# Patient Record
Sex: MALE | Race: WHITE | Employment: OTHER | URBAN - METROPOLITAN AREA
[De-identification: names, ages, dates, MRNs, and addresses within clinical notes are randomized per-mention and may not be internally consistent; named-entity substitution may affect disease eponyms.]

---

## 2018-12-11 ENCOUNTER — HOSPITAL ENCOUNTER (OUTPATIENT)
Dept: CARDIAC REHAB | Age: 63
Setting detail: THERAPIES SERIES
Discharge: HOME | End: 2018-12-11
Payer: MEDICARE

## 2018-12-11 PROCEDURE — G0424 PULMONARY REHAB W EXER: HCPCS

## 2018-12-13 ENCOUNTER — HOSPITAL ENCOUNTER (OUTPATIENT)
Dept: CARDIAC REHAB | Age: 63
Setting detail: THERAPIES SERIES
Discharge: HOME | End: 2018-12-13
Payer: MEDICARE

## 2018-12-13 PROCEDURE — G0424 PULMONARY REHAB W EXER: HCPCS

## 2018-12-18 ENCOUNTER — HOSPITAL ENCOUNTER (OUTPATIENT)
Dept: CARDIAC REHAB | Age: 63
Setting detail: THERAPIES SERIES
Discharge: HOME | End: 2018-12-18
Payer: MEDICARE

## 2018-12-18 PROCEDURE — G0424 PULMONARY REHAB W EXER: HCPCS

## 2018-12-20 ENCOUNTER — HOSPITAL ENCOUNTER (OUTPATIENT)
Dept: CARDIAC REHAB | Age: 63
Setting detail: THERAPIES SERIES
Discharge: HOME | End: 2018-12-20
Payer: MEDICARE

## 2018-12-20 VITALS — BODY MASS INDEX: 28.72 KG/M2 | WEIGHT: 200.6 LBS | HEIGHT: 70 IN

## 2018-12-20 PROCEDURE — G0424 PULMONARY REHAB W EXER: HCPCS

## 2018-12-27 ENCOUNTER — HOSPITAL ENCOUNTER (OUTPATIENT)
Dept: CARDIAC REHAB | Age: 63
Setting detail: THERAPIES SERIES
Discharge: HOME | End: 2018-12-27
Payer: MEDICARE

## 2018-12-27 VITALS — WEIGHT: 202.2 LBS | HEIGHT: 70 IN | BODY MASS INDEX: 28.95 KG/M2

## 2018-12-27 NOTE — PROGRESS NOTES
Outpatient Dietitian Assessment  12/27/2018    Lucita Padilla 58 y.o. male    PCP:   Ghazala Koehler MD    Assessment:  Patient and significant seen for nutrition counseling. Patient and significant report that patient usually eats 3 meals per day; has gained 20 pounds since quit smoking and wants to lose weight. Usual body weight: 170 to 175 pounds. Patient likes sweets. Pertinent past medical/surgical history:   hyperlipidemia, depression, emphysema ( per pt), GERD, HTN, SOB, Hard of Hearing, COPD, CAD,  LLL  Lung nodule. Diet at home: regular; no alcohol use now ( hx of 1 beer a month , data on file) , no food allergies; appetite excellent; no cultural, Islam or ethnic food preferences; no difficulty chewing or swallowing foods; has partial upper dentures and own bottom teeth; has adequate funds for groceries; patient and significan other shop at grocery stores; patient and significant other cook; patient is a retired baker; 24 hour dietary recall done and  Is as followed:  Breakfast: 9 am, at home 2 cups regular coffee with cream ( usually does not eat breakfast)  AM snack: time?, at home,  Ate few [employer] sugar cookies  During  afternoon, ate \"few ham sandwiches\" on white bread which were leftover  After dinner, time?, at home, ate more Amanda cookies  Eats at sit down restaurants per haps one time per week  Eats at fast food restaurants one time per month ( plans to stop eating fast food after the first of year)   Drinks approximately 2 1/2 cups regular coffee with cream daily  Does not routinely drink pop  Drinks 1 - 16 ounce bottle of water daily    Ht Readings from Last 1 Encounters:   12/27/18 5' 10\" (1.778 m)     Wt Readings from Last 3 Encounters:   12/27/18 202 lb 3.2 oz (91.7 kg)   12/20/18 200 lb 9.6 oz (91 kg)   01/06/13 175 lb (79.4 kg)     Body mass index is 29.01 kg/m². Diagnosis:  Overweight related to history of excessive caloric intake as evident by BMI 29.01.     Intervention:  Meet estimated needs. Lose weight. Adequate hydration. Increased knowledge. Improved compliance. Follow individualized diet. Education provided: Medical Nutrition Therapy Heart Healthy Low Sodium, My Plate, Meal Planning Made Easy, Reaching Nutrition goals, 7 common serving size handouts. ( addendum) Nutrition: Interdisciplinary Nutrition Therapy ( ITP) note: Patient is a phase II patient. ITP done. See data on file. Will continue to monitor. Electronically signed by Josie Thayer RD, LD on 12/27/18 at 1:23 PM      Monitoring/Evaluation:  RD will monitor PO intake and weight data as available. The goals set by this patient are to limit portions to lose weight, drink more water and limit sweet intake. The patient will be followed on January 24, 2019. Sonali Melton MA, RD, LD

## 2019-11-07 RX ORDER — GABAPENTIN 600 MG/1
600 TABLET ORAL NIGHTLY
COMMUNITY

## 2019-11-07 RX ORDER — HYDROCODONE BITARTRATE AND ACETAMINOPHEN 7.5; 325 MG/1; MG/1
1 TABLET ORAL EVERY 8 HOURS PRN
COMMUNITY
End: 2022-08-21 | Stop reason: ALTCHOICE

## 2019-11-07 RX ORDER — QUETIAPINE FUMARATE 200 MG/1
200 TABLET, FILM COATED ORAL DAILY
COMMUNITY
End: 2022-08-21 | Stop reason: ALTCHOICE

## 2019-11-08 ENCOUNTER — HOSPITAL ENCOUNTER (OUTPATIENT)
Age: 64
Discharge: HOME | End: 2019-11-08
Payer: MEDICARE

## 2019-11-08 LAB
EKG ATRIAL RATE: 110 BPM
EKG P AXIS: 79 DEGREES
EKG P-R INTERVAL: 174 MS
EKG Q-T INTERVAL: 324 MS
EKG QRS DURATION: 92 MS
EKG QTC CALCULATION (BAZETT): 438 MS
EKG R AXIS: 80 DEGREES
EKG T AXIS: 61 DEGREES
EKG VENTRICULAR RATE: 110 BPM

## 2019-11-08 PROCEDURE — 93005 ELECTROCARDIOGRAM TRACING: CPT | Performed by: ANESTHESIOLOGY

## 2019-11-11 ENCOUNTER — ANESTHESIA EVENT (OUTPATIENT)
Dept: OPERATING ROOM | Age: 64
End: 2019-11-11
Payer: MEDICARE

## 2019-11-12 ASSESSMENT — LIFESTYLE VARIABLES: SMOKING_STATUS: 0

## 2019-11-13 ENCOUNTER — HOSPITAL ENCOUNTER (OUTPATIENT)
Age: 64
Setting detail: OUTPATIENT SURGERY
Discharge: HOME | End: 2019-11-13
Attending: SURGERY | Admitting: SURGERY
Payer: MEDICARE

## 2019-11-13 ENCOUNTER — ANESTHESIA (OUTPATIENT)
Dept: OPERATING ROOM | Age: 64
End: 2019-11-13
Payer: MEDICARE

## 2019-11-13 VITALS
WEIGHT: 209 LBS | SYSTOLIC BLOOD PRESSURE: 130 MMHG | HEIGHT: 70 IN | HEART RATE: 95 BPM | BODY MASS INDEX: 29.92 KG/M2 | DIASTOLIC BLOOD PRESSURE: 82 MMHG | OXYGEN SATURATION: 92 % | RESPIRATION RATE: 16 BRPM | TEMPERATURE: 98 F

## 2019-11-13 VITALS
OXYGEN SATURATION: 99 % | SYSTOLIC BLOOD PRESSURE: 102 MMHG | DIASTOLIC BLOOD PRESSURE: 63 MMHG | RESPIRATION RATE: 8 BRPM

## 2019-11-13 DIAGNOSIS — M72.0 CONTRACTURE OF PALMAR FASCIA: ICD-10-CM

## 2019-11-13 DIAGNOSIS — M72.0 DUPUYTREN CONTRACTURE: Primary | ICD-10-CM

## 2019-11-13 PROCEDURE — 3700000001 HC ADD 15 MINUTES (ANESTHESIA): Performed by: SURGERY

## 2019-11-13 PROCEDURE — 7100000010 HC PHASE II RECOVERY - FIRST 15 MIN: Performed by: SURGERY

## 2019-11-13 PROCEDURE — 7100000001 HC PACU RECOVERY - ADDTL 15 MIN: Performed by: SURGERY

## 2019-11-13 PROCEDURE — 2580000003 HC RX 258: Performed by: ANESTHESIOLOGY

## 2019-11-13 PROCEDURE — 6370000000 HC RX 637 (ALT 250 FOR IP): Performed by: ANESTHESIOLOGY

## 2019-11-13 PROCEDURE — 3700000000 HC ANESTHESIA ATTENDED CARE: Performed by: SURGERY

## 2019-11-13 PROCEDURE — 3600000003 HC SURGERY LEVEL 3 BASE: Performed by: SURGERY

## 2019-11-13 PROCEDURE — 7100000000 HC PACU RECOVERY - FIRST 15 MIN: Performed by: SURGERY

## 2019-11-13 PROCEDURE — 2709999900 HC NON-CHARGEABLE SUPPLY: Performed by: SURGERY

## 2019-11-13 PROCEDURE — 6360000002 HC RX W HCPCS: Performed by: NURSE ANESTHETIST, CERTIFIED REGISTERED

## 2019-11-13 PROCEDURE — 2580000003 HC RX 258: Performed by: SURGERY

## 2019-11-13 PROCEDURE — 7100000011 HC PHASE II RECOVERY - ADDTL 15 MIN: Performed by: SURGERY

## 2019-11-13 PROCEDURE — 6360000002 HC RX W HCPCS: Performed by: SURGERY

## 2019-11-13 PROCEDURE — 3600000013 HC SURGERY LEVEL 3 ADDTL 15MIN: Performed by: SURGERY

## 2019-11-13 PROCEDURE — 2500000003 HC RX 250 WO HCPCS: Performed by: SURGERY

## 2019-11-13 PROCEDURE — 2500000003 HC RX 250 WO HCPCS: Performed by: NURSE ANESTHETIST, CERTIFIED REGISTERED

## 2019-11-13 PROCEDURE — 88304 TISSUE EXAM BY PATHOLOGIST: CPT

## 2019-11-13 RX ORDER — HYDRALAZINE HYDROCHLORIDE 20 MG/ML
5 INJECTION INTRAMUSCULAR; INTRAVENOUS EVERY 10 MIN PRN
Status: DISCONTINUED | OUTPATIENT
Start: 2019-11-13 | End: 2019-11-13 | Stop reason: HOSPADM

## 2019-11-13 RX ORDER — SODIUM CHLORIDE, SODIUM LACTATE, POTASSIUM CHLORIDE, CALCIUM CHLORIDE 600; 310; 30; 20 MG/100ML; MG/100ML; MG/100ML; MG/100ML
INJECTION, SOLUTION INTRAVENOUS CONTINUOUS
Status: DISCONTINUED | OUTPATIENT
Start: 2019-11-13 | End: 2019-11-13 | Stop reason: HOSPADM

## 2019-11-13 RX ORDER — HYDROMORPHONE HYDROCHLORIDE 1 MG/ML
0.25 INJECTION, SOLUTION INTRAMUSCULAR; INTRAVENOUS; SUBCUTANEOUS EVERY 5 MIN PRN
Status: DISCONTINUED | OUTPATIENT
Start: 2019-11-13 | End: 2019-11-13 | Stop reason: HOSPADM

## 2019-11-13 RX ORDER — FAMOTIDINE 20 MG/1
20 TABLET, FILM COATED ORAL ONCE
Status: COMPLETED | OUTPATIENT
Start: 2019-11-13 | End: 2019-11-13

## 2019-11-13 RX ORDER — HYDROCODONE BITARTRATE AND ACETAMINOPHEN 5; 325 MG/1; MG/1
1 TABLET ORAL EVERY 4 HOURS PRN
Qty: 12 TABLET | Refills: 0 | Status: SHIPPED | OUTPATIENT
Start: 2019-11-13 | End: 2019-11-20

## 2019-11-13 RX ORDER — PROMETHAZINE HYDROCHLORIDE 25 MG/ML
25 INJECTION, SOLUTION INTRAMUSCULAR; INTRAVENOUS
Status: DISCONTINUED | OUTPATIENT
Start: 2019-11-13 | End: 2019-11-13 | Stop reason: HOSPADM

## 2019-11-13 RX ORDER — HYDROCODONE BITARTRATE AND ACETAMINOPHEN 7.5; 325 MG/1; MG/1
1 TABLET ORAL EVERY 6 HOURS PRN
Qty: 12 TABLET | Refills: 0 | Status: SHIPPED | OUTPATIENT
Start: 2019-11-13 | End: 2019-11-16

## 2019-11-13 RX ORDER — DIPHENHYDRAMINE HYDROCHLORIDE 50 MG/ML
INJECTION INTRAMUSCULAR; INTRAVENOUS PRN
Status: DISCONTINUED | OUTPATIENT
Start: 2019-11-13 | End: 2019-11-13 | Stop reason: SDUPTHER

## 2019-11-13 RX ORDER — MEPERIDINE HYDROCHLORIDE 50 MG/ML
INJECTION INTRAMUSCULAR; INTRAVENOUS; SUBCUTANEOUS PRN
Status: DISCONTINUED | OUTPATIENT
Start: 2019-11-13 | End: 2019-11-13 | Stop reason: SDUPTHER

## 2019-11-13 RX ORDER — GLYCOPYRROLATE 1 MG/5 ML
SYRINGE (ML) INTRAVENOUS PRN
Status: DISCONTINUED | OUTPATIENT
Start: 2019-11-13 | End: 2019-11-13 | Stop reason: SDUPTHER

## 2019-11-13 RX ORDER — MIDAZOLAM HYDROCHLORIDE 1 MG/ML
INJECTION INTRAMUSCULAR; INTRAVENOUS PRN
Status: DISCONTINUED | OUTPATIENT
Start: 2019-11-13 | End: 2019-11-13 | Stop reason: SDUPTHER

## 2019-11-13 RX ORDER — METOCLOPRAMIDE 10 MG/1
10 TABLET ORAL ONCE
Status: COMPLETED | OUTPATIENT
Start: 2019-11-13 | End: 2019-11-13

## 2019-11-13 RX ORDER — DEXAMETHASONE SODIUM PHOSPHATE 10 MG/ML
INJECTION, SOLUTION INTRAMUSCULAR; INTRAVENOUS PRN
Status: DISCONTINUED | OUTPATIENT
Start: 2019-11-13 | End: 2019-11-13 | Stop reason: SDUPTHER

## 2019-11-13 RX ORDER — MEPERIDINE HYDROCHLORIDE 50 MG/ML
12.5 INJECTION INTRAMUSCULAR; INTRAVENOUS; SUBCUTANEOUS EVERY 5 MIN PRN
Status: DISCONTINUED | OUTPATIENT
Start: 2019-11-13 | End: 2019-11-13 | Stop reason: HOSPADM

## 2019-11-13 RX ORDER — MORPHINE SULFATE 2 MG/ML
1 INJECTION, SOLUTION INTRAMUSCULAR; INTRAVENOUS EVERY 5 MIN PRN
Status: DISCONTINUED | OUTPATIENT
Start: 2019-11-13 | End: 2019-11-13 | Stop reason: HOSPADM

## 2019-11-13 RX ORDER — FENTANYL CITRATE 50 UG/ML
50 INJECTION, SOLUTION INTRAMUSCULAR; INTRAVENOUS EVERY 5 MIN PRN
Status: DISCONTINUED | OUTPATIENT
Start: 2019-11-13 | End: 2019-11-13 | Stop reason: HOSPADM

## 2019-11-13 RX ORDER — SIMVASTATIN 80 MG
10 TABLET ORAL NIGHTLY
COMMUNITY

## 2019-11-13 RX ORDER — PROPOFOL 10 MG/ML
INJECTION, EMULSION INTRAVENOUS PRN
Status: DISCONTINUED | OUTPATIENT
Start: 2019-11-13 | End: 2019-11-13 | Stop reason: SDUPTHER

## 2019-11-13 RX ORDER — CEPHALEXIN 500 MG/1
500 CAPSULE ORAL 3 TIMES DAILY
Qty: 16 CAPSULE | Refills: 0 | Status: ON HOLD | OUTPATIENT
Start: 2019-11-13 | End: 2021-09-14 | Stop reason: HOSPADM

## 2019-11-13 RX ORDER — DIPHENHYDRAMINE HYDROCHLORIDE 50 MG/ML
12.5 INJECTION INTRAMUSCULAR; INTRAVENOUS
Status: DISCONTINUED | OUTPATIENT
Start: 2019-11-13 | End: 2019-11-13 | Stop reason: HOSPADM

## 2019-11-13 RX ORDER — HYDROCODONE BITARTRATE AND ACETAMINOPHEN 5; 325 MG/1; MG/1
1 TABLET ORAL PRN
Status: COMPLETED | OUTPATIENT
Start: 2019-11-13 | End: 2019-11-13

## 2019-11-13 RX ORDER — LIDOCAINE HYDROCHLORIDE 20 MG/ML
INJECTION, SOLUTION INTRAVENOUS PRN
Status: DISCONTINUED | OUTPATIENT
Start: 2019-11-13 | End: 2019-11-13 | Stop reason: SDUPTHER

## 2019-11-13 RX ORDER — FENTANYL CITRATE 50 UG/ML
INJECTION, SOLUTION INTRAMUSCULAR; INTRAVENOUS PRN
Status: DISCONTINUED | OUTPATIENT
Start: 2019-11-13 | End: 2019-11-13 | Stop reason: SDUPTHER

## 2019-11-13 RX ORDER — HYDROCODONE BITARTRATE AND ACETAMINOPHEN 5; 325 MG/1; MG/1
2 TABLET ORAL PRN
Status: COMPLETED | OUTPATIENT
Start: 2019-11-13 | End: 2019-11-13

## 2019-11-13 RX ORDER — OXYCODONE HYDROCHLORIDE AND ACETAMINOPHEN 5; 325 MG/1; MG/1
1 TABLET ORAL EVERY 4 HOURS PRN
Status: DISCONTINUED | OUTPATIENT
Start: 2019-11-13 | End: 2019-11-13 | Stop reason: HOSPADM

## 2019-11-13 RX ORDER — PHENYLEPHRINE HYDROCHLORIDE 10 MG/ML
INJECTION INTRAVENOUS PRN
Status: DISCONTINUED | OUTPATIENT
Start: 2019-11-13 | End: 2019-11-13 | Stop reason: SDUPTHER

## 2019-11-13 RX ORDER — LABETALOL 20 MG/4 ML (5 MG/ML) INTRAVENOUS SYRINGE
5 EVERY 10 MIN PRN
Status: DISCONTINUED | OUTPATIENT
Start: 2019-11-13 | End: 2019-11-13 | Stop reason: HOSPADM

## 2019-11-13 RX ORDER — PROMETHAZINE HYDROCHLORIDE 25 MG/ML
INJECTION, SOLUTION INTRAMUSCULAR; INTRAVENOUS PRN
Status: DISCONTINUED | OUTPATIENT
Start: 2019-11-13 | End: 2019-11-13 | Stop reason: SDUPTHER

## 2019-11-13 RX ORDER — ONDANSETRON 2 MG/ML
INJECTION INTRAMUSCULAR; INTRAVENOUS PRN
Status: DISCONTINUED | OUTPATIENT
Start: 2019-11-13 | End: 2019-11-13 | Stop reason: SDUPTHER

## 2019-11-13 RX ADMIN — Medication 0.2 MG: at 12:26

## 2019-11-13 RX ADMIN — FAMOTIDINE 20 MG: 20 TABLET ORAL at 11:15

## 2019-11-13 RX ADMIN — SODIUM CHLORIDE, POTASSIUM CHLORIDE, SODIUM LACTATE AND CALCIUM CHLORIDE: 600; 310; 30; 20 INJECTION, SOLUTION INTRAVENOUS at 13:28

## 2019-11-13 RX ADMIN — PHENYLEPHRINE HYDROCHLORIDE 100 MCG: 10 INJECTION INTRAVENOUS at 13:22

## 2019-11-13 RX ADMIN — HYDROCODONE BITARTRATE AND ACETAMINOPHEN 2 TABLET: 5; 325 TABLET ORAL at 15:31

## 2019-11-13 RX ADMIN — FENTANYL CITRATE 100 MCG: 50 INJECTION, SOLUTION INTRAMUSCULAR; INTRAVENOUS at 12:42

## 2019-11-13 RX ADMIN — CEFAZOLIN 1 G: 1 INJECTION, POWDER, FOR SOLUTION INTRAMUSCULAR; INTRAVENOUS at 12:24

## 2019-11-13 RX ADMIN — FENTANYL CITRATE 50 MCG: 50 INJECTION, SOLUTION INTRAMUSCULAR; INTRAVENOUS at 13:19

## 2019-11-13 RX ADMIN — PHENYLEPHRINE HYDROCHLORIDE 50 MCG: 10 INJECTION INTRAVENOUS at 12:49

## 2019-11-13 RX ADMIN — METOCLOPRAMIDE 10 MG: 10 TABLET ORAL at 11:15

## 2019-11-13 RX ADMIN — ONDANSETRON HYDROCHLORIDE 4 MG: 2 INJECTION, SOLUTION INTRAMUSCULAR; INTRAVENOUS at 14:18

## 2019-11-13 RX ADMIN — DEXAMETHASONE SODIUM PHOSPHATE 10 MG: 10 INJECTION, SOLUTION INTRAMUSCULAR; INTRAVENOUS at 12:45

## 2019-11-13 RX ADMIN — FENTANYL CITRATE 50 MCG: 50 INJECTION, SOLUTION INTRAMUSCULAR; INTRAVENOUS at 12:26

## 2019-11-13 RX ADMIN — DIPHENHYDRAMINE HYDROCHLORIDE 12.5 MG: 50 INJECTION, SOLUTION INTRAMUSCULAR; INTRAVENOUS at 14:18

## 2019-11-13 RX ADMIN — SODIUM CHLORIDE, POTASSIUM CHLORIDE, SODIUM LACTATE AND CALCIUM CHLORIDE: 600; 310; 30; 20 INJECTION, SOLUTION INTRAVENOUS at 11:33

## 2019-11-13 RX ADMIN — MEPERIDINE HYDROCHLORIDE 25 MG: 50 INJECTION, SOLUTION INTRAMUSCULAR; INTRAVENOUS; SUBCUTANEOUS at 14:22

## 2019-11-13 RX ADMIN — PROPOFOL 200 MG: 10 INJECTION, EMULSION INTRAVENOUS at 12:30

## 2019-11-13 RX ADMIN — MEPERIDINE HYDROCHLORIDE 25 MG: 50 INJECTION, SOLUTION INTRAMUSCULAR; INTRAVENOUS; SUBCUTANEOUS at 14:18

## 2019-11-13 RX ADMIN — MIDAZOLAM 2 MG: 1 INJECTION INTRAMUSCULAR; INTRAVENOUS at 12:24

## 2019-11-13 RX ADMIN — LIDOCAINE HYDROCHLORIDE 50 MG: 20 INJECTION, SOLUTION INTRAVENOUS at 12:26

## 2019-11-13 RX ADMIN — PROMETHAZINE HYDROCHLORIDE 12.5 MG: 25 INJECTION INTRAMUSCULAR; INTRAVENOUS at 14:22

## 2019-11-13 RX ADMIN — FENTANYL CITRATE 50 MCG: 50 INJECTION, SOLUTION INTRAMUSCULAR; INTRAVENOUS at 13:06

## 2019-11-13 ASSESSMENT — PULMONARY FUNCTION TESTS
PIF_VALUE: 19
PIF_VALUE: 17
PIF_VALUE: 18
PIF_VALUE: 16
PIF_VALUE: 16
PIF_VALUE: 18
PIF_VALUE: 16
PIF_VALUE: 15
PIF_VALUE: 17
PIF_VALUE: 15
PIF_VALUE: 16
PIF_VALUE: 15
PIF_VALUE: 17
PIF_VALUE: 16
PIF_VALUE: 17
PIF_VALUE: 16
PIF_VALUE: 17
PIF_VALUE: 15
PIF_VALUE: 2
PIF_VALUE: 16
PIF_VALUE: 17
PIF_VALUE: 27
PIF_VALUE: 16
PIF_VALUE: 3
PIF_VALUE: 16
PIF_VALUE: 17
PIF_VALUE: 15
PIF_VALUE: 15
PIF_VALUE: 6
PIF_VALUE: 17
PIF_VALUE: 16
PIF_VALUE: 14
PIF_VALUE: 16
PIF_VALUE: 17
PIF_VALUE: 15
PIF_VALUE: 15
PIF_VALUE: 17
PIF_VALUE: 15
PIF_VALUE: 15
PIF_VALUE: 17
PIF_VALUE: 15
PIF_VALUE: 19
PIF_VALUE: 15
PIF_VALUE: 19
PIF_VALUE: 9
PIF_VALUE: 17
PIF_VALUE: 16
PIF_VALUE: 17
PIF_VALUE: 17
PIF_VALUE: 19
PIF_VALUE: 4
PIF_VALUE: 19
PIF_VALUE: 20
PIF_VALUE: 15
PIF_VALUE: 5
PIF_VALUE: 16
PIF_VALUE: 16
PIF_VALUE: 15
PIF_VALUE: 18
PIF_VALUE: 16
PIF_VALUE: 18
PIF_VALUE: 17
PIF_VALUE: 19
PIF_VALUE: 15
PIF_VALUE: 17
PIF_VALUE: 15
PIF_VALUE: 16
PIF_VALUE: 17
PIF_VALUE: 17
PIF_VALUE: 16
PIF_VALUE: 15
PIF_VALUE: 16
PIF_VALUE: 16
PIF_VALUE: 17
PIF_VALUE: 17
PIF_VALUE: 15
PIF_VALUE: 17
PIF_VALUE: 17
PIF_VALUE: 16
PIF_VALUE: 17
PIF_VALUE: 16
PIF_VALUE: 4
PIF_VALUE: 4
PIF_VALUE: 15
PIF_VALUE: 15
PIF_VALUE: 17
PIF_VALUE: 16
PIF_VALUE: 17
PIF_VALUE: 16
PIF_VALUE: 19
PIF_VALUE: 5
PIF_VALUE: 15
PIF_VALUE: 17
PIF_VALUE: 14
PIF_VALUE: 17
PIF_VALUE: 16
PIF_VALUE: 17
PIF_VALUE: 17
PIF_VALUE: 16
PIF_VALUE: 17
PIF_VALUE: 18
PIF_VALUE: 16
PIF_VALUE: 19
PIF_VALUE: 16
PIF_VALUE: 17
PIF_VALUE: 15
PIF_VALUE: 17
PIF_VALUE: 1
PIF_VALUE: 17
PIF_VALUE: 15
PIF_VALUE: 16
PIF_VALUE: 0
PIF_VALUE: 18
PIF_VALUE: 18
PIF_VALUE: 16
PIF_VALUE: 12
PIF_VALUE: 0
PIF_VALUE: 18
PIF_VALUE: 15
PIF_VALUE: 15

## 2019-11-13 ASSESSMENT — PAIN DESCRIPTION - ORIENTATION
ORIENTATION: LEFT

## 2019-11-13 ASSESSMENT — PAIN DESCRIPTION - FREQUENCY
FREQUENCY: CONTINUOUS

## 2019-11-13 ASSESSMENT — PAIN SCALES - GENERAL
PAINLEVEL_OUTOF10: 5
PAINLEVEL_OUTOF10: 0
PAINLEVEL_OUTOF10: 10
PAINLEVEL_OUTOF10: 0

## 2019-11-13 ASSESSMENT — PAIN DESCRIPTION - LOCATION
LOCATION: HAND

## 2019-11-13 ASSESSMENT — PAIN DESCRIPTION - DESCRIPTORS
DESCRIPTORS: ACHING;BURNING;DISCOMFORT
DESCRIPTORS: BURNING

## 2019-11-13 ASSESSMENT — PAIN DESCRIPTION - PAIN TYPE
TYPE: SURGICAL PAIN
TYPE: SURGICAL PAIN
TYPE: CHRONIC PAIN
TYPE: SURGICAL PAIN

## 2019-11-13 ASSESSMENT — PAIN DESCRIPTION - PROGRESSION: CLINICAL_PROGRESSION: GRADUALLY WORSENING

## 2019-11-13 ASSESSMENT — PAIN DESCRIPTION - ONSET: ONSET: ON-GOING

## 2019-11-13 ASSESSMENT — PAIN - FUNCTIONAL ASSESSMENT: PAIN_FUNCTIONAL_ASSESSMENT: 0-10

## 2020-04-27 ENCOUNTER — HOSPITAL ENCOUNTER (OUTPATIENT)
Age: 65
Discharge: HOME | End: 2020-04-29
Payer: MEDICARE

## 2020-04-27 LAB
ALBUMIN SERPL-MCNC: 4.2 G/DL (ref 3.5–5.2)
ALP BLD-CCNC: 98 U/L (ref 40–129)
ALT SERPL-CCNC: 35 U/L (ref 0–40)
ANION GAP SERPL CALCULATED.3IONS-SCNC: 12 MMOL/L (ref 7–16)
AST SERPL-CCNC: 21 U/L (ref 0–39)
BASOPHILS ABSOLUTE: 0 E9/L (ref 0–0.2)
BASOPHILS RELATIVE PERCENT: 0.3 % (ref 0–2)
BILIRUB SERPL-MCNC: 0.3 MG/DL (ref 0–1.2)
BUN BLDV-MCNC: 17 MG/DL (ref 8–23)
CALCIUM SERPL-MCNC: 9.4 MG/DL (ref 8.6–10.2)
CHLORIDE BLD-SCNC: 103 MMOL/L (ref 98–107)
CHOLESTEROL, TOTAL: 262 MG/DL (ref 0–199)
CO2: 25 MMOL/L (ref 22–29)
CREAT SERPL-MCNC: 1.2 MG/DL (ref 0.7–1.2)
EOSINOPHILS ABSOLUTE: 0.1 E9/L (ref 0.05–0.5)
EOSINOPHILS RELATIVE PERCENT: 0.9 % (ref 0–6)
GFR AFRICAN AMERICAN: >60
GFR NON-AFRICAN AMERICAN: >60 ML/MIN/1.73
GLUCOSE BLD-MCNC: 135 MG/DL (ref 74–99)
HCT VFR BLD CALC: 47.9 % (ref 37–54)
HDLC SERPL-MCNC: 51 MG/DL
HEMOGLOBIN: 15.7 G/DL (ref 12.5–16.5)
LDL CHOLESTEROL CALCULATED: 163 MG/DL (ref 0–99)
LYMPHOCYTES ABSOLUTE: 1.27 E9/L (ref 1.5–4)
LYMPHOCYTES RELATIVE PERCENT: 11.3 % (ref 20–42)
MCH RBC QN AUTO: 32 PG (ref 26–35)
MCHC RBC AUTO-ENTMCNC: 32.8 % (ref 32–34.5)
MCV RBC AUTO: 97.8 FL (ref 80–99.9)
MONOCYTES ABSOLUTE: 0.58 E9/L (ref 0.1–0.95)
MONOCYTES RELATIVE PERCENT: 5.2 % (ref 2–12)
NEUTROPHILS ABSOLUTE: 9.55 E9/L (ref 1.8–7.3)
NEUTROPHILS RELATIVE PERCENT: 82.6 % (ref 43–80)
PDW BLD-RTO: 13.2 FL (ref 11.5–15)
PLATELET # BLD: 197 E9/L (ref 130–450)
PMV BLD AUTO: 10.4 FL (ref 7–12)
POTASSIUM SERPL-SCNC: 4.6 MMOL/L (ref 3.5–5)
PROSTATE SPECIFIC ANTIGEN: 0.4 NG/ML (ref 0–4)
RBC # BLD: 4.9 E12/L (ref 3.8–5.8)
SODIUM BLD-SCNC: 140 MMOL/L (ref 132–146)
TOTAL PROTEIN: 6.7 G/DL (ref 6.4–8.3)
TRIGL SERPL-MCNC: 238 MG/DL (ref 0–149)
TSH SERPL DL<=0.05 MIU/L-ACNC: 0.2 UIU/ML (ref 0.27–4.2)
URIC ACID, SERUM: 5.3 MG/DL (ref 3.4–7)
VITAMIN D 25-HYDROXY: 41 NG/ML (ref 30–100)
VLDLC SERPL CALC-MCNC: 48 MG/DL
WBC # BLD: 11.5 E9/L (ref 4.5–11.5)

## 2020-04-27 PROCEDURE — 80061 LIPID PANEL: CPT

## 2020-04-27 PROCEDURE — 85025 COMPLETE CBC W/AUTO DIFF WBC: CPT

## 2020-04-27 PROCEDURE — 82306 VITAMIN D 25 HYDROXY: CPT

## 2020-04-27 PROCEDURE — 84550 ASSAY OF BLOOD/URIC ACID: CPT

## 2020-04-27 PROCEDURE — 84443 ASSAY THYROID STIM HORMONE: CPT

## 2020-04-27 PROCEDURE — G0103 PSA SCREENING: HCPCS

## 2020-04-27 PROCEDURE — 80053 COMPREHEN METABOLIC PANEL: CPT

## 2021-08-24 ENCOUNTER — TELEPHONE (OUTPATIENT)
Dept: FAMILY MEDICINE CLINIC | Age: 66
End: 2021-08-24

## 2021-08-24 NOTE — TELEPHONE ENCOUNTER
----- Message from [email or web address] sent at 8/23/2021 12:29 PM EDT -----  Subject: Appointment Request    Reason for Call: Routine Hospital Follow Up    QUESTIONS  Type of Appointment? New Patient/New to Provider  Reason for appointment request? Available appointments did not meet   patient need  Additional Information for Provider? patient was released from [facility] 08/19/21, had 2 separate stays between 07/15-08/19/21 (9   days and 6 days on the second, for difficulty breathing-- lung infections,   pulmonary obstructions, and blood bacteria. Patient needs IP follow up   care to be scheduled. No appts available before 11/11/21. Patient wants to   see Dr. Jackie Mattson. Please leave message  ---------------------------------------------------------------------------  --------------  CALL BACK INFO  What is the best way for the office to contact you? OK to leave message on   voicemail  Preferred Call Back Phone Number? [phone]  ---------------------------------------------------------------------------  --------------  SCRIPT ANSWERS  Relationship to Patient? Other  Representative Name? Janette Worthington  Additional information verified (besides Name and Date of Birth)? Address  Specialty Confirmation? Primary Care  (Patient requests to see provider urgently. )? No  (Has the patient been discharged from the hospital within 2 business days   AND does not have a Telephone Encounter - Follow Up From Hospital   documented in [address]?)? No  Do you have any questions for your primary care provider that need to be   answered prior to your appointment? (Use RN Triage if question pertains to   anything on the red flag list)? No  (Patient needs follow up visit after hospital discharge) Book first   available appointment within 7 days OF DISCHARGE, if no appt, proceed to   book the next available time slot within 14 days OF DISCHARGE AND Send   Message to Provider.  [address] Follow Up appointment cannot be booked beyond 14 Days and should result in a Message to Provider. ? Yes   Have you been diagnosed with, awaiting test results for, or told that you   are suspected of having COVID-19 (Coronavirus)? (If patient has tested   negative or was tested as a requirement for work, school, or travel and   not based on symptoms, answer no)? No  Do you currently have flu-like symptoms including fever or chills, cough,   shortness of breath, difficulty breathing, or new loss of taste or smell? No  Have you had close contact with someone with COVID-19 in the last 14 days? No  (Service Expert - click yes below to proceed with [employer] As Usual   Scheduling)?  Yes

## 2021-09-07 ENCOUNTER — APPOINTMENT (OUTPATIENT)
Dept: GENERAL RADIOLOGY | Age: 66
DRG: 189 | End: 2021-09-07
Payer: MEDICARE

## 2021-09-07 ENCOUNTER — HOSPITAL ENCOUNTER (INPATIENT)
Age: 66
LOS: 7 days | Discharge: HOME | DRG: 189 | End: 2021-09-14
Attending: EMERGENCY MEDICINE | Admitting: INTERNAL MEDICINE
Payer: MEDICARE

## 2021-09-07 DIAGNOSIS — J96.21 ACUTE ON CHRONIC RESPIRATORY FAILURE WITH HYPOXIA (HCC): ICD-10-CM

## 2021-09-07 DIAGNOSIS — J44.1 COPD EXACERBATION (HCC): Primary | ICD-10-CM

## 2021-09-07 LAB
ALBUMIN SERPL-MCNC: 3.1 G/DL (ref 3.5–5.2)
ALP BLD-CCNC: 74 U/L (ref 40–129)
ALT SERPL-CCNC: 13 U/L (ref 0–40)
ANION GAP SERPL CALCULATED.3IONS-SCNC: 8 MMOL/L (ref 7–16)
AST SERPL-CCNC: 10 U/L (ref 0–39)
BASOPHILS ABSOLUTE: 0 E9/L (ref 0–0.2)
BASOPHILS RELATIVE PERCENT: 0.3 % (ref 0–2)
BILIRUB SERPL-MCNC: 0.6 MG/DL (ref 0–1.2)
BUN BLDV-MCNC: 13 MG/DL (ref 6–23)
CALCIUM SERPL-MCNC: 8.1 MG/DL (ref 8.6–10.2)
CHLORIDE BLD-SCNC: 103 MMOL/L (ref 98–107)
CO2: 23 MMOL/L (ref 22–29)
CREAT SERPL-MCNC: 1.1 MG/DL (ref 0.7–1.2)
EOSINOPHILS ABSOLUTE: 0.09 E9/L (ref 0.05–0.5)
EOSINOPHILS RELATIVE PERCENT: 0.9 % (ref 0–6)
GFR AFRICAN AMERICAN: >60
GFR NON-AFRICAN AMERICAN: >60 ML/MIN/1.73
GLUCOSE BLD-MCNC: 190 MG/DL (ref 74–99)
HCT VFR BLD CALC: 38.1 % (ref 37–54)
HEMOGLOBIN: 12.3 G/DL (ref 12.5–16.5)
LYMPHOCYTES ABSOLUTE: 1.14 E9/L (ref 1.5–4)
LYMPHOCYTES RELATIVE PERCENT: 12.2 % (ref 20–42)
MCH RBC QN AUTO: 32.5 PG (ref 26–35)
MCHC RBC AUTO-ENTMCNC: 32.3 % (ref 32–34.5)
MCV RBC AUTO: 100.8 FL (ref 80–99.9)
MONOCYTES ABSOLUTE: 0.66 E9/L (ref 0.1–0.95)
MONOCYTES RELATIVE PERCENT: 7 % (ref 2–12)
NEUTROPHILS ABSOLUTE: 7.6 E9/L (ref 1.8–7.3)
NEUTROPHILS RELATIVE PERCENT: 80 % (ref 43–80)
OVALOCYTES: ABNORMAL
PDW BLD-RTO: 13.3 FL (ref 11.5–15)
PLATELET # BLD: 162 E9/L (ref 130–450)
PMV BLD AUTO: 9.4 FL (ref 7–12)
POIKILOCYTES: ABNORMAL
POLYCHROMASIA: ABNORMAL
POTASSIUM REFLEX MAGNESIUM: 3.7 MMOL/L (ref 3.5–5)
PRO-BNP: 692 PG/ML (ref 0–125)
RBC # BLD: 3.78 E12/L (ref 3.8–5.8)
SARS-COV-2, NAAT: NOT DETECTED
SODIUM BLD-SCNC: 134 MMOL/L (ref 132–146)
TOTAL PROTEIN: 5.6 G/DL (ref 6.4–8.3)
TROPONIN, HIGH SENSITIVITY: 16 NG/L (ref 0–11)
WBC # BLD: 9.5 E9/L (ref 4.5–11.5)

## 2021-09-07 PROCEDURE — 93005 ELECTROCARDIOGRAM TRACING: CPT | Performed by: EMERGENCY MEDICINE

## 2021-09-07 PROCEDURE — 85025 COMPLETE CBC W/AUTO DIFF WBC: CPT

## 2021-09-07 PROCEDURE — 84484 ASSAY OF TROPONIN QUANT: CPT

## 2021-09-07 PROCEDURE — 80053 COMPREHEN METABOLIC PANEL: CPT

## 2021-09-07 PROCEDURE — 99223 1ST HOSP IP/OBS HIGH 75: CPT | Performed by: INTERNAL MEDICINE

## 2021-09-07 PROCEDURE — 2060000000 HC ICU INTERMEDIATE R&B

## 2021-09-07 PROCEDURE — 6370000000 HC RX 637 (ALT 250 FOR IP): Performed by: EMERGENCY MEDICINE

## 2021-09-07 PROCEDURE — 83036 HEMOGLOBIN GLYCOSYLATED A1C: CPT

## 2021-09-07 PROCEDURE — 87635 SARS-COV-2 COVID-19 AMP PRB: CPT

## 2021-09-07 PROCEDURE — 36415 COLL VENOUS BLD VENIPUNCTURE: CPT

## 2021-09-07 PROCEDURE — 71045 X-RAY EXAM CHEST 1 VIEW: CPT

## 2021-09-07 PROCEDURE — 99284 EMERGENCY DEPT VISIT MOD MDM: CPT

## 2021-09-07 PROCEDURE — 83880 ASSAY OF NATRIURETIC PEPTIDE: CPT

## 2021-09-07 PROCEDURE — 94640 AIRWAY INHALATION TREATMENT: CPT

## 2021-09-07 RX ORDER — METHYLPREDNISOLONE SODIUM SUCCINATE 125 MG/2ML
125 INJECTION, POWDER, LYOPHILIZED, FOR SOLUTION INTRAMUSCULAR; INTRAVENOUS ONCE
Status: DISCONTINUED | OUTPATIENT
Start: 2021-09-07 | End: 2021-09-07

## 2021-09-07 RX ORDER — IPRATROPIUM BROMIDE AND ALBUTEROL SULFATE 2.5; .5 MG/3ML; MG/3ML
3 SOLUTION RESPIRATORY (INHALATION) ONCE
Status: COMPLETED | OUTPATIENT
Start: 2021-09-07 | End: 2021-09-07

## 2021-09-07 RX ADMIN — IPRATROPIUM BROMIDE AND ALBUTEROL SULFATE 3 AMPULE: .5; 3 SOLUTION RESPIRATORY (INHALATION) at 20:42

## 2021-09-07 ASSESSMENT — ENCOUNTER SYMPTOMS
EYE PAIN: 0
BACK PAIN: 0
WHEEZING: 1
VOMITING: 0
DIARRHEA: 0
ABDOMINAL PAIN: 0
SORE THROAT: 0
ALLERGIC/IMMUNOLOGIC NEGATIVE: 1
COUGH: 1
SHORTNESS OF BREATH: 1

## 2021-09-08 ENCOUNTER — APPOINTMENT (OUTPATIENT)
Dept: CT IMAGING | Age: 66
DRG: 189 | End: 2021-09-08
Payer: MEDICARE

## 2021-09-08 PROBLEM — Z87.891 HISTORY OF TOBACCO ABUSE: Status: ACTIVE | Noted: 2021-09-08

## 2021-09-08 PROBLEM — J96.22 ACUTE ON CHRONIC RESPIRATORY FAILURE WITH HYPOXIA AND HYPERCAPNIA (HCC): Status: ACTIVE | Noted: 2021-09-08

## 2021-09-08 PROBLEM — J96.21 ACUTE ON CHRONIC RESPIRATORY FAILURE WITH HYPOXIA AND HYPERCAPNIA (HCC): Status: ACTIVE | Noted: 2021-09-08

## 2021-09-08 LAB
ADENOVIRUS BY PCR: NOT DETECTED
ANION GAP SERPL CALCULATED.3IONS-SCNC: 11 MMOL/L (ref 7–16)
B.E.: -4.5 MMOL/L (ref -3–3)
BORDETELLA PARAPERTUSSIS BY PCR: NOT DETECTED
BORDETELLA PERTUSSIS BY PCR: NOT DETECTED
BUN BLDV-MCNC: 12 MG/DL (ref 6–23)
CALCIUM SERPL-MCNC: 9.6 MG/DL (ref 8.6–10.2)
CHLAMYDOPHILIA PNEUMONIAE BY PCR: NOT DETECTED
CHLORIDE BLD-SCNC: 104 MMOL/L (ref 98–107)
CO2: 26 MMOL/L (ref 22–29)
COHB: 0.2 % (ref 0–1.5)
CORONAVIRUS 229E BY PCR: NOT DETECTED
CORONAVIRUS HKU1 BY PCR: NOT DETECTED
CORONAVIRUS NL63 BY PCR: NOT DETECTED
CORONAVIRUS OC43 BY PCR: NOT DETECTED
CREAT SERPL-MCNC: 0.8 MG/DL (ref 0.7–1.2)
CRITICAL: ABNORMAL
D DIMER: 397 NG/ML DDU
DATE ANALYZED: ABNORMAL
DATE OF COLLECTION: ABNORMAL
EKG ATRIAL RATE: 117 BPM
EKG P AXIS: 76 DEGREES
EKG P-R INTERVAL: 166 MS
EKG Q-T INTERVAL: 316 MS
EKG QRS DURATION: 92 MS
EKG QTC CALCULATION (BAZETT): 440 MS
EKG R AXIS: 72 DEGREES
EKG T AXIS: 59 DEGREES
EKG VENTRICULAR RATE: 117 BPM
FIO2: 36 %
FOLATE: 15.5 NG/ML (ref 4.8–24.2)
GFR AFRICAN AMERICAN: >60
GFR NON-AFRICAN AMERICAN: >60 ML/MIN/1.73
GLUCOSE BLD-MCNC: 277 MG/DL (ref 74–99)
HBA1C MFR BLD: 7.2 % (ref 4–5.6)
HCO3: 20.2 MMOL/L (ref 22–26)
HCT VFR BLD CALC: 39 % (ref 37–54)
HEMOGLOBIN: 12.9 G/DL (ref 12.5–16.5)
HHB: 1.8 % (ref 0–5)
HUMAN METAPNEUMOVIRUS BY PCR: NOT DETECTED
HUMAN RHINOVIRUS/ENTEROVIRUS BY PCR: NOT DETECTED
INFLUENZA A BY PCR: NOT DETECTED
INFLUENZA B BY PCR: NOT DETECTED
LAB: ABNORMAL
Lab: ABNORMAL
MAGNESIUM: 2.2 MG/DL (ref 1.6–2.6)
MCH RBC QN AUTO: 32.4 PG (ref 26–35)
MCHC RBC AUTO-ENTMCNC: 33.1 % (ref 32–34.5)
MCV RBC AUTO: 98 FL (ref 80–99.9)
METHB: 0.4 % (ref 0–1.5)
MODE: ABNORMAL
MYCOPLASMA PNEUMONIAE BY PCR: NOT DETECTED
O2 CONTENT: 19.7 ML/DL
O2 SATURATION: 98.2 % (ref 92–98.5)
O2HB: 97.6 % (ref 94–97)
OPERATOR ID: 3086
PARAINFLUENZA VIRUS 1 BY PCR: NOT DETECTED
PARAINFLUENZA VIRUS 2 BY PCR: NOT DETECTED
PARAINFLUENZA VIRUS 3 BY PCR: NOT DETECTED
PARAINFLUENZA VIRUS 4 BY PCR: NOT DETECTED
PATIENT TEMP: 37 C
PCO2: 36.1 MMHG (ref 35–45)
PDW BLD-RTO: 13 FL (ref 11.5–15)
PFO2: 3.49 MMHG/%
PH BLOOD GAS: 7.37 (ref 7.35–7.45)
PHOSPHORUS: 2.1 MG/DL (ref 2.5–4.5)
PLATELET # BLD: 183 E9/L (ref 130–450)
PMV BLD AUTO: 9.3 FL (ref 7–12)
PO2: 125.7 MMHG (ref 75–100)
POTASSIUM SERPL-SCNC: 4.1 MMOL/L (ref 3.5–5)
RBC # BLD: 3.98 E12/L (ref 3.8–5.8)
RESPIRATORY SYNCYTIAL VIRUS BY PCR: NOT DETECTED
RI(T): 0.64
SARS-COV-2, PCR: NOT DETECTED
SODIUM BLD-SCNC: 141 MMOL/L (ref 132–146)
SOURCE, BLOOD GAS: ABNORMAL
T4 FREE: 1.03 NG/DL (ref 0.93–1.7)
THB: 14.2 G/DL (ref 11.5–16.5)
TIME ANALYZED: 526
TROPONIN, HIGH SENSITIVITY: 11 NG/L (ref 0–11)
TROPONIN, HIGH SENSITIVITY: 13 NG/L (ref 0–11)
TSH SERPL DL<=0.05 MIU/L-ACNC: 0.1 UIU/ML (ref 0.27–4.2)
VITAMIN B-12: 617 PG/ML (ref 211–946)
WBC # BLD: 9.1 E9/L (ref 4.5–11.5)

## 2021-09-08 PROCEDURE — 83735 ASSAY OF MAGNESIUM: CPT

## 2021-09-08 PROCEDURE — 82607 VITAMIN B-12: CPT

## 2021-09-08 PROCEDURE — 6360000004 HC RX CONTRAST MEDICATION: Performed by: RADIOLOGY

## 2021-09-08 PROCEDURE — 80048 BASIC METABOLIC PNL TOTAL CA: CPT

## 2021-09-08 PROCEDURE — 6370000000 HC RX 637 (ALT 250 FOR IP): Performed by: EMERGENCY MEDICINE

## 2021-09-08 PROCEDURE — 2060000000 HC ICU INTERMEDIATE R&B

## 2021-09-08 PROCEDURE — 85378 FIBRIN DEGRADE SEMIQUANT: CPT

## 2021-09-08 PROCEDURE — 94664 DEMO&/EVAL PT USE INHALER: CPT

## 2021-09-08 PROCEDURE — 36415 COLL VENOUS BLD VENIPUNCTURE: CPT

## 2021-09-08 PROCEDURE — 84443 ASSAY THYROID STIM HORMONE: CPT

## 2021-09-08 PROCEDURE — 92610 EVALUATE SWALLOWING FUNCTION: CPT | Performed by: SPEECH-LANGUAGE PATHOLOGIST

## 2021-09-08 PROCEDURE — 6360000002 HC RX W HCPCS: Performed by: INTERNAL MEDICINE

## 2021-09-08 PROCEDURE — 85027 COMPLETE CBC AUTOMATED: CPT

## 2021-09-08 PROCEDURE — 2500000003 HC RX 250 WO HCPCS: Performed by: EMERGENCY MEDICINE

## 2021-09-08 PROCEDURE — 6360000002 HC RX W HCPCS: Performed by: EMERGENCY MEDICINE

## 2021-09-08 PROCEDURE — 82805 BLOOD GASES W/O2 SATURATION: CPT

## 2021-09-08 PROCEDURE — 94640 AIRWAY INHALATION TREATMENT: CPT

## 2021-09-08 PROCEDURE — 6370000000 HC RX 637 (ALT 250 FOR IP): Performed by: INTERNAL MEDICINE

## 2021-09-08 PROCEDURE — APPSS30 APP SPLIT SHARED TIME 16-30 MINUTES: Performed by: NURSE PRACTITIONER

## 2021-09-08 PROCEDURE — 2580000003 HC RX 258: Performed by: EMERGENCY MEDICINE

## 2021-09-08 PROCEDURE — 71275 CT ANGIOGRAPHY CHEST: CPT

## 2021-09-08 PROCEDURE — 84484 ASSAY OF TROPONIN QUANT: CPT

## 2021-09-08 PROCEDURE — 2580000003 HC RX 258: Performed by: INTERNAL MEDICINE

## 2021-09-08 PROCEDURE — 99233 SBSQ HOSP IP/OBS HIGH 50: CPT | Performed by: INTERNAL MEDICINE

## 2021-09-08 PROCEDURE — 93010 ELECTROCARDIOGRAM REPORT: CPT | Performed by: INTERNAL MEDICINE

## 2021-09-08 PROCEDURE — 84100 ASSAY OF PHOSPHORUS: CPT

## 2021-09-08 PROCEDURE — 84439 ASSAY OF FREE THYROXINE: CPT

## 2021-09-08 PROCEDURE — 82746 ASSAY OF FOLIC ACID SERUM: CPT

## 2021-09-08 PROCEDURE — 0202U NFCT DS 22 TRGT SARS-COV-2: CPT

## 2021-09-08 RX ORDER — DOXYCYCLINE HYCLATE 100 MG
100 TABLET ORAL EVERY 12 HOURS
Status: DISCONTINUED | OUTPATIENT
Start: 2021-09-08 | End: 2021-09-08 | Stop reason: CLARIF

## 2021-09-08 RX ORDER — METHYLPREDNISOLONE SODIUM SUCCINATE 40 MG/ML
40 INJECTION, POWDER, LYOPHILIZED, FOR SOLUTION INTRAMUSCULAR; INTRAVENOUS EVERY 6 HOURS
Status: COMPLETED | OUTPATIENT
Start: 2021-09-08 | End: 2021-09-09

## 2021-09-08 RX ORDER — HYDROCODONE BITARTRATE AND ACETAMINOPHEN 7.5; 325 MG/1; MG/1
1 TABLET ORAL EVERY 8 HOURS PRN
Status: DISCONTINUED | OUTPATIENT
Start: 2021-09-08 | End: 2021-09-14 | Stop reason: HOSPADM

## 2021-09-08 RX ORDER — ARFORMOTEROL TARTRATE 15 UG/2ML
15 SOLUTION RESPIRATORY (INHALATION) 2 TIMES DAILY
Status: DISCONTINUED | OUTPATIENT
Start: 2021-09-08 | End: 2021-09-14 | Stop reason: HOSPADM

## 2021-09-08 RX ORDER — POLYETHYLENE GLYCOL 3350 17 G/17G
17 POWDER, FOR SOLUTION ORAL DAILY PRN
Status: DISCONTINUED | OUTPATIENT
Start: 2021-09-08 | End: 2021-09-14 | Stop reason: HOSPADM

## 2021-09-08 RX ORDER — ACETAMINOPHEN 325 MG/1
650 TABLET ORAL EVERY 6 HOURS PRN
Status: DISCONTINUED | OUTPATIENT
Start: 2021-09-08 | End: 2021-09-14 | Stop reason: HOSPADM

## 2021-09-08 RX ORDER — DOXYCYCLINE HYCLATE 100 MG/1
100 CAPSULE ORAL EVERY 12 HOURS
Status: DISCONTINUED | OUTPATIENT
Start: 2021-09-08 | End: 2021-09-10

## 2021-09-08 RX ORDER — ONDANSETRON 4 MG/1
4 TABLET, ORALLY DISINTEGRATING ORAL EVERY 8 HOURS PRN
Status: DISCONTINUED | OUTPATIENT
Start: 2021-09-08 | End: 2021-09-14 | Stop reason: HOSPADM

## 2021-09-08 RX ORDER — QUETIAPINE FUMARATE 100 MG/1
200 TABLET, FILM COATED ORAL DAILY
Status: DISCONTINUED | OUTPATIENT
Start: 2021-09-08 | End: 2021-09-14 | Stop reason: HOSPADM

## 2021-09-08 RX ORDER — SODIUM CHLORIDE 0.9 % (FLUSH) 0.9 %
5-40 SYRINGE (ML) INJECTION PRN
Status: DISCONTINUED | OUTPATIENT
Start: 2021-09-08 | End: 2021-09-14 | Stop reason: HOSPADM

## 2021-09-08 RX ORDER — IPRATROPIUM BROMIDE AND ALBUTEROL SULFATE 2.5; .5 MG/3ML; MG/3ML
1 SOLUTION RESPIRATORY (INHALATION)
Status: DISCONTINUED | OUTPATIENT
Start: 2021-09-08 | End: 2021-09-10

## 2021-09-08 RX ORDER — ATORVASTATIN CALCIUM 20 MG/1
20 TABLET, FILM COATED ORAL DAILY
Status: DISCONTINUED | OUTPATIENT
Start: 2021-09-08 | End: 2021-09-14 | Stop reason: HOSPADM

## 2021-09-08 RX ORDER — GABAPENTIN 300 MG/1
600 CAPSULE ORAL 3 TIMES DAILY
Status: DISCONTINUED | OUTPATIENT
Start: 2021-09-08 | End: 2021-09-14 | Stop reason: HOSPADM

## 2021-09-08 RX ORDER — SODIUM CHLORIDE 9 MG/ML
25 INJECTION, SOLUTION INTRAVENOUS PRN
Status: DISCONTINUED | OUTPATIENT
Start: 2021-09-08 | End: 2021-09-14 | Stop reason: HOSPADM

## 2021-09-08 RX ORDER — SODIUM CHLORIDE 0.9 % (FLUSH) 0.9 %
5-40 SYRINGE (ML) INJECTION EVERY 12 HOURS SCHEDULED
Status: DISCONTINUED | OUTPATIENT
Start: 2021-09-08 | End: 2021-09-14 | Stop reason: HOSPADM

## 2021-09-08 RX ORDER — ONDANSETRON 2 MG/ML
4 INJECTION INTRAMUSCULAR; INTRAVENOUS EVERY 6 HOURS PRN
Status: DISCONTINUED | OUTPATIENT
Start: 2021-09-08 | End: 2021-09-14 | Stop reason: HOSPADM

## 2021-09-08 RX ORDER — ALBUTEROL SULFATE 2.5 MG/3ML
2.5 SOLUTION RESPIRATORY (INHALATION)
Status: DISCONTINUED | OUTPATIENT
Start: 2021-09-08 | End: 2021-09-14 | Stop reason: HOSPADM

## 2021-09-08 RX ORDER — PREDNISONE 20 MG/1
40 TABLET ORAL DAILY
Status: DISCONTINUED | OUTPATIENT
Start: 2021-09-10 | End: 2021-09-10

## 2021-09-08 RX ORDER — BUDESONIDE 0.5 MG/2ML
1 INHALANT ORAL 2 TIMES DAILY
Status: DISCONTINUED | OUTPATIENT
Start: 2021-09-08 | End: 2021-09-14 | Stop reason: HOSPADM

## 2021-09-08 RX ORDER — IPRATROPIUM BROMIDE AND ALBUTEROL SULFATE 2.5; .5 MG/3ML; MG/3ML
3 SOLUTION RESPIRATORY (INHALATION) ONCE
Status: COMPLETED | OUTPATIENT
Start: 2021-09-08 | End: 2021-09-08

## 2021-09-08 RX ORDER — ACETAMINOPHEN 650 MG/1
650 SUPPOSITORY RECTAL EVERY 6 HOURS PRN
Status: DISCONTINUED | OUTPATIENT
Start: 2021-09-08 | End: 2021-09-14 | Stop reason: HOSPADM

## 2021-09-08 RX ADMIN — IPRATROPIUM BROMIDE AND ALBUTEROL SULFATE 1 AMPULE: .5; 3 SOLUTION RESPIRATORY (INHALATION) at 09:50

## 2021-09-08 RX ADMIN — BUDESONIDE 1000 MCG: 0.5 INHALANT RESPIRATORY (INHALATION) at 05:15

## 2021-09-08 RX ADMIN — METHYLPREDNISOLONE SODIUM SUCCINATE 40 MG: 40 INJECTION, POWDER, FOR SOLUTION INTRAMUSCULAR; INTRAVENOUS at 21:13

## 2021-09-08 RX ADMIN — ATORVASTATIN CALCIUM 20 MG: 20 TABLET, FILM COATED ORAL at 15:16

## 2021-09-08 RX ADMIN — ALBUTEROL SULFATE 2.5 MG: 2.5 SOLUTION RESPIRATORY (INHALATION) at 09:50

## 2021-09-08 RX ADMIN — IPRATROPIUM BROMIDE AND ALBUTEROL SULFATE 1 AMPULE: .5; 3 SOLUTION RESPIRATORY (INHALATION) at 18:29

## 2021-09-08 RX ADMIN — QUETIAPINE FUMARATE 200 MG: 100 TABLET ORAL at 13:16

## 2021-09-08 RX ADMIN — DOXYCYCLINE HYCLATE 100 MG: 100 CAPSULE ORAL at 15:17

## 2021-09-08 RX ADMIN — IPRATROPIUM BROMIDE AND ALBUTEROL SULFATE 3 AMPULE: .5; 3 SOLUTION RESPIRATORY (INHALATION) at 05:11

## 2021-09-08 RX ADMIN — ARFORMOTEROL TARTRATE 15 MCG: 15 SOLUTION RESPIRATORY (INHALATION) at 05:10

## 2021-09-08 RX ADMIN — METHYLPREDNISOLONE SODIUM SUCCINATE 40 MG: 40 INJECTION, POWDER, FOR SOLUTION INTRAMUSCULAR; INTRAVENOUS at 02:03

## 2021-09-08 RX ADMIN — GABAPENTIN 600 MG: 300 CAPSULE ORAL at 15:17

## 2021-09-08 RX ADMIN — METHYLPREDNISOLONE SODIUM SUCCINATE 40 MG: 40 INJECTION, POWDER, FOR SOLUTION INTRAMUSCULAR; INTRAVENOUS at 10:03

## 2021-09-08 RX ADMIN — BUDESONIDE 1000 MCG: 0.5 INHALANT RESPIRATORY (INHALATION) at 18:29

## 2021-09-08 RX ADMIN — IOPAMIDOL 75 ML: 755 INJECTION, SOLUTION INTRAVENOUS at 11:18

## 2021-09-08 RX ADMIN — ONDANSETRON 4 MG: 4 TABLET, ORALLY DISINTEGRATING ORAL at 02:06

## 2021-09-08 RX ADMIN — IPRATROPIUM BROMIDE AND ALBUTEROL SULFATE 1 AMPULE: .5; 3 SOLUTION RESPIRATORY (INHALATION) at 14:52

## 2021-09-08 RX ADMIN — ENOXAPARIN SODIUM 40 MG: 40 INJECTION SUBCUTANEOUS at 13:16

## 2021-09-08 RX ADMIN — HYDROCODONE BITARTRATE AND ACETAMINOPHEN 1 TABLET: 7.5; 325 TABLET ORAL at 02:06

## 2021-09-08 RX ADMIN — METHYLPREDNISOLONE SODIUM SUCCINATE 40 MG: 40 INJECTION, POWDER, FOR SOLUTION INTRAMUSCULAR; INTRAVENOUS at 15:23

## 2021-09-08 RX ADMIN — Medication 10 ML: at 15:18

## 2021-09-08 RX ADMIN — GABAPENTIN 600 MG: 300 CAPSULE ORAL at 21:10

## 2021-09-08 RX ADMIN — ARFORMOTEROL TARTRATE 15 MCG: 15 SOLUTION RESPIRATORY (INHALATION) at 18:29

## 2021-09-08 RX ADMIN — DOXYCYCLINE 100 MG: 100 INJECTION, POWDER, LYOPHILIZED, FOR SOLUTION INTRAVENOUS at 02:08

## 2021-09-08 RX ADMIN — Medication 10 ML: at 21:12

## 2021-09-08 RX ADMIN — WATER 1000 MG: 1 INJECTION INTRAMUSCULAR; INTRAVENOUS; SUBCUTANEOUS at 02:07

## 2021-09-08 ASSESSMENT — PAIN SCALES - GENERAL: PAINLEVEL_OUTOF10: 3

## 2021-09-08 NOTE — ED NOTES
Paged respiratory, gases need re-drawn and patient needs a breathing treatment     Pravin Latif RN  09/08/21 [id number]

## 2021-09-08 NOTE — PROGRESS NOTES
[address] [place], [place] [address]ist   Progress Note    Admitting Date and Time: 9/7/2021  8:12 PM  Admit Dx: COPD exacerbation ([address] [J44.1]    Subjective:    Patient complains of shortness of breath. He is requiring 4 liters of oxygen and wears 2 liters at home. He said that he was admitted twice to [facility] since July 15 th. The first admission he said was 9 days and the second 6 days. He said that he had a bronch at that time and a pea was found in his lungs. He reports that he has episodes of choking on his food. ROS: denies fever, chills, cp, sob, n/v, HA unless stated above.      gabapentin  600 mg Oral TID    QUEtiapine  200 mg Oral Daily    atorvastatin  20 mg Oral Daily    sodium chloride flush  5-40 mL IntraVENous 2 times per day    enoxaparin  40 mg SubCUTAneous Daily    ipratropium-albuterol  1 ampule Inhalation Q4H WA    methylPREDNISolone  40 mg IntraVENous Q6H    Followed by   Elizabeth Mendez ON 9/10/2021] predniSONE  40 mg Oral Daily    Arformoterol Tartrate  15 mcg Nebulization BID    And    budesonide  1 mg Nebulization BID    doxycycline hyclate  100 mg Oral Q12H     HYDROcodone-acetaminophen, 1 tablet, Q8H PRN  sodium chloride flush, 5-40 mL, PRN  sodium chloride, 25 mL, PRN  ondansetron, 4 mg, Q8H PRN   Or  ondansetron, 4 mg, Q6H PRN  polyethylene glycol, 17 g, Daily PRN  acetaminophen, 650 mg, Q6H PRN   Or  acetaminophen, 650 mg, Q6H PRN  albuterol, 2.5 mg, Q2H PRN         Objective:    BP (!) 137/93   Pulse 96   Temp 97.6 °F (36.4 °C)   Resp 22   Wt 205 lb (93 kg)   SpO2 96%   BMI 29.41 kg/m²   General Appearance: alert and oriented to person, place and time, chronically ill appearing  Skin: warm and dry  Head: normocephalic and atraumatic  Eyes: pupils equal, round, and reactive to light, conjunctivae normal  Neck: neck supple and non tender without mass   Pulmonary/Chest: lungs diminished bilaterally,  wheezes, dyspnea with activity  Cardiovascular: normal rate, normal S1 and S2   Abdomen: soft, non-tender, distended, normal bowel sounds, no masses or organomegaly  Extremities: no cyanosis, no clubbing and no edema  Neurologic: no cranial nerve deficit and speech normal      Recent Labs     09/07/21 2046      K 3.7      CO2 23   BUN 13   CREATININE 1.1   GLUCOSE 190*   CALCIUM 8.1*       Recent Labs     09/07/21 2046   ALKPHOS 74   PROT 5.6*   LABALBU 3.1*   BILITOT 0.6   AST 10   ALT 13       Recent Labs     09/07/21 2046   WBC 9.5   RBC 3.78*   HGB 12.3*   HCT 38.1   .8*   MCH 32.5   MCHC 32.3   RDW 13.3      MPV 9.4           Radiology:   XR CHEST PORTABLE   Final Result   Vague ill-defined confluent area of increased density/plate atelectasis in   the left base. There is no recent studies available for comparison to determine baseline. Can consider further evaluation with CT chest.         CTA CHEST W CONTRAST    (Results Pending)       Assessment:  Active Problems:    COPD exacerbation (HCC)    Acute on chronic respiratory failure with hypoxia and hypercapnia (HCC)    History of tobacco abuse  Resolved Problems:    * No resolved hospital problems. *      Plan:  1. Acute on chronic respiratory failure with hypoxia and hypercapnia:  Patient wears 2 liters of oxygen chronically at home but is requiring 4 liters to maintain an oxygen sat above 90%. CTA of the chest ordered. Respiratory film array pending. 2. COPD exacerbation:  Continue doxycycline, steroids, and aerosols. Patient follows with Dr. Vivek Phan as an outpatient. 3. Dysphagia:  Consult Speech Therapy. 4. Depression:  Continue Seroquel. 5. Lung nodules:  3 mm right middle lobe nodule and 5 mm medial left lower lobe nodule seen on CT of the chest 6/24/20 at [facility]. CTA of the chest ordered. Record request recent bronch report from [facility]. NOTE: This report was transcribed using voice recognition software.  Every effort was made to ensure accuracy; however, inadvertent computerized transcription errors may be present.      Electronically signed by OSIRIS Rogers CNP on 9/8/2021 at 10:29 AM

## 2021-09-08 NOTE — ED PROVIDER NOTES
HPI:  9/7/21, Time: 8:26 PM EDT         Charity Gil is a 78 y.o. male presenting to the ED for shortness of breath, beginning 3 days ago. The complaint has been persistent, severe in severity, and worsened by walking. Patient has history of COPD quit smoking 6 years ago has a 30-pack-year history of smoking states he is on 2 L oxygen via nasal cannula at all times, at this time he is on 4 L oxygen nasal cannula slightly tachypneic in the emergency department still only saturating at 95%. .  States he did duo nebs at home however they did not improve his respiratory effort so he called the ambulance. He was given 125 Solu-Medrol and 2 DuoNeb's in route. Patient denies any fever, chills does state he got the Covid vaccine. States is made better by oxygen made worse by ambulation and activity. ROS:     Review of Systems   Constitutional: Positive for fatigue. Negative for chills and fever. HENT: Negative for ear pain and sore throat. Eyes: Negative for pain. Respiratory: Positive for cough, shortness of breath and wheezing. Cardiovascular: Negative for chest pain. Gastrointestinal: Negative for abdominal pain, diarrhea and vomiting. Genitourinary: Negative for flank pain and penile pain. Musculoskeletal: Negative for back pain and neck pain. Skin: Negative. Negative for rash. Allergic/Immunologic: Negative. Neurological: Negative. Negative for syncope and headaches.                --------------------------------------------- PAST HISTORY ---------------------------------------------  Past Medical History:  has a past medical history of Angina pectoris (HCC), Cervical spinal stenosis, COPD (chronic obstructive pulmonary disease) ([facility] Depression, Hyperlipidemia, and Neck pain. Past Surgical History:  has a past surgical history that includes Neck surgery; Colonoscopy; laryngoscopy; Hand surgery (Left, 11/13/2019); and Wrist surgery (Left, 11/13/2019).     Social History: reports that he has quit smoking. His smoking use included cigarettes. He quit after 40.00 years of use. He has never used smokeless tobacco. He reports current alcohol use. He reports that he does not use drugs. Family History: family history is not on file. The patients home medications have been reviewed. Allergies: Patient has no known allergies. ----------------------------------------PHYSICAL EXAM--------------------------------------  Constitutional:  Well developed, ill-appearing, well nourished, no acute distress, non-toxic appearance   Eyes:  PERRL, conjunctiva normal, EOMI  HENT:  Atraumatic, external ears normal, nose normal, oropharynx moist, no pharyngeal exudates. Neck- normal range of motion, no nuchal rigidity   Respiratory: Moderate respiratory distress, normal breath sounds, no rales, diffuse wheezing with moderate restriction of lower lung fields and decreased lung sounds lower lung fields. Cardiovascular:  Tachycardic rate, normal rhythm, no murmurs, no gallops, no rubs. Radial and DP pulses 2+ bilaterally. GI:  Soft, nondistended, normal bowel sounds, nontender, no organomegaly, no mass, no rebound, no guarding   :  No costovertebral angle tenderness   Musculoskeletal:  No edema, no tenderness, no deformities. Back- no tenderness  Integument:  Well hydrated, no rash. Adequate perfusion. Lymphatic:  No cervical lymphadenopathy noted   Neurologic:  Alert & oriented x 3, CN 2-12 normal, normal motor function, normal sensory function, no focal deficits noted. Normal gait. Psychiatric:  Speech and behavior appropriate       -------------------------------------------------- RESULTS -------------------------------------------------  I have personally reviewed all laboratory and imaging results for this patient. Results are listed below.      LABS:  Results for orders placed or performed during the hospital encounter of 09/07/21   COVID-19, Rapid    Specimen: Nasopharyngeal Swab   Result Value Ref Range    SARS-CoV-2, NAAT Not Detected Not Detected   CBC Auto Differential   Result Value Ref Range    WBC 9.5 4.5 - 11.5 E9/L    RBC 3.78 (L) 3.80 - 5.80 E12/L    Hemoglobin 12.3 (L) 12.5 - 16.5 g/dL    Hematocrit 38.1 37.0 - 54.0 %    .8 (H) 80.0 - 99.9 fL    MCH 32.5 26.0 - 35.0 pg    MCHC 32.3 32.0 - 34.5 %    RDW 13.3 11.5 - 15.0 fL    Platelets 593 183 - 620 E9/L    MPV 9.4 7.0 - 12.0 fL    Neutrophils % 80.0 43.0 - 80.0 %    Lymphocytes % 12.2 (L) 20.0 - 42.0 %    Monocytes % 7.0 2.0 - 12.0 %    Eosinophils % 0.9 0.0 - 6.0 %    Basophils % 0.3 0.0 - 2.0 %    Neutrophils Absolute 7.60 (H) 1.80 - 7.30 E9/L    Lymphocytes Absolute 1.14 (L) 1.50 - 4.00 E9/L    Monocytes Absolute 0.66 0.10 - 0.95 E9/L    Eosinophils Absolute 0.09 0.05 - 0.50 E9/L    Basophils Absolute 0.00 0.00 - 0.20 E9/L    Polychromasia 1+     Poikilocytes 1+     Ovalocytes 1+    Comprehensive Metabolic Panel w/ Reflex to MG   Result Value Ref Range    Sodium 134 132 - 146 mmol/L    Potassium reflex Magnesium 3.7 3.5 - 5.0 mmol/L    Chloride 103 98 - 107 mmol/L    CO2 23 22 - 29 mmol/L    Anion Gap 8 7 - 16 mmol/L    Glucose 190 (H) 74 - 99 mg/dL    BUN 13 6 - 23 mg/dL    CREATININE 1.1 0.7 - 1.2 mg/dL    GFR Non-African American >60 >=60 mL/min/1.73    GFR African American >60     Calcium 8.1 (L) 8.6 - 10.2 mg/dL    Total Protein 5.6 (L) 6.4 - 8.3 g/dL    Albumin 3.1 (L) 3.5 - 5.2 g/dL    Total Bilirubin 0.6 0.0 - 1.2 mg/dL    Alkaline Phosphatase 74 40 - 129 U/L    ALT 13 0 - 40 U/L    AST 10 0 - 39 U/L   Troponin   Result Value Ref Range    Troponin, High Sensitivity 16 (H) 0 - 11 ng/L   Brain Natriuretic Peptide   Result Value Ref Range    Pro- (H) 0 - 125 pg/mL   Troponin   Result Value Ref Range    Troponin, High Sensitivity 13 (H) 0 - 11 ng/L   D-dimer, quantitative   Result Value Ref Range    D-Dimer, Quant 397 ng/mL DDU   TSH without Reflex   Result Value Ref Range    TSH 0.098 (L) 0.270 - 4.200 uIU/mL   EKG 12 Lead   Result Value Ref Range    Ventricular Rate 117 BPM    Atrial Rate 117 BPM    P-R Interval 166 ms    QRS Duration 92 ms    Q-T Interval 316 ms    QTc Calculation (Bazett) 440 ms    P Axis 76 degrees    R Axis 72 degrees    T Axis 59 degrees       RADIOLOGY:  Interpreted by Radiologist.  XR CHEST PORTABLE   Final Result   Vague ill-defined confluent area of increased density/plate atelectasis in   the left base. There is no recent studies available for comparison to determine baseline. Can consider further evaluation with CT chest.                 EKG Interpretation by Me  Time: 2051  Rhythm: sinus tachycardia  Rate: tachycardia  Axis: normal  Conduction: normal  ST Segments: no acute change  T Waves: no acute change  Clinical Impression: no acute changes  Comparison to prior EKG: stable as compared to patient's most recent EKG and None      ------------------------- NURSING NOTES AND VITALS REVIEWED ---------------------------  The nursing notes within the ED encounter and vital signs as below have been reviewed by myself. /86   Pulse 127   Temp 97 °F (36.1 °C) (Temporal)   Resp (!) 33   Wt 205 lb (93 kg)   SpO2 97%   BMI 29.41 kg/m²   Oxygen Saturation Interpretation: Abnormal      The patients available past medical records and past encounters were reviewed.         ------------------------------ ED COURSE/MEDICAL DECISION MAKING----------------------  Medications   gabapentin (NEURONTIN) capsule 600 mg (has no administration in time range)   HYDROcodone-acetaminophen (NORCO) 7.5-325 MG per tablet 1 tablet (1 tablet Oral Given 9/8/21 0206)   QUEtiapine (SEROQUEL) tablet 200 mg (has no administration in time range)   atorvastatin (LIPITOR) tablet 20 mg (has no administration in time range)   sodium chloride flush 0.9 % injection 5-40 mL (has no administration in time range)   sodium chloride flush 0.9 % injection 5-40 mL (has no administration in time range) 0.9 % sodium chloride infusion (has no administration in time range)   ondansetron (ZOFRAN-ODT) disintegrating tablet 4 mg (4 mg Oral Given 9/8/21 0206)     Or   ondansetron (ZOFRAN) injection 4 mg ( IntraVENous See Alternative 9/8/21 0206)   polyethylene glycol (GLYCOLAX) packet 17 g (has no administration in time range)   enoxaparin (LOVENOX) injection 40 mg (has no administration in time range)   acetaminophen (TYLENOL) tablet 650 mg (has no administration in time range)     Or   acetaminophen (TYLENOL) suppository 650 mg (has no administration in time range)   ipratropium-albuterol (DUONEB) nebulizer solution 1 ampule (1 ampule Inhalation Not Given 9/8/21 0503)   albuterol (PROVENTIL) nebulizer solution 2.5 mg (has no administration in time range)   methylPREDNISolone sodium (SOLU-MEDROL) injection 40 mg (40 mg IntraVENous Given 9/8/21 0203)     Followed by   predniSONE (DELTASONE) tablet 40 mg (has no administration in time range)   Arformoterol Tartrate (BROVANA) nebulizer solution 15 mcg (15 mcg Nebulization Given 9/8/21 0510)     And   budesonide (PULMICORT) nebulizer suspension 1,000 mcg (1,000 mcg Nebulization Not Given 9/8/21 0122)   doxycycline hyclate (VIBRAMYCIN) capsule 100 mg (has no administration in time range)   ipratropium-albuterol (DUONEB) nebulizer solution 3 ampule (has no administration in time range)   ipratropium-albuterol (DUONEB) nebulizer solution 3 ampule (3 ampules Inhalation Given 9/7/21 2042)   cefTRIAXone (ROCEPHIN) 1,000 mg in sterile water 10 mL IV syringe (0 mg IntraVENous Stopped 9/8/21 0242)   doxycycline (VIBRAMYCIN) 100 mg in dextrose 5 % 100 mL IVPB (100 mg IntraVENous New Bag 9/8/21 0208)     ED Course as of Sep 08 0511   Tue Sep 07, 2021   2047 ATTENDING PROVIDER ATTESTATION:     I have personally performed and/or participated in the history, exam, medical decision making, and procedures and agree with all pertinent clinical information unless otherwise noted.       I have also reviewed and agree with the past medical, family and social history unless otherwise noted. I have discussed this patient in detail with the resident, and provided the instruction and education regarding patient here complaining of gradually worsening shortness of breath, states he spent 15 days in the hospital last month for worsening along issues and breathing issues and has only 30% lung function. States he is chronically getting worse again, no acute change. No particular chest pain or palpitations. No fevers or URI symptoms. No leg pain or swelling. Medicated with breathing treatments prior to arrival as well as steroids. .  My findings/plan: Patient with mild respiratory distress with tachypnea, very diminished breath sounds with an extremely prolonged expiratory phase with tight wheezes in all lung fields. Heart rate mildly tachycardic. Abdomen soft and nontender. No pretibial edema or calf pain. [NC]      ED Course User Index  [NC] Kalia Del Rio,        MEDICATIONS  New Prescriptions    No medications on file           Medical Decision Making:    Patient is a 70-year-old male with chronic COPD on 2 L chronically presenting with moderate respiratory distress requiring 4 to 5 L oxygen to maintain a saturation above 90%. Patient's continues to be tachypneic was given duo nebs and Solu-Medrol however he continued to have diffuse wheezing with decreased lung sounds bilateral lower lung fields.   Patient was given Rocephin and doxycycline for treatment of his COPD exacerbation and will be admitted to the hospital for continued treatment        This patient's ED course included: a personal history and physicial examination, re-evaluation prior to disposition, multiple bedside re-evaluations, IV medications, cardiac monitoring, continuous pulse oximetry, complex medical decision making and emergency management and a personal history and physicial eaxmination    This patient has remained hemodynamically stable and been closely monitored during their ED course. Re-Evaluations:  Time: 2156   Re-evaluation. Patients symptoms show no change  Repeat physical examination is not changed      Consultations:   Spoke with Dr. Darnell Vazquez, he will admit the patient      Counseling: The emergency provider has spoken with the patient and discussed todays results, in addition to providing specific details for the plan of care and counseling regarding the diagnosis and prognosis. Questions are answered at this time and they are agreeable with the plan.    --------------------------- IMPRESSION AND DISPOSITION ---------------------------------    IMPRESSION  1. COPD exacerbation ([facility] [address]    2.  Acute on chronic respiratory failure with hypoxia ([facility]        DISPOSITION  Disposition: Admit to telemetry  Patient condition is fair              Robi Rosales DO  Resident  09/08/21 3274

## 2021-09-08 NOTE — PROGRESS NOTES
SPEECH/LANGUAGE PATHOLOGY  CLINICAL ASSESSMENT OF SWALLOWING FUNCTION   and PLAN OF CARE    PATIENT NAME:  Mike Healy  (male)     MRN:  [id number]    :  1955  (78 y.o.)  STATUS:  Emergency visit:  Room     TODAY'S DATE:  2021  REFERRING PROVIDER:     SLP eval and treat Start: 21 0900, End: 21 09, ONE TIME, Standing Count: 1 Occurrences, R    Gonzalo Benson, APRN - CNP  REASON FOR REFERRAL: concern for aspiration, patient reports that he chokes on his food  EVALUATING THERAPIST: CLARISSA Alex                 RESULTS:    DYSPHAGIA DIAGNOSIS:   Clinical indicators of mild  pharyngeal phase dysphagia       DIET RECOMMENDATIONS:  Regular consistency solids with  thin liquids     FEEDING RECOMMENDATIONS:     Assistance level:  No assistance needed      Compensatory strategies recommended: Small bites/sips and Alternate solids and liquids      Discussed recommendations with nursing and/or faxed report to referring provider: No secondary to no diet/liquid change recommended     SPEECH THERAPY  PLAN OF CARE   The dysphagia POC is established based on physician order, dysphagia diagnosis and results of clinical assessment     Skilled SLP intervention for dysphagia management on acute care 3-5 x per week until goals met, pt plateaus in function and/or discharged from hospital    Conditions Requiring Skilled Therapeutic Intervention for dysphagia:    Sensation of food sticking in throat     Specific dysphagia interventions to include:     Training in positioning for improved integrity of swallow  Compensatory strategy training     Specific instructions for next treatment:  development and training of compensatory swallow strategies to improve airway protection and swallow function     Patient Treatment Goals:    Short Term Goals:  Pt will participate in MBSS to fully assess oropharyngeal swallow function and to assist in determining the least restrictive PO diet to maintain adequate nutrition/hydration   Pt will implement identified compensatory swallowing strategies on 90% of opportunities or greater to improve airway protection and swallow function. Long Term Goals:   Pt will improve oropharyngeal swallow function to ensure airway protection during PO intake to maintain adequate nutrition/hydration and decrease signs/symptoms of aspiration to less than 1 x/day. OTHER RECOMMENDATIONS:  A Video Swallow Study (MBSS) is recommended and requires a physician order      Patient/family Goal:    To eat and drink without choking and feeling food stick     Plan of care discussed with Patient and Family   The Patient and Family understand(s) the diagnosis, prognosis and plan of care     Rehabilitation Potential/Prognosis: good                    ADMITTING DIAGNOSIS: COPD exacerbation ([facility] [J44.1]    VISIT DIAGNOSIS:   Visit Diagnoses       Codes    Acute on chronic respiratory failure with [facility]     J96.21           PATIENT REPORT/COMPLAINT: patient reports he feels food get stuck in throat and at times chokes. Family also reports that in July he had a bronchoscopy done and at the time they found a pea in his lungs. RN cleared patient for participation in assessment     yes     PRIOR LEVEL OF SWALLOW FUNCTION:    PAST HISTORY OF DYSPHAGIA?: yes    Home diet: Regular consistency solids with  thin liquids    PROCEDURE:  Consistencies Administered During the Evaluation   Liquids: thin liquid   Solids:  pureed foods and solid foods      Method of Intake:   cup, spoon  Self fed      Position:   Seated, upright    CLINICAL ASSESSMENT:  Oral Stage:       Pt is able to achieve adequate cohesive bolus prep as evidenced by satisfactory mastication patterns, timely a-p bolus propulsion, and with min oral stasis      Pharyngeal Stage:    No signs of aspiration were noted during this evaluation however, silent aspiration cannot be ruled out at bedside.   If silent aspiration is suspected, a

## 2021-09-08 NOTE — ED NOTES
Bed: 17  Expected date:   Expected time:   Means of arrival:   Comments:  India Burciaga RN  09/07/21 2012

## 2021-09-09 LAB
ALBUMIN SERPL-MCNC: 3.2 G/DL (ref 3.5–5.2)
ALP BLD-CCNC: 82 U/L (ref 40–129)
ALT SERPL-CCNC: 14 U/L (ref 0–40)
ANION GAP SERPL CALCULATED.3IONS-SCNC: 13 MMOL/L (ref 7–16)
AST SERPL-CCNC: 11 U/L (ref 0–39)
BILIRUB SERPL-MCNC: 0.5 MG/DL (ref 0–1.2)
BUN BLDV-MCNC: 16 MG/DL (ref 6–23)
CALCIUM SERPL-MCNC: 8.9 MG/DL (ref 8.6–10.2)
CHLORIDE BLD-SCNC: 103 MMOL/L (ref 98–107)
CO2: 23 MMOL/L (ref 22–29)
CREAT SERPL-MCNC: 0.8 MG/DL (ref 0.7–1.2)
GFR AFRICAN AMERICAN: >60
GFR NON-AFRICAN AMERICAN: >60 ML/MIN/1.73
GLUCOSE BLD-MCNC: 258 MG/DL (ref 74–99)
HCT VFR BLD CALC: 36.6 % (ref 37–54)
HEMOGLOBIN: 11.9 G/DL (ref 12.5–16.5)
MAGNESIUM: 2.3 MG/DL (ref 1.6–2.6)
MCH RBC QN AUTO: 32.7 PG (ref 26–35)
MCHC RBC AUTO-ENTMCNC: 32.5 % (ref 32–34.5)
MCV RBC AUTO: 100.5 FL (ref 80–99.9)
METER GLUCOSE: 210 MG/DL (ref 74–99)
METER GLUCOSE: 215 MG/DL (ref 74–99)
METER GLUCOSE: 243 MG/DL (ref 74–99)
METER GLUCOSE: 271 MG/DL (ref 74–99)
PDW BLD-RTO: 13.2 FL (ref 11.5–15)
PHOSPHORUS: 2.2 MG/DL (ref 2.5–4.5)
PLATELET # BLD: 213 E9/L (ref 130–450)
PMV BLD AUTO: 9.6 FL (ref 7–12)
POTASSIUM SERPL-SCNC: 4.4 MMOL/L (ref 3.5–5)
RBC # BLD: 3.64 E12/L (ref 3.8–5.8)
SODIUM BLD-SCNC: 139 MMOL/L (ref 132–146)
TOTAL PROTEIN: 6.1 G/DL (ref 6.4–8.3)
WBC # BLD: 15.1 E9/L (ref 4.5–11.5)

## 2021-09-09 PROCEDURE — 2580000003 HC RX 258: Performed by: NURSE PRACTITIONER

## 2021-09-09 PROCEDURE — 85027 COMPLETE CBC AUTOMATED: CPT

## 2021-09-09 PROCEDURE — 36415 COLL VENOUS BLD VENIPUNCTURE: CPT

## 2021-09-09 PROCEDURE — 6370000000 HC RX 637 (ALT 250 FOR IP): Performed by: INTERNAL MEDICINE

## 2021-09-09 PROCEDURE — APPSS30 APP SPLIT SHARED TIME 16-30 MINUTES: Performed by: NURSE PRACTITIONER

## 2021-09-09 PROCEDURE — 82962 GLUCOSE BLOOD TEST: CPT

## 2021-09-09 PROCEDURE — 2700000000 HC OXYGEN THERAPY PER DAY

## 2021-09-09 PROCEDURE — 6360000002 HC RX W HCPCS: Performed by: INTERNAL MEDICINE

## 2021-09-09 PROCEDURE — 84100 ASSAY OF PHOSPHORUS: CPT

## 2021-09-09 PROCEDURE — 6370000000 HC RX 637 (ALT 250 FOR IP): Performed by: NURSE PRACTITIONER

## 2021-09-09 PROCEDURE — 99233 SBSQ HOSP IP/OBS HIGH 50: CPT | Performed by: INTERNAL MEDICINE

## 2021-09-09 PROCEDURE — 80053 COMPREHEN METABOLIC PANEL: CPT

## 2021-09-09 PROCEDURE — 2580000003 HC RX 258: Performed by: INTERNAL MEDICINE

## 2021-09-09 PROCEDURE — 2500000003 HC RX 250 WO HCPCS: Performed by: NURSE PRACTITIONER

## 2021-09-09 PROCEDURE — 94640 AIRWAY INHALATION TREATMENT: CPT

## 2021-09-09 PROCEDURE — 2060000000 HC ICU INTERMEDIATE R&B

## 2021-09-09 PROCEDURE — 83735 ASSAY OF MAGNESIUM: CPT

## 2021-09-09 RX ORDER — CARVEDILOL 3.12 MG/1
3.12 TABLET ORAL 2 TIMES DAILY WITH MEALS
Status: DISCONTINUED | OUTPATIENT
Start: 2021-09-09 | End: 2021-09-14 | Stop reason: HOSPADM

## 2021-09-09 RX ORDER — NICOTINE POLACRILEX 4 MG
15 LOZENGE BUCCAL PRN
Status: DISCONTINUED | OUTPATIENT
Start: 2021-09-09 | End: 2021-09-14 | Stop reason: HOSPADM

## 2021-09-09 RX ORDER — DEXTROSE MONOHYDRATE 25 G/50ML
12.5 INJECTION, SOLUTION INTRAVENOUS PRN
Status: DISCONTINUED | OUTPATIENT
Start: 2021-09-09 | End: 2021-09-14 | Stop reason: HOSPADM

## 2021-09-09 RX ORDER — DEXTROSE MONOHYDRATE 50 MG/ML
100 INJECTION, SOLUTION INTRAVENOUS PRN
Status: DISCONTINUED | OUTPATIENT
Start: 2021-09-09 | End: 2021-09-14 | Stop reason: HOSPADM

## 2021-09-09 RX ADMIN — BUDESONIDE 1000 MCG: 0.5 INHALANT RESPIRATORY (INHALATION) at 17:30

## 2021-09-09 RX ADMIN — IPRATROPIUM BROMIDE AND ALBUTEROL SULFATE 1 AMPULE: .5; 3 SOLUTION RESPIRATORY (INHALATION) at 06:39

## 2021-09-09 RX ADMIN — INSULIN LISPRO 3 UNITS: 100 INJECTION, SOLUTION INTRAVENOUS; SUBCUTANEOUS at 12:21

## 2021-09-09 RX ADMIN — METHYLPREDNISOLONE SODIUM SUCCINATE 40 MG: 40 INJECTION, POWDER, FOR SOLUTION INTRAMUSCULAR; INTRAVENOUS at 18:17

## 2021-09-09 RX ADMIN — ARFORMOTEROL TARTRATE 15 MCG: 15 SOLUTION RESPIRATORY (INHALATION) at 17:30

## 2021-09-09 RX ADMIN — CARVEDILOL 3.12 MG: 3.12 TABLET, FILM COATED ORAL at 16:44

## 2021-09-09 RX ADMIN — GABAPENTIN 600 MG: 300 CAPSULE ORAL at 14:07

## 2021-09-09 RX ADMIN — METHYLPREDNISOLONE SODIUM SUCCINATE 40 MG: 40 INJECTION, POWDER, FOR SOLUTION INTRAMUSCULAR; INTRAVENOUS at 01:13

## 2021-09-09 RX ADMIN — QUETIAPINE FUMARATE 200 MG: 100 TABLET ORAL at 08:24

## 2021-09-09 RX ADMIN — DOXYCYCLINE HYCLATE 100 MG: 100 CAPSULE ORAL at 01:14

## 2021-09-09 RX ADMIN — Medication 10 ML: at 21:03

## 2021-09-09 RX ADMIN — DOXYCYCLINE HYCLATE 100 MG: 100 CAPSULE ORAL at 12:21

## 2021-09-09 RX ADMIN — Medication 10 ML: at 08:24

## 2021-09-09 RX ADMIN — ARFORMOTEROL TARTRATE 15 MCG: 15 SOLUTION RESPIRATORY (INHALATION) at 06:39

## 2021-09-09 RX ADMIN — ATORVASTATIN CALCIUM 20 MG: 20 TABLET, FILM COATED ORAL at 08:24

## 2021-09-09 RX ADMIN — CARVEDILOL 3.12 MG: 3.12 TABLET, FILM COATED ORAL at 08:23

## 2021-09-09 RX ADMIN — INSULIN LISPRO 2 UNITS: 100 INJECTION, SOLUTION INTRAVENOUS; SUBCUTANEOUS at 16:44

## 2021-09-09 RX ADMIN — IPRATROPIUM BROMIDE AND ALBUTEROL SULFATE 1 AMPULE: .5; 3 SOLUTION RESPIRATORY (INHALATION) at 13:23

## 2021-09-09 RX ADMIN — GABAPENTIN 600 MG: 300 CAPSULE ORAL at 21:00

## 2021-09-09 RX ADMIN — GABAPENTIN 600 MG: 300 CAPSULE ORAL at 08:23

## 2021-09-09 RX ADMIN — IPRATROPIUM BROMIDE AND ALBUTEROL SULFATE 1 AMPULE: .5; 3 SOLUTION RESPIRATORY (INHALATION) at 09:59

## 2021-09-09 RX ADMIN — METHYLPREDNISOLONE SODIUM SUCCINATE 40 MG: 40 INJECTION, POWDER, FOR SOLUTION INTRAMUSCULAR; INTRAVENOUS at 07:14

## 2021-09-09 RX ADMIN — METFORMIN HYDROCHLORIDE 500 MG: 500 TABLET ORAL at 16:44

## 2021-09-09 RX ADMIN — INSULIN LISPRO 1 UNITS: 100 INJECTION, SOLUTION INTRAVENOUS; SUBCUTANEOUS at 21:01

## 2021-09-09 RX ADMIN — SODIUM PHOSPHATE, MONOBASIC, MONOHYDRATE 10 MMOL: 276; 142 INJECTION, SOLUTION INTRAVENOUS at 12:29

## 2021-09-09 RX ADMIN — METHYLPREDNISOLONE SODIUM SUCCINATE 40 MG: 40 INJECTION, POWDER, FOR SOLUTION INTRAMUSCULAR; INTRAVENOUS at 12:29

## 2021-09-09 RX ADMIN — BUDESONIDE 1000 MCG: 0.5 INHALANT RESPIRATORY (INHALATION) at 06:39

## 2021-09-09 RX ADMIN — ENOXAPARIN SODIUM 40 MG: 40 INJECTION SUBCUTANEOUS at 08:28

## 2021-09-09 RX ADMIN — Medication 10 ML: at 07:14

## 2021-09-09 RX ADMIN — INSULIN LISPRO 2 UNITS: 100 INJECTION, SOLUTION INTRAVENOUS; SUBCUTANEOUS at 08:32

## 2021-09-09 RX ADMIN — IPRATROPIUM BROMIDE AND ALBUTEROL SULFATE 1 AMPULE: .5; 3 SOLUTION RESPIRATORY (INHALATION) at 17:30

## 2021-09-09 SDOH — ECONOMIC STABILITY: HOUSING INSECURITY
IN THE LAST 12 MONTHS, WAS THERE A TIME WHEN YOU DID NOT HAVE A STEADY PLACE TO SLEEP OR SLEPT IN A SHELTER (INCLUDING NOW)?: NO

## 2021-09-09 SDOH — ECONOMIC STABILITY: HOUSING INSECURITY: IN THE LAST 12 MONTHS, HOW MANY PLACES HAVE YOU LIVED?: 1

## 2021-09-09 SDOH — ECONOMIC STABILITY: FOOD INSECURITY: WITHIN THE PAST 12 MONTHS, THE FOOD YOU BOUGHT JUST DIDN'T LAST AND YOU DIDN'T HAVE MONEY TO GET MORE.: NEVER TRUE

## 2021-09-09 SDOH — HEALTH STABILITY: PHYSICAL HEALTH: ON AVERAGE, HOW MANY DAYS PER WEEK DO YOU ENGAGE IN MODERATE TO STRENUOUS EXERCISE (LIKE A BRISK WALK)?: 0 DAYS

## 2021-09-09 SDOH — ECONOMIC STABILITY: TRANSPORTATION INSECURITY
IN THE PAST 12 MONTHS, HAS LACK OF TRANSPORTATION KEPT YOU FROM MEETINGS, WORK, OR FROM GETTING THINGS NEEDED FOR DAILY LIVING?: NO

## 2021-09-09 SDOH — HEALTH STABILITY: PHYSICAL HEALTH: ON AVERAGE, HOW MANY MINUTES DO YOU ENGAGE IN EXERCISE AT THIS LEVEL?: 0 MIN

## 2021-09-09 SDOH — ECONOMIC STABILITY: INCOME INSECURITY: IN THE LAST 12 MONTHS, WAS THERE A TIME WHEN YOU WERE NOT ABLE TO PAY THE MORTGAGE OR RENT ON TIME?: NO

## 2021-09-09 SDOH — ECONOMIC STABILITY: TRANSPORTATION INSECURITY
IN THE PAST 12 MONTHS, HAS THE LACK OF TRANSPORTATION KEPT YOU FROM MEDICAL APPOINTMENTS OR FROM GETTING MEDICATIONS?: NO

## 2021-09-09 SDOH — ECONOMIC STABILITY: FOOD INSECURITY: WITHIN THE PAST 12 MONTHS, YOU WORRIED THAT YOUR FOOD WOULD RUN OUT BEFORE YOU GOT MONEY TO BUY MORE.: NEVER TRUE

## 2021-09-09 ASSESSMENT — SOCIAL DETERMINANTS OF HEALTH (SDOH)
HOW OFTEN DO YOU ATTEND CHURCH OR RELIGIOUS SERVICES?: 1 TO 4 TIMES PER YEAR
WITHIN THE LAST YEAR, HAVE TO BEEN RAPED OR FORCED TO HAVE ANY KIND OF SEXUAL ACTIVITY BY YOUR PARTNER OR EX-PARTNER?: NO
IN A TYPICAL WEEK, HOW MANY TIMES DO YOU TALK ON THE PHONE WITH FAMILY, FRIENDS, OR NEIGHBORS?: TWICE A WEEK
HOW HARD IS IT FOR YOU TO PAY FOR THE VERY BASICS LIKE FOOD, HOUSING, MEDICAL CARE, AND HEATING?: NOT VERY HARD
WITHIN THE LAST YEAR, HAVE YOU BEEN AFRAID OF YOUR PARTNER OR EX-PARTNER?: NO
HOW OFTEN DO YOU ATTENT MEETINGS OF THE CLUB OR ORGANIZATION YOU BELONG TO?: NEVER
DO YOU BELONG TO ANY CLUBS OR ORGANIZATIONS SUCH AS CHURCH GROUPS UNIONS, FRATERNAL OR ATHLETIC GROUPS, OR SCHOOL GROUPS?: NO
HOW OFTEN DO YOU GET TOGETHER WITH FRIENDS OR RELATIVES?: ONCE A WEEK
WITHIN THE LAST YEAR, HAVE YOU BEEN KICKED, HIT, SLAPPED, OR OTHERWISE PHYSICALLY HURT BY YOUR PARTNER OR EX-PARTNER?: NO
ARE YOU MARRIED, WIDOWED, DIVORCED, SEPARATED, NEVER MARRIED, OR LIVING WITH A PARTNER?: LIVING WITH PARTNER
WITHIN THE LAST YEAR, HAVE YOU BEEN HUMILIATED OR EMOTIONALLY ABUSED IN OTHER WAYS BY YOUR PARTNER OR EX-PARTNER?: NO

## 2021-09-09 ASSESSMENT — PAIN - FUNCTIONAL ASSESSMENT
PAIN_FUNCTIONAL_ASSESSMENT: ACTIVITIES ARE NOT PREVENTED
PAIN_FUNCTIONAL_ASSESSMENT: ACTIVITIES ARE NOT PREVENTED

## 2021-09-09 ASSESSMENT — PAIN SCALES - GENERAL
PAINLEVEL_OUTOF10: 0

## 2021-09-09 ASSESSMENT — LIFESTYLE VARIABLES
HOW MANY STANDARD DRINKS CONTAINING ALCOHOL DO YOU HAVE ON A TYPICAL DAY: PATIENT DECLINED
HOW OFTEN DO YOU HAVE A DRINK CONTAINING ALCOHOL: NEVER

## 2021-09-09 ASSESSMENT — PAIN DESCRIPTION - FREQUENCY
FREQUENCY: INTERMITTENT
FREQUENCY: INTERMITTENT

## 2021-09-09 ASSESSMENT — PAIN DESCRIPTION - LOCATION
LOCATION: BACK
LOCATION: BACK

## 2021-09-09 ASSESSMENT — PATIENT HEALTH QUESTIONNAIRE - PHQ9: DEPRESSION UNABLE TO ASSESS: NO

## 2021-09-09 ASSESSMENT — PAIN DESCRIPTION - PAIN TYPE
TYPE: CHRONIC PAIN
TYPE: CHRONIC PAIN

## 2021-09-09 ASSESSMENT — PAIN DESCRIPTION - ORIENTATION
ORIENTATION: POSTERIOR
ORIENTATION: POSTERIOR

## 2021-09-09 ASSESSMENT — PAIN DESCRIPTION - PROGRESSION
CLINICAL_PROGRESSION: NOT CHANGED
CLINICAL_PROGRESSION: NOT CHANGED

## 2021-09-09 ASSESSMENT — PAIN DESCRIPTION - ONSET
ONSET: GRADUAL
ONSET: GRADUAL

## 2021-09-09 NOTE — PLAN OF CARE
Problem: Airway Clearance - Ineffective:  Goal: Ability to maintain a clear airway will improve  Description: Ability to maintain a clear airway will improve  9/9/2021 1608 by Blane Hernandez RN  Outcome: Met This Shift  9/9/2021 0457 by Jose Russell RN  Outcome: Met This Shift     Problem: Skin Integrity:  Goal: Will show no infection signs and symptoms  Description: Will show no infection signs and symptoms  Outcome: Met This Shift  Goal: Absence of new skin breakdown  Description: Absence of new skin breakdown  Outcome: Met This Shift

## 2021-09-09 NOTE — PROGRESS NOTES
[address] [place], [place] [address]ist   Progress Note    Admitting Date and Time: 9/7/2021  8:12 PM  Admit Dx: COPD exacerbation ([facility] [J44.1]  Ac[facility] on chronic respiratory failure with [facility] [J96.21]    Subjective:    Patient reports improvement in his shortness of breath since admission. His blood sugars have been elevated and Hgb A1C is 7.2. He denies any history of Diabetes. ROS: denies fever, chills, cp, sob, n/v, HA unless stated above.      carvedilol  3.125 mg Oral BID WC    insulin lispro  0-6 Units SubCUTAneous TID WC    insulin lispro  0-3 Units SubCUTAneous Nightly    sodium phosphate IVPB  10 mmol IntraVENous Once    gabapentin  600 mg Oral TID    QUEtiapine  200 mg Oral Daily    atorvastatin  20 mg Oral Daily    sodium chloride flush  5-40 mL IntraVENous 2 times per day    enoxaparin  40 mg SubCUTAneous Daily    ipratropium-albuterol  1 ampule Inhalation Q4H WA    methylPREDNISolone  40 mg IntraVENous Q6H    Followed by   Dannial Redhead ON 9/10/2021] predniSONE  40 mg Oral Daily    Arformoterol Tartrate  15 mcg Nebulization BID    And    budesonide  1 mg Nebulization BID    doxycycline hyclate  100 mg Oral Q12H     glucose, 15 g, PRN  dextrose, 12.5 g, PRN  glucagon (rDNA), 1 mg, PRN  dextrose, 100 mL/hr, PRN  HYDROcodone-acetaminophen, 1 tablet, Q8H PRN  sodium chloride flush, 5-40 mL, PRN  sodium chloride, 25 mL, PRN  ondansetron, 4 mg, Q8H PRN   Or  ondansetron, 4 mg, Q6H PRN  polyethylene glycol, 17 g, Daily PRN  acetaminophen, 650 mg, Q6H PRN   Or  acetaminophen, 650 mg, Q6H PRN  albuterol, 2.5 mg, Q2H PRN         Objective:    /76   Pulse 99   Temp 98 °F (36.7 °C) (Oral)   Resp 18   Ht 5' 7\" (1.702 m)   Wt 205 lb (93 kg)   SpO2 97%   BMI 32.11 kg/m²   General Appearance: alert and oriented to person, place and time, chronically ill appearing  Skin: warm and dry  Head: normocephalic and atraumatic  Eyes: pupils equal, round, and reactive to light, conjunctivae normal  Neck: neck supple and non tender without mass   Pulmonary/Chest: lungs diminished bilaterally,  wheezes, dyspnea with activity  Cardiovascular: normal rate, normal S1 and S2   Abdomen: soft, non-tender, distended, normal bowel sounds, no masses or organomegaly  Extremities: no cyanosis, no clubbing and no edema  Neurologic: no cranial nerve deficit and speech normal      Recent Labs     09/07/21 2046 09/08/21 1730 09/09/21  0832    141 139   K 3.7 4.1 4.4    104 103   CO2 23 26 23   BUN 13 12 16   CREATININE 1.1 0.8 0.8   GLUCOSE 190* 277* 258*   CALCIUM 8.1* 9.6 8.9       Recent Labs     09/07/21 2046 09/09/21  0832   ALKPHOS 74 82   PROT 5.6* 6.1*   LABALBU 3.1* 3.2*   BILITOT 0.6 0.5   AST 10 11   ALT 13 14       Recent Labs     09/07/21 2046 09/08/21 1730 09/09/21  0832   WBC 9.5 9.1 15.1*   RBC 3.78* 3.98 3.64*   HGB 12.3* 12.9 11.9*   HCT 38.1 39.0 36.6*   .8* 98.0 100.5*   MCH 32.5 32.4 32.7   MCHC 32.3 33.1 32.5   RDW 13.3 13.0 13.2    183 213   MPV 9.4 9.3 9.6           Radiology:   CTA CHEST W CONTRAST   Final Result   No evidence of pulmonary emboli or acute infiltrate         XR CHEST PORTABLE   Final Result   Vague ill-defined confluent area of increased density/plate atelectasis in   the left base. There is no recent studies available for comparison to determine baseline. Can consider further evaluation with CT chest.             Assessment:  Active Problems:    COPD exacerbation (HCC)    Acute on chronic respiratory failure with hypoxia and hypercapnia (HCC)    History of tobacco abuse  Resolved Problems:    * No resolved hospital problems. *      Plan:  1. Acute on chronic respiratory failure with hypoxia and hypercapnia:  Patient wears 2 liters of oxygen chronically at home but is requiring 4 liters. Wean oxygen to maintain an oxygen sat above 90%. CTA of the chest was negative for PE or acute infiltrate. Respiratory film array was negative. Autumn Spencer 2. COPD exacerbation:  Continue doxycycline, steroids, and aerosols. Patient follows with Dr. Berta Moses as an outpatient. 3. Dysphagia:  Speech Therapy evaluated and recommend regular diet and thin liquids. 4. Depression:  Continue Seroquel. 5. Lung nodules:  3 mm right middle lobe nodule and 5 mm medial left lower lobe nodule seen on CT of the chest 6/24/20 at [facility]. CTA of the chest ordered. Record request recent bronch report from [place]. 6. Fatty liver:  Seen on CTA of the chest.  Outpatient follow up. 7. Diabetes:  Hgb A1C is 7.2. Start Humalog Sliding Scale, blood sugar checks, hypoglycemic protocol. Consult Diabetic Educator. 8. Hypertension:  Continue home Coreg. NOTE: This report was transcribed using voice recognition software. Every effort was made to ensure accuracy; however, inadvertent computerized transcription errors may be present.      Electronically signed by OSIRIS Domínguez CNP on 9/9/2021 at 11:04 AM

## 2021-09-09 NOTE — PROGRESS NOTES
Reason for consult:  New onset DM    A1C:   7.2%                  Patient states the following concerns/barriers to diabetes self-management:       [x] None       [] Medication cost   [] Food cost/availability          [] Reading  [] Hearing   [] Vision                  [] Work    [] Transportation  [] No insurance    [] Physical limitations    [] Other:              Diabetes survival packet provided to:   [x] Patient        Information reviewed:   Definition of diabetes   Target glucose ranges/A1C   Self-monitoring of blood glucose   Prevention/symptoms/treatment of hypo-/hyperglycemia   Medication adherence   The plate method/meal planning guidelines   The benefits of exercise and recommendations   Reducing the risk of chronic complications      Comment:  50 minutes with reviewing above information. Stated he likes his sweets. Encouraged to come back for OP DM education classes. Receptive    Diabetes medications reviewed (use, purpose, action, side effects):  Metformin reviewed    Evaluation/Plan/Recommendations:   Patient's understanding of diabetes:   []Poor   []Fair    []Good   [] Excellent     Outpatient diabetes education is recommended:   [x] Yes  [] No   [] As needed  Patient is interested in outpatient diabetes education:   [x] Yes    [] No    [] Unsure    Recommended:     [] Consult to social work; patient has no insurance or financial hardship      [x] Script for glucometer and supplies (per preference of patient's insurance)               [x] Script for outpatient diabetes education classes from PCP    [x] Carbohydrate-controlled diet    [] Patient prefers/educator recommends insulin pens instead of syringes     (if insulin ordered for home use)    Thank you for this consult.     Benji Aldana RN [facility]

## 2021-09-09 NOTE — PLAN OF CARE
Problem: Breathing Pattern - Ineffective:  Goal: Ability to achieve and maintain a regular respiratory rate will improve  Description: Ability to achieve and maintain a regular respiratory rate will improve  Outcome: Met This Shift     Problem: Discharge Planning:  Goal: Discharged to appropriate level of care  Description: Discharged to appropriate level of care  Outcome: Met This Shift     Problem:  Activity Intolerance:  Goal: Ability to tolerate increased activity will improve  Description: Ability to tolerate increased activity will improve  Outcome: Met This Shift     Problem: Airway Clearance - Ineffective:  Goal: Ability to maintain a clear airway will improve  Description: Ability to maintain a clear airway will improve  Outcome: Met This Shift     Problem: Breathing Pattern - Ineffective:  Goal: Ability to achieve and maintain a regular respiratory rate will improve  Description: Ability to achieve and maintain a regular respiratory rate will improve  Outcome: Met This Shift     Problem: Gas Exchange - Impaired:  Goal: Levels of oxygenation will improve  Description: Levels of oxygenation will improve  Outcome: Met This Shift

## 2021-09-10 LAB
ALBUMIN SERPL-MCNC: 3.1 G/DL (ref 3.5–5.2)
ALP BLD-CCNC: 73 U/L (ref 40–129)
ALT SERPL-CCNC: 12 U/L (ref 0–40)
ANION GAP SERPL CALCULATED.3IONS-SCNC: 9 MMOL/L (ref 7–16)
AST SERPL-CCNC: 12 U/L (ref 0–39)
BILIRUB SERPL-MCNC: <0.2 MG/DL (ref 0–1.2)
BILIRUBIN DIRECT: <0.2 MG/DL (ref 0–0.3)
BILIRUBIN, INDIRECT: ABNORMAL MG/DL (ref 0–1)
BUN BLDV-MCNC: 23 MG/DL (ref 6–23)
CALCIUM SERPL-MCNC: 8.7 MG/DL (ref 8.6–10.2)
CHLORIDE BLD-SCNC: 103 MMOL/L (ref 98–107)
CO2: 27 MMOL/L (ref 22–29)
CREAT SERPL-MCNC: 0.9 MG/DL (ref 0.7–1.2)
GFR AFRICAN AMERICAN: >60
GFR NON-AFRICAN AMERICAN: >60 ML/MIN/1.73
GLUCOSE BLD-MCNC: 213 MG/DL (ref 74–99)
HCT VFR BLD CALC: 37 % (ref 37–54)
HEMOGLOBIN: 11.9 G/DL (ref 12.5–16.5)
MAGNESIUM: 2.2 MG/DL (ref 1.6–2.6)
MCH RBC QN AUTO: 32 PG (ref 26–35)
MCHC RBC AUTO-ENTMCNC: 32.2 % (ref 32–34.5)
MCV RBC AUTO: 99.5 FL (ref 80–99.9)
METER GLUCOSE: 178 MG/DL (ref 74–99)
METER GLUCOSE: 209 MG/DL (ref 74–99)
METER GLUCOSE: 267 MG/DL (ref 74–99)
METER GLUCOSE: 349 MG/DL (ref 74–99)
PDW BLD-RTO: 13.2 FL (ref 11.5–15)
PHOSPHORUS: 2.7 MG/DL (ref 2.5–4.5)
PLATELET # BLD: 246 E9/L (ref 130–450)
PMV BLD AUTO: 9.6 FL (ref 7–12)
POTASSIUM SERPL-SCNC: 4.2 MMOL/L (ref 3.5–5)
RBC # BLD: 3.72 E12/L (ref 3.8–5.8)
SODIUM BLD-SCNC: 139 MMOL/L (ref 132–146)
TOTAL PROTEIN: 5.8 G/DL (ref 6.4–8.3)
WBC # BLD: 16.8 E9/L (ref 4.5–11.5)

## 2021-09-10 PROCEDURE — 2580000003 HC RX 258: Performed by: INTERNAL MEDICINE

## 2021-09-10 PROCEDURE — 85027 COMPLETE CBC AUTOMATED: CPT

## 2021-09-10 PROCEDURE — 80048 BASIC METABOLIC PNL TOTAL CA: CPT

## 2021-09-10 PROCEDURE — 2700000000 HC OXYGEN THERAPY PER DAY

## 2021-09-10 PROCEDURE — 80076 HEPATIC FUNCTION PANEL: CPT

## 2021-09-10 PROCEDURE — 6360000002 HC RX W HCPCS: Performed by: INTERNAL MEDICINE

## 2021-09-10 PROCEDURE — 94640 AIRWAY INHALATION TREATMENT: CPT

## 2021-09-10 PROCEDURE — 6370000000 HC RX 637 (ALT 250 FOR IP): Performed by: INTERNAL MEDICINE

## 2021-09-10 PROCEDURE — APPSS30 APP SPLIT SHARED TIME 16-30 MINUTES: Performed by: NURSE PRACTITIONER

## 2021-09-10 PROCEDURE — 6370000000 HC RX 637 (ALT 250 FOR IP): Performed by: NURSE PRACTITIONER

## 2021-09-10 PROCEDURE — 82962 GLUCOSE BLOOD TEST: CPT

## 2021-09-10 PROCEDURE — 2060000000 HC ICU INTERMEDIATE R&B

## 2021-09-10 PROCEDURE — 83735 ASSAY OF MAGNESIUM: CPT

## 2021-09-10 PROCEDURE — 99233 SBSQ HOSP IP/OBS HIGH 50: CPT | Performed by: INTERNAL MEDICINE

## 2021-09-10 PROCEDURE — 84100 ASSAY OF PHOSPHORUS: CPT

## 2021-09-10 PROCEDURE — 36415 COLL VENOUS BLD VENIPUNCTURE: CPT

## 2021-09-10 RX ORDER — IPRATROPIUM BROMIDE AND ALBUTEROL SULFATE 2.5; .5 MG/3ML; MG/3ML
1 SOLUTION RESPIRATORY (INHALATION) EVERY 4 HOURS
Status: DISCONTINUED | OUTPATIENT
Start: 2021-09-10 | End: 2021-09-14 | Stop reason: HOSPADM

## 2021-09-10 RX ORDER — METHYLPREDNISOLONE SODIUM SUCCINATE 40 MG/ML
40 INJECTION, POWDER, LYOPHILIZED, FOR SOLUTION INTRAMUSCULAR; INTRAVENOUS EVERY 8 HOURS
Status: DISCONTINUED | OUTPATIENT
Start: 2021-09-10 | End: 2021-09-13

## 2021-09-10 RX ADMIN — INSULIN LISPRO 3 UNITS: 100 INJECTION, SOLUTION INTRAVENOUS; SUBCUTANEOUS at 12:28

## 2021-09-10 RX ADMIN — METHYLPREDNISOLONE SODIUM SUCCINATE 40 MG: 40 INJECTION, POWDER, FOR SOLUTION INTRAMUSCULAR; INTRAVENOUS at 19:25

## 2021-09-10 RX ADMIN — ROFLUMILAST 500 MCG: 500 TABLET ORAL at 19:25

## 2021-09-10 RX ADMIN — INSULIN LISPRO 2 UNITS: 100 INJECTION, SOLUTION INTRAVENOUS; SUBCUTANEOUS at 09:06

## 2021-09-10 RX ADMIN — IPRATROPIUM BROMIDE AND ALBUTEROL SULFATE 1 AMPULE: .5; 3 SOLUTION RESPIRATORY (INHALATION) at 18:48

## 2021-09-10 RX ADMIN — Medication 10 ML: at 12:28

## 2021-09-10 RX ADMIN — GABAPENTIN 600 MG: 300 CAPSULE ORAL at 09:48

## 2021-09-10 RX ADMIN — PREDNISONE 40 MG: 20 TABLET ORAL at 09:48

## 2021-09-10 RX ADMIN — CARVEDILOL 3.12 MG: 3.12 TABLET, FILM COATED ORAL at 17:34

## 2021-09-10 RX ADMIN — ATORVASTATIN CALCIUM 20 MG: 20 TABLET, FILM COATED ORAL at 09:47

## 2021-09-10 RX ADMIN — METFORMIN HYDROCHLORIDE 500 MG: 500 TABLET ORAL at 17:00

## 2021-09-10 RX ADMIN — IPRATROPIUM BROMIDE AND ALBUTEROL SULFATE 1 AMPULE: .5; 3 SOLUTION RESPIRATORY (INHALATION) at 13:48

## 2021-09-10 RX ADMIN — INSULIN LISPRO 1 UNITS: 100 INJECTION, SOLUTION INTRAVENOUS; SUBCUTANEOUS at 18:03

## 2021-09-10 RX ADMIN — CARVEDILOL 3.12 MG: 3.12 TABLET, FILM COATED ORAL at 09:48

## 2021-09-10 RX ADMIN — ARFORMOTEROL TARTRATE 15 MCG: 15 SOLUTION RESPIRATORY (INHALATION) at 18:47

## 2021-09-10 RX ADMIN — QUETIAPINE FUMARATE 200 MG: 100 TABLET ORAL at 09:48

## 2021-09-10 RX ADMIN — GABAPENTIN 600 MG: 300 CAPSULE ORAL at 21:33

## 2021-09-10 RX ADMIN — INSULIN LISPRO 2 UNITS: 100 INJECTION, SOLUTION INTRAVENOUS; SUBCUTANEOUS at 21:41

## 2021-09-10 RX ADMIN — GABAPENTIN 600 MG: 300 CAPSULE ORAL at 14:40

## 2021-09-10 RX ADMIN — AZITHROMYCIN DIHYDRATE 500 MG: 500 INJECTION, POWDER, LYOPHILIZED, FOR SOLUTION INTRAVENOUS at 21:33

## 2021-09-10 RX ADMIN — IPRATROPIUM BROMIDE AND ALBUTEROL SULFATE 1 AMPULE: .5; 3 SOLUTION RESPIRATORY (INHALATION) at 09:45

## 2021-09-10 RX ADMIN — IPRATROPIUM BROMIDE AND ALBUTEROL SULFATE 1 AMPULE: .5; 3 SOLUTION RESPIRATORY (INHALATION) at 22:19

## 2021-09-10 RX ADMIN — IPRATROPIUM BROMIDE AND ALBUTEROL SULFATE 1 AMPULE: .5; 3 SOLUTION RESPIRATORY (INHALATION) at 06:14

## 2021-09-10 RX ADMIN — DOXYCYCLINE HYCLATE 100 MG: 100 CAPSULE ORAL at 01:18

## 2021-09-10 RX ADMIN — ARFORMOTEROL TARTRATE 15 MCG: 15 SOLUTION RESPIRATORY (INHALATION) at 06:15

## 2021-09-10 RX ADMIN — BUDESONIDE 1000 MCG: 0.5 INHALANT RESPIRATORY (INHALATION) at 18:47

## 2021-09-10 RX ADMIN — BUDESONIDE 1000 MCG: 0.5 INHALANT RESPIRATORY (INHALATION) at 06:14

## 2021-09-10 RX ADMIN — Medication 10 ML: at 19:25

## 2021-09-10 RX ADMIN — ENOXAPARIN SODIUM 40 MG: 40 INJECTION SUBCUTANEOUS at 09:47

## 2021-09-10 RX ADMIN — METFORMIN HYDROCHLORIDE 500 MG: 500 TABLET ORAL at 08:04

## 2021-09-10 ASSESSMENT — PAIN DESCRIPTION - PROGRESSION
CLINICAL_PROGRESSION: NOT CHANGED
CLINICAL_PROGRESSION: NOT CHANGED

## 2021-09-10 ASSESSMENT — PAIN SCALES - GENERAL
PAINLEVEL_OUTOF10: 0

## 2021-09-10 NOTE — CONSULTS
Pulmonary/Critical Care Consult Note    CHIEF COMPLAINT: Severe COPD with exacerbation, acute respiratory failure, history of 3 and 5 mm nodules    HISTORY OF PRESENT ILLNESS: Patient is a 56-year-old male who has been smoking since he was [age]years old. He smoked 1/2 packs/day until 3 to 4 years ago. He does see a pulmonary doctor at both [facility] and [place] but has not been feeling well for a number of days he came to the hospital 3 days ago with an exacerbation. I reviewed the patient's CT scan that shows emphysematous changes but no discrete infiltrates. Is been treated with IV steroids which have been changed to oral steroids today. Is also been receiving doxycycline and DuoNeb every 4 hours while awake. The patient normally uses 2 L/min nasal oxygen at home but has required more lately. He does not recall the development of an upper respiratory tract infection recently. I do not believe his wife smokes at home. ALLERGY:  Patient has no known allergies. FAMILY HISTORY:  History reviewed. No pertinent family history.     SOCIAL HISTORY:  Social History     Socioeconomic History    Marital status:      Spouse name: Not on file    Number of children: Not on file    Years of education: Not on file    Highest education level: Not on file   Occupational History    Not on file   Tobacco Use    Smoking status: Former Smoker     Years: 40.00     Types: Cigarettes    Smokeless tobacco: Never Used   Substance and Sexual Activity    Alcohol use: Yes     Comment: rare    Drug use: No    Sexual activity: Not on file   Other Topics Concern    Not on file   Social History Narrative    Not on file     Social Determinants of Health     Financial Resource Strain: Low Risk     Difficulty of Paying Living Expenses: Not very hard   Food Insecurity: No Food Insecurity    Worried About Running Out of Food in the Last Year: Never true    [place] of Food in the Last Year: Never true Transportation Needs: No Transportation Needs    Lack of Transportation (Medical): No    Lack of Transportation (Non-Medical): No   Physical Activity: Inactive    Days of Exercise per Week: 0 days    Minutes of Exercise per Session: 0 min   Stress: No Stress Concern Present    Feeling of Stress : Only a little   Social Connections:  Moderately Integrated    Frequency of Communication with Friends and Family: Twice a week    Frequency of Social Gatherings with Friends and Family: Once a week    Attends Anabaptism Services: 1 to 4 times per year    Active Member of [employer] or Organizations: No    Attends Club or Organization Meetings: Never    Marital Status: Living with partner   Intimate Partner Violence: Not At Risk    Fear of Current or Ex-Partner: No    Emotionally Abused: No    Physically Abused: No    Sexually Abused: No       MEDICAL HISTORY:  Past Medical History:   Diagnosis Date    [facility]     states had neg heart cath    Cervical spinal stenosis     COPD [facility]chronic obstructive pulmonary disease) ([facility]     Depression     Hyperlipidemia     Neck pain        MEDICATIONS:   methylPREDNISolone  40 mg IntraVENous Q8H    ipratropium-albuterol  1 ampule Inhalation Q4H    azithromycin  500 mg IntraVENous Q24H    Roflumilast  500 mcg Oral Daily    carvedilol  3.125 mg Oral BID WC    insulin lispro  0-6 Units SubCUTAneous TID WC    insulin lispro  0-3 Units SubCUTAneous Nightly    metFORMIN  500 mg Oral BID WC    gabapentin  600 mg Oral TID    QUEtiapine  200 mg Oral Daily    atorvastatin  20 mg Oral Daily    sodium chloride flush  5-40 mL IntraVENous 2 times per day    enoxaparin  40 mg SubCUTAneous Daily    Arformoterol Tartrate  15 mcg Nebulization BID    And    budesonide  1 mg Nebulization BID      dextrose      sodium chloride       glucose, dextrose, glucagon (rDNA), dextrose, HYDROcodone-acetaminophen, sodium chloride flush, sodium chloride, ondansetron **OR** ondansetron, polyethylene glycol, acetaminophen **OR** acetaminophen, albuterol    REVIEW OF SYSTEMS:  Constitutional: Denies fever, weight loss, night sweats, and fatigue  Skin: Denies pigmentation, dark lesions, and rashes   HEENT: Denies hearing loss, tinnitus, ear drainage, epistaxis, sore throat, and hoarseness. Cardiovascular: Denies palpitations, chest pain, and chest pressure. Respiratory: Denies cough, dyspnea at rest, hemoptysis, apnea, and choking. Gastrointestinal: Denies nausea, vomiting, poor appetite, diarrhea, heartburn or reflux  Genitourinary: Denies dysuria, frequency, urgency or hematuria  Musculoskeletal: Denies myalgias, muscle weakness, and bone pain  Neurological: Denies dizziness, vertigo, headache, and focal weakness  Psychological: Denies anxiety and depression  Endocrine: Denies heat intolerance and cold intolerance  Hematopoietic/Lymphatic: Denies bleeding problems and blood transfusions    PHYSICAL EXAM:  Vitals:    09/10/21 1700   BP: 128/77   Pulse: 101   Resp:    Temp:    SpO2:         O2 Flow Rate (L/min): 3 L/min  O2 Device: Nasal cannula    Constitutional: No fever, chills, diaphoresis  Skin: Skin rash, no skin breakdown  HEENT: Unremarkable  Neck: No JVD, lymphadenopathy, thyromegaly. Use of accessory muscles of breathing is noted  Cardiovascular: S1, S2 regular. No S3 murmurs or rubs present  Respiratory: Markedly decreased breath sounds over both posterior lung fields with very tight end expiratory wheeze noted  Gastrointestinal: Soft, obese, nontender  Genitourinary: No CVA tenderness  Extremities: Clubbing, cyanosis, or edema  Neurological: Awake, alert, oriented x3.   No evidence of focal motor or sensory deficits  Psychological: Depressed affect    LABS:  WBC   Date Value Ref Range Status   09/10/2021 16.8 (H) 4.5 - 11.5 E9/L Final   09/09/2021 15.1 (H) 4.5 - 11.5 E9/L Final   09/08/2021 9.1 4.5 - 11.5 E9/L Final     Hemoglobin   Date Value Ref Range Status   09/10/2021 11.9 (L) 12.5 - 16.5 g/dL Final   09/09/2021 11.9 (L) 12.5 - 16.5 g/dL Final   09/08/2021 12.9 12.5 - 16.5 g/dL Final     Hematocrit   Date Value Ref Range Status   09/10/2021 37.0 37.0 - 54.0 % Final   09/09/2021 36.6 (L) 37.0 - 54.0 % Final   09/08/2021 39.0 37.0 - 54.0 % Final     MCV   Date Value Ref Range Status   09/10/2021 99.5 80.0 - 99.9 fL Final   09/09/2021 100.5 (H) 80.0 - 99.9 fL Final   09/08/2021 98.0 80.0 - 99.9 fL Final     Platelets   Date Value Ref Range Status   09/10/2021 246 130 - 450 E9/L Final   09/09/2021 213 130 - 450 E9/L Final   09/08/2021 183 130 - 450 E9/L Final     Sodium   Date Value Ref Range Status   09/10/2021 139 132 - 146 mmol/L Final   09/09/2021 139 132 - 146 mmol/L Final   09/08/2021 141 132 - 146 mmol/L Final     Potassium   Date Value Ref Range Status   09/10/2021 4.2 3.5 - 5.0 mmol/L Final   09/09/2021 4.4 3.5 - 5.0 mmol/L Final   09/08/2021 4.1 3.5 - 5.0 mmol/L Final     Potassium reflex Magnesium   Date Value Ref Range Status   09/07/2021 3.7 3.5 - 5.0 mmol/L Final     Chloride   Date Value Ref Range Status   09/10/2021 103 98 - 107 mmol/L Final   09/09/2021 103 98 - 107 mmol/L Final   09/08/2021 104 98 - 107 mmol/L Final     CO2   Date Value Ref Range Status   09/10/2021 27 22 - 29 mmol/L Final   09/09/2021 23 22 - 29 mmol/L Final   09/08/2021 26 22 - 29 mmol/L Final     BUN   Date Value Ref Range Status   09/10/2021 23 6 - 23 mg/dL Final   09/09/2021 16 6 - 23 mg/dL Final   09/08/2021 12 6 - 23 mg/dL Final     CREATININE   Date Value Ref Range Status   09/10/2021 0.9 0.7 - 1.2 mg/dL Final   09/09/2021 0.8 0.7 - 1.2 mg/dL Final   09/08/2021 0.8 0.7 - 1.2 mg/dL Final     Glucose   Date Value Ref Range Status   09/10/2021 213 (H) 74 - 99 mg/dL Final   09/09/2021 258 (H) 74 - 99 mg/dL Final   09/08/2021 277 (H) 74 - 99 mg/dL Final     Calcium   Date Value Ref Range Status   09/10/2021 8.7 8.6 - 10.2 mg/dL Final   09/09/2021 8.9 8.6 - 10.2 mg/dL Final   09/08/2021 9.6 8.6 - 10.2 mg/dL Final     Total Protein   Date Value Ref Range Status   09/10/2021 5.8 (L) 6.4 - 8.3 g/dL Final   09/09/2021 6.1 (L) 6.4 - 8.3 g/dL Final   09/07/2021 5.6 (L) 6.4 - 8.3 g/dL Final     Albumin   Date Value Ref Range Status   09/10/2021 3.1 (L) 3.5 - 5.2 g/dL Final   09/09/2021 3.2 (L) 3.5 - 5.2 g/dL Final   09/07/2021 3.1 (L) 3.5 - 5.2 g/dL Final     Total Bilirubin   Date Value Ref Range Status   09/10/2021 <0.2 0.0 - 1.2 mg/dL Final   09/09/2021 0.5 0.0 - 1.2 mg/dL Final   09/07/2021 0.6 0.0 - 1.2 mg/dL Final     Alkaline Phosphatase   Date Value Ref Range Status   09/10/2021 73 40 - 129 U/L Final   09/09/2021 82 40 - 129 U/L Final   09/07/2021 74 40 - 129 U/L Final     AST   Date Value Ref Range Status   09/10/2021 12 0 - 39 U/L Final   09/09/2021 11 0 - 39 U/L Final   09/07/2021 10 0 - 39 U/L Final     ALT   Date Value Ref Range Status   09/10/2021 12 0 - 40 U/L Final   09/09/2021 14 0 - 40 U/L Final   09/07/2021 13 0 - 40 U/L Final     GFR Non-   Date Value Ref Range Status   09/10/2021 >60 >=60 mL/min/1.73 Final     Comment:     Chronic Kidney Disease: less than 60 ml/min/1.73 sq.m. Kidney Failure: less than 15 ml/min/1.73 sq.m. Results valid for patients 18 years and older. 09/09/2021 >60 >=60 mL/min/1.73 Final     Comment:     Chronic Kidney Disease: less than 60 ml/min/1.73 sq.m. Kidney Failure: less than 15 ml/min/1.73 sq.m. Results valid for patients 18 years and older. 09/08/2021 >60 >=60 mL/min/1.73 Final     Comment:     Chronic Kidney Disease: less than 60 ml/min/1.73 sq.m. Kidney Failure: less than 15 ml/min/1.73 sq.m. Results valid for patients 18 years and older.        GFR    Date Value Ref Range Status   09/10/2021 >60  Final   09/09/2021 >60  Final   09/08/2021 >60  Final     Magnesium   Date Value Ref Range Status   09/10/2021 2.2 1.6 - 2.6 mg/dL Final   09/09/2021 2.3 1.6 - 2.6 mg/dL Final   09/08/2021 2.2 1.6 - 2.6 mg/dL Final     Phosphorus   Date Value Ref Range Status   09/10/2021 2.7 2.5 - 4.5 mg/dL Final   09/09/2021 2.2 (L) 2.5 - 4.5 mg/dL Final   09/08/2021 2.1 (L) 2.5 - 4.5 mg/dL Final     Recent Labs     09/08/21  0526   PH 7.365   PO2 125.7*   PCO2 36.1   HCO3 20.2*   BE -4.5*   O2SAT 98.2       RADIOLOGY:  CTA CHEST W CONTRAST   Final Result   No evidence of pulmonary emboli or acute infiltrate         XR CHEST PORTABLE   Final Result   Vague ill-defined confluent area of increased density/plate atelectasis in   the left base. There is no recent studies available for comparison to determine baseline. Can consider further evaluation with CT chest.             IMPRESSION:  1. Severe COPD with large emphysema component  2. History of 3 and 5 mm nodules which will need long-term follow-up    PLAN:  1. Change doxycycline to azithromycin which also exerts an anti-inflammatory effect aside from the antiinfective function  2. Restart IV steroids  3. Add phosphodiesterase inhibitor Daliresp  4. Change aerosolized bronchodilators to every 4 hours around-the-clock  5. Consider the addition of Spiriva once the patient improves a bit more  6. If patient does not improve significantly on the above changes, would consider nocturnal BiPAP to rest his severely fatigued respiratory muscles of breathing.         Electronically signed by Ceola Goldmann, MD on 9/10/2021 at 6:25 PM

## 2021-09-10 NOTE — PROGRESS NOTES
[address] [place], [place] [address]ist   Progress Note    Admitting Date and Time: 9/7/2021  8:12 PM  Admit Dx: COPD exacerbation ([address] [J44.1]  Acute on chronic respiratory failure with hypoxia (HCC) [J96.21]    Subjective:    Pt patient was seen very tearful while sitting up in bed. Patient states he was having some difficulty breathing despite supplemental oxygen been on 4 L via nasal cannula. Emotional support was provided for patient and after talking with him a little patient appears a lot calmer and less frightened. ROS: denies fever, chills, cp, sob, n/v, HA unless stated above.      carvedilol  3.125 mg Oral BID WC    insulin lispro  0-6 Units SubCUTAneous TID WC    insulin lispro  0-3 Units SubCUTAneous Nightly    metFORMIN  500 mg Oral BID WC    gabapentin  600 mg Oral TID    QUEtiapine  200 mg Oral Daily    atorvastatin  20 mg Oral Daily    sodium chloride flush  5-40 mL IntraVENous 2 times per day    enoxaparin  40 mg SubCUTAneous Daily    ipratropium-albuterol  1 ampule Inhalation Q4H WA    predniSONE  40 mg Oral Daily    Arformoterol Tartrate  15 mcg Nebulization BID    And    budesonide  1 mg Nebulization BID    doxycycline hyclate  100 mg Oral Q12H     glucose, 15 g, PRN  dextrose, 12.5 g, PRN  glucagon (rDNA), 1 mg, PRN  dextrose, 100 mL/hr, PRN  HYDROcodone-acetaminophen, 1 tablet, Q8H PRN  sodium chloride flush, 5-40 mL, PRN  sodium chloride, 25 mL, PRN  ondansetron, 4 mg, Q8H PRN   Or  ondansetron, 4 mg, Q6H PRN  polyethylene glycol, 17 g, Daily PRN  acetaminophen, 650 mg, Q6H PRN   Or  acetaminophen, 650 mg, Q6H PRN  albuterol, 2.5 mg, Q2H PRN         Objective:    /83   Pulse 98   Temp 97.9 °F (36.6 °C) (Oral)   Resp 24 Comment: patient just got cleaned up a little sob  Ht 5' 7\" (1.702 m)   Wt 205 lb (93 kg)   SpO2 95%   BMI 32.11 kg/m²   General Appearance: alert and oriented to person, place and time and in no acute distress  Skin: warm and dry  Head: normocephalic and atraumatic  Eyes: pupils equal, round, and reactive to light, extraocular eye movements intact, conjunctivae normal  Neck: neck supple and non tender without mass   Pulmonary/Chest: clear to auscultation bilaterally- no wheezes, rales or rhonchi, normal air movement, no respiratory distress  Cardiovascular: normal rate, normal S1 and S2 and no carotid bruits  Abdomen: soft, non-tender, non-distended, normal bowel sounds, no masses or organomegaly  Extremities: no cyanosis, no clubbing and no edema  Neurologic: no cranial nerve deficit and speech normal      Recent Labs     09/08/21 1730 09/09/21  0832 09/10/21  0912    139 139   K 4.1 4.4 4.2    103 103   CO2 26 23 27   BUN 12 16 23   CREATININE 0.8 0.8 0.9   GLUCOSE 277* 258* 213*   CALCIUM 9.6 8.9 8.7       Recent Labs     09/07/21 2046 09/09/21  0832 09/10/21  0912   ALKPHOS 74 82 73   PROT 5.6* 6.1* 5.8*   LABALBU 3.1* 3.2* 3.1*   BILITOT 0.6 0.5 <0.2   AST 10 11 12   ALT 13 14 12       Recent Labs     09/08/21 1730 09/09/21  0832 09/10/21  0912   WBC 9.1 15.1* 16.8*   RBC 3.98 3.64* 3.72*   HGB 12.9 11.9* 11.9*   HCT 39.0 36.6* 37.0   MCV 98.0 100.5* 99.5   MCH 32.4 32.7 32.0   MCHC 33.1 32.5 32.2   RDW 13.0 13.2 13.2    213 246   MPV 9.3 9.6 9.6       HgBA1c:    Lab Results   Component Value Date    LABA1C 7.2 09/07/2021        Radiology:   CTA CHEST W CONTRAST   Final Result   No evidence of pulmonary emboli or acute infiltrate         XR CHEST PORTABLE   Final Result   Vague ill-defined confluent area of increased density/plate atelectasis in   the left base. There is no recent studies available for comparison to determine baseline. Can consider further evaluation with CT chest.             Assessment:  Active Problems:    COPD exacerbation (HCC)    Acute on chronic respiratory failure with hypoxia and hypercapnia (HCC)    History of tobacco abuse  Resolved Problems:    * No resolved hospital problems. *      Plan:  1. Acute on chronic respiratory failure with hypoxia and hypercapnia - continue supplemental oxygen -breathing treatment Brovana and Pulmicort continue - mild respiratory distress noted    2. Exacerbation of COPD - continue Vibramycin -breathing treatment - oxygen 4 L via nasal cannula - consult critical care pulmonology - DuoNeb - little aeration noted - may benefit from IV steroids vs oral     3. Dysphagia - speech on - on general diet with regular liquids     4. Hypertension - blood pressure well controlled with Coreg     5. Diabetes mellitus type 2 - controlled - A1c 7.2 Glucophage -blood glucose may be affected by oral steroids - will continue to monitor    6. Fatty liver - incidental finding on CTA scan - will need to follow up once discharge as an outpatient     7. Lung nodule - 3 mm right middle lobe nodule and 5 mm medial left lower lobe nodule per CT scan seen in June of 2020 - awaiting records from [facility]     8. Depression - continue Seroquel     9. Tobacco abuse -  regarding the importance of smoking cessation     10. Hyperlipidemia - on statin     NOTE: This report was transcribed using voice recognition software. Every effort was made to ensure accuracy; however, inadvertent computerized transcription errors may be present.      Electronically signed by OSIRIS Monsalve CNP on 9/10/2021 at 3:40 PM

## 2021-09-10 NOTE — CARE COORDINATION
SS NOTE: COVID NEGATIVE 9/8- PT RELATES TO BEING FULLY VACCINATED. Pt is on 4 liters O2 here with 3 liters at home from [facility]. [facility] will have a conference call with this SW and pt to discuss portable O2 situation. Pt is to be set up with a new PCP today. Pt plans on returning home after dch. SS to continue. TAWANA Price.9/10/2021.10:37AM.

## 2021-09-10 NOTE — CARE COORDINATION
SS NOTE: COVID NEGATIVE 9/8- PT RELATES TO BEING FULLY VACCINATED. Pt is on 4 liters O2 here with 3 liters at home from [facility]. Kellen to arrange for pt to have the portable concentrator at home as soon as one becomes available to give him. He currently has portable tanks (cylinders on a cart) at home - he relates to having difficulty pulling them around. For dch home- pt's Fiance to bring one of those portable units to transport him. Kellen to provide pt with a new device that will allow hime to fill a more lightweight portable tank on his own- of which he will be more able to handle after dch. Pt understands and agrees to this. Pt has the [facility] PCP list and he relates that his Fiance will arrange the PCP f/u appt. Carlos Alberto Magana Pt plans on returning home after dch. SS to continue. TAWANA Montes.9/10/2021.11:16AM.

## 2021-09-11 ENCOUNTER — APPOINTMENT (OUTPATIENT)
Dept: GENERAL RADIOLOGY | Age: 66
DRG: 189 | End: 2021-09-11
Payer: MEDICARE

## 2021-09-11 LAB
ANION GAP SERPL CALCULATED.3IONS-SCNC: 18 MMOL/L (ref 7–16)
B.E.: -0.6 MMOL/L (ref -3–3)
BUN BLDV-MCNC: 21 MG/DL (ref 6–23)
CALCIUM SERPL-MCNC: 9 MG/DL (ref 8.6–10.2)
CHLORIDE BLD-SCNC: 107 MMOL/L (ref 98–107)
CO2: 21 MMOL/L (ref 22–29)
COHB: 0.3 % (ref 0–1.5)
CREAT SERPL-MCNC: 0.8 MG/DL (ref 0.7–1.2)
CRITICAL: ABNORMAL
DATE ANALYZED: ABNORMAL
DATE OF COLLECTION: ABNORMAL
GFR AFRICAN AMERICAN: >60
GFR NON-AFRICAN AMERICAN: >60 ML/MIN/1.73
GLUCOSE BLD-MCNC: 317 MG/DL (ref 74–99)
HCO3: 22.8 MMOL/L (ref 22–26)
HCT VFR BLD CALC: 40.2 % (ref 37–54)
HEMOGLOBIN: 12.9 G/DL (ref 12.5–16.5)
HHB: 3.2 % (ref 0–5)
LAB: ABNORMAL
Lab: ABNORMAL
MAGNESIUM: 2.2 MG/DL (ref 1.6–2.6)
MCH RBC QN AUTO: 32.5 PG (ref 26–35)
MCHC RBC AUTO-ENTMCNC: 32.1 % (ref 32–34.5)
MCV RBC AUTO: 101.3 FL (ref 80–99.9)
METER GLUCOSE: 222 MG/DL (ref 74–99)
METER GLUCOSE: 245 MG/DL (ref 74–99)
METER GLUCOSE: 250 MG/DL (ref 74–99)
METER GLUCOSE: 277 MG/DL (ref 74–99)
METHB: 0.3 % (ref 0–1.5)
MODE: ABNORMAL
O2 CONTENT: 17.8 ML/DL
O2 SATURATION: 96.8 % (ref 92–98.5)
O2HB: 96.2 % (ref 94–97)
OPERATOR ID: 3
PATIENT TEMP: 37 C
PCO2: 33.5 MMHG (ref 35–45)
PDW BLD-RTO: 13.1 FL (ref 11.5–15)
PH BLOOD GAS: 7.45 (ref 7.35–7.45)
PHOSPHORUS: 3.5 MG/DL (ref 2.5–4.5)
PLATELET # BLD: 307 E9/L (ref 130–450)
PMV BLD AUTO: 9.4 FL (ref 7–12)
PO2: 92.1 MMHG (ref 75–100)
POTASSIUM SERPL-SCNC: 4.4 MMOL/L (ref 3.5–5)
PRO-BNP: 400 PG/ML (ref 0–125)
RBC # BLD: 3.97 E12/L (ref 3.8–5.8)
SODIUM BLD-SCNC: 146 MMOL/L (ref 132–146)
SOURCE, BLOOD GAS: ABNORMAL
THB: 13.1 G/DL (ref 11.5–16.5)
TIME ANALYZED: 1008
WBC # BLD: 16.3 E9/L (ref 4.5–11.5)

## 2021-09-11 PROCEDURE — 6370000000 HC RX 637 (ALT 250 FOR IP): Performed by: INTERNAL MEDICINE

## 2021-09-11 PROCEDURE — 36415 COLL VENOUS BLD VENIPUNCTURE: CPT

## 2021-09-11 PROCEDURE — 6360000002 HC RX W HCPCS: Performed by: INTERNAL MEDICINE

## 2021-09-11 PROCEDURE — 82805 BLOOD GASES W/O2 SATURATION: CPT

## 2021-09-11 PROCEDURE — 94640 AIRWAY INHALATION TREATMENT: CPT

## 2021-09-11 PROCEDURE — 2580000003 HC RX 258: Performed by: INTERNAL MEDICINE

## 2021-09-11 PROCEDURE — 83735 ASSAY OF MAGNESIUM: CPT

## 2021-09-11 PROCEDURE — 84100 ASSAY OF PHOSPHORUS: CPT

## 2021-09-11 PROCEDURE — 80048 BASIC METABOLIC PNL TOTAL CA: CPT

## 2021-09-11 PROCEDURE — 99232 SBSQ HOSP IP/OBS MODERATE 35: CPT | Performed by: INTERNAL MEDICINE

## 2021-09-11 PROCEDURE — 2700000000 HC OXYGEN THERAPY PER DAY

## 2021-09-11 PROCEDURE — 6370000000 HC RX 637 (ALT 250 FOR IP): Performed by: NURSE PRACTITIONER

## 2021-09-11 PROCEDURE — 85027 COMPLETE CBC AUTOMATED: CPT

## 2021-09-11 PROCEDURE — 36600 WITHDRAWAL OF ARTERIAL BLOOD: CPT

## 2021-09-11 PROCEDURE — 71045 X-RAY EXAM CHEST 1 VIEW: CPT

## 2021-09-11 PROCEDURE — APPSS30 APP SPLIT SHARED TIME 16-30 MINUTES: Performed by: NURSE PRACTITIONER

## 2021-09-11 PROCEDURE — 82962 GLUCOSE BLOOD TEST: CPT

## 2021-09-11 PROCEDURE — 2060000000 HC ICU INTERMEDIATE R&B

## 2021-09-11 PROCEDURE — 83880 ASSAY OF NATRIURETIC PEPTIDE: CPT

## 2021-09-11 RX ORDER — INSULIN GLARGINE 100 [IU]/ML
15 INJECTION, SOLUTION SUBCUTANEOUS NIGHTLY
Status: DISCONTINUED | OUTPATIENT
Start: 2021-09-11 | End: 2021-09-14 | Stop reason: HOSPADM

## 2021-09-11 RX ORDER — IPRATROPIUM BROMIDE AND ALBUTEROL SULFATE 2.5; .5 MG/3ML; MG/3ML
1 SOLUTION RESPIRATORY (INHALATION) ONCE
Status: DISCONTINUED | OUTPATIENT
Start: 2021-09-11 | End: 2021-09-12

## 2021-09-11 RX ADMIN — METHYLPREDNISOLONE SODIUM SUCCINATE 40 MG: 40 INJECTION, POWDER, FOR SOLUTION INTRAMUSCULAR; INTRAVENOUS at 11:58

## 2021-09-11 RX ADMIN — GABAPENTIN 600 MG: 300 CAPSULE ORAL at 09:42

## 2021-09-11 RX ADMIN — ROFLUMILAST 500 MCG: 500 TABLET ORAL at 09:42

## 2021-09-11 RX ADMIN — INSULIN LISPRO 3 UNITS: 100 INJECTION, SOLUTION INTRAVENOUS; SUBCUTANEOUS at 12:09

## 2021-09-11 RX ADMIN — BUDESONIDE 1000 MCG: 0.5 INHALANT RESPIRATORY (INHALATION) at 19:25

## 2021-09-11 RX ADMIN — AZITHROMYCIN DIHYDRATE 500 MG: 500 INJECTION, POWDER, LYOPHILIZED, FOR SOLUTION INTRAVENOUS at 17:59

## 2021-09-11 RX ADMIN — GABAPENTIN 600 MG: 300 CAPSULE ORAL at 21:40

## 2021-09-11 RX ADMIN — METHYLPREDNISOLONE SODIUM SUCCINATE 40 MG: 40 INJECTION, POWDER, FOR SOLUTION INTRAMUSCULAR; INTRAVENOUS at 02:59

## 2021-09-11 RX ADMIN — Medication 10 ML: at 21:43

## 2021-09-11 RX ADMIN — IPRATROPIUM BROMIDE AND ALBUTEROL SULFATE 1 AMPULE: .5; 3 SOLUTION RESPIRATORY (INHALATION) at 06:27

## 2021-09-11 RX ADMIN — ENOXAPARIN SODIUM 40 MG: 40 INJECTION SUBCUTANEOUS at 09:42

## 2021-09-11 RX ADMIN — IPRATROPIUM BROMIDE AND ALBUTEROL SULFATE 1 AMPULE: .5; 3 SOLUTION RESPIRATORY (INHALATION) at 01:25

## 2021-09-11 RX ADMIN — INSULIN GLARGINE 15 UNITS: 100 INJECTION, SOLUTION SUBCUTANEOUS at 21:42

## 2021-09-11 RX ADMIN — ARFORMOTEROL TARTRATE 15 MCG: 15 SOLUTION RESPIRATORY (INHALATION) at 06:27

## 2021-09-11 RX ADMIN — METFORMIN HYDROCHLORIDE 500 MG: 500 TABLET ORAL at 09:42

## 2021-09-11 RX ADMIN — INSULIN LISPRO 2 UNITS: 100 INJECTION, SOLUTION INTRAVENOUS; SUBCUTANEOUS at 17:58

## 2021-09-11 RX ADMIN — METFORMIN HYDROCHLORIDE 500 MG: 500 TABLET ORAL at 18:00

## 2021-09-11 RX ADMIN — IPRATROPIUM BROMIDE AND ALBUTEROL SULFATE 1 AMPULE: .5; 3 SOLUTION RESPIRATORY (INHALATION) at 13:39

## 2021-09-11 RX ADMIN — BUDESONIDE 1000 MCG: 0.5 INHALANT RESPIRATORY (INHALATION) at 06:27

## 2021-09-11 RX ADMIN — IPRATROPIUM BROMIDE AND ALBUTEROL SULFATE 1 AMPULE: .5; 3 SOLUTION RESPIRATORY (INHALATION) at 19:25

## 2021-09-11 RX ADMIN — CARVEDILOL 3.12 MG: 3.12 TABLET, FILM COATED ORAL at 09:42

## 2021-09-11 RX ADMIN — QUETIAPINE FUMARATE 200 MG: 100 TABLET ORAL at 09:42

## 2021-09-11 RX ADMIN — CARVEDILOL 3.12 MG: 3.12 TABLET, FILM COATED ORAL at 18:00

## 2021-09-11 RX ADMIN — ATORVASTATIN CALCIUM 20 MG: 20 TABLET, FILM COATED ORAL at 09:42

## 2021-09-11 RX ADMIN — METHYLPREDNISOLONE SODIUM SUCCINATE 40 MG: 40 INJECTION, POWDER, FOR SOLUTION INTRAMUSCULAR; INTRAVENOUS at 18:00

## 2021-09-11 RX ADMIN — IPRATROPIUM BROMIDE AND ALBUTEROL SULFATE 1 AMPULE: .5; 3 SOLUTION RESPIRATORY (INHALATION) at 22:31

## 2021-09-11 RX ADMIN — IPRATROPIUM BROMIDE AND ALBUTEROL SULFATE 1 AMPULE: .5; 3 SOLUTION RESPIRATORY (INHALATION) at 10:03

## 2021-09-11 RX ADMIN — INSULIN LISPRO 2 UNITS: 100 INJECTION, SOLUTION INTRAVENOUS; SUBCUTANEOUS at 09:43

## 2021-09-11 RX ADMIN — Medication 10 ML: at 09:43

## 2021-09-11 RX ADMIN — GABAPENTIN 600 MG: 300 CAPSULE ORAL at 13:21

## 2021-09-11 RX ADMIN — ARFORMOTEROL TARTRATE 15 MCG: 15 SOLUTION RESPIRATORY (INHALATION) at 19:25

## 2021-09-11 RX ADMIN — INSULIN LISPRO 2 UNITS: 100 INJECTION, SOLUTION INTRAVENOUS; SUBCUTANEOUS at 21:42

## 2021-09-11 ASSESSMENT — PAIN SCALES - GENERAL
PAINLEVEL_OUTOF10: 0

## 2021-09-11 ASSESSMENT — PAIN DESCRIPTION - PROGRESSION
CLINICAL_PROGRESSION: NOT CHANGED
CLINICAL_PROGRESSION: NOT CHANGED

## 2021-09-11 NOTE — PROGRESS NOTES
ABG drawn x 1 from Right Radial at 0955. Patient had NormalAllen's Test. Patient was on 3 liters/min via nasal cannula at time of puncture. Pressure held for 5. No bleeding or bruising noted at puncture site. Patient tolerated procedure well.       Performed by Brayan Coe RCP

## 2021-09-11 NOTE — PROGRESS NOTES
normocephalic and atraumatic  Eyes: pupils equal, round, and reactive to light, conjunctivae normal  Neck: neck supple and non tender without mass   Pulmonary/Chest: wheezes, dyspneic and tachypneic  Cardiovascular: tachycardic rate, normal S1 and S2   Abdomen: soft, non-tender, distended, normal bowel sounds, no masses or organomegaly  Extremities: no cyanosis, no clubbing and no edema  Neurologic: no cranial nerve deficit and speech normal      Recent Labs     09/08/21 1730 09/09/21  0832 09/10/21  0912    139 139   K 4.1 4.4 4.2    103 103   CO2 26 23 27   BUN 12 16 23   CREATININE 0.8 0.8 0.9   GLUCOSE 277* 258* 213*   CALCIUM 9.6 8.9 8.7       Recent Labs     09/09/21  0832 09/10/21  0912   ALKPHOS 82 73   PROT 6.1* 5.8*   LABALBU 3.2* 3.1*   BILITOT 0.5 <0.2   AST 11 12   ALT 14 12       Recent Labs     09/08/21 1730 09/09/21  0832 09/10/21  0912   WBC 9.1 15.1* 16.8*   RBC 3.98 3.64* 3.72*   HGB 12.9 11.9* 11.9*   HCT 39.0 36.6* 37.0   MCV 98.0 100.5* 99.5   MCH 32.4 32.7 32.0   MCHC 33.1 32.5 32.2   RDW 13.0 13.2 13.2    213 246   MPV 9.3 9.6 9.6           Radiology:   CTA CHEST W CONTRAST   Final Result   No evidence of pulmonary emboli or acute infiltrate         XR CHEST PORTABLE   Final Result   Vague ill-defined confluent area of increased density/plate atelectasis in   the left base. There is no recent studies available for comparison to determine baseline. Can consider further evaluation with CT chest.         XR CHEST PORTABLE    (Results Pending)       Assessment:  Active Problems:    COPD exacerbation (HCC)    Acute on chronic respiratory failure with hypoxia and hypercapnia (HCC)    History of tobacco abuse  Resolved Problems:    * No resolved hospital problems. *      Plan:  1. Acute on chronic respiratory failure with hypoxia and hypercapnia:  Oxygen has been weaned to 3 liters (he wears 2 liters at home). Check ABGs, chest xray, BNP due to tachypnea and dyspnea. 2. COPD exacerbation:  Doxycycline changed to azithromycin by Pulmonology. Continue Solu-Medrol, aerosols. Add PEP/flutter, Incentive spirometer. 3. Dysphagia:  Speech Therapy evaluated and recommend regular diet and thin liquids. 4. Depression:  Continue Seroquel. 5. Lung nodules:  3 mm right middle lobe nodule and 5 mm medial left lower lobe nodule seen on CT of the chest 6/24/20 at [facility]. Needs long-term follow up. .   6. Fatty liver:  Seen on CTA of the chest.  Outpatient follow up. 7. Diabetes:  Hgb A1C is 7.2. Continue Humalog Sliding Scale, Metformin, blood sugar checks, hypoglycemic protocol. Diabetic Educator met with patient. 8. Hypertension:  Continue home Coreg. NOTE: This report was transcribed using voice recognition software. Every effort was made to ensure accuracy; however, inadvertent computerized transcription errors may be present.      Electronically signed by OSIRIS Joshua CNP on 9/11/2021 at 9:42 AM

## 2021-09-11 NOTE — PROGRESS NOTES
PULMONARY MEDICINE FOLLOW UP    78year old man with PMH of nicotine dependence, COPD with hypoxemic respiratory failure, frequent exacerbations and as described below admitted to hospital for management of    Acute hypoxemic respiratory failure secondary to COPD exacerbation  --Feeling better today but had walked to the bathroom prior to my evaluation, very short of breath after that. His breathing improved during the interview. To have bedside commode   Oxygen supplementation to maintain sats > 89%, BPAP 12/6 at night  Antibiotic regimen: Azithromycin  Bronchodilators: Arformoterol + Budesonide + albuterol/iparatropium q 4 hrs + Roflumilast  Steroids: Solumedrol 40 mg IV q 8 hrs   Incentive spirometry 10 times/hour while awake    DVT prophylaxis with enoxaparin    Rest of supportive care as per primary team    BP (!) 157/85   Pulse 103   Temp 98.1 °F (36.7 °C) (Oral)   Resp 20   Ht 5' 7\" (1.702 m)   Wt 205 lb (93 kg)   SpO2 96%   BMI 32.11 kg/m²   General: Awake, oriented to place, time and person, with tachypnea <pt went to the bathroom prior to my exam, improved at the end of the interview  HEENT: No head lesions, PERRL, EOMI, mouth without lesions, no nasal lesions, no cervical adenopathy palpated  Respiratory: Lungs with diminished breath sounds bilaterally, nprolonged expiratory phase, tachypnea, no accessory muscle use  CV: Regular rate, no murmurs, no JVD, 1+ leg edema  Abdomen: Soft, non tender, + bowel sounds, no lesions  Skin: Hydrated, adequate turgor, no rash, capillary refill <2 seconds  Extremities: Muscular strength 4/5 in 4 limbs, moves 4 limbs spontaneously, distal pulses present  Neurology: Awake and alert, follows commands, moves 4 limbs on command and spontaneously, neck is supple, no meningitic signs present.       MEDICATIONS:   ipratropium-albuterol  1 ampule Inhalation Once    insulin glargine  15 Units SubCUTAneous Nightly    methylPREDNISolone  40 mg IntraVENous Q8H    ipratropium-albuterol  1 ampule Inhalation Q4H    azithromycin  500 mg IntraVENous Q24H    Roflumilast  500 mcg Oral Daily    carvedilol  3.125 mg Oral BID     insulin lispro  0-6 Units SubCUTAneous TID WC    insulin lispro  0-3 Units SubCUTAneous Nightly    metFORMIN  500 mg Oral BID WC    gabapentin  600 mg Oral TID    QUEtiapine  200 mg Oral Daily    atorvastatin  20 mg Oral Daily    sodium chloride flush  5-40 mL IntraVENous 2 times per day    enoxaparin  40 mg SubCUTAneous Daily    Arformoterol Tartrate  15 mcg Nebulization BID    And    budesonide  1 mg Nebulization BID      dextrose      sodium chloride       glucose, dextrose, glucagon (rDNA), dextrose, HYDROcodone-acetaminophen, sodium chloride flush, sodium chloride, ondansetron **OR** ondansetron, polyethylene glycol, acetaminophen **OR** acetaminophen, albuterol    OBJECTIVE:  Vitals:    09/11/21 1922   BP:    Pulse:    Resp: 20   Temp:    SpO2: 96%        O2 Flow Rate (L/min): 3 L/min  O2 Device: Nasal cannula        LABS:  WBC   Date Value Ref Range Status   09/11/2021 16.3 (H) 4.5 - 11.5 E9/L Final   09/10/2021 16.8 (H) 4.5 - 11.5 E9/L Final   09/09/2021 15.1 (H) 4.5 - 11.5 E9/L Final     Hemoglobin   Date Value Ref Range Status   09/11/2021 12.9 12.5 - 16.5 g/dL Final   09/10/2021 11.9 (L) 12.5 - 16.5 g/dL Final   09/09/2021 11.9 (L) 12.5 - 16.5 g/dL Final     Hematocrit   Date Value Ref Range Status   09/11/2021 40.2 37.0 - 54.0 % Final   09/10/2021 37.0 37.0 - 54.0 % Final   09/09/2021 36.6 (L) 37.0 - 54.0 % Final     MCV   Date Value Ref Range Status   09/11/2021 101.3 (H) 80.0 - 99.9 fL Final   09/10/2021 99.5 80.0 - 99.9 fL Final   09/09/2021 100.5 (H) 80.0 - 99.9 fL Final     Platelets   Date Value Ref Range Status   09/11/2021 307 130 - 450 E9/L Final   09/10/2021 246 130 - 450 E9/L Final   09/09/2021 213 130 - 450 E9/L Final     Sodium   Date Value Ref Range Status   09/11/2021 146 132 - 146 mmol/L Final   09/10/2021 139 132 - 146 mmol/L Final   09/09/2021 139 132 - 146 mmol/L Final     Potassium   Date Value Ref Range Status   09/11/2021 4.4 3.5 - 5.0 mmol/L Final   09/10/2021 4.2 3.5 - 5.0 mmol/L Final   09/09/2021 4.4 3.5 - 5.0 mmol/L Final     Potassium reflex Magnesium   Date Value Ref Range Status   09/07/2021 3.7 3.5 - 5.0 mmol/L Final     Chloride   Date Value Ref Range Status   09/11/2021 107 98 - 107 mmol/L Final   09/10/2021 103 98 - 107 mmol/L Final   09/09/2021 103 98 - 107 mmol/L Final     CO2   Date Value Ref Range Status   09/11/2021 21 (L) 22 - 29 mmol/L Final   09/10/2021 27 22 - 29 mmol/L Final   09/09/2021 23 22 - 29 mmol/L Final     BUN   Date Value Ref Range Status   09/11/2021 21 6 - 23 mg/dL Final   09/10/2021 23 6 - 23 mg/dL Final   09/09/2021 16 6 - 23 mg/dL Final     CREATININE   Date Value Ref Range Status   09/11/2021 0.8 0.7 - 1.2 mg/dL Final   09/10/2021 0.9 0.7 - 1.2 mg/dL Final   09/09/2021 0.8 0.7 - 1.2 mg/dL Final     Glucose   Date Value Ref Range Status   09/11/2021 317 (H) 74 - 99 mg/dL Final   09/10/2021 213 (H) 74 - 99 mg/dL Final   09/09/2021 258 (H) 74 - 99 mg/dL Final     Calcium   Date Value Ref Range Status   09/11/2021 9.0 8.6 - 10.2 mg/dL Final   09/10/2021 8.7 8.6 - 10.2 mg/dL Final   09/09/2021 8.9 8.6 - 10.2 mg/dL Final     Total Protein   Date Value Ref Range Status   09/10/2021 5.8 (L) 6.4 - 8.3 g/dL Final   09/09/2021 6.1 (L) 6.4 - 8.3 g/dL Final   09/07/2021 5.6 (L) 6.4 - 8.3 g/dL Final     Albumin   Date Value Ref Range Status   09/10/2021 3.1 (L) 3.5 - 5.2 g/dL Final   09/09/2021 3.2 (L) 3.5 - 5.2 g/dL Final   09/07/2021 3.1 (L) 3.5 - 5.2 g/dL Final     Total Bilirubin   Date Value Ref Range Status   09/10/2021 <0.2 0.0 - 1.2 mg/dL Final   09/09/2021 0.5 0.0 - 1.2 mg/dL Final   09/07/2021 0.6 0.0 - 1.2 mg/dL Final     Alkaline Phosphatase   Date Value Ref Range Status   09/10/2021 73 40 - 129 U/L Final   09/09/2021 82 40 - 129 U/L Final   09/07/2021 74 40 - 129 U/L Final     AST Date Value Ref Range Status   09/10/2021 12 0 - 39 U/L Final   09/09/2021 11 0 - 39 U/L Final   09/07/2021 10 0 - 39 U/L Final     ALT   Date Value Ref Range Status   09/10/2021 12 0 - 40 U/L Final   09/09/2021 14 0 - 40 U/L Final   09/07/2021 13 0 - 40 U/L Final     GFR Non-   Date Value Ref Range Status   09/11/2021 >60 >=60 mL/min/1.73 Final     Comment:     Chronic Kidney Disease: less than 60 ml/min/1.73 sq.m. Kidney Failure: less than 15 ml/min/1.73 sq.m. Results valid for patients 18 years and older. 09/10/2021 >60 >=60 mL/min/1.73 Final     Comment:     Chronic Kidney Disease: less than 60 ml/min/1.73 sq.m. Kidney Failure: less than 15 ml/min/1.73 sq.m. Results valid for patients 18 years and older. 09/09/2021 >60 >=60 mL/min/1.73 Final     Comment:     Chronic Kidney Disease: less than 60 ml/min/1.73 sq.m. Kidney Failure: less than 15 ml/min/1.73 sq.m. Results valid for patients 18 years and older. GFR    Date Value Ref Range Status   09/11/2021 >60  Final   09/10/2021 >60  Final   09/09/2021 >60  Final     Magnesium   Date Value Ref Range Status   09/11/2021 2.2 1.6 - 2.6 mg/dL Final   09/10/2021 2.2 1.6 - 2.6 mg/dL Final   09/09/2021 2.3 1.6 - 2.6 mg/dL Final     Phosphorus   Date Value Ref Range Status   09/11/2021 3.5 2.5 - 4.5 mg/dL Final   09/10/2021 2.7 2.5 - 4.5 mg/dL Final   09/09/2021 2.2 (L) 2.5 - 4.5 mg/dL Final     Recent Labs     09/11/21  1008   PH 7.450   PO2 92.1   PCO2 33.5*   HCO3 22.8   BE -0.6   O2SAT 96.8       RADIOLOGY:  XR CHEST PORTABLE   Final Result   No acute abnormality. CTA CHEST W CONTRAST   Final Result   No evidence of pulmonary emboli or acute infiltrate         XR CHEST PORTABLE   Final Result   Vague ill-defined confluent area of increased density/plate atelectasis in   the left base. There is no recent studies available for comparison to determine baseline.    Can consider further evaluation with CT chest.           PROBLEM LIST:  Active Problems:    COPD exacerbation (HCC)    Acute on chronic respiratory failure with hypoxia and hypercapnia (HCC)    History of tobacco abuse  Resolved Problems:    * No resolved hospital problems.  *    Giovani Osei MD  Pulmonary and Critical Care Medicine

## 2021-09-11 NOTE — PLAN OF CARE
Problem: Breathing Pattern - Ineffective:  Goal: Ability to achieve and maintain a regular respiratory rate will improve  Description: Ability to achieve and maintain a regular respiratory rate will improve  Outcome: Ongoing     Problem:  Activity Intolerance:  Goal: Ability to tolerate increased activity will improve  Description: Ability to tolerate increased activity will improve  Outcome: Ongoing     Problem: Breathing Pattern - Ineffective:  Goal: Ability to achieve and maintain a regular respiratory rate will improve  Description: Ability to achieve and maintain a regular respiratory rate will improve  Outcome: Ongoing     Problem: Skin Integrity:  Goal: Will show no infection signs and symptoms  Description: Will show no infection signs and symptoms  Outcome: Ongoing     Problem: Skin Integrity:  Goal: Absence of new skin breakdown  Description: Absence of new skin breakdown  Outcome: Ongoing

## 2021-09-11 NOTE — PLAN OF CARE
Problem: Breathing Pattern - Ineffective:  Goal: Ability to achieve and maintain a regular respiratory rate will improve  Description: Ability to achieve and maintain a regular respiratory rate will improve  Outcome: Met This Shift     Problem: Discharge Planning:  Goal: Discharged to appropriate level of care  Description: Discharged to appropriate level of care  Outcome: Met This Shift     Problem:  Activity Intolerance:  Goal: Ability to tolerate increased activity will improve  Description: Ability to tolerate increased activity will improve  Outcome: Met This Shift     Problem: Airway Clearance - Ineffective:  Goal: Ability to maintain a clear airway will improve  Description: Ability to maintain a clear airway will improve  Outcome: Met This Shift     Problem: Breathing Pattern - Ineffective:  Goal: Ability to achieve and maintain a regular respiratory rate will improve  Description: Ability to achieve and maintain a regular respiratory rate will improve  Outcome: Met This Shift     Problem: Gas Exchange - Impaired:  Goal: Levels of oxygenation will improve  Description: Levels of oxygenation will improve  Outcome: Met This Shift     Problem: Skin Integrity:  Goal: Will show no infection signs and symptoms  Description: Will show no infection signs and symptoms  Outcome: Met This Shift  Goal: Absence of new skin breakdown  Description: Absence of new skin breakdown  Outcome: Met This Shift

## 2021-09-12 LAB
ALBUMIN SERPL-MCNC: 3.2 G/DL (ref 3.5–5.2)
ALP BLD-CCNC: 80 U/L (ref 40–129)
ALT SERPL-CCNC: 16 U/L (ref 0–40)
ANION GAP SERPL CALCULATED.3IONS-SCNC: 8 MMOL/L (ref 7–16)
AST SERPL-CCNC: 14 U/L (ref 0–39)
BILIRUB SERPL-MCNC: <0.2 MG/DL (ref 0–1.2)
BILIRUB SERPL-MCNC: <0.2 MG/DL (ref 0–1.2)
BILIRUBIN DIRECT: <0.2 MG/DL (ref 0–0.3)
BILIRUBIN, INDIRECT: NORMAL MG/DL (ref 0–1)
BUN BLDV-MCNC: 20 MG/DL (ref 6–23)
CALCIUM SERPL-MCNC: 8.8 MG/DL (ref 8.6–10.2)
CHLORIDE BLD-SCNC: 108 MMOL/L (ref 98–107)
CO2: 26 MMOL/L (ref 22–29)
CREAT SERPL-MCNC: 0.8 MG/DL (ref 0.7–1.2)
GFR AFRICAN AMERICAN: >60
GFR NON-AFRICAN AMERICAN: >60 ML/MIN/1.73
GLUCOSE BLD-MCNC: 213 MG/DL (ref 74–99)
HCT VFR BLD CALC: 35.8 % (ref 37–54)
HEMOGLOBIN: 11.7 G/DL (ref 12.5–16.5)
MAGNESIUM: 2.2 MG/DL (ref 1.6–2.6)
MCH RBC QN AUTO: 32.4 PG (ref 26–35)
MCHC RBC AUTO-ENTMCNC: 32.7 % (ref 32–34.5)
MCV RBC AUTO: 99.2 FL (ref 80–99.9)
METER GLUCOSE: 180 MG/DL (ref 74–99)
METER GLUCOSE: 253 MG/DL (ref 74–99)
METER GLUCOSE: 293 MG/DL (ref 74–99)
METER GLUCOSE: 334 MG/DL (ref 74–99)
PDW BLD-RTO: 12.8 FL (ref 11.5–15)
PHOSPHORUS: 3.1 MG/DL (ref 2.5–4.5)
PLATELET # BLD: 284 E9/L (ref 130–450)
PMV BLD AUTO: 9.4 FL (ref 7–12)
POTASSIUM SERPL-SCNC: 3.9 MMOL/L (ref 3.5–5)
RBC # BLD: 3.61 E12/L (ref 3.8–5.8)
SODIUM BLD-SCNC: 142 MMOL/L (ref 132–146)
TOTAL PROTEIN: 5.3 G/DL (ref 6.4–8.3)
WBC # BLD: 14.8 E9/L (ref 4.5–11.5)

## 2021-09-12 PROCEDURE — 2700000000 HC OXYGEN THERAPY PER DAY

## 2021-09-12 PROCEDURE — 94640 AIRWAY INHALATION TREATMENT: CPT

## 2021-09-12 PROCEDURE — 6370000000 HC RX 637 (ALT 250 FOR IP): Performed by: NURSE PRACTITIONER

## 2021-09-12 PROCEDURE — 6360000002 HC RX W HCPCS: Performed by: INTERNAL MEDICINE

## 2021-09-12 PROCEDURE — 2580000003 HC RX 258: Performed by: INTERNAL MEDICINE

## 2021-09-12 PROCEDURE — 6370000000 HC RX 637 (ALT 250 FOR IP): Performed by: INTERNAL MEDICINE

## 2021-09-12 PROCEDURE — 2060000000 HC ICU INTERMEDIATE R&B

## 2021-09-12 PROCEDURE — 82962 GLUCOSE BLOOD TEST: CPT

## 2021-09-12 PROCEDURE — 94660 CPAP INITIATION&MGMT: CPT

## 2021-09-12 PROCEDURE — 94669 MECHANICAL CHEST WALL OSCILL: CPT

## 2021-09-12 PROCEDURE — 36415 COLL VENOUS BLD VENIPUNCTURE: CPT

## 2021-09-12 PROCEDURE — 93005 ELECTROCARDIOGRAM TRACING: CPT | Performed by: NURSE PRACTITIONER

## 2021-09-12 PROCEDURE — 82248 BILIRUBIN DIRECT: CPT

## 2021-09-12 PROCEDURE — APPSS30 APP SPLIT SHARED TIME 16-30 MINUTES: Performed by: NURSE PRACTITIONER

## 2021-09-12 PROCEDURE — 82247 BILIRUBIN TOTAL: CPT

## 2021-09-12 PROCEDURE — 84100 ASSAY OF PHOSPHORUS: CPT

## 2021-09-12 PROCEDURE — 99233 SBSQ HOSP IP/OBS HIGH 50: CPT | Performed by: INTERNAL MEDICINE

## 2021-09-12 PROCEDURE — 80053 COMPREHEN METABOLIC PANEL: CPT

## 2021-09-12 PROCEDURE — 83735 ASSAY OF MAGNESIUM: CPT

## 2021-09-12 PROCEDURE — 85027 COMPLETE CBC AUTOMATED: CPT

## 2021-09-12 RX ADMIN — ENOXAPARIN SODIUM 40 MG: 40 INJECTION SUBCUTANEOUS at 09:41

## 2021-09-12 RX ADMIN — CARVEDILOL 3.12 MG: 3.12 TABLET, FILM COATED ORAL at 16:54

## 2021-09-12 RX ADMIN — Medication 10 ML: at 21:19

## 2021-09-12 RX ADMIN — ARFORMOTEROL TARTRATE 15 MCG: 15 SOLUTION RESPIRATORY (INHALATION) at 18:19

## 2021-09-12 RX ADMIN — BUDESONIDE 1000 MCG: 0.5 INHALANT RESPIRATORY (INHALATION) at 06:00

## 2021-09-12 RX ADMIN — INSULIN LISPRO 2 UNITS: 100 INJECTION, SOLUTION INTRAVENOUS; SUBCUTANEOUS at 21:20

## 2021-09-12 RX ADMIN — METHYLPREDNISOLONE SODIUM SUCCINATE 40 MG: 40 INJECTION, POWDER, FOR SOLUTION INTRAMUSCULAR; INTRAVENOUS at 12:00

## 2021-09-12 RX ADMIN — Medication 10 ML: at 09:10

## 2021-09-12 RX ADMIN — GABAPENTIN 600 MG: 300 CAPSULE ORAL at 09:41

## 2021-09-12 RX ADMIN — ROFLUMILAST 500 MCG: 500 TABLET ORAL at 09:42

## 2021-09-12 RX ADMIN — INSULIN LISPRO 3 UNITS: 100 INJECTION, SOLUTION INTRAVENOUS; SUBCUTANEOUS at 16:54

## 2021-09-12 RX ADMIN — IPRATROPIUM BROMIDE AND ALBUTEROL SULFATE 1 AMPULE: .5; 3 SOLUTION RESPIRATORY (INHALATION) at 22:24

## 2021-09-12 RX ADMIN — CARVEDILOL 3.12 MG: 3.12 TABLET, FILM COATED ORAL at 09:42

## 2021-09-12 RX ADMIN — GABAPENTIN 600 MG: 300 CAPSULE ORAL at 13:28

## 2021-09-12 RX ADMIN — IPRATROPIUM BROMIDE AND ALBUTEROL SULFATE 1 AMPULE: .5; 3 SOLUTION RESPIRATORY (INHALATION) at 18:19

## 2021-09-12 RX ADMIN — ARFORMOTEROL TARTRATE 15 MCG: 15 SOLUTION RESPIRATORY (INHALATION) at 06:00

## 2021-09-12 RX ADMIN — IPRATROPIUM BROMIDE AND ALBUTEROL SULFATE 1 AMPULE: .5; 3 SOLUTION RESPIRATORY (INHALATION) at 02:32

## 2021-09-12 RX ADMIN — AZITHROMYCIN DIHYDRATE 500 MG: 500 INJECTION, POWDER, LYOPHILIZED, FOR SOLUTION INTRAVENOUS at 18:34

## 2021-09-12 RX ADMIN — GABAPENTIN 600 MG: 300 CAPSULE ORAL at 21:17

## 2021-09-12 RX ADMIN — BUDESONIDE 1000 MCG: 0.5 INHALANT RESPIRATORY (INHALATION) at 18:19

## 2021-09-12 RX ADMIN — IPRATROPIUM BROMIDE AND ALBUTEROL SULFATE 1 AMPULE: .5; 3 SOLUTION RESPIRATORY (INHALATION) at 09:18

## 2021-09-12 RX ADMIN — IPRATROPIUM BROMIDE AND ALBUTEROL SULFATE 1 AMPULE: .5; 3 SOLUTION RESPIRATORY (INHALATION) at 06:00

## 2021-09-12 RX ADMIN — METHYLPREDNISOLONE SODIUM SUCCINATE 40 MG: 40 INJECTION, POWDER, FOR SOLUTION INTRAMUSCULAR; INTRAVENOUS at 18:34

## 2021-09-12 RX ADMIN — INSULIN LISPRO 4 UNITS: 100 INJECTION, SOLUTION INTRAVENOUS; SUBCUTANEOUS at 12:00

## 2021-09-12 RX ADMIN — INSULIN GLARGINE 15 UNITS: 100 INJECTION, SOLUTION SUBCUTANEOUS at 21:20

## 2021-09-12 RX ADMIN — METFORMIN HYDROCHLORIDE 500 MG: 500 TABLET ORAL at 09:42

## 2021-09-12 RX ADMIN — INSULIN LISPRO 2 UNITS: 100 INJECTION, SOLUTION INTRAVENOUS; SUBCUTANEOUS at 09:42

## 2021-09-12 RX ADMIN — METHYLPREDNISOLONE SODIUM SUCCINATE 40 MG: 40 INJECTION, POWDER, FOR SOLUTION INTRAMUSCULAR; INTRAVENOUS at 04:12

## 2021-09-12 RX ADMIN — QUETIAPINE FUMARATE 200 MG: 100 TABLET ORAL at 09:41

## 2021-09-12 RX ADMIN — ATORVASTATIN CALCIUM 20 MG: 20 TABLET, FILM COATED ORAL at 09:41

## 2021-09-12 RX ADMIN — IPRATROPIUM BROMIDE AND ALBUTEROL SULFATE 1 AMPULE: .5; 3 SOLUTION RESPIRATORY (INHALATION) at 14:08

## 2021-09-12 RX ADMIN — METFORMIN HYDROCHLORIDE 500 MG: 500 TABLET ORAL at 16:54

## 2021-09-12 ASSESSMENT — PAIN SCALES - GENERAL
PAINLEVEL_OUTOF10: 0

## 2021-09-12 ASSESSMENT — PAIN DESCRIPTION - PROGRESSION: CLINICAL_PROGRESSION: NOT CHANGED

## 2021-09-12 NOTE — PROGRESS NOTES
[address] [place], [place] [address]ist   Progress Note    Admitting Date and Time: 9/7/2021  8:12 PM  Admit Dx: COPD exacerbation ([facility] [address] [J44.1]  Acute on chronic respiratory failure with hypoxia ([facility] [address] [J96.21]    Subjective:    Patient was dyspneic at rest today and breathing is labored. Discussed Bipap with patient to decrease the workload of his breathing and he is agreeable. ROS: denies fever, chills, n/v, HA unless stated above.      ipratropium-albuterol  1 ampule Inhalation Once    insulin glargine  15 Units SubCUTAneous Nightly    methylPREDNISolone  40 mg IntraVENous Q8H    ipratropium-albuterol  1 ampule Inhalation Q4H    azithromycin  500 mg IntraVENous Q24H    Roflumilast  500 mcg Oral Daily    carvedilol  3.125 mg Oral BID WC    insulin lispro  0-6 Units SubCUTAneous TID WC    insulin lispro  0-3 Units SubCUTAneous Nightly    metFORMIN  500 mg Oral BID WC    gabapentin  600 mg Oral TID    QUEtiapine  200 mg Oral Daily    atorvastatin  20 mg Oral Daily    sodium chloride flush  5-40 mL IntraVENous 2 times per day    enoxaparin  40 mg SubCUTAneous Daily    Arformoterol Tartrate  15 mcg Nebulization BID    And    budesonide  1 mg Nebulization BID     glucose, 15 g, PRN  dextrose, 12.5 g, PRN  glucagon (rDNA), 1 mg, PRN  dextrose, 100 mL/hr, PRN  HYDROcodone-acetaminophen, 1 tablet, Q8H PRN  sodium chloride flush, 5-40 mL, PRN  sodium chloride, 25 mL, PRN  ondansetron, 4 mg, Q8H PRN   Or  ondansetron, 4 mg, Q6H PRN  polyethylene glycol, 17 g, Daily PRN  acetaminophen, 650 mg, Q6H PRN   Or  acetaminophen, 650 mg, Q6H PRN  albuterol, 2.5 mg, Q2H PRN         Objective:    BP (!) 156/81   Pulse 94   Temp 98.1 °F (36.7 °C) (Oral)   Resp 20   Ht 5' 7\" (1.702 m)   Wt 205 lb (93 kg)   SpO2 97%   BMI 32.11 kg/m²   General Appearance: alert and oriented to person, place and time, chronically ill appearing  Skin: warm and dry  Head: normocephalic and atraumatic  Eyes: pupils equal, round, and reactive to light, conjunctivae normal  Neck: neck supple and non tender without mass   Pulmonary/Chest: wheezes, dyspneic and tachypneic  Cardiovascular: regular rate, normal S1 and S2   Abdomen: soft, non-tender, distended, normal bowel sounds, no masses or organomegaly  Extremities: no cyanosis, no clubbing and no edema  Neurologic: no cranial nerve deficit and speech normal      Recent Labs     09/10/21  0912 09/11/21  0929 09/12/21  0428    146 142   K 4.2 4.4 3.9    107 108*   CO2 27 21* 26   BUN 23 21 20   CREATININE 0.9 0.8 0.8   GLUCOSE 213* 317* 213*   CALCIUM 8.7 9.0 8.8       Recent Labs     09/10/21  0912 09/12/21  0428   ALKPHOS 73 80   PROT 5.8* 5.3*   LABALBU 3.1* 3.2*   BILITOT <0.2 <0.2   AST 12 14   ALT 12 16       Recent Labs     09/10/21  0912 09/11/21  0929 09/12/21  0428   WBC 16.8* 16.3* 14.8*   RBC 3.72* 3.97 3.61*   HGB 11.9* 12.9 11.7*   HCT 37.0 40.2 35.8*   MCV 99.5 101.3* 99.2   MCH 32.0 32.5 32.4   MCHC 32.2 32.1 32.7   RDW 13.2 13.1 12.8    307 284   MPV 9.6 9.4 9.4           Radiology:   XR CHEST PORTABLE   Final Result   No acute abnormality. CTA CHEST W CONTRAST   Final Result   No evidence of pulmonary emboli or acute infiltrate         XR CHEST PORTABLE   Final Result   Vague ill-defined confluent area of increased density/plate atelectasis in   the left base. There is no recent studies available for comparison to determine baseline. Can consider further evaluation with CT chest.             Assessment:  Active Problems:    COPD exacerbation (HCC)    Acute on chronic respiratory failure with hypoxia and hypercapnia (HCC)    History of tobacco abuse  Resolved Problems:    * No resolved hospital problems. *      Plan:  1. Acute on chronic respiratory failure with hypoxia and hypercapnia:  Bipap to decrease the workload of his breathing. 2. COPD exacerbation:  Continue Solu-Medrol, azithromycinaerosols.   PEP/flutter, Incentive spirometer. 3. Dysphagia:  Speech Therapy evaluated and recommend regular diet and thin liquids. 4. Depression:  Continue Seroquel. 5. Lung nodules:  3 mm right middle lobe nodule and 5 mm medial left lower lobe nodule seen on CT of the chest 6/24/20 at [facility]. Needs long-term follow up. 6. Fatty liver:  Seen on CTA of the chest.  Outpatient follow up. 7. Diabetes:  Hgb A1C is 7.2. Continue Humalog Sliding Scale, Metformin, blood sugar checks, hypoglycemic protocol. Lantus added yesterday and blood sugars have improved this am.   Diabetic Educator met with patient. 8. Hypertension:  Continue home Coreg. NOTE: This report was transcribed using voice recognition software. Every effort was made to ensure accuracy; however, inadvertent computerized transcription errors may be present.      Electronically signed by OSIRIS Garcia CNP on 9/12/2021 at 9:55 AM

## 2021-09-13 ENCOUNTER — APPOINTMENT (OUTPATIENT)
Dept: GENERAL RADIOLOGY | Age: 66
DRG: 189 | End: 2021-09-13
Payer: MEDICARE

## 2021-09-13 LAB
ALBUMIN SERPL-MCNC: 3.2 G/DL (ref 3.5–5.2)
ALP BLD-CCNC: 81 U/L (ref 40–129)
ALT SERPL-CCNC: 18 U/L (ref 0–40)
ANION GAP SERPL CALCULATED.3IONS-SCNC: 14 MMOL/L (ref 7–16)
AST SERPL-CCNC: 14 U/L (ref 0–39)
BILIRUB SERPL-MCNC: 0.2 MG/DL (ref 0–1.2)
BILIRUBIN DIRECT: <0.2 MG/DL (ref 0–0.3)
BILIRUBIN, INDIRECT: ABNORMAL MG/DL (ref 0–1)
BUN BLDV-MCNC: 23 MG/DL (ref 6–23)
CALCIUM SERPL-MCNC: 9.1 MG/DL (ref 8.6–10.2)
CHLORIDE BLD-SCNC: 105 MMOL/L (ref 98–107)
CO2: 23 MMOL/L (ref 22–29)
CREAT SERPL-MCNC: 0.8 MG/DL (ref 0.7–1.2)
GFR AFRICAN AMERICAN: >60
GFR NON-AFRICAN AMERICAN: >60 ML/MIN/1.73
GLUCOSE BLD-MCNC: 240 MG/DL (ref 74–99)
HCT VFR BLD CALC: 40.5 % (ref 37–54)
HEMOGLOBIN: 13.2 G/DL (ref 12.5–16.5)
MAGNESIUM: 2.3 MG/DL (ref 1.6–2.6)
MCH RBC QN AUTO: 32.5 PG (ref 26–35)
MCHC RBC AUTO-ENTMCNC: 32.6 % (ref 32–34.5)
MCV RBC AUTO: 99.8 FL (ref 80–99.9)
METER GLUCOSE: 180 MG/DL (ref 74–99)
METER GLUCOSE: 182 MG/DL (ref 74–99)
METER GLUCOSE: 183 MG/DL (ref 74–99)
METER GLUCOSE: 283 MG/DL (ref 74–99)
METER GLUCOSE: 311 MG/DL (ref 74–99)
PDW BLD-RTO: 13.1 FL (ref 11.5–15)
PHOSPHORUS: 3 MG/DL (ref 2.5–4.5)
PLATELET # BLD: 294 E9/L (ref 130–450)
PMV BLD AUTO: 9.2 FL (ref 7–12)
POTASSIUM SERPL-SCNC: 4.2 MMOL/L (ref 3.5–5)
RBC # BLD: 4.06 E12/L (ref 3.8–5.8)
SODIUM BLD-SCNC: 142 MMOL/L (ref 132–146)
TOTAL PROTEIN: 5.5 G/DL (ref 6.4–8.3)
WBC # BLD: 15.8 E9/L (ref 4.5–11.5)

## 2021-09-13 PROCEDURE — 82962 GLUCOSE BLOOD TEST: CPT

## 2021-09-13 PROCEDURE — 6360000002 HC RX W HCPCS: Performed by: INTERNAL MEDICINE

## 2021-09-13 PROCEDURE — 71045 X-RAY EXAM CHEST 1 VIEW: CPT

## 2021-09-13 PROCEDURE — 80076 HEPATIC FUNCTION PANEL: CPT

## 2021-09-13 PROCEDURE — 94660 CPAP INITIATION&MGMT: CPT

## 2021-09-13 PROCEDURE — 6370000000 HC RX 637 (ALT 250 FOR IP): Performed by: INTERNAL MEDICINE

## 2021-09-13 PROCEDURE — 2700000000 HC OXYGEN THERAPY PER DAY

## 2021-09-13 PROCEDURE — 6370000000 HC RX 637 (ALT 250 FOR IP): Performed by: NURSE PRACTITIONER

## 2021-09-13 PROCEDURE — 94669 MECHANICAL CHEST WALL OSCILL: CPT

## 2021-09-13 PROCEDURE — 36415 COLL VENOUS BLD VENIPUNCTURE: CPT

## 2021-09-13 PROCEDURE — 83735 ASSAY OF MAGNESIUM: CPT

## 2021-09-13 PROCEDURE — 2060000000 HC ICU INTERMEDIATE R&B

## 2021-09-13 PROCEDURE — APPSS30 APP SPLIT SHARED TIME 16-30 MINUTES: Performed by: NURSE PRACTITIONER

## 2021-09-13 PROCEDURE — 80048 BASIC METABOLIC PNL TOTAL CA: CPT

## 2021-09-13 PROCEDURE — 2580000003 HC RX 258: Performed by: INTERNAL MEDICINE

## 2021-09-13 PROCEDURE — 84100 ASSAY OF PHOSPHORUS: CPT

## 2021-09-13 PROCEDURE — 94640 AIRWAY INHALATION TREATMENT: CPT

## 2021-09-13 PROCEDURE — 99232 SBSQ HOSP IP/OBS MODERATE 35: CPT | Performed by: INTERNAL MEDICINE

## 2021-09-13 PROCEDURE — 85027 COMPLETE CBC AUTOMATED: CPT

## 2021-09-13 RX ORDER — METHYLPREDNISOLONE SODIUM SUCCINATE 40 MG/ML
20 INJECTION, POWDER, LYOPHILIZED, FOR SOLUTION INTRAMUSCULAR; INTRAVENOUS EVERY 8 HOURS
Status: DISCONTINUED | OUTPATIENT
Start: 2021-09-14 | End: 2021-09-14

## 2021-09-13 RX ADMIN — METFORMIN HYDROCHLORIDE 500 MG: 500 TABLET ORAL at 08:56

## 2021-09-13 RX ADMIN — ARFORMOTEROL TARTRATE 15 MCG: 15 SOLUTION RESPIRATORY (INHALATION) at 20:13

## 2021-09-13 RX ADMIN — IPRATROPIUM BROMIDE AND ALBUTEROL SULFATE 1 AMPULE: .5; 3 SOLUTION RESPIRATORY (INHALATION) at 06:47

## 2021-09-13 RX ADMIN — BUDESONIDE 1000 MCG: 0.5 INHALANT RESPIRATORY (INHALATION) at 06:47

## 2021-09-13 RX ADMIN — IPRATROPIUM BROMIDE AND ALBUTEROL SULFATE 1 AMPULE: .5; 3 SOLUTION RESPIRATORY (INHALATION) at 14:14

## 2021-09-13 RX ADMIN — IPRATROPIUM BROMIDE AND ALBUTEROL SULFATE 1 AMPULE: .5; 3 SOLUTION RESPIRATORY (INHALATION) at 20:13

## 2021-09-13 RX ADMIN — METFORMIN HYDROCHLORIDE 500 MG: 500 TABLET ORAL at 18:12

## 2021-09-13 RX ADMIN — GABAPENTIN 600 MG: 300 CAPSULE ORAL at 15:29

## 2021-09-13 RX ADMIN — ROFLUMILAST 500 MCG: 500 TABLET ORAL at 08:56

## 2021-09-13 RX ADMIN — ENOXAPARIN SODIUM 40 MG: 40 INJECTION SUBCUTANEOUS at 08:55

## 2021-09-13 RX ADMIN — INSULIN LISPRO 1 UNITS: 100 INJECTION, SOLUTION INTRAVENOUS; SUBCUTANEOUS at 21:58

## 2021-09-13 RX ADMIN — ATORVASTATIN CALCIUM 20 MG: 20 TABLET, FILM COATED ORAL at 08:56

## 2021-09-13 RX ADMIN — METHYLPREDNISOLONE SODIUM SUCCINATE 40 MG: 40 INJECTION, POWDER, FOR SOLUTION INTRAMUSCULAR; INTRAVENOUS at 18:12

## 2021-09-13 RX ADMIN — CARVEDILOL 3.12 MG: 3.12 TABLET, FILM COATED ORAL at 08:56

## 2021-09-13 RX ADMIN — QUETIAPINE FUMARATE 200 MG: 100 TABLET ORAL at 08:56

## 2021-09-13 RX ADMIN — Medication 10 ML: at 10:28

## 2021-09-13 RX ADMIN — BUDESONIDE 1000 MCG: 0.5 INHALANT RESPIRATORY (INHALATION) at 20:13

## 2021-09-13 RX ADMIN — METHYLPREDNISOLONE SODIUM SUCCINATE 40 MG: 40 INJECTION, POWDER, FOR SOLUTION INTRAMUSCULAR; INTRAVENOUS at 02:42

## 2021-09-13 RX ADMIN — Medication 10 ML: at 21:00

## 2021-09-13 RX ADMIN — GABAPENTIN 600 MG: 300 CAPSULE ORAL at 08:56

## 2021-09-13 RX ADMIN — INSULIN GLARGINE 15 UNITS: 100 INJECTION, SOLUTION SUBCUTANEOUS at 21:57

## 2021-09-13 RX ADMIN — INSULIN LISPRO 4 UNITS: 100 INJECTION, SOLUTION INTRAVENOUS; SUBCUTANEOUS at 13:38

## 2021-09-13 RX ADMIN — AZITHROMYCIN DIHYDRATE 500 MG: 500 INJECTION, POWDER, LYOPHILIZED, FOR SOLUTION INTRAVENOUS at 18:12

## 2021-09-13 RX ADMIN — INSULIN LISPRO 3 UNITS: 100 INJECTION, SOLUTION INTRAVENOUS; SUBCUTANEOUS at 18:25

## 2021-09-13 RX ADMIN — METHYLPREDNISOLONE SODIUM SUCCINATE 40 MG: 40 INJECTION, POWDER, FOR SOLUTION INTRAMUSCULAR; INTRAVENOUS at 10:28

## 2021-09-13 RX ADMIN — GABAPENTIN 600 MG: 300 CAPSULE ORAL at 21:56

## 2021-09-13 RX ADMIN — INSULIN LISPRO 1 UNITS: 100 INJECTION, SOLUTION INTRAVENOUS; SUBCUTANEOUS at 10:28

## 2021-09-13 RX ADMIN — ARFORMOTEROL TARTRATE 15 MCG: 15 SOLUTION RESPIRATORY (INHALATION) at 06:47

## 2021-09-13 RX ADMIN — CARVEDILOL 3.12 MG: 3.12 TABLET, FILM COATED ORAL at 18:12

## 2021-09-13 RX ADMIN — IPRATROPIUM BROMIDE AND ALBUTEROL SULFATE 1 AMPULE: .5; 3 SOLUTION RESPIRATORY (INHALATION) at 09:59

## 2021-09-13 ASSESSMENT — PAIN DESCRIPTION - PROGRESSION: CLINICAL_PROGRESSION: NOT CHANGED

## 2021-09-13 ASSESSMENT — PAIN SCALES - GENERAL
PAINLEVEL_OUTOF10: 0

## 2021-09-13 NOTE — PROGRESS NOTES
alert and oriented to person, place and time and in no acute distress  Skin: warm and dry  Head: normocephalic and atraumatic  Eyes: pupils equal, round, and reactive to light, extraocular eye movements intact, conjunctivae normal  Neck: neck supple and non tender without mass   Pulmonary/Chest: diminished throughout, tachypnea, with little aeration noted to auscultation bilaterally, mild respiratory distress, using accessory muscles   Cardiovascular: normal rate, normal S1 and S2 and no carotid bruits  Abdomen: soft, non-tender, non-distended, normal bowel sounds   Extremities: no cyanosis, no clubbing and no edema  Neurologic: no cranial nerve deficit and speech normal      Recent Labs     09/11/21  0929 09/12/21 0428    142   K 4.4 3.9    108*   CO2 21* 26   BUN 21 20   CREATININE 0.8 0.8   GLUCOSE 317* 213*   CALCIUM 9.0 8.8       Recent Labs     09/12/21 0428   ALKPHOS 80   PROT 5.3*   LABALBU 3.2*   BILITOT <0.2  <0.2   AST 14   ALT 16       Recent Labs     09/11/21  0929 09/12/21 0428   WBC 16.3* 14.8*   RBC 3.97 3.61*   HGB 12.9 11.7*   HCT 40.2 35.8*   .3* 99.2   MCH 32.5 32.4   MCHC 32.1 32.7   RDW 13.1 12.8    284   MPV 9.4 9.4       Radiology:   XR CHEST PORTABLE   Final Result   No acute abnormality. CTA CHEST W CONTRAST   Final Result   No evidence of pulmonary emboli or acute infiltrate         XR CHEST PORTABLE   Final Result   Vague ill-defined confluent area of increased density/plate atelectasis in   the left base. There is no recent studies available for comparison to determine baseline. Can consider further evaluation with CT chest.             Assessment:  Active Problems:    COPD exacerbation (HCC)    Acute on chronic respiratory failure with hypoxia and hypercapnia (HCC)    History of tobacco abuse  Resolved Problems:    * No resolved hospital problems. *      Plan:    1.  Acute on chronic respiratory failure with hypoxemia and hypercapnia - oxygen 4L NC with BiPAP at HS     2. Exacerbation of COPD - IV Zithromax - Duoneb, Brovana and Pulmicort - IV Solumedrol - continue Daliresp - before treatments lungs are tight then post treatment expiratory wheezes noted - chest xray completed, no acute abnormality     3. Diabetes mellitus - basal insulin, Metformin and low dose sliding scale - 4 carb diabetic diet - A1c 7.2 - BG elevated most likely d/t steroids - continue ac&hs blood glucose monitoring     4. Diabetic neuropathy - on Neurontin     5. Hyperlipidemia - continue statin      6. Depression - on Seroquel     7. Fatty liver - incidental finding on CTA scan - will need to follow up once discharge as an outpatient     8. Lung nodule - 3 mm right middle lobe nodule and 5 mm medial left lower lobe nodule per CT scan seen in June of 2020 - patient will need to follow up with repeat CT scan and PCP/[facility] as an outpatient     9. Hypertension - BP controlled with medication     10. Dysphagia - has resolved now tolerating regular diet with thin liquids      11. Tobacco abuse - continue educate on importance of not smoking     NOTE: This report was transcribed using voice recognition software. Every effort was made to ensure accuracy; however, inadvertent computerized transcription errors may be present.      Electronically signed by OSIRIS Davison CNP on 9/13/2021 at 9:15 AM

## 2021-09-13 NOTE — PROGRESS NOTES
Speech Language Pathology      NAME:  Charity Gil  :  1955  DATE: 2021  ROOM:  [facility]/5182-29    Attempted ongoing Speech-Language Pathology intervention for dysphagia. Pt unavailable at this time due to:  [] HOLD per RN/ medical staff d/t medical status   [] Off unit for testing/ procedure    [x] With medical staff   [] Declined intervention  [] Sleeping/ Lethargic   [] Other:     SLP to continue previously established POC and re-attempt as able.             COPD exacerbation ([facility] [address] [facility] [J44.1]  Acute on chronic respiratory failure with hypoxia ([facility] [address] [J96.21]        Alexandro Valle MSCCC/SLP  Speech Language Pathologist  [id number]

## 2021-09-13 NOTE — PROGRESS NOTES
Pulmonary/Critical Care Progress Note    We are following patient for acute superimposed on chronic respiratory failure, history of small lung nodules, severe COPD    SUBJECTIVE:  The patient is definitely feeling better. He is noticing less dyspnea on exertion and actually smiled a couple of times during our visit. We will decrease his steroids and suggest that he should be ready for discharge on 9/15/2021. In the meantime, we will continue appropriate antibiotics and phosphodiesterase inhibitors along with aerosolized bronchodilators. MEDICATIONS:   [START ON 9/14/2021] methylPREDNISolone  20 mg IntraVENous Q8H    insulin glargine  15 Units SubCUTAneous Nightly    ipratropium-albuterol  1 ampule Inhalation Q4H    azithromycin  500 mg IntraVENous Q24H    Roflumilast  500 mcg Oral Daily    carvedilol  3.125 mg Oral BID WC    insulin lispro  0-6 Units SubCUTAneous TID WC    insulin lispro  0-3 Units SubCUTAneous Nightly    metFORMIN  500 mg Oral BID WC    gabapentin  600 mg Oral TID    QUEtiapine  200 mg Oral Daily    atorvastatin  20 mg Oral Daily    sodium chloride flush  5-40 mL IntraVENous 2 times per day    enoxaparin  40 mg SubCUTAneous Daily    Arformoterol Tartrate  15 mcg Nebulization BID    And    budesonide  1 mg Nebulization BID      dextrose      sodium chloride       glucose, dextrose, glucagon (rDNA), dextrose, HYDROcodone-acetaminophen, sodium chloride flush, sodium chloride, ondansetron **OR** ondansetron, polyethylene glycol, acetaminophen **OR** acetaminophen, albuterol      REVIEW OF SYSTEMS:  Constitutional: Denies fever, weight loss, night sweats, and fatigue  Skin: Denies pigmentation, dark lesions, and rashes   HEENT: Denies hearing loss, tinnitus, ear drainage, epistaxis, sore throat, and hoarseness. Cardiovascular: Denies palpitations, chest pain, and chest pressure. Respiratory: Denies cough, dyspnea at rest, hemoptysis, apnea, and choking.   Gastrointestinal: Denies nausea, vomiting, poor appetite, diarrhea, heartburn or reflux  Genitourinary: Denies dysuria, frequency, urgency or hematuria  Musculoskeletal: Denies myalgias, muscle weakness, and bone pain  Neurological: Denies dizziness, vertigo, headache, and focal weakness  Psychological: Denies anxiety and depression  Endocrine: Denies heat intolerance and cold intolerance  Hematopoietic/Lymphatic: Denies bleeding problems and blood transfusions    OBJECTIVE:  Vitals:    09/13/21 1515   BP: (!) 167/92   Pulse: 109   Resp: 20   Temp: 97 °F (36.1 °C)   SpO2: 96%        O2 Flow Rate (L/min): 4 L/min  O2 Device: Nasal cannula    PHYSICAL EXAM:  Constitutional: No fever, chills, diaphoresis  Skin: Skin rash, no skin breakdown  HEENT: Unremarkable  Neck: No JVD, lymphadenopathy, thyromegaly  Cardiovascular: S1, S2 regular. No S3 murmurs rubs present  Respiratory: Decreased breath sounds bilaterally but breath sounds are improved, there is no further wheezing, and the patient's ability to empty his lungs has improved as well. No inspiratory crackles are heard. Gastrointestinal: Soft, obese, nontender  Genitourinary: No CVA tenderness  Extremities: Clubbing, cyanosis, or edema  Neurological: Awake, alert, oriented x3.   No evidence of focal motor or sensory deficits  Psychological: Appropriate affect; much less anxious than he has been since admission    LABS:  WBC   Date Value Ref Range Status   09/13/2021 15.8 (H) 4.5 - 11.5 E9/L Final   09/12/2021 14.8 (H) 4.5 - 11.5 E9/L Final   09/11/2021 16.3 (H) 4.5 - 11.5 E9/L Final     Hemoglobin   Date Value Ref Range Status   09/13/2021 13.2 12.5 - 16.5 g/dL Final   09/12/2021 11.7 (L) 12.5 - 16.5 g/dL Final   09/11/2021 12.9 12.5 - 16.5 g/dL Final     Hematocrit   Date Value Ref Range Status   09/13/2021 40.5 37.0 - 54.0 % Final   09/12/2021 35.8 (L) 37.0 - 54.0 % Final   09/11/2021 40.2 37.0 - 54.0 % Final     MCV   Date Value Ref Range Status   09/13/2021 99.8 80.0 - 99.9 fL Final   09/12/2021 99.2 80.0 - 99.9 fL Final   09/11/2021 101.3 (H) 80.0 - 99.9 fL Final     Platelets   Date Value Ref Range Status   09/13/2021 294 130 - 450 E9/L Final   09/12/2021 284 130 - 450 E9/L Final   09/11/2021 307 130 - 450 E9/L Final     Sodium   Date Value Ref Range Status   09/13/2021 142 132 - 146 mmol/L Final   09/12/2021 142 132 - 146 mmol/L Final   09/11/2021 146 132 - 146 mmol/L Final     Potassium   Date Value Ref Range Status   09/13/2021 4.2 3.5 - 5.0 mmol/L Final   09/12/2021 3.9 3.5 - 5.0 mmol/L Final   09/11/2021 4.4 3.5 - 5.0 mmol/L Final     Potassium reflex Magnesium   Date Value Ref Range Status   09/07/2021 3.7 3.5 - 5.0 mmol/L Final     Chloride   Date Value Ref Range Status   09/13/2021 105 98 - 107 mmol/L Final   09/12/2021 108 (H) 98 - 107 mmol/L Final   09/11/2021 107 98 - 107 mmol/L Final     CO2   Date Value Ref Range Status   09/13/2021 23 22 - 29 mmol/L Final   09/12/2021 26 22 - 29 mmol/L Final   09/11/2021 21 (L) 22 - 29 mmol/L Final     BUN   Date Value Ref Range Status   09/13/2021 23 6 - 23 mg/dL Final   09/12/2021 20 6 - 23 mg/dL Final   09/11/2021 21 6 - 23 mg/dL Final     CREATININE   Date Value Ref Range Status   09/13/2021 0.8 0.7 - 1.2 mg/dL Final   09/12/2021 0.8 0.7 - 1.2 mg/dL Final   09/11/2021 0.8 0.7 - 1.2 mg/dL Final     Glucose   Date Value Ref Range Status   09/13/2021 240 (H) 74 - 99 mg/dL Final   09/12/2021 213 (H) 74 - 99 mg/dL Final   09/11/2021 317 (H) 74 - 99 mg/dL Final     Calcium   Date Value Ref Range Status   09/13/2021 9.1 8.6 - 10.2 mg/dL Final   09/12/2021 8.8 8.6 - 10.2 mg/dL Final   09/11/2021 9.0 8.6 - 10.2 mg/dL Final     Total Protein   Date Value Ref Range Status   09/13/2021 5.5 (L) 6.4 - 8.3 g/dL Final   09/12/2021 5.3 (L) 6.4 - 8.3 g/dL Final   09/10/2021 5.8 (L) 6.4 - 8.3 g/dL Final     Albumin   Date Value Ref Range Status   09/13/2021 3.2 (L) 3.5 - 5.2 g/dL Final   09/12/2021 3.2 (L) 3.5 - 5.2 g/dL Final   09/10/2021 3.1 (L) 3.5 - 5.2 g/dL Final     Total Bilirubin   Date Value Ref Range Status   09/13/2021 0.2 0.0 - 1.2 mg/dL Final   09/12/2021 <0.2 0.0 - 1.2 mg/dL Final   09/12/2021 <0.2 0.0 - 1.2 mg/dL Final     Alkaline Phosphatase   Date Value Ref Range Status   09/13/2021 81 40 - 129 U/L Final   09/12/2021 80 40 - 129 U/L Final   09/10/2021 73 40 - 129 U/L Final     AST   Date Value Ref Range Status   09/13/2021 14 0 - 39 U/L Final   09/12/2021 14 0 - 39 U/L Final   09/10/2021 12 0 - 39 U/L Final     ALT   Date Value Ref Range Status   09/13/2021 18 0 - 40 U/L Final   09/12/2021 16 0 - 40 U/L Final   09/10/2021 12 0 - 40 U/L Final     GFR Non-   Date Value Ref Range Status   09/13/2021 >60 >=60 mL/min/1.73 Final     Comment:     Chronic Kidney Disease: less than 60 ml/min/1.73 sq.m. Kidney Failure: less than 15 ml/min/1.73 sq.m. Results valid for patients 18 years and older. 09/12/2021 >60 >=60 mL/min/1.73 Final     Comment:     Chronic Kidney Disease: less than 60 ml/min/1.73 sq.m. Kidney Failure: less than 15 ml/min/1.73 sq.m. Results valid for patients 18 years and older. 09/11/2021 >60 >=60 mL/min/1.73 Final     Comment:     Chronic Kidney Disease: less than 60 ml/min/1.73 sq.m. Kidney Failure: less than 15 ml/min/1.73 sq.m. Results valid for patients 18 years and older.        GFR    Date Value Ref Range Status   09/13/2021 >60  Final   09/12/2021 >60  Final   09/11/2021 >60  Final     Magnesium   Date Value Ref Range Status   09/13/2021 2.3 1.6 - 2.6 mg/dL Final   09/12/2021 2.2 1.6 - 2.6 mg/dL Final   09/11/2021 2.2 1.6 - 2.6 mg/dL Final     Phosphorus   Date Value Ref Range Status   09/13/2021 3.0 2.5 - 4.5 mg/dL Final   09/12/2021 3.1 2.5 - 4.5 mg/dL Final   09/11/2021 3.5 2.5 - 4.5 mg/dL Final     Recent Labs     09/11/21  1008   PH 7.450   PO2 92.1   PCO2 33.5*   HCO3 22.8   BE -0.6   O2SAT 96.8       RADIOLOGY:  XR CHEST PORTABLE   Final Result   No acute process. XR CHEST PORTABLE   Final Result   No acute abnormality. CTA CHEST W CONTRAST   Final Result   No evidence of pulmonary emboli or acute infiltrate         XR CHEST PORTABLE   Final Result   Vague ill-defined confluent area of increased density/plate atelectasis in   the left base. There is no recent studies available for comparison to determine baseline. Can consider further evaluation with CT chest.                 PROBLEM LIST:  Principal Problem:    Acute on chronic respiratory failure with hypoxia and hypercapnia (HCC)  Active Problems:    COPD exacerbation (HCC)    History of tobacco abuse  Resolved Problems:    * No resolved hospital problems. *      IMPRESSION:  1. Acute respiratory failure  2. COPD exacerbation  3. History of small lung nodules 3 and 5 mm eyes:    PLAN:  1. Decrease IV steroids further  2. Continue IV antibiotics  3. Continue Daliresp  4. ABGs in a.m. on 2 L/min nasal cannula  5. Would plan for discharge 9/15/2021  ioration, which requires the highest level of physician preparedness to intervene urgently. I managed/supervised life or organ supporting interventions that required frequent physician assessment. I devoted my full attention to the direct care of this patient for the amount of time indicated below. Time I spent with the family or surrogate(s) is included only if the patient was incapable of providing the necessary information or participating in medical decisions - Time devoted to teaching and to any procedures I billed separately is not included.     Electronically signed by Pepe Davila MD on 9/13/2021 at 6:38 PM

## 2021-09-13 NOTE — CARE COORDINATION
9/13/21 1513 CM note: COVID (-) X 2 on 9/7 21 & 9/8/21. Patient HR up to 150's today. Met with patient at the bedside to discuss discharge planning. Discharge plan is home and patient declines [facility] [address]. Patient states his mobility is not great but says its because his lungs are only working at 30% and he is on a lung transplant list at [facility]. Patient has home O2 at 3L and nebulizer through [facility]. Pts currently does NOT have a PCP stating his PCP retired and he was set up with Dr Aure Dee but it takes 90 days to get in to see him. Patient declines [id number] [facility] PCP list stating doctors at the [facility] work with Dr Aure Dee and he would deal with the same wait list. Patient states his fiance will assist in him in establishing with a new PCP. Advised patient to call the number on the back on his insurance card to get a list of PCPs in the area that are in his network. Pts fiance will provide transportation at discharge. CM to follow for ATB at discharge.  Electronically signed by Lucina Ness RN on 9/13/2021 at 3:20 PM

## 2021-09-13 NOTE — PROGRESS NOTES
Patient was encouraged by the Respiratory Therapist to go on the Bipap to decrease his work of breathing. The patient refused and stated that he had things to get done and phone calls to make.

## 2021-09-14 VITALS
TEMPERATURE: 97.7 F | WEIGHT: 205 LBS | RESPIRATION RATE: 20 BRPM | DIASTOLIC BLOOD PRESSURE: 85 MMHG | HEIGHT: 67 IN | HEART RATE: 92 BPM | BODY MASS INDEX: 32.18 KG/M2 | OXYGEN SATURATION: 97 % | SYSTOLIC BLOOD PRESSURE: 130 MMHG

## 2021-09-14 LAB
EKG ATRIAL RATE: 93 BPM
EKG P AXIS: 71 DEGREES
EKG P-R INTERVAL: 166 MS
EKG Q-T INTERVAL: 368 MS
EKG QRS DURATION: 102 MS
EKG QTC CALCULATION (BAZETT): 457 MS
EKG R AXIS: 52 DEGREES
EKG T AXIS: 47 DEGREES
EKG VENTRICULAR RATE: 93 BPM
METER GLUCOSE: 118 MG/DL (ref 74–99)
METER GLUCOSE: 140 MG/DL (ref 74–99)
METER GLUCOSE: 176 MG/DL (ref 74–99)
METER GLUCOSE: 260 MG/DL (ref 74–99)

## 2021-09-14 PROCEDURE — 6360000002 HC RX W HCPCS: Performed by: NURSE PRACTITIONER

## 2021-09-14 PROCEDURE — 6370000000 HC RX 637 (ALT 250 FOR IP): Performed by: NURSE PRACTITIONER

## 2021-09-14 PROCEDURE — 2580000003 HC RX 258: Performed by: INTERNAL MEDICINE

## 2021-09-14 PROCEDURE — 99239 HOSP IP/OBS DSCHRG MGMT >30: CPT | Performed by: INTERNAL MEDICINE

## 2021-09-14 PROCEDURE — 97161 PT EVAL LOW COMPLEX 20 MIN: CPT | Performed by: PHYSICAL THERAPIST

## 2021-09-14 PROCEDURE — 6370000000 HC RX 637 (ALT 250 FOR IP): Performed by: INTERNAL MEDICINE

## 2021-09-14 PROCEDURE — 2700000000 HC OXYGEN THERAPY PER DAY

## 2021-09-14 PROCEDURE — 97165 OT EVAL LOW COMPLEX 30 MIN: CPT | Performed by: OCCUPATIONAL THERAPIST

## 2021-09-14 PROCEDURE — 94660 CPAP INITIATION&MGMT: CPT

## 2021-09-14 PROCEDURE — 82962 GLUCOSE BLOOD TEST: CPT

## 2021-09-14 PROCEDURE — 6360000002 HC RX W HCPCS: Performed by: INTERNAL MEDICINE

## 2021-09-14 PROCEDURE — 97530 THERAPEUTIC ACTIVITIES: CPT | Performed by: OCCUPATIONAL THERAPIST

## 2021-09-14 PROCEDURE — APPSS30 APP SPLIT SHARED TIME 16-30 MINUTES: Performed by: NURSE PRACTITIONER

## 2021-09-14 PROCEDURE — 94640 AIRWAY INHALATION TREATMENT: CPT

## 2021-09-14 PROCEDURE — APPSS45 APP SPLIT SHARED TIME 31-45 MINUTES: Performed by: NURSE PRACTITIONER

## 2021-09-14 RX ORDER — INSULIN GLARGINE 100 [IU]/ML
15 INJECTION, SOLUTION SUBCUTANEOUS NIGHTLY
Qty: 10 ML | Refills: 0 | Status: CANCELLED | OUTPATIENT
Start: 2021-09-14

## 2021-09-14 RX ORDER — PREDNISONE 20 MG/1
20 TABLET ORAL DAILY
Qty: 5 TABLET | Refills: 0 | Status: SHIPPED | OUTPATIENT
Start: 2021-09-14 | End: 2021-09-19

## 2021-09-14 RX ORDER — CARVEDILOL 3.12 MG/1
3.12 TABLET ORAL 2 TIMES DAILY WITH MEALS
Qty: 60 TABLET | Refills: 0 | Status: SHIPPED | OUTPATIENT
Start: 2021-09-14 | End: 2022-08-21 | Stop reason: ALTCHOICE

## 2021-09-14 RX ORDER — AZITHROMYCIN 500 MG/1
500 TABLET, FILM COATED ORAL DAILY
Qty: 3 TABLET | Refills: 0 | Status: SHIPPED | OUTPATIENT
Start: 2021-09-14 | End: 2021-09-17

## 2021-09-14 RX ORDER — METHYLPREDNISOLONE SODIUM SUCCINATE 40 MG/ML
20 INJECTION, POWDER, LYOPHILIZED, FOR SOLUTION INTRAMUSCULAR; INTRAVENOUS EVERY 12 HOURS
Status: DISCONTINUED | OUTPATIENT
Start: 2021-09-14 | End: 2021-09-14 | Stop reason: HOSPADM

## 2021-09-14 RX ADMIN — METHYLPREDNISOLONE SODIUM SUCCINATE 20 MG: 40 INJECTION, POWDER, FOR SOLUTION INTRAMUSCULAR; INTRAVENOUS at 13:20

## 2021-09-14 RX ADMIN — BUDESONIDE 1000 MCG: 0.5 INHALANT RESPIRATORY (INHALATION) at 04:18

## 2021-09-14 RX ADMIN — IPRATROPIUM BROMIDE AND ALBUTEROL SULFATE 1 AMPULE: .5; 3 SOLUTION RESPIRATORY (INHALATION) at 09:58

## 2021-09-14 RX ADMIN — ENOXAPARIN SODIUM 40 MG: 40 INJECTION SUBCUTANEOUS at 09:31

## 2021-09-14 RX ADMIN — GABAPENTIN 600 MG: 300 CAPSULE ORAL at 09:32

## 2021-09-14 RX ADMIN — ATORVASTATIN CALCIUM 20 MG: 20 TABLET, FILM COATED ORAL at 09:33

## 2021-09-14 RX ADMIN — CARVEDILOL 3.12 MG: 3.12 TABLET, FILM COATED ORAL at 09:32

## 2021-09-14 RX ADMIN — CARVEDILOL 3.12 MG: 3.12 TABLET, FILM COATED ORAL at 16:48

## 2021-09-14 RX ADMIN — METFORMIN HYDROCHLORIDE 500 MG: 500 TABLET ORAL at 09:40

## 2021-09-14 RX ADMIN — INSULIN LISPRO 1 UNITS: 100 INJECTION, SOLUTION INTRAVENOUS; SUBCUTANEOUS at 09:31

## 2021-09-14 RX ADMIN — INSULIN LISPRO 3 UNITS: 100 INJECTION, SOLUTION INTRAVENOUS; SUBCUTANEOUS at 13:17

## 2021-09-14 RX ADMIN — Medication 10 ML: at 13:20

## 2021-09-14 RX ADMIN — ARFORMOTEROL TARTRATE 15 MCG: 15 SOLUTION RESPIRATORY (INHALATION) at 04:18

## 2021-09-14 RX ADMIN — QUETIAPINE FUMARATE 200 MG: 100 TABLET ORAL at 09:32

## 2021-09-14 RX ADMIN — METFORMIN HYDROCHLORIDE 500 MG: 500 TABLET ORAL at 16:47

## 2021-09-14 RX ADMIN — IPRATROPIUM BROMIDE AND ALBUTEROL SULFATE 1 AMPULE: .5; 3 SOLUTION RESPIRATORY (INHALATION) at 04:18

## 2021-09-14 RX ADMIN — GABAPENTIN 600 MG: 300 CAPSULE ORAL at 13:15

## 2021-09-14 RX ADMIN — ROFLUMILAST 500 MCG: 500 TABLET ORAL at 09:32

## 2021-09-14 RX ADMIN — METHYLPREDNISOLONE SODIUM SUCCINATE 20 MG: 40 INJECTION, POWDER, FOR SOLUTION INTRAMUSCULAR; INTRAVENOUS at 02:29

## 2021-09-14 ASSESSMENT — PAIN SCALES - GENERAL: PAINLEVEL_OUTOF10: 0

## 2021-09-14 NOTE — PLAN OF CARE
Problem: Breathing Pattern - Ineffective:  Goal: Ability to achieve and maintain a regular respiratory rate will improve  Description: Ability to achieve and maintain a regular respiratory rate will improve  9/13/2021 2333 by Armani Olivia RN  Outcome: Met This Shift  9/13/2021 1212 by Mary Nicolas RN  Outcome: Met This Shift     Problem: Discharge Planning:  Goal: Discharged to appropriate level of care  Description: Discharged to appropriate level of care  9/13/2021 2333 by Armani Olivia RN  Outcome: Met This Shift  9/13/2021 1212 by Mary Nicolas RN  Outcome: Met This Shift     Problem:  Activity Intolerance:  Goal: Ability to tolerate increased activity will improve  Description: Ability to tolerate increased activity will improve  9/13/2021 2333 by Armani Olivia RN  Outcome: Met This Shift  9/13/2021 1212 by Mary Nicolas RN  Outcome: Met This Shift     Problem: Airway Clearance - Ineffective:  Goal: Ability to maintain a clear airway will improve  Description: Ability to maintain a clear airway will improve  9/13/2021 2333 by Armnai Olivia RN  Outcome: Met This Shift  9/13/2021 1212 by Mary Nicolas RN  Outcome: Met This Shift     Problem: Breathing Pattern - Ineffective:  Goal: Ability to achieve and maintain a regular respiratory rate will improve  Description: Ability to achieve and maintain a regular respiratory rate will improve  9/13/2021 2333 by Armani Olivia RN  Outcome: Met This Shift  9/13/2021 1212 by Mary Nicolas RN  Outcome: Met This Shift     Problem: Gas Exchange - Impaired:  Goal: Levels of oxygenation will improve  Description: Levels of oxygenation will improve  9/13/2021 2333 by Armani Olivia RN  Outcome: Met This Shift  9/13/2021 1212 by Mary Nicolas RN  Outcome: Met This Shift     Problem: Skin Integrity:  Goal: Will show no infection signs and symptoms  Description: Will show no infection signs and symptoms  9/13/2021 2333 by Armani Olivia RN  Outcome: Met This Shift  9/13/2021 1212 by Colin Lester RN  Outcome: Met This Shift  Goal: Absence of new skin breakdown  Description: Absence of new skin breakdown  9/13/2021 2333 by Obed Thomas RN  Outcome: Met This Shift  9/13/2021 1212 by Colin Lester RN  Outcome: Met This Shift

## 2021-09-14 NOTE — PROGRESS NOTES
Pulse ox was 90% on 2L (baseline home O2) at rest.  Ambulated patient on 2L. Oxygen saturation was 96% on 2L while ambulating.    Electronically signed by Aura Valadez RN on 9/14/2021 at 5:07 PM

## 2021-09-14 NOTE — PROGRESS NOTES
Pulmonary/Critical Care Progress Note    We are following patient for severe COPD with exacerbation, history of small lung nodules    SUBJECTIVE:  Patient continues to improve and tells me that he may be discharged today, which would be completely appropriate. Patient is on the [facility] list for lung transplant and he will continue with his transplant nurse follow-up following discharge. I would strongly recommend a tapering dose of steroids over the next week and that he continue Roflumilast as his phosphodiesterase inhibitor for home use. MEDICATIONS:   methylPREDNISolone  20 mg IntraVENous Q12H    insulin glargine  15 Units SubCUTAneous Nightly    ipratropium-albuterol  1 ampule Inhalation Q4H    azithromycin  500 mg IntraVENous Q24H    Roflumilast  500 mcg Oral Daily    carvedilol  3.125 mg Oral BID WC    insulin lispro  0-6 Units SubCUTAneous TID WC    insulin lispro  0-3 Units SubCUTAneous Nightly    metFORMIN  500 mg Oral BID WC    gabapentin  600 mg Oral TID    QUEtiapine  200 mg Oral Daily    atorvastatin  20 mg Oral Daily    sodium chloride flush  5-40 mL IntraVENous 2 times per day    enoxaparin  40 mg SubCUTAneous Daily    Arformoterol Tartrate  15 mcg Nebulization BID    And    budesonide  1 mg Nebulization BID      dextrose      sodium chloride       glucose, dextrose, glucagon (rDNA), dextrose, HYDROcodone-acetaminophen, sodium chloride flush, sodium chloride, ondansetron **OR** ondansetron, polyethylene glycol, acetaminophen **OR** acetaminophen, albuterol      REVIEW OF SYSTEMS:  Constitutional: Denies fever, weight loss, night sweats, and fatigue  Skin: Denies pigmentation, dark lesions, and rashes   HEENT: Denies hearing loss, tinnitus, ear drainage, epistaxis, sore throat, and hoarseness. Cardiovascular: Denies palpitations, chest pain, and chest pressure. Respiratory: Denies cough, dyspnea at rest, hemoptysis, apnea, and choking.   Gastrointestinal: Denies nausea, vomiting, poor appetite, diarrhea, heartburn or reflux  Genitourinary: Denies dysuria, frequency, urgency or hematuria  Musculoskeletal: Denies myalgias, muscle weakness, and bone pain  Neurological: Denies dizziness, vertigo, headache, and focal weakness  Psychological: Denies anxiety and depression  Endocrine: Denies heat intolerance and cold intolerance  Hematopoietic/Lymphatic: Denies bleeding problems and blood transfusions    OBJECTIVE:  Vitals:    09/14/21 1200   BP:    Pulse:    Resp:    Temp:    SpO2: 96%        O2 Flow Rate (L/min): 2 L/min  O2 Device: Nasal cannula    PHYSICAL EXAM:  Constitutional: No fever, chills, diaphoresis  Skin: Skin rash, no skin breakdown  HEENT: Unremarkable  Neck: No JVD, lymphadenopathy, thyromegaly  Cardiovascular: S1, S2 regular. No S3 murmurs rubs present  Respiratory: Decreased breath sounds over both posterior lung fields. No wheezing is heard. Gastrointestinal: Soft, obese, nontender  Genitourinary: No CVA tenderness  Extremities: Clubbing, cyanosis, or edema  Neurological: Awake, alert, oriented x3. No focal deficits  Psychological: In good spirits. Appropriate affect.     LABS:  WBC   Date Value Ref Range Status   09/13/2021 15.8 (H) 4.5 - 11.5 E9/L Final   09/12/2021 14.8 (H) 4.5 - 11.5 E9/L Final   09/11/2021 16.3 (H) 4.5 - 11.5 E9/L Final     Hemoglobin   Date Value Ref Range Status   09/13/2021 13.2 12.5 - 16.5 g/dL Final   09/12/2021 11.7 (L) 12.5 - 16.5 g/dL Final   09/11/2021 12.9 12.5 - 16.5 g/dL Final     Hematocrit   Date Value Ref Range Status   09/13/2021 40.5 37.0 - 54.0 % Final   09/12/2021 35.8 (L) 37.0 - 54.0 % Final   09/11/2021 40.2 37.0 - 54.0 % Final     MCV   Date Value Ref Range Status   09/13/2021 99.8 80.0 - 99.9 fL Final   09/12/2021 99.2 80.0 - 99.9 fL Final   09/11/2021 101.3 (H) 80.0 - 99.9 fL Final     Platelets   Date Value Ref Range Status   09/13/2021 294 130 - 450 E9/L Final   09/12/2021 284 130 - 450 E9/L Final   09/11/2021 307 130 - 450 E9/L Final     Sodium   Date Value Ref Range Status   09/13/2021 142 132 - 146 mmol/L Final   09/12/2021 142 132 - 146 mmol/L Final   09/11/2021 146 132 - 146 mmol/L Final     Potassium   Date Value Ref Range Status   09/13/2021 4.2 3.5 - 5.0 mmol/L Final   09/12/2021 3.9 3.5 - 5.0 mmol/L Final   09/11/2021 4.4 3.5 - 5.0 mmol/L Final     Potassium reflex Magnesium   Date Value Ref Range Status   09/07/2021 3.7 3.5 - 5.0 mmol/L Final     Chloride   Date Value Ref Range Status   09/13/2021 105 98 - 107 mmol/L Final   09/12/2021 108 (H) 98 - 107 mmol/L Final   09/11/2021 107 98 - 107 mmol/L Final     CO2   Date Value Ref Range Status   09/13/2021 23 22 - 29 mmol/L Final   09/12/2021 26 22 - 29 mmol/L Final   09/11/2021 21 (L) 22 - 29 mmol/L Final     BUN   Date Value Ref Range Status   09/13/2021 23 6 - 23 mg/dL Final   09/12/2021 20 6 - 23 mg/dL Final   09/11/2021 21 6 - 23 mg/dL Final     CREATININE   Date Value Ref Range Status   09/13/2021 0.8 0.7 - 1.2 mg/dL Final   09/12/2021 0.8 0.7 - 1.2 mg/dL Final   09/11/2021 0.8 0.7 - 1.2 mg/dL Final     Glucose   Date Value Ref Range Status   09/13/2021 240 (H) 74 - 99 mg/dL Final   09/12/2021 213 (H) 74 - 99 mg/dL Final   09/11/2021 317 (H) 74 - 99 mg/dL Final     Calcium   Date Value Ref Range Status   09/13/2021 9.1 8.6 - 10.2 mg/dL Final   09/12/2021 8.8 8.6 - 10.2 mg/dL Final   09/11/2021 9.0 8.6 - 10.2 mg/dL Final     Total Protein   Date Value Ref Range Status   09/13/2021 5.5 (L) 6.4 - 8.3 g/dL Final   09/12/2021 5.3 (L) 6.4 - 8.3 g/dL Final   09/10/2021 5.8 (L) 6.4 - 8.3 g/dL Final     Albumin   Date Value Ref Range Status   09/13/2021 3.2 (L) 3.5 - 5.2 g/dL Final   09/12/2021 3.2 (L) 3.5 - 5.2 g/dL Final   09/10/2021 3.1 (L) 3.5 - 5.2 g/dL Final     Total Bilirubin   Date Value Ref Range Status   09/13/2021 0.2 0.0 - 1.2 mg/dL Final   09/12/2021 <0.2 0.0 - 1.2 mg/dL Final   09/12/2021 <0.2 0.0 - 1.2 mg/dL Final     Alkaline Phosphatase   Date Value Ref Range Status   09/13/2021 81 40 - 129 U/L Final   09/12/2021 80 40 - 129 U/L Final   09/10/2021 73 40 - 129 U/L Final     AST   Date Value Ref Range Status   09/13/2021 14 0 - 39 U/L Final   09/12/2021 14 0 - 39 U/L Final   09/10/2021 12 0 - 39 U/L Final     ALT   Date Value Ref Range Status   09/13/2021 18 0 - 40 U/L Final   09/12/2021 16 0 - 40 U/L Final   09/10/2021 12 0 - 40 U/L Final     GFR Non-   Date Value Ref Range Status   09/13/2021 >60 >=60 mL/min/1.73 Final     Comment:     Chronic Kidney Disease: less than 60 ml/min/1.73 sq.m. Kidney Failure: less than 15 ml/min/1.73 sq.m. Results valid for patients 18 years and older. 09/12/2021 >60 >=60 mL/min/1.73 Final     Comment:     Chronic Kidney Disease: less than 60 ml/min/1.73 sq.m. Kidney Failure: less than 15 ml/min/1.73 sq.m. Results valid for patients 18 years and older. 09/11/2021 >60 >=60 mL/min/1.73 Final     Comment:     Chronic Kidney Disease: less than 60 ml/min/1.73 sq.m. Kidney Failure: less than 15 ml/min/1.73 sq.m. Results valid for patients 18 years and older. GFR    Date Value Ref Range Status   09/13/2021 >60  Final   09/12/2021 >60  Final   09/11/2021 >60  Final     Magnesium   Date Value Ref Range Status   09/13/2021 2.3 1.6 - 2.6 mg/dL Final   09/12/2021 2.2 1.6 - 2.6 mg/dL Final   09/11/2021 2.2 1.6 - 2.6 mg/dL Final     Phosphorus   Date Value Ref Range Status   09/13/2021 3.0 2.5 - 4.5 mg/dL Final   09/12/2021 3.1 2.5 - 4.5 mg/dL Final   09/11/2021 3.5 2.5 - 4.5 mg/dL Final     No results for input(s): PH, PO2, PCO2, HCO3, BE, O2SAT in the last 72 hours. RADIOLOGY:  XR CHEST PORTABLE   Final Result   No acute process. XR CHEST PORTABLE   Final Result   No acute abnormality.          CTA CHEST W CONTRAST   Final Result   No evidence of pulmonary emboli or acute infiltrate         XR CHEST PORTABLE   Final Result   Vague ill-defined confluent area of increased density/plate atelectasis in   the left base. There is no recent studies available for comparison to determine baseline. Can consider further evaluation with CT chest.                 PROBLEM LIST:  Principal Problem:    Acute on chronic respiratory failure with hypoxia and hypercapnia (HCC)  Active Problems:    COPD exacerbation (HCC)    History of tobacco abuse  Resolved Problems:    * No resolved hospital problems. *      IMPRESSION:  1. Acute hypoxemic and hypercapnic respiratory failure  2. COPD exacerbation  3. Small lung nodules likely granulomas    PLAN:  1. Agree with discharge today. 2. Suggest steroid taper  3. Suggest patient continue with Roflumilast as outpatient since I think it has been a significant help to him. Acute for the opportunity participate in the care of this changing gracious gentleman. ATTESTATION:  ICU Staff Physician note of personal involvement in Care  As the attending physician, I certify that I personally reviewed the patients history and personally examined the patient to confirm the physical findings described above,  And that I reviewed the relevant imaging studies and available reports. I also discussed the differential diagnosis and all of the proposed management plans with the patient and individuals accompanying the patient to this visit. They had the opportunity to ask questions about the proposed management plans and to have those questions answered. This patient has a high probability of sudden, clinically significant deterioration, which requires the highest level of physician preparedness to intervene urgently. I managed/supervised life or organ supporting interventions that required frequent physician assessment. I devoted my full attention to the direct care of this patient for the amount of time indicated below.   Time I spent with the family or surrogate(s) is included only if the patient was incapable of providing the necessary information or participating in medical decisions - Time devoted to teaching and to any procedures I billed separately is not included.     CRITICAL CARE TIME:      Electronically signed by Rubi Rosales MD on 9/14/2021 at 4:08 PM

## 2021-09-14 NOTE — DISCHARGE SUMMARY
[facility] Physician Discharge Summary       No follow-up provider specified. Activity level: As tolerated    Diet: ADULT DIET; Regular 4 carb controlled    Dispo: Home    Condition at discharge: Stable    Continue supplemental oxygen via nasal canula @ 2 LPM round-the-clock. Patient ID:  Cachorro Ovalles  [id number]  78 y.o.  1955    Admit date: 9/7/2021    Discharge date and time:  9/14/2021  4:32 PM    Admission Diagnoses: Principal Problem:    Acute on chronic respiratory failure with hypoxia and hypercapnia (HCC)  Active Problems:    COPD exacerbation ([facility] [address]    History of tobacco abuse  Resolved Problems:    * No resolved hospital problems. *      Discharge Diagnoses: Principal Problem:    Acute on chronic respiratory failure with hypoxia and hypercapnia (HCC)  Active Problems:    COPD exacerbation (HCC)    History of tobacco abuse  Resolved Problems:    * No resolved hospital problems. *      Consults:  IP CONSULT TO SOCIAL WORK  IP CONSULT TO DIABETES EDUCATOR  IP CONSULT TO PULMONOLOGY    Procedures: None    Hospital Course: Patient was admitted with COPD exacerbation ([facility] [J44.1]  Acute on chronic respiratory failure with hypoxia ([facility] [J96.21]. Patient is a 69-year-old male who presented to the ED with complaints of shortness of breath. Patient was admitted with acute on chronic respiratory failure with hypoxia in the setting of COPD exacerbation. During patient brief hospital stay patient was seen by critical care pulmonologist.  Patient received breathing treatments, supplemental oxygen 4 L via nasal cannula and IV steroids. When patient first arrived to the ED he required BiPAP as his O2 saturation continued to drop. Patient wears oxygen at home and his chronic use is 2 L prior to earlier this morning he remained on 4 L but was able to wean down to 2 L and has been maintaining a saturation of greater than 92%.   Patient also was recently diagnosed with new onset diabetes mellitus type 2. Patient was started on Metformin and Lantus due to increasing blood glucose. Upon discharge patient will be on Metformin as he will be weaned off of IV steroids and transfer to oral steroids only for 5 days. Diabetic education was consulted and patient will need to follow up with diabetic educator as an outpatient. Patient is breathing easy with no acute respiratory distress noted. Patient will be discharged home with the following medications and instructions. Discharge Exam:  Vitals:    09/14/21 0744 09/14/21 0930 09/14/21 1035 09/14/21 1200   BP: (!) 145/87      Pulse: 87      Resp:       Temp: 98.1 °F (36.7 °C)      TempSrc: Oral      SpO2: 98% 97%  96%   Weight:       Height:   5' 7\" (1.702 m)        General Appearance: alert and oriented to person, place and time, well-developed and well-nourished, in no acute distress  Skin: warm and dry  Head: normocephalic and atraumatic  Eyes: pupils equal, round, and reactive to light and conjunctivae normal  ENT: hearing grossly normal bilaterally  Neck: neck supple and non tender without mass   Pulmonary/Chest: decreased breath sounds noted- throughout, continue O2 2L NC   Cardiovascular: normal rate, regular rhythm and intact distal pulses  Abdomen: soft, non-tender, non-distended, normal bowel sounds, no masses or organomegaly  Extremities: no cyanosis, no clubbing and no edema  Musculoskeletal: normal range of motion, no joint swelling, deformity or tenderness  Neurologic: no cranial nerve deficit, gait and coordination normal and speech normal    I/O last 3 completed shifts: In: 500 [P.O.:500]  Out: 2025 [Urine:2025]  No intake/output data recorded.       LABS:  Recent Labs     09/12/21 0428 09/13/21  1449    142   K 3.9 4.2   * 105   CO2 26 23   BUN 20 23   CREATININE 0.8 0.8   GLUCOSE 213* 240*   CALCIUM 8.8 9.1       Recent Labs     09/12/21 0428 09/13/21  1449   WBC 14.8* 15.8*   RBC 3.61* 4.06   HGB 11.7* 13.2   HCT 35.8* 40.5   MCV 99.2 99.8   MCH 32.4 32.5   MCHC 32.7 32.6   RDW 12.8 13.1    294   MPV 9.4 9.2       No results for input(s): POCGLU in the last 72 hours. Imaging:   XR CHEST PORTABLE   Final Result   No acute process. XR CHEST PORTABLE   Final Result   No acute abnormality. CTA CHEST W CONTRAST   Final Result   No evidence of pulmonary emboli or acute infiltrate         XR CHEST PORTABLE   Final Result   Vague ill-defined confluent area of increased density/plate atelectasis in   the left base. There is no recent studies available for comparison to determine baseline.    Can consider further evaluation with CT chest.             Patient Instructions:      Medication List      START taking these medications    azithromycin 500 MG tablet  Commonly known as: Zithromax  Take 1 tablet by mouth daily for 3 days     carvedilol 3.125 MG tablet  Commonly known as: COREG  Take 1 tablet by mouth 2 times daily (with meals)     metFORMIN 500 MG tablet  Commonly known as: GLUCOPHAGE  Take 1 tablet by mouth 2 times daily (with meals)     predniSONE 20 MG tablet  Commonly known as: DELTASONE  Take 1 tablet by mouth daily for 5 days        CONTINUE taking these medications    ADVAIR HFA IN     gabapentin 600 MG tablet  Commonly known as: NEURONTIN     HYDROcodone-acetaminophen 7.5-325 MG per tablet  Commonly known as: NORCO     promethazine 25 MG tablet  Commonly known as: PHENERGAN     QUEtiapine 200 MG tablet  Commonly known as: SEROQUEL     simvastatin 10 MG tablet  Commonly known as: [address] taking these medications    cephALEXin 500 MG capsule  Commonly known as: Emma Jacinto           Where to Get Your Medications      These medications were sent to [address] [facility] [place] [facility] [place] [facility] [address] [facility] [phone] Sage Mcginnis [phone]  [facility] [address]    Phone: [phone]   · azithromycin 500 MG tablet  · carvedilol 3.125 MG tablet  · metFORMIN 500 MG tablet  · predniSONE 20 MG tablet         Note that more than 30 minutes was spent in preparing discharge papers, discussing discharge with patient, medication review, etc.    Signed:  Electronically signed by OSIRIS Ramírez CNP on 9/14/2021 at 4:32 PM    NOTE: This report was transcribed using voice recognition software. Every effort was made to ensure accuracy; however, inadvertent computerized transcription errors may be present.

## 2021-09-14 NOTE — PROGRESS NOTES
[address] [place], [place] [address]   Progress Note    Admitting Date and Time: 9/7/2021  8:12 PM  Admit Dx: COPD exacerbation ([address] [J44.1]  Acute on chronic respiratory failure with hypoxia (HCC) [J96.21]    Subjective:    Pt feels     ROS: denies fever, chills, cp, sob, n/v, HA unless stated above.      methylPREDNISolone  20 mg IntraVENous Q12H    insulin glargine  15 Units SubCUTAneous Nightly    ipratropium-albuterol  1 ampule Inhalation Q4H    azithromycin  500 mg IntraVENous Q24H    Roflumilast  500 mcg Oral Daily    carvedilol  3.125 mg Oral BID WC    insulin lispro  0-6 Units SubCUTAneous TID WC    insulin lispro  0-3 Units SubCUTAneous Nightly    metFORMIN  500 mg Oral BID WC    gabapentin  600 mg Oral TID    QUEtiapine  200 mg Oral Daily    atorvastatin  20 mg Oral Daily    sodium chloride flush  5-40 mL IntraVENous 2 times per day    enoxaparin  40 mg SubCUTAneous Daily    Arformoterol Tartrate  15 mcg Nebulization BID    And    budesonide  1 mg Nebulization BID     glucose, 15 g, PRN  dextrose, 12.5 g, PRN  glucagon (rDNA), 1 mg, PRN  dextrose, 100 mL/hr, PRN  HYDROcodone-acetaminophen, 1 tablet, Q8H PRN  sodium chloride flush, 5-40 mL, PRN  sodium chloride, 25 mL, PRN  ondansetron, 4 mg, Q8H PRN   Or  ondansetron, 4 mg, Q6H PRN  polyethylene glycol, 17 g, Daily PRN  acetaminophen, 650 mg, Q6H PRN   Or  acetaminophen, 650 mg, Q6H PRN  albuterol, 2.5 mg, Q2H PRN         Objective:    BP (!) 145/87   Pulse 87   Temp 98.1 °F (36.7 °C) (Oral)   Resp 20   Ht 5' 7\" (1.702 m)   Wt 205 lb (93 kg)   SpO2 97%   BMI 32.11 kg/m²     General Appearance: alert and oriented to person, place and time, well-developed and well-nourished, in no acute distress  Skin: warm and dry  Head: normocephalic and atraumatic  Eyes: pupils equal, round, and reactive to light and conjunctivae normal  ENT: hearing grossly normal bilaterally  Neck: neck supple and non tender without mass   Pulmonary/Chest: decreased breath sounds noted- throughout, continue O2 2L NC   Cardiovascular: normal rate, regular rhythm and intact distal pulses  Abdomen: soft, non-tender, non-distended, normal bowel sounds   Extremities: no cyanosis, no clubbing and no edema  Musculoskeletal: normal range of motion, no joint swelling, deformity or tenderness  Neurologic: no cranial nerve deficit, gait and coordination normal and speech normal    Recent Labs     09/12/21  0428 09/13/21  1449    142   K 3.9 4.2   * 105   CO2 26 23   BUN 20 23   CREATININE 0.8 0.8   GLUCOSE 213* 240*   CALCIUM 8.8 9.1       Recent Labs     09/12/21 0428 09/13/21  1449   ALKPHOS 80 81   PROT 5.3* 5.5*   LABALBU 3.2* 3.2*   BILITOT <0.2  <0.2 0.2   AST 14 14   ALT 16 18       Recent Labs     09/12/21 0428 09/13/21  1449   WBC 14.8* 15.8*   RBC 3.61* 4.06   HGB 11.7* 13.2   HCT 35.8* 40.5   MCV 99.2 99.8   MCH 32.4 32.5   MCHC 32.7 32.6   RDW 12.8 13.1    294   MPV 9.4 9.2        Radiology:   XR CHEST PORTABLE   Final Result   No acute process. XR CHEST PORTABLE   Final Result   No acute abnormality. CTA CHEST W CONTRAST   Final Result   No evidence of pulmonary emboli or acute infiltrate         XR CHEST PORTABLE   Final Result   Vague ill-defined confluent area of increased density/plate atelectasis in   the left base. There is no recent studies available for comparison to determine baseline. Can consider further evaluation with CT chest.             Assessment:  Principal Problem:    Acute on chronic respiratory failure with hypoxia and hypercapnia (HCC)  Active Problems:    COPD exacerbation (HCC)    History of tobacco abuse  Resolved Problems:    * No resolved hospital problems. *      Plan:    1.  Acute on chronic respiratory failure with hypoxemia and hypercapnia - oxygen is being weaned down to chronic home use of 2L NC with BiPAP at HS      2. Exacerbation of COPD - IV Zithromax - continue breathing treatments - pulmonary following - IV Solumedrol weaned to BID - continue Daliresp - chest xray completed, no acute abnormality on 9/13/21 - breathing easy - slight increase in leukocytosis - afebrile      3. Diabetes mellitus - blood glucose elevated d/t steroids - when discharge will not go home on insulin - continue current treatment regimen     4. Diabetic neuropathy - pain controlled with medications      5. Hyperlipidemia - on Lipitor        6. Depression - calm and pleasant - remains on Quetiapine      7. Fatty liver - incidental finding on CTA scan - will need to follow up once discharge as an outpatient      8. Lung nodule - 3 mm right middle lobe nodule and 5 mm medial left lower lobe nodule per CT scan seen in June of 2020 - patient will need to follow up with repeat CT scan and PCP/[facility] as an outpatient      9. Hypertension - continue Coreg - BP stable      10. Dysphagia - has resolved now tolerating regular diet with thin liquids      11. Tobacco abuse - patient states that he is trying to quit - continue educate regarding possible complications       NOTE: This report was transcribed using voice recognition software. Every effort was made to ensure accuracy; however, inadvertent computerized transcription errors may be present.      Electronically signed by OSIRIS Bradshaw CNP on 9/14/2021 at 11:09 AM

## 2021-09-14 NOTE — CARE COORDINATION
9/14/21 1409 CM note: COVID (-) X 2 on 9/7/21 & 9/8/21. Discharge plan is home and patient declines [facility]. Patient states his mobility is not great but says its because his lungs are only working at 30% and he is on a lung transplant list at [facility]. Patient has home O2 at 3L and nebulizer through [facility]. Pts currently does NOT have a PCP stating his PCP retired and he was set up with Dr Good Randall but it takes 90 days to get an appt to see him. Patient declines [facility] PCP list stating doctors at the [facility] work with Dr Good Randall and he would deal with the same wait list. Patient states his fiance will assist in him in establishing with a new PCP. Advised patient to call the number on the back on his insurance card to get a list of PCPs in the area that are in his network. Pts fiance will provide transportation at discharge. CM to follow for ATB at discharge.  Electronically signed by Jennifer Foy RN on 9/14/2021 at 2:13 PM

## 2021-09-14 NOTE — PROGRESS NOTES
Physical Therapy    Physical Therapy Initial Evaluation/Plan of Care    Room #:  4480/5796-11  Patient Name: Amandeep Warner  YOB: 1955  MRN: [id number]    Date of Service: 9/14/2021     Tentative placement recommendation: Home  Equipment recommendation: None      Evaluating Physical Therapist: Kellie Flores, PT  #[id number]      Specific Provider Orders/Date/Referring Provider :  09/13/21 1615   PT eval and treat Start: 09/13/21 1615, End: 09/13/21 1615, ONE TIME, Standing Count: 1 Occurrences, Kristel Hugo MD      Admitting Diagnosis:   COPD exacerbation ([address] [J44.1]  Acute on chronic respiratory failure with hypoxia ([address] [J96.21]     shortness of breath,    Surgery: none  Visit Diagnoses       Codes    Acute on chronic respiratory failure with hypoxia ([address]     J96.21          Patient Active Problem List   Diagnosis    COPD exacerbation ([address]    Acute on chronic respiratory failure with hypoxia and hypercapnia ([address]    History of tobacco abuse        ASSESSMENT    No skilled needs identified; will discharge from services. If condition changes, please reorder physical therapy. Physical therapy plan of care is established based on physician order,  patient diagnosis and clinical assessment   Patient and or family understand(s) diagnosis, prognosis, and plan of care. Prior Level of Function: Patient ambulated independently    Rehab Potential: good    for baseline    Past medical history:   Past Medical History:   Diagnosis Date    Angina pectoris ([address]     states had neg heart cath    Cervical spinal stenosis     COPD (chronic obstructive pulmonary disease) (HCC)     Depression     Hyperlipidemia     Neck pain      Past Surgical History:   Procedure Laterality Date    COLONOSCOPY      HAND SURGERY Left 11/13/2019    LEFT HAND-PALN, DUPUYTREN'S CONTRACTURE, MAJOR RESECTION.  POSSIBLE FULL THICKNESS SKIN GRAFT    LARYNGOSCOPY      removal non malignant lesion    NECK SURGERY cervical fusion x2    WRIST SURGERY Left 11/13/2019    LEFT HAND DUPUYTRENS CONTRACTURE MAJOR RESECTION,  Z-PLASTY performed by Suzann Essex, MD at [address]:    Precautions: Up with assistance,  ,    Social history: Patient lives with significant other in a two story home bedroom and bathroom 2nd floor full flight stairs with Rail  with 3 steps  to enter with [employer] in [facility] owned: O2,  3 Liters of o2 via nasal cannula     AM-PAC Basic Mobility        AM-PAC Mobility Inpatient   How much difficulty turning over in bed?: None  How much difficulty sitting down on / standing up from a chair with arms?: None  How much difficulty moving from lying on back to sitting on side of bed?: None  How much help from another person moving to and from a bed to a chair?: None  How much help from another person needed to walk in hospital room?: None  How much help from another person for climbing 3-5 steps with a railing?: A Little  AM-PAC Inpatient Mobility Raw Score : 23  AM-PAC Inpatient T-Scale Score : 56.93  Mobility Inpatient CMS 0-100% Score: 11.2  Mobility Inpatient CMS G-Code Modifier : CI    Nursing cleared patient for PT evaluation. The admitting diagnosis and active problem list as listed above have been reviewed prior to the initiation of this evaluation. OBJECTIVE;   Initial Evaluation  Date: 9/14/2021   Was pt agreeable to Eval/treatment? Yes   Pain level   0/10      Bed Mobility    Rolling: Independent    Supine to sit:  Independent    Sit to supine: Independent    Scooting: Independent     Transfers Sit to stand: Independent     Ambulation    100 feet using  no device with Independent      Stair negotiation: ascended and descended   Not assessed    ROM Within functional limits     Strength BUE:  refer to OT eval  RLE:  4+/5  LLE:  4+/5   Balance Sitting EOB:  good    Dynamic Standing:  good with management of o2 line     Patient is Alert & Oriented x person, place, time and situation and follows directions    Sensation:  Patient  denies numbness/tingling   Edema:  no   Endurance: good       Vitals: 2 liters nasal cannula   Blood Pressure at rest  Blood Pressure during session    Heart Rate at rest   Heart Rate during session     SPO2 at rest 98%  SPO2 during session 93%     Patient education  Patient educated on role of Physical Therapy, risks of immobility, safety and plan of care and  importance of mobility while in hospital      Patient response to education:   Pt verbalized understanding Pt demonstrated skill Pt requires further education in this area   Yes Yes No           At end of session, patient in bed with  call light and phone within reach,  all lines and tubes intact, nursing notified. Patient would benefit from continued skilled Physical Therapy to improve functional independence and quality of life. Patient's/ family goals   home    Time in  12  Time out  1110    Total Treatment Time  0 minutes    Evaluation time includes thorough review of current medical information, gathering information on past medical history/social history and prior level of function, completion of standardized testing/informal observation of tasks, assessment of data, and development of Plan of care and goals.      CPT codes:  Low Complexity PT evaluation (02491)  No treatment    Becky Doll PT

## 2021-09-14 NOTE — PROGRESS NOTES
[address] [facility]  [facility]         Date:2021                                                   Patient Name: Colonel Mc     MRN: [id number]     : 1955     Room: [address] [place] [address]       Evaluating OT: Per Thompson, OTR/L; [id number]       Referring Provider and Orders/Date:   OT eval and treat Start: 21, End: 21, ONE TIME, Standing Count: 1 Occurrences, Osiris Brooks MD     Diagnosis:   1. COPD exacerbation ([facility]    2. Acute on chronic respiratory failure with hypoxia [facility])            Pertinent Medical History:        Past Medical History:   Diagnosis Date    Angina pectoris (HCC)     states had neg heart cath    Cervical spinal stenosis     COPD (chronic obstructive pulmonary disease) ([facility]     Depression     Hyperlipidemia     Neck pain           Past Surgical History:   Procedure Laterality Date    COLONOSCOPY      HAND SURGERY Left 2019    LEFT HAND-PALN, DUPUYTREN'S CONTRACTURE, MAJOR RESECTION.  POSSIBLE FULL THICKNESS SKIN GRAFT    LARYNGOSCOPY      removal non malignant lesion    NECK SURGERY      cervical fusion x2    WRIST SURGERY Left 2019    LEFT HAND DUPUYTRENS CONTRACTURE MAJOR RESECTION,  Z-PLASTY performed by Miguel Spears MD at [address] [place] [address] [facility]       Precautions:  Fall Risk, 4L    Recommended placement: home with [facility]    Assessment of current deficits     [x] Functional mobility  [x]ADLs  [x] Strength               []Cognition     [x] Functional transfers   [x] IADLs         [x] Safety Awareness   [x]Endurance     [] Fine Coordination              [x] Balance      [] Vision/perception   []Sensation      [x]Gross Motor Coordination  [] ROM  [] Delirium                   [] Motor Control     OT PLAN OF CARE   OT POC based on physician orders, patient diagnosis and results of clinical assessment    Frequency/Duration 1-3 days/wk for 2 weeks PRN   Specific OT Treatment Interventions to include:   * Instruction/training on adapted ADL techniques and AE recommendations to increase functional independence within precautions       * Training on energy conservation strategies, correct breathing pattern and techniques to improve independence/tolerance for self-care routine  * Functional transfer/mobility training/DME recommendations for increased independence, safety, and fall prevention  * Patient/Family education to increase follow through with safety techniques and functional independence  * Recommendation of environmental modifications for increased safety with functional transfers/mobility and ADLs  * Therapeutic exercise to improve motor endurance, ROM, and functional strength for ADLs/functional transfers  * Therapeutic activities to facilitate/challenge dynamic balance, stand tolerance for increased safety and independence with ADLs  * Therapeutic activities to facilitate gross/fine motor skills for increased independence with ADLs     Recommended Adaptive Equipment/DME: shower chair; TBD      Home Living: Pt lives with his fiance in a 2 story home with 3 steps to enter with rails. 14 steps to the second floor with rails to bed and bath.     Bathroom setup: walk in shower without bars or DME; standard toilet    DME owned: 3L O2, cane, walker     Prior Level of Function: indep with ADLs , min A with IADLs; ambulated indep   Driving: yes   Occupation: retired   Enjoys: none reported    Pain Level: none  Cognition: A&O: 4/4; Follows 4 step directions   Memory:  Intact   Sequencing:  Intact   Problem solving:  Intact   Judgement/safety:  Intact    AM-PAC Daily Activity Inpatient   How much help for putting on and taking off regular lower body clothing?: A Little  How much help for Bathing?: A Little  How much help for Toileting?: A Little  How much help for putting on and taking off regular upper body clothing?: A Little  How much help for taking care of personal grooming?: A Little  How much help for eating meals?: None  AM-PAC Inpatient Daily Activity Raw Score: 19  AM-PAC Inpatient ADL T-Scale Score : 40.22  ADL Inpatient CMS 0-100% Score: 42.8  ADL Inpatient CMS G-Code Modifier : CK    Functional Assessment:     Initial Eval Status  Date: 9/14/2021   Treatment Status  Date: STGs = LTGs  Time frame: 10-14 days   Feeding Independent   NA-PLOF   Grooming Stand by Assist at the sink for hair comb and hand wash with balance and safety assist  Independent    UB Dressing Supervision and set up with assist to manage monitors  Independent    LB Dressing Stand by Assist with extended time due to SOB. Balance assist to pull up in standing  Independent    Bathing Stand by Assist with extended time for sponge bathe from sitting/standing EOB  Independent    Toileting Stand by Assist for safety and balance  Independent    Bed Mobility  Supine to sit: Supervision   Sit to supine: Supervision     Supine to sit: Independent   Sit to supine: Independent    Functional Transfers Stand by Assist from bed and toilet with walker for support  Independent    Functional Mobility Stand by Assist with walker for short distance functional mobility throughout the room to simulate house hold distances.     Independent    Balance Sitting:     Static:  good    Dynamic:fair+  Standing: fair  Sitting:     Dynamic:good  Standing: fair+   Activity Tolerance Vitals with activity:at 97% following ADLs and functional mobility   Standing tolerance 1-2min with pt demonstrating SOB, but remaining above 96% throughout  Increase standing tolerance for >5min with stable vital signs for carry over into toileting, functional tranfers and indep in ADLs   Visual/  Perceptual Glasses: Present; WFL    Reports change in vision since admission: No     NA     Hand Dominance  [x] Right  [] Left    AROM (PROM) Strength Additional Info:    RUE  WFL 5/5 good  and  FMC/dexterity noted during ADL tasks  Opposition [x] Intact [] Impaired  Finger to nose [x] Intact [] Impaired     LUE WFL 5/5 good  and FMC/dexterity noted during ADL tasks  Opposition [x] Intact [] Impaired  Finger to nose [x] Intact [] Impaired     Hearing: WFL   Sensation:  No c/o numbness or tingling   Tone: WFL   Edema: none    Comments: Upon arrival patient supine in bed. Pt required set up A for most UB ADLs and SBA LB ADLs tasks. Limited with SB A for standing during LB ADLs and functional transfers. The biggest barriers reflect that of functional transfers, functional mobility, UB/LB ADLs, O2 management, activity tolerance, balance, safety and strengthening. At end of session, patient sitting EOB with call light and phone within reach, all lines and tubes intact. Overall patient demonstrated decreased independence and safety during completion of ADL/functional transfer/mobility tasks. Nursing updated on pt position and status following OT eval. Pt would benefit from continued skilled OT to increase safety and independence with completion of ADL/IADL tasks for functional independence and quality of life. Treatment: OT treatment provided this date includes:   Instruction, education and training on safe facilitation and adapted techniques for completion of ADLs. These include neuromuscular reeducation to facilitate balance/righting reactions, safe functional transfer techniques and on energy conservation/work simplification for completion of ADLs. Education provided on hand/feet placement with bed rails, walker, toilet and body mechanics for fall prevention. Cues for energy conservation and safety for in the home at [place], including modifications and DME. Extended time to complete all tasks, including skilled monitoring of patient's response during treatment session and vital signs. Prior to and at the end of session, environmental modifications / line management completed for patients safety and efficiency of treatment session.  See above for further details. Rehab Potential: Good  for established goals     Patient / Family Goal: O2 management      Patient and/or family were instructed on functional diagnosis, prognosis/goals and OT plan of care. Demonstrated good understanding. Eval Complexity: Low  · History: Brief review of medical records and additional review of physical, cognitive, or psychosocial history related to current functional performance  · Exam: 1-3 performance deficits  · Assistance/Modification: Min assistance or modifications required to perform tasks. May have comorbidities that affect occupational performance. Time In: 0845  Time Out: 3987  Total Treatment Time: 9    Min Units   OT Eval Low 97165  x  1   OT Eval Medium 01123      OT Eval High 42618      OT Re-Eval [id number]       Therapeutic Ex 09818       Therapeutic Activities 99167  9 1    ADL/Self Care 06447       Orthotic Management 74972       Manual [id number]     Neuro Re-Ed 58932       Non-Billable Time          Evaluation Time additionally includes thorough review of current medical information, gathering information on past medical history/social history and prior level of function, interpretation of standardized testing/informal observation of tasks, assessment of data and development of plan of care and goals.             Batool Johnson OTR/L; [id number]

## 2022-08-21 ENCOUNTER — APPOINTMENT (OUTPATIENT)
Dept: GENERAL RADIOLOGY | Age: 67
DRG: 190 | End: 2022-08-21
Payer: MEDICARE

## 2022-08-21 ENCOUNTER — HOSPITAL ENCOUNTER (INPATIENT)
Age: 67
LOS: 8 days | Discharge: HOME | DRG: 190 | End: 2022-08-29
Attending: STUDENT IN AN ORGANIZED HEALTH CARE EDUCATION/TRAINING PROGRAM | Admitting: STUDENT IN AN ORGANIZED HEALTH CARE EDUCATION/TRAINING PROGRAM
Payer: MEDICARE

## 2022-08-21 DIAGNOSIS — J44.1 COPD EXACERBATION (HCC): Primary | ICD-10-CM

## 2022-08-21 PROBLEM — I10 PRIMARY HYPERTENSION: Chronic | Status: ACTIVE | Noted: 2022-08-21

## 2022-08-21 LAB
ALBUMIN SERPL-MCNC: 4 G/DL (ref 3.5–5.2)
ALP BLD-CCNC: 117 U/L (ref 40–129)
ALT SERPL-CCNC: 17 U/L (ref 0–40)
ANION GAP SERPL CALCULATED.3IONS-SCNC: 12 MMOL/L (ref 7–16)
APTT: 30.9 SEC (ref 24.5–35.1)
AST SERPL-CCNC: 19 U/L (ref 0–39)
B.E.: 0 MMOL/L (ref -3–3)
BASOPHILS ABSOLUTE: 0.04 E9/L (ref 0–0.2)
BASOPHILS RELATIVE PERCENT: 0.5 % (ref 0–2)
BILIRUB SERPL-MCNC: 0.5 MG/DL (ref 0–1.2)
BUN BLDV-MCNC: 12 MG/DL (ref 6–23)
CALCIUM SERPL-MCNC: 9.4 MG/DL (ref 8.6–10.2)
CHLORIDE BLD-SCNC: 102 MMOL/L (ref 98–107)
CO2: 25 MMOL/L (ref 22–29)
CREAT SERPL-MCNC: 1.2 MG/DL (ref 0.7–1.2)
DELIVERY SYSTEMS: ABNORMAL
DEVICE: ABNORMAL
EOSINOPHILS ABSOLUTE: 0.06 E9/L (ref 0.05–0.5)
EOSINOPHILS RELATIVE PERCENT: 0.8 % (ref 0–6)
FIO2: 50
GFR AFRICAN AMERICAN: >60
GFR NON-AFRICAN AMERICAN: >60 ML/MIN/1.73
GLUCOSE BLD-MCNC: 180 MG/DL (ref 74–99)
HCO3: 23.1 MMOL/L (ref 22–26)
HCT VFR BLD CALC: 46.3 % (ref 37–54)
HEMOGLOBIN: 15.6 G/DL (ref 12.5–16.5)
IMMATURE GRANULOCYTES #: 0.02 E9/L
IMMATURE GRANULOCYTES %: 0.3 % (ref 0–5)
INR BLD: 0.9
LYMPHOCYTES ABSOLUTE: 1.92 E9/L (ref 1.5–4)
LYMPHOCYTES RELATIVE PERCENT: 25.8 % (ref 20–42)
MCH RBC QN AUTO: 31.9 PG (ref 26–35)
MCHC RBC AUTO-ENTMCNC: 33.7 % (ref 32–34.5)
MCV RBC AUTO: 94.7 FL (ref 80–99.9)
MONOCYTES ABSOLUTE: 0.26 E9/L (ref 0.1–0.95)
MONOCYTES RELATIVE PERCENT: 3.5 % (ref 2–12)
NEUTROPHILS ABSOLUTE: 5.14 E9/L (ref 1.8–7.3)
NEUTROPHILS RELATIVE PERCENT: 69.1 % (ref 43–80)
O2 SATURATION: 99.9 % (ref 92–98.5)
OPERATOR ID: 30
PCO2 37: 32.7 MMHG (ref 35–45)
PDW BLD-RTO: 12.5 FL (ref 11.5–15)
PH 37: 7.46 (ref 7.35–7.45)
PLATELET # BLD: 196 E9/L (ref 130–450)
PMV BLD AUTO: 9.6 FL (ref 7–12)
PO2 37: 295.3 MMHG (ref 60–80)
POC SOURCE: ABNORMAL
POSITIVE END EXP PRESS: 8 CMH2O
POTASSIUM REFLEX MAGNESIUM: 4.2 MMOL/L (ref 3.5–5)
PRESSURE SUPPORT: 6 CMH2O
PRO-BNP: 122 PG/ML (ref 0–125)
PROTHROMBIN TIME: 10.5 SEC (ref 9.3–12.4)
RBC # BLD: 4.89 E12/L (ref 3.8–5.8)
SARS-COV-2, NAAT: NOT DETECTED
SODIUM BLD-SCNC: 139 MMOL/L (ref 132–146)
TOTAL PROTEIN: 6.5 G/DL (ref 6.4–8.3)
TROPONIN, HIGH SENSITIVITY: 14 NG/L (ref 0–11)
WBC # BLD: 7.4 E9/L (ref 4.5–11.5)

## 2022-08-21 PROCEDURE — 99223 1ST HOSP IP/OBS HIGH 75: CPT | Performed by: STUDENT IN AN ORGANIZED HEALTH CARE EDUCATION/TRAINING PROGRAM

## 2022-08-21 PROCEDURE — 82803 BLOOD GASES ANY COMBINATION: CPT

## 2022-08-21 PROCEDURE — 84484 ASSAY OF TROPONIN QUANT: CPT

## 2022-08-21 PROCEDURE — 85730 THROMBOPLASTIN TIME PARTIAL: CPT

## 2022-08-21 PROCEDURE — 71045 X-RAY EXAM CHEST 1 VIEW: CPT

## 2022-08-21 PROCEDURE — 6370000000 HC RX 637 (ALT 250 FOR IP): Performed by: STUDENT IN AN ORGANIZED HEALTH CARE EDUCATION/TRAINING PROGRAM

## 2022-08-21 PROCEDURE — 94640 AIRWAY INHALATION TREATMENT: CPT

## 2022-08-21 PROCEDURE — 99285 EMERGENCY DEPT VISIT HI MDM: CPT

## 2022-08-21 PROCEDURE — 83880 ASSAY OF NATRIURETIC PEPTIDE: CPT

## 2022-08-21 PROCEDURE — 85610 PROTHROMBIN TIME: CPT

## 2022-08-21 PROCEDURE — 2060000000 HC ICU INTERMEDIATE R&B

## 2022-08-21 PROCEDURE — 94664 DEMO&/EVAL PT USE INHALER: CPT

## 2022-08-21 PROCEDURE — 2700000000 HC OXYGEN THERAPY PER DAY

## 2022-08-21 PROCEDURE — 93005 ELECTROCARDIOGRAM TRACING: CPT | Performed by: STUDENT IN AN ORGANIZED HEALTH CARE EDUCATION/TRAINING PROGRAM

## 2022-08-21 PROCEDURE — 80053 COMPREHEN METABOLIC PANEL: CPT

## 2022-08-21 PROCEDURE — 85025 COMPLETE CBC W/AUTO DIFF WBC: CPT

## 2022-08-21 PROCEDURE — 94660 CPAP INITIATION&MGMT: CPT

## 2022-08-21 PROCEDURE — 6360000002 HC RX W HCPCS: Performed by: STUDENT IN AN ORGANIZED HEALTH CARE EDUCATION/TRAINING PROGRAM

## 2022-08-21 PROCEDURE — 87635 SARS-COV-2 COVID-19 AMP PRB: CPT

## 2022-08-21 PROCEDURE — 2580000003 HC RX 258: Performed by: STUDENT IN AN ORGANIZED HEALTH CARE EDUCATION/TRAINING PROGRAM

## 2022-08-21 RX ORDER — CLONIDINE HYDROCHLORIDE 0.1 MG/1
0.1 TABLET ORAL DAILY
Status: DISCONTINUED | OUTPATIENT
Start: 2022-08-22 | End: 2022-08-21

## 2022-08-21 RX ORDER — IPRATROPIUM BROMIDE AND ALBUTEROL SULFATE 2.5; .5 MG/3ML; MG/3ML
3 SOLUTION RESPIRATORY (INHALATION) ONCE
Status: COMPLETED | OUTPATIENT
Start: 2022-08-21 | End: 2022-08-21

## 2022-08-21 RX ORDER — SODIUM CHLORIDE 0.9 % (FLUSH) 0.9 %
5-40 SYRINGE (ML) INJECTION EVERY 12 HOURS SCHEDULED
Status: DISCONTINUED | OUTPATIENT
Start: 2022-08-21 | End: 2022-08-29 | Stop reason: HOSPADM

## 2022-08-21 RX ORDER — GABAPENTIN 300 MG/1
600 CAPSULE ORAL NIGHTLY
Status: DISCONTINUED | OUTPATIENT
Start: 2022-08-22 | End: 2022-08-29 | Stop reason: HOSPADM

## 2022-08-21 RX ORDER — ALBUTEROL SULFATE 2.5 MG/3ML
2.5 SOLUTION RESPIRATORY (INHALATION) EVERY 6 HOURS PRN
Status: DISCONTINUED | OUTPATIENT
Start: 2022-08-21 | End: 2022-08-29 | Stop reason: HOSPADM

## 2022-08-21 RX ORDER — PREDNISONE 10 MG/1
30 TABLET ORAL SEE ADMIN INSTRUCTIONS
Status: ON HOLD | COMMUNITY
End: 2022-08-29 | Stop reason: HOSPADM

## 2022-08-21 RX ORDER — FLUTICASONE PROPIONATE AND SALMETEROL 500; 50 UG/1; UG/1
1 POWDER RESPIRATORY (INHALATION) EVERY 12 HOURS
Status: ON HOLD | COMMUNITY
End: 2022-08-29 | Stop reason: HOSPADM

## 2022-08-21 RX ORDER — IPRATROPIUM BROMIDE AND ALBUTEROL SULFATE 2.5; .5 MG/3ML; MG/3ML
1 SOLUTION RESPIRATORY (INHALATION) 3 TIMES DAILY
COMMUNITY

## 2022-08-21 RX ORDER — BUDESONIDE, GLYCOPYRROLATE, AND FORMOTEROL FUMARATE 160; 9; 4.8 UG/1; UG/1; UG/1
2 AEROSOL, METERED RESPIRATORY (INHALATION) 2 TIMES DAILY
COMMUNITY

## 2022-08-21 RX ORDER — IPRATROPIUM BROMIDE AND ALBUTEROL SULFATE 2.5; .5 MG/3ML; MG/3ML
1 SOLUTION RESPIRATORY (INHALATION)
Status: DISCONTINUED | OUTPATIENT
Start: 2022-08-21 | End: 2022-08-29 | Stop reason: HOSPADM

## 2022-08-21 RX ORDER — ONDANSETRON 2 MG/ML
4 INJECTION INTRAMUSCULAR; INTRAVENOUS EVERY 6 HOURS PRN
Status: DISCONTINUED | OUTPATIENT
Start: 2022-08-21 | End: 2022-08-29 | Stop reason: HOSPADM

## 2022-08-21 RX ORDER — ALBUTEROL SULFATE 90 UG/1
2 AEROSOL, METERED RESPIRATORY (INHALATION) EVERY 6 HOURS PRN
Status: DISCONTINUED | OUTPATIENT
Start: 2022-08-21 | End: 2022-08-21 | Stop reason: CLARIF

## 2022-08-21 RX ORDER — ATORVASTATIN CALCIUM 40 MG/1
40 TABLET, FILM COATED ORAL DAILY
Status: DISCONTINUED | OUTPATIENT
Start: 2022-08-22 | End: 2022-08-29 | Stop reason: HOSPADM

## 2022-08-21 RX ORDER — ACETAMINOPHEN 650 MG/1
650 SUPPOSITORY RECTAL EVERY 6 HOURS PRN
Status: DISCONTINUED | OUTPATIENT
Start: 2022-08-21 | End: 2022-08-29 | Stop reason: HOSPADM

## 2022-08-21 RX ORDER — SODIUM CHLORIDE 9 MG/ML
INJECTION, SOLUTION INTRAVENOUS PRN
Status: DISCONTINUED | OUTPATIENT
Start: 2022-08-21 | End: 2022-08-29 | Stop reason: HOSPADM

## 2022-08-21 RX ORDER — LOSARTAN POTASSIUM 25 MG/1
25 TABLET ORAL NIGHTLY
Status: ON HOLD | COMMUNITY
End: 2022-08-29 | Stop reason: HOSPADM

## 2022-08-21 RX ORDER — ALBUTEROL SULFATE 90 UG/1
2 AEROSOL, METERED RESPIRATORY (INHALATION) EVERY 6 HOURS PRN
COMMUNITY

## 2022-08-21 RX ORDER — CLONIDINE HYDROCHLORIDE 0.1 MG/1
0.1 TABLET ORAL DAILY
Status: DISCONTINUED | OUTPATIENT
Start: 2022-08-21 | End: 2022-08-24

## 2022-08-21 RX ORDER — LOSARTAN POTASSIUM 50 MG/1
25 TABLET ORAL NIGHTLY
Status: DISCONTINUED | OUTPATIENT
Start: 2022-08-22 | End: 2022-08-29 | Stop reason: HOSPADM

## 2022-08-21 RX ORDER — POLYETHYLENE GLYCOL 3350 17 G/17G
17 POWDER, FOR SOLUTION ORAL DAILY PRN
Status: DISCONTINUED | OUTPATIENT
Start: 2022-08-21 | End: 2022-08-29 | Stop reason: HOSPADM

## 2022-08-21 RX ORDER — CLONIDINE HYDROCHLORIDE 0.1 MG/1
0.1 TABLET ORAL DAILY
COMMUNITY

## 2022-08-21 RX ORDER — SODIUM CHLORIDE 0.9 % (FLUSH) 0.9 %
5-40 SYRINGE (ML) INJECTION PRN
Status: DISCONTINUED | OUTPATIENT
Start: 2022-08-21 | End: 2022-08-29 | Stop reason: HOSPADM

## 2022-08-21 RX ORDER — AZITHROMYCIN 250 MG/1
250 TABLET, FILM COATED ORAL
Status: ON HOLD | COMMUNITY
End: 2022-08-29 | Stop reason: HOSPADM

## 2022-08-21 RX ORDER — ENOXAPARIN SODIUM 100 MG/ML
40 INJECTION SUBCUTANEOUS DAILY
Status: DISCONTINUED | OUTPATIENT
Start: 2022-08-22 | End: 2022-08-29 | Stop reason: HOSPADM

## 2022-08-21 RX ORDER — PREDNISONE 20 MG/1
40 TABLET ORAL DAILY
Status: DISCONTINUED | OUTPATIENT
Start: 2022-08-22 | End: 2022-08-22

## 2022-08-21 RX ORDER — ONDANSETRON 4 MG/1
4 TABLET, ORALLY DISINTEGRATING ORAL EVERY 8 HOURS PRN
Status: DISCONTINUED | OUTPATIENT
Start: 2022-08-21 | End: 2022-08-29 | Stop reason: HOSPADM

## 2022-08-21 RX ORDER — QUETIAPINE 200 MG/1
200 TABLET, FILM COATED, EXTENDED RELEASE ORAL NIGHTLY
Status: DISCONTINUED | OUTPATIENT
Start: 2022-08-21 | End: 2022-08-29 | Stop reason: HOSPADM

## 2022-08-21 RX ORDER — BUDESONIDE 0.5 MG/2ML
0.5 INHALANT ORAL 2 TIMES DAILY
Status: DISCONTINUED | OUTPATIENT
Start: 2022-08-21 | End: 2022-08-29 | Stop reason: HOSPADM

## 2022-08-21 RX ORDER — ACETAMINOPHEN 325 MG/1
650 TABLET ORAL EVERY 6 HOURS PRN
Status: DISCONTINUED | OUTPATIENT
Start: 2022-08-21 | End: 2022-08-29 | Stop reason: HOSPADM

## 2022-08-21 RX ORDER — QUETIAPINE 200 MG/1
200 TABLET, FILM COATED, EXTENDED RELEASE ORAL NIGHTLY
COMMUNITY

## 2022-08-21 RX ORDER — AZITHROMYCIN 250 MG/1
250 TABLET, FILM COATED ORAL
Status: DISCONTINUED | OUTPATIENT
Start: 2022-08-22 | End: 2022-08-23

## 2022-08-21 RX ADMIN — IPRATROPIUM BROMIDE AND ALBUTEROL SULFATE 3 AMPULE: .5; 3 SOLUTION RESPIRATORY (INHALATION) at 16:16

## 2022-08-21 RX ADMIN — BUDESONIDE 500 MCG: 0.5 SUSPENSION RESPIRATORY (INHALATION) at 22:17

## 2022-08-21 RX ADMIN — Medication 10 ML: at 22:27

## 2022-08-21 RX ADMIN — IPRATROPIUM BROMIDE AND ALBUTEROL SULFATE 1 AMPULE: .5; 2.5 SOLUTION RESPIRATORY (INHALATION) at 21:49

## 2022-08-21 RX ADMIN — CLONIDINE HYDROCHLORIDE 0.1 MG: 0.1 TABLET ORAL at 22:27

## 2022-08-21 RX ADMIN — QUETIAPINE FUMARATE 200 MG: 200 TABLET, EXTENDED RELEASE ORAL at 22:27

## 2022-08-21 ASSESSMENT — PAIN - FUNCTIONAL ASSESSMENT: PAIN_FUNCTIONAL_ASSESSMENT: 0-10

## 2022-08-21 ASSESSMENT — PAIN SCALES - GENERAL: PAINLEVEL_OUTOF10: 0

## 2022-08-21 NOTE — ED PROVIDER NOTES
ED  Provider Note  Admit Date/RoomTime: 8/21/2022  4:07 PM  ED Room: [facility]/0616-      History of Present Illness:  8/21/22, Time: 4:10 PM EDT  Chief Complaint   Patient presents with    Shortness of Breath     Hx of COPD, wears 3L at baseline         Endy Katz is a 75 y.o. male presenting to the ED for severe respiratory distress, arrives by EMS. Patient wears 3 L at baseline for COPD, began to be short of breath acutely in the last couple of days, markedly worsening today. Denies chest pain, arm/jaw/back pain, neck pain, but has some chest tightness. Patient is in severe respiratory distress on arrival, medics report that they have given him 125 mg of Solu-Medrol and 2 DuoNeb treatments with significant improvement in his aeration and general clinical appearance. Point-of-care ultrasound performed by me showing no B-lines that would indicate pulmonary edema, though patient's blood pressure is elevated on arrival.  Patient does not have any lower extremity edema or clinical fluid overloaded status on exam, and denies any history of CHF. Per medics, patient is on list for lung transplant. Patient is conversationally dyspneic, only able to speak in short bursts. Symptoms progressive as above, severe, gradual onset, multiple days duration. Exacerbated by none, relieved by breathing treatments and steroids prior to arrival.  Denies fevers or chills. Patient unable to give any more history at this time due to his severe respiratory distress. Review of Systems:   A complete review of systems was not performed in this critical patient.      --------------------------------------------- PATIENT HISTORY--------------------------------------------  Past Medical History:  has a past medical history of Angina pectoris (HCC), Cervical spinal stenosis, COPD (chronic obstructive pulmonary disease) ([address] Depression, Hyperlipidemia, and Neck pain.     Past Surgical History:  has a past surgical history that includes Neck surgery; Colonoscopy; laryngoscopy; Hand surgery (Left, 11/13/2019); and Wrist surgery (Left, 11/13/2019). Family History: family history includes Heart Disease in his brother, father, sibling, sibling, and sibling; No Known Problems in his mother. . Unless otherwise noted, family history is non contributory    Social History:  reports that he has quit smoking. His smoking use included cigarettes. He has never used smokeless tobacco. He reports current alcohol use. He reports that he does not use drugs. The patients home medications have been reviewed. Allergies: Patient has no known allergies. I have reviewed the past medical history, past surgical history, social history, and family history    ---------------------------------------------------PHYSICAL EXAM--------------------------------------    Constitutional/General: Alert and oriented x3  Head: Normocephalic and atraumatic  Eyes: PERRL, EOMI, sclera non icteric  ENT: Oropharynx clear, handling secretions, no trismus  Neck: Supple, full ROM, no stridor, no meningismus  Respiratory: markedly diminished bs, with wheeze no rales or ronchi   Cardiovascular: RRR, no R/G/M, 2+ peripheral pulses  Chest: No chest wall tenderness, equal chest rise  Gastrointestinal:  SNTND  Musculoskeletal: Extremities warm and well perfused, moving all extremities  Skin: skin warm and dry. No rashes. Neurologic: No focal deficits, strength and sensation grossly intact   Psychiatric: Normal Affect, behavior normal      ED Course as of 08/22/22 2253   Sun Aug 21, 2022   1613 Patient received 125 mg IV Solu-Medrol from EMS. [VG]   1906 Reassessed, SPO2 was 97% on his baseline 3 L nasal cannula but he is still having increased work of breathing and states he feels feels pretty short of breath and uncomfortable. We placed him back on BiPAP.   We will admit the patient [VG]   5005 12 25 98 with hospitalist, discussed case, patient is accepted for admission.  [VG] ED Course User Index  [VG] Delmy Yoder, DO       -------------------------------------------------- RESULTS -------------------------------------------------  I have personally reviewed all laboratory and imaging results for this patient. Results are listed below.      LABS: (Lab results interpreted by me)  Results for orders placed or performed during the hospital encounter of 08/21/22   COVID-19, Rapid    Specimen: Nasopharyngeal Swab   Result Value Ref Range    SARS-CoV-2, NAAT Not Detected Not Detected   Respiratory Panel, Molecular, with COVID-19 (Restricted: peds pts or suitable admitted adults)    Specimen: Nasopharyngeal   Result Value Ref Range    Adenovirus by PCR Not Detected Not Detected    Bordetella parapertussis by PCR Not Detected Not Detected    Bordetella pertussis by PCR Not Detected Not Detected    Chlamydophilia pneumoniae by PCR Not Detected Not Detected    Coronavirus 229E by PCR Not Detected Not Detected    Coronavirus HKU1 by PCR Not Detected Not Detected    Coronavirus NL63 by PCR Not Detected Not Detected    Coronavirus OC43 by PCR Not Detected Not Detected    SARS-CoV-2, PCR Not Detected Not Detected    Human Metapneumovirus by PCR Not Detected Not Detected    Human Rhinovirus/Enterovirus by PCR Not Detected Not Detected    Influenza A by PCR Not Detected Not Detected    Influenza B by PCR Not Detected Not Detected    Mycoplasma pneumoniae by PCR Not Detected Not Detected    Parainfluenza Virus 1 by PCR Not Detected Not Detected    Parainfluenza Virus 2 by PCR Not Detected Not Detected    Parainfluenza Virus 3 by PCR Not Detected Not Detected    Parainfluenza Virus 4 by PCR Not Detected Not Detected    Respiratory Syncytial Virus by PCR Not Detected Not Detected   CBC with Auto Differential   Result Value Ref Range    WBC 7.4 4.5 - 11.5 E9/L    RBC 4.89 3.80 - 5.80 E12/L    Hemoglobin 15.6 12.5 - 16.5 g/dL    Hematocrit 46.3 37.0 - 54.0 %    MCV 94.7 80.0 - 99.9 fL    MCH 31.9 cmH2O    Delivery Systems BiPAP    Basic Metabolic Panel w/ Reflex to MG   Result Value Ref Range    Sodium 140 132 - 146 mmol/L    Potassium reflex Magnesium 4.5 3.5 - 5.0 mmol/L    Chloride 105 98 - 107 mmol/L    CO2 21 (L) 22 - 29 mmol/L    Anion Gap 14 7 - 16 mmol/L    Glucose 191 (H) 74 - 99 mg/dL    BUN 19 6 - 23 mg/dL    Creatinine 1.0 0.7 - 1.2 mg/dL    GFR Non-African American >60 >=60 mL/min/1.73    GFR African American >60     Calcium 9.0 8.6 - 10.2 mg/dL   CBC with Auto Differential   Result Value Ref Range    WBC 10.1 4.5 - 11.5 E9/L    RBC 4.64 3.80 - 5.80 E12/L    Hemoglobin 14.6 12.5 - 16.5 g/dL    Hematocrit 44.0 37.0 - 54.0 %    MCV 94.8 80.0 - 99.9 fL    MCH 31.5 26.0 - 35.0 pg    MCHC 33.2 32.0 - 34.5 %    RDW 12.6 11.5 - 15.0 fL    Platelets 616 739 - 744 E9/L    MPV 9.9 7.0 - 12.0 fL    Neutrophils % 88.4 (H) 43.0 - 80.0 %    Immature Granulocytes % 0.6 0.0 - 5.0 %    Lymphocytes % 8.2 (L) 20.0 - 42.0 %    Monocytes % 2.7 2.0 - 12.0 %    Eosinophils % 0.0 0.0 - 6.0 %    Basophils % 0.1 0.0 - 2.0 %    Neutrophils Absolute 8.97 (H) 1.80 - 7.30 E9/L    Immature Granulocytes # 0.06 E9/L    Lymphocytes Absolute 0.83 (L) 1.50 - 4.00 E9/L    Monocytes Absolute 0.27 0.10 - 0.95 E9/L    Eosinophils Absolute 0.00 (L) 0.05 - 0.50 E9/L    Basophils Absolute 0.01 0.00 - 0.20 E9/L   Hemoglobin A1C   Result Value Ref Range    Hemoglobin A1C 6.4 (H) 4.0 - 5.6 %   EKG 12 Lead   Result Value Ref Range    Ventricular Rate 122 BPM    Atrial Rate 99 BPM    P-R Interval 170 ms    QRS Duration 86 ms    Q-T Interval 364 ms    QTc Calculation (Bazett) 518 ms    P Axis 77 degrees    R Axis 68 degrees    T Axis 63 degrees   ,       RADIOLOGY:  Imaging interpreted by Radiologist unless otherwise specified  XR CHEST PORTABLE   Final Result   No acute process.              ------------------------- NURSING NOTES AND VITALS REVIEWED ---------------------------  The nursing notes within the ED encounter and vital signs as below have been reviewed by myself  BP (!) 145/62   Pulse (!) 107   Temp 97.6 °F (36.4 °C)   Resp 17   Ht 5' 7\" (1.702 m)   Wt 205 lb (93 kg)   SpO2 97%   BMI 32.11 kg/m²      The patients available past medical records and past encounters were reviewed.         ------------------------------ ED COURSE/MEDICAL DECISION MAKING----------------------  Medications   azithromycin (ZITHROMAX) tablet 250 mg (250 mg Oral Given 8/22/22 2040)   gabapentin (NEURONTIN) capsule 600 mg (600 mg Oral Given 8/22/22 2039)   losartan (COZAAR) tablet 25 mg (25 mg Oral Given 8/22/22 2040)   QUEtiapine (SEROQUEL XR) extended release tablet 200 mg (200 mg Oral Given 8/22/22 2040)   atorvastatin (LIPITOR) tablet 40 mg (40 mg Oral Given 8/22/22 2039)   sodium chloride flush 0.9 % injection 5-40 mL (10 mLs IntraVENous Given 8/22/22 2040)   sodium chloride flush 0.9 % injection 5-40 mL (has no administration in time range)   0.9 % sodium chloride infusion (has no administration in time range)   ondansetron (ZOFRAN-ODT) disintegrating tablet 4 mg (has no administration in time range)     Or   ondansetron (ZOFRAN) injection 4 mg (has no administration in time range)   polyethylene glycol (GLYCOLAX) packet 17 g (has no administration in time range)   enoxaparin (LOVENOX) injection 40 mg (40 mg SubCUTAneous Given 8/22/22 0809)   acetaminophen (TYLENOL) tablet 650 mg (650 mg Oral Given 8/22/22 0011)     Or   acetaminophen (TYLENOL) suppository 650 mg ( Rectal See Alternative 8/22/22 0011)   ipratropium-albuterol (DUONEB) nebulizer solution 1 ampule (1 ampule Inhalation Given 8/22/22 1758)   albuterol (PROVENTIL) nebulizer solution 2.5 mg (has no administration in time range)   cloNIDine (CATAPRES) tablet 0.1 mg (0.1 mg Oral Given 8/22/22 0810)   budesonide (PULMICORT) nebulizer suspension 500 mcg (500 mcg Nebulization Given 8/22/22 1758)   Arformoterol Tartrate (BROVANA) nebulizer solution 15 mcg (15 mcg Nebulization Given 8/22/22 1758) methylPREDNISolone sodium (SOLU-MEDROL) injection 40 mg (40 mg IntraVENous Given 22)   ipratropium-albuterol (DUONEB) nebulizer solution 3 ampule (3 ampules Inhalation Given 22 1616)       Medical Decision Makin-year-old male with history of severe COPD presents in exacerbation  Placed on BiPAP with significant improvement  When taken off BiPAP, maintains SPO2 but still has significant work of breathing  We will admit for further evaluation and management of severe COPD exacerbation  Patient minimal to this plan  Medicine consulted for admission, patient accepted      ED Course as of 22 2253   Sun Aug 21, 2022   1613 Patient received 125 mg IV Solu-Medrol from EMS. [VG]   1906 Reassessed, SPO2 was 97% on his baseline 3 L nasal cannula but he is still having increased work of breathing and states he feels feels pretty short of breath and uncomfortable. We placed him back on BiPAP. We will admit the patient [VG]   [facility] with hospitalist, discussed case, patient is accepted for admission.  [VG]      ED Course User Index  [VG] Jack Luna DO           ED Counseling: This emergency provider has spoken with the patient and any family present to discuss clinical status, results, plan of care, diagnosis and prognosis as able to be determined at this time. Any questions were answered and patient and/or family/POA are agreeable with the plan.       --------------------------------- IMPRESSION AND DISPOSITION ---------------------------------    IMPRESSION  1. COPD exacerbation ([facility] [address]        DISPOSITION  Disposition: Admit to telemetry  Patient condition is stable      This report was transcribed using voice recognition software. Every effort was made to ensure accuracy; however, transcription errors may be present.         Jack Luna DO  Resident  22 0179

## 2022-08-21 NOTE — H&P
[facility] History and Physical          ASSESSMENT:      Principal Problem:    COPD exacerbation ([address]  Active Problems:    Primary hypertension  Resolved Problems:    * No resolved hospital problems. *      PLAN:    -Admit patient  - Continue BiPAP and wean off as tolerated: Settings 14/8 with FiO2 30%  - Continue azithromycin Monday Wednesday Friday per home regimen  - Prednisone 40 mg daily, first dose tomorrow morning  - N.p.o. while on BiPAP  - Cardiac diet when off BiPAP  - Resume home meds: Clonidine, losartan, Lipitor    Code Status: Full code  DVT prophylaxis: Lovenox 40 mg subcutaneous daily      CHIEF COMPLAINT:    Chief Complaint   Patient presents with    Shortness of Breath     Hx of COPD, wears 3L at baseline        History of Present Illness:  Patient is a 75 y.o. male with a past medical history significant for COPD-on 3 L nasal cannula at baseline who was BIBEMS with significant shortness of breath. History obtained mainly from chart review as patient was connected to BiPAP. The patient has been significantly short of breath over the past 2 days, worsening today. He reports chest tightness and increased cough with white sputum production. He also has increased albuterol inhaler use, to the point that he has become more jittery while using his rescue inhaler. He was told to wean off the inhaler by his pulmonologist at [facility] clinic. He is currently being evaluated for lung transplant. He quit smoking. Otherwise, the patient denied any fevers, chills, back pain, abdominal pain, nausea, vomiting, diarrhea, constipation, bloody stools, bloody urine, lower extremity swelling, numbness/tingling of extremities, focal weakness, recent injuries or loss of consciousness. EMS report that they have given him 125 mg of Solu-Medrol and 2 DuoNeb treatments with significant improvement in his aeration and general clinical appearance.     Initial ED vitals:  Initial BP Heart Rate Temp Resp SpO2   08/21 1610 158/100 Important  88 98.1 °F (36.7 °C) 28 100 %     Labs were significant for ABG of 7.45/32.7/295.3 while on BiPAP. Troponin 14, , unremarkable BMP and CBC. While in the ED, was given 3 duo nebs without significant relief. He was then placed on BiPAP, but on attempt to wean off BiPAP patient continued to have difficulty breathing. Informant(s) for H&P: Chart review and patient    REVIEW OF SYSTEMS:  A comprehensive review of systems was negative except for: what is in the HPI        PMH:  Past Medical History:   Diagnosis Date    Angina pectoris ([address]     states had neg heart cath    Cervical spinal stenosis     COPD (chronic obstructive pulmonary disease) ([facility])     Depression     Hyperlipidemia     Neck pain        Surgical History:  Past Surgical History:   Procedure Laterality Date    COLONOSCOPY      HAND SURGERY Left 11/13/2019    LEFT HAND-PALN, DUPUYTREN'S CONTRACTURE, MAJOR RESECTION. POSSIBLE FULL THICKNESS SKIN GRAFT    LARYNGOSCOPY      removal non malignant lesion    NECK SURGERY      cervical fusion x2    WRIST SURGERY Left 11/13/2019    LEFT HAND DUPUYTRENS CONTRACTURE MAJOR RESECTION,  Z-PLASTY performed by Yesica Soria MD at [address] [place] [address]       Medications Prior to Admission:    Prior to Admission medications    Medication Sig Start Date End Date Taking?  Authorizing Provider   fluticasone-salmeterol (ADVAIR) 500-50 MCG/ACT AEPB diskus inhaler Inhale 1 puff into the lungs every 12 hours   Yes Historical Provider, MD   QUEtiapine (SEROQUEL XR) 200 MG extended release tablet Take 200 mg by mouth nightly   Yes Historical Provider, MD   cloNIDine (CATAPRES) 0.1 MG tablet Take 0.1 mg by mouth daily   Yes Historical Provider, MD   ipratropium-albuterol (DUONEB) 0.5-2.5 (3) MG/3ML SOLN nebulizer solution Inhale 1 vial into the lungs 3 times daily   Yes Historical Provider, MD   albuterol sulfate HFA (VENTOLIN HFA) 108 (90 Base) MCG/ACT inhaler Inhale 2 puffs into the lungs every 6 hours as needed for Wheezing   Yes Historical Provider, MD   azithromycin (ZITHROMAX) 250 MG tablet Take 250 mg by mouth three times a week Monday, Wednesday, Friday   Yes Historical Provider, MD   OXYGEN Inhale 3 L into the lungs continuous   Yes Historical Provider, MD   Budeson-Glycopyrrol-Formoterol (BREZTRI AEROSPHERE) 160-9-4.8 MCG/ACT AERO Inhale 2 puffs into the lungs in the morning and at bedtime   Yes Historical Provider, MD   losartan (COZAAR) 25 MG tablet Take 25 mg by mouth nightly   Yes Historical Provider, MD   predniSONE (DELTASONE) 10 MG tablet Take 30 mg by mouth See Admin Instructions Taper Dosing   Yes Historical Provider, MD   simvastatin (ZOCOR) 80 MG tablet Take 10 mg by mouth nightly    Historical Provider, MD   gabapentin (NEURONTIN) 600 MG tablet Take 600 mg by mouth nightly. Historical Provider, MD       Allergies:    Patient has no known allergies. Social History:    reports that he has quit smoking. His smoking use included cigarettes. He has never used smokeless tobacco. He reports current alcohol use. He reports that he does not use drugs. Family History:   family history includes Heart Disease in his brother, father, sibling, sibling, and sibling; No Known Problems in his mother.        PHYSICAL EXAM:  Vitals:  BP (!) 158/100   Pulse 88   Temp 98.1 °F (36.7 °C) (Oral)   Resp 28   Ht 5' 7\" (1.702 m)   Wt 205 lb (93 kg)   SpO2 100%   BMI 32.11 kg/m²     General Appearance: Elderly male laying in bed in no acute distress, alert and oriented to person, place and time, on BiPAP, appears comfortable  Skin: warm and dry  Head: normocephalic and atraumatic  Eyes: pupils equal, round, and reactive to light, extraocular eye movements intact, conjunctivae normal  Neck: neck supple and non tender without mass   Pulmonary/Chest: Diminished air movement throughout all lung fields, mild end expiratory wheezing throughout all lung fields, no rales or

## 2022-08-22 LAB
ADENOVIRUS BY PCR: NOT DETECTED
ANION GAP SERPL CALCULATED.3IONS-SCNC: 14 MMOL/L (ref 7–16)
BASOPHILS ABSOLUTE: 0.01 E9/L (ref 0–0.2)
BASOPHILS RELATIVE PERCENT: 0.1 % (ref 0–2)
BORDETELLA PARAPERTUSSIS BY PCR: NOT DETECTED
BORDETELLA PERTUSSIS BY PCR: NOT DETECTED
BUN BLDV-MCNC: 19 MG/DL (ref 6–23)
CALCIUM SERPL-MCNC: 9 MG/DL (ref 8.6–10.2)
CHLAMYDOPHILIA PNEUMONIAE BY PCR: NOT DETECTED
CHLORIDE BLD-SCNC: 105 MMOL/L (ref 98–107)
CO2: 21 MMOL/L (ref 22–29)
CORONAVIRUS 229E BY PCR: NOT DETECTED
CORONAVIRUS HKU1 BY PCR: NOT DETECTED
CORONAVIRUS NL63 BY PCR: NOT DETECTED
CORONAVIRUS OC43 BY PCR: NOT DETECTED
CREAT SERPL-MCNC: 1 MG/DL (ref 0.7–1.2)
EOSINOPHILS ABSOLUTE: 0 E9/L (ref 0.05–0.5)
EOSINOPHILS RELATIVE PERCENT: 0 % (ref 0–6)
GFR AFRICAN AMERICAN: >60
GFR NON-AFRICAN AMERICAN: >60 ML/MIN/1.73
GLUCOSE BLD-MCNC: 191 MG/DL (ref 74–99)
HBA1C MFR BLD: 6.4 % (ref 4–5.6)
HCT VFR BLD CALC: 44 % (ref 37–54)
HEMOGLOBIN: 14.6 G/DL (ref 12.5–16.5)
HUMAN METAPNEUMOVIRUS BY PCR: NOT DETECTED
HUMAN RHINOVIRUS/ENTEROVIRUS BY PCR: NOT DETECTED
IMMATURE GRANULOCYTES #: 0.06 E9/L
IMMATURE GRANULOCYTES %: 0.6 % (ref 0–5)
INFLUENZA A BY PCR: NOT DETECTED
INFLUENZA B BY PCR: NOT DETECTED
LYMPHOCYTES ABSOLUTE: 0.83 E9/L (ref 1.5–4)
LYMPHOCYTES RELATIVE PERCENT: 8.2 % (ref 20–42)
MCH RBC QN AUTO: 31.5 PG (ref 26–35)
MCHC RBC AUTO-ENTMCNC: 33.2 % (ref 32–34.5)
MCV RBC AUTO: 94.8 FL (ref 80–99.9)
MONOCYTES ABSOLUTE: 0.27 E9/L (ref 0.1–0.95)
MONOCYTES RELATIVE PERCENT: 2.7 % (ref 2–12)
MYCOPLASMA PNEUMONIAE BY PCR: NOT DETECTED
NEUTROPHILS ABSOLUTE: 8.97 E9/L (ref 1.8–7.3)
NEUTROPHILS RELATIVE PERCENT: 88.4 % (ref 43–80)
PARAINFLUENZA VIRUS 1 BY PCR: NOT DETECTED
PARAINFLUENZA VIRUS 2 BY PCR: NOT DETECTED
PARAINFLUENZA VIRUS 3 BY PCR: NOT DETECTED
PARAINFLUENZA VIRUS 4 BY PCR: NOT DETECTED
PDW BLD-RTO: 12.6 FL (ref 11.5–15)
PLATELET # BLD: 203 E9/L (ref 130–450)
PMV BLD AUTO: 9.9 FL (ref 7–12)
POTASSIUM REFLEX MAGNESIUM: 4.5 MMOL/L (ref 3.5–5)
RBC # BLD: 4.64 E12/L (ref 3.8–5.8)
RESPIRATORY SYNCYTIAL VIRUS BY PCR: NOT DETECTED
SARS-COV-2, PCR: NOT DETECTED
SODIUM BLD-SCNC: 140 MMOL/L (ref 132–146)
WBC # BLD: 10.1 E9/L (ref 4.5–11.5)

## 2022-08-22 PROCEDURE — 6360000002 HC RX W HCPCS: Performed by: STUDENT IN AN ORGANIZED HEALTH CARE EDUCATION/TRAINING PROGRAM

## 2022-08-22 PROCEDURE — 2700000000 HC OXYGEN THERAPY PER DAY

## 2022-08-22 PROCEDURE — 6370000000 HC RX 637 (ALT 250 FOR IP): Performed by: STUDENT IN AN ORGANIZED HEALTH CARE EDUCATION/TRAINING PROGRAM

## 2022-08-22 PROCEDURE — 94660 CPAP INITIATION&MGMT: CPT

## 2022-08-22 PROCEDURE — 85025 COMPLETE CBC W/AUTO DIFF WBC: CPT

## 2022-08-22 PROCEDURE — 0202U NFCT DS 22 TRGT SARS-COV-2: CPT

## 2022-08-22 PROCEDURE — 2060000000 HC ICU INTERMEDIATE R&B

## 2022-08-22 PROCEDURE — 2580000003 HC RX 258: Performed by: STUDENT IN AN ORGANIZED HEALTH CARE EDUCATION/TRAINING PROGRAM

## 2022-08-22 PROCEDURE — 80048 BASIC METABOLIC PNL TOTAL CA: CPT

## 2022-08-22 PROCEDURE — 6360000002 HC RX W HCPCS: Performed by: NURSE PRACTITIONER

## 2022-08-22 PROCEDURE — 99232 SBSQ HOSP IP/OBS MODERATE 35: CPT | Performed by: INTERNAL MEDICINE

## 2022-08-22 PROCEDURE — 83036 HEMOGLOBIN GLYCOSYLATED A1C: CPT

## 2022-08-22 PROCEDURE — 94640 AIRWAY INHALATION TREATMENT: CPT

## 2022-08-22 PROCEDURE — 36415 COLL VENOUS BLD VENIPUNCTURE: CPT

## 2022-08-22 PROCEDURE — APPSS30 APP SPLIT SHARED TIME 16-30 MINUTES: Performed by: NURSE PRACTITIONER

## 2022-08-22 RX ORDER — METHYLPREDNISOLONE SODIUM SUCCINATE 40 MG/ML
40 INJECTION, POWDER, LYOPHILIZED, FOR SOLUTION INTRAMUSCULAR; INTRAVENOUS EVERY 8 HOURS
Status: DISCONTINUED | OUTPATIENT
Start: 2022-08-22 | End: 2022-08-23

## 2022-08-22 RX ORDER — ARFORMOTEROL TARTRATE 15 UG/2ML
15 SOLUTION RESPIRATORY (INHALATION) 2 TIMES DAILY
Status: DISCONTINUED | OUTPATIENT
Start: 2022-08-22 | End: 2022-08-29 | Stop reason: HOSPADM

## 2022-08-22 RX ADMIN — IPRATROPIUM BROMIDE AND ALBUTEROL SULFATE 1 AMPULE: .5; 2.5 SOLUTION RESPIRATORY (INHALATION) at 17:58

## 2022-08-22 RX ADMIN — LOSARTAN POTASSIUM 25 MG: 50 TABLET, FILM COATED ORAL at 20:40

## 2022-08-22 RX ADMIN — GABAPENTIN 600 MG: 300 CAPSULE ORAL at 20:39

## 2022-08-22 RX ADMIN — CLONIDINE HYDROCHLORIDE 0.1 MG: 0.1 TABLET ORAL at 08:10

## 2022-08-22 RX ADMIN — IPRATROPIUM BROMIDE AND ALBUTEROL SULFATE 1 AMPULE: .5; 2.5 SOLUTION RESPIRATORY (INHALATION) at 09:51

## 2022-08-22 RX ADMIN — Medication 10 ML: at 08:09

## 2022-08-22 RX ADMIN — ATORVASTATIN CALCIUM 40 MG: 40 TABLET, FILM COATED ORAL at 20:39

## 2022-08-22 RX ADMIN — QUETIAPINE FUMARATE 200 MG: 200 TABLET, EXTENDED RELEASE ORAL at 20:40

## 2022-08-22 RX ADMIN — IPRATROPIUM BROMIDE AND ALBUTEROL SULFATE 1 AMPULE: .5; 2.5 SOLUTION RESPIRATORY (INHALATION) at 14:24

## 2022-08-22 RX ADMIN — AZITHROMYCIN MONOHYDRATE 250 MG: 250 TABLET ORAL at 20:40

## 2022-08-22 RX ADMIN — ACETAMINOPHEN 650 MG: 325 TABLET ORAL at 00:11

## 2022-08-22 RX ADMIN — PREDNISONE 40 MG: 20 TABLET ORAL at 08:10

## 2022-08-22 RX ADMIN — ARFORMOTEROL TARTRATE 15 MCG: 15 SOLUTION RESPIRATORY (INHALATION) at 17:58

## 2022-08-22 RX ADMIN — Medication 10 ML: at 20:40

## 2022-08-22 RX ADMIN — BUDESONIDE 500 MCG: 0.5 SUSPENSION RESPIRATORY (INHALATION) at 17:58

## 2022-08-22 RX ADMIN — ENOXAPARIN SODIUM 40 MG: 100 INJECTION SUBCUTANEOUS at 08:09

## 2022-08-22 RX ADMIN — METHYLPREDNISOLONE SODIUM SUCCINATE 40 MG: 40 INJECTION, POWDER, FOR SOLUTION INTRAMUSCULAR; INTRAVENOUS at 20:40

## 2022-08-22 ASSESSMENT — PAIN DESCRIPTION - DESCRIPTORS: DESCRIPTORS: DISCOMFORT

## 2022-08-22 ASSESSMENT — PAIN - FUNCTIONAL ASSESSMENT: PAIN_FUNCTIONAL_ASSESSMENT: ACTIVITIES ARE NOT PREVENTED

## 2022-08-22 ASSESSMENT — PAIN DESCRIPTION - ORIENTATION: ORIENTATION: MID

## 2022-08-22 ASSESSMENT — PAIN DESCRIPTION - LOCATION: LOCATION: HEAD

## 2022-08-22 ASSESSMENT — PAIN SCALES - GENERAL
PAINLEVEL_OUTOF10: 0
PAINLEVEL_OUTOF10: 0
PAINLEVEL_OUTOF10: 6

## 2022-08-22 NOTE — PROGRESS NOTES
[address] [place], [place] [address]ist   Progress Note    Admitting Date and Time: 8/21/2022  4:07 PM  Admit Dx: COPD exacerbation ([address] [J44.1]    Subjective:    Patient reports that he is coughing up yellow sputum and complains of a sore throat. He is dyspneic with conversation. He is off of Bipap and is on his baseline 3 liters. ROS: denies fever, chills, n/v, HA unless stated above.      Arformoterol Tartrate  15 mcg Nebulization BID    methylPREDNISolone  40 mg IntraVENous Q8H    azithromycin  250 mg Oral Q MWF    gabapentin  600 mg Oral Nightly    losartan  25 mg Oral Nightly    QUEtiapine  200 mg Oral Nightly    atorvastatin  40 mg Oral Daily    sodium chloride flush  5-40 mL IntraVENous 2 times per day    enoxaparin  40 mg SubCUTAneous Daily    ipratropium-albuterol  1 ampule Inhalation Q4H WA    cloNIDine  0.1 mg Oral Daily    budesonide  0.5 mg Nebulization BID     sodium chloride flush, 5-40 mL, PRN  sodium chloride, , PRN  ondansetron, 4 mg, Q8H PRN   Or  ondansetron, 4 mg, Q6H PRN  polyethylene glycol, 17 g, Daily PRN  acetaminophen, 650 mg, Q6H PRN   Or  acetaminophen, 650 mg, Q6H PRN  albuterol, 2.5 mg, Q6H PRN         Objective:    /66   Pulse 91   Temp 97.9 °F (36.6 °C) (Oral)   Resp 24   Ht 5' 7\" (1.702 m)   Wt 205 lb (93 kg)   SpO2 97%   BMI 32.11 kg/m²   General Appearance: alert and oriented to person, place and time and in no acute distress  Skin: warm and dry  Head: normocephalic and atraumatic  Eyes: pupils equal, round, and reactive to light, conjunctivae normal  Neck: neck supple and non tender without mass, throat reddened but no pustules noted   Pulmonary/Chest: lungs diminished, expiratory wheezes, conversational dyspnea  Cardiovascular: normal rate, normal S1 and S2, murmur  Abdomen: soft, non-tender, non-distended, normal bowel sounds  Extremities: no cyanosis, no clubbing and no edema  Neurologic: no cranial nerve deficit and speech normal      Recent Labs 08/21/22  1626 08/22/22  0514    140   K 4.2 4.5    105   CO2 25 21*   BUN 12 19   CREATININE 1.2 1.0   GLUCOSE 180* 191*   CALCIUM 9.4 9.0       Recent Labs     08/21/22  1626   ALKPHOS 117   PROT 6.5   LABALBU 4.0   BILITOT 0.5   AST 19   ALT 17       Recent Labs     08/21/22  1626 08/22/22  0514   WBC 7.4 10.1   RBC 4.89 4.64   HGB 15.6 14.6   HCT 46.3 44.0   MCV 94.7 94.8   MCH 31.9 31.5   MCHC 33.7 33.2   RDW 12.5 12.6    203   MPV 9.6 9.9           Radiology:   XR CHEST PORTABLE   Final Result   No acute process. Assessment:  Principal Problem:    COPD exacerbation ([address]  Active Problems:    Primary hypertension  Resolved Problems:    * No resolved hospital problems. *      Plan:  Acute on chronic respiratory failure: He was unable to be weaned from the [facility] in ED. He is now on his baseline 3 liters but dyspneic with conversation. Continue supplemental oxygen and Bipap at HS and prn. COPD exacerbation:  Continue aerosols. Change Prednisone to Solu-Medrol. He currently follows with [facility] for lung transplant. Rapid COVID was negative. Check respiratory film array. Check sputum culture. Hypertension: Continue losartan and clonidine. Depression:  Continue Seroquel. Diabetes:  Hgb A1C was 7.2 on 9/7/21. He was discharged on home Metformin but does not currently take any medication. Repeat Hgb A1C. NOTE: This report was transcribed using voice recognition software. Every effort was made to ensure accuracy; however, inadvertent computerized transcription errors may be present.      Electronically signed by OSIRIS Ratliff CNP on 8/22/2022 at 9:59 AM

## 2022-08-22 NOTE — ACP (ADVANCE CARE PLANNING)
Advance Care Planning   Healthcare Decision Maker:    Primary Decision Maker: Addy Head - Other - [phone]

## 2022-08-22 NOTE — PLAN OF CARE
Problem: Discharge Planning  Goal: Discharge to home or other facility with appropriate resources  Outcome: Progressing  Flowsheets (Taken 8/21/2022 2154)  Discharge to home or other facility with appropriate resources: Identify barriers to discharge with patient and caregiver     Problem: Pain  Goal: Verbalizes/displays adequate comfort level or baseline comfort level  Outcome: Progressing     Problem: Safety - Adult  Goal: Free from fall injury  Outcome: Progressing

## 2022-08-22 NOTE — PLAN OF CARE
Problem: Discharge Planning  Goal: Discharge to home or other facility with appropriate resources  8/22/2022 0936 by Kareem Horta RN  Outcome: Progressing  8/22/2022 0043 by Jossie Magallanes RN  Outcome: Progressing  Flowsheets (Taken 8/21/2022 2154)  Discharge to home or other facility with appropriate resources: Identify barriers to discharge with patient and caregiver     Problem: Pain  Goal: Verbalizes/displays adequate comfort level or baseline comfort level  8/22/2022 0936 by Kareem Horta RN  Outcome: Progressing  8/22/2022 0043 by Jossie Magallanes RN  Outcome: Progressing     Problem: Safety - Adult  Goal: Free from fall injury  8/22/2022 0936 by Kareem Horta RN  Outcome: Progressing  8/22/2022 0043 by Jossie Magallanes RN  Outcome: Progressing

## 2022-08-23 LAB
ALBUMIN SERPL-MCNC: 3.8 G/DL (ref 3.5–5.2)
ALP BLD-CCNC: 91 U/L (ref 40–129)
ALT SERPL-CCNC: 15 U/L (ref 0–40)
ANION GAP SERPL CALCULATED.3IONS-SCNC: 14 MMOL/L (ref 7–16)
AST SERPL-CCNC: 12 U/L (ref 0–39)
BASOPHILS ABSOLUTE: 0.01 E9/L (ref 0–0.2)
BASOPHILS RELATIVE PERCENT: 0.1 % (ref 0–2)
BILIRUB SERPL-MCNC: 0.3 MG/DL (ref 0–1.2)
BUN BLDV-MCNC: 20 MG/DL (ref 6–23)
CALCIUM SERPL-MCNC: 9 MG/DL (ref 8.6–10.2)
CHLORIDE BLD-SCNC: 105 MMOL/L (ref 98–107)
CO2: 21 MMOL/L (ref 22–29)
CREAT SERPL-MCNC: 1 MG/DL (ref 0.7–1.2)
EOSINOPHILS ABSOLUTE: 0 E9/L (ref 0.05–0.5)
EOSINOPHILS RELATIVE PERCENT: 0 % (ref 0–6)
GFR AFRICAN AMERICAN: >60
GFR NON-AFRICAN AMERICAN: >60 ML/MIN/1.73
GLUCOSE BLD-MCNC: 189 MG/DL (ref 74–99)
HCT VFR BLD CALC: 42.8 % (ref 37–54)
HEMOGLOBIN: 13.9 G/DL (ref 12.5–16.5)
IMMATURE GRANULOCYTES #: 0.09 E9/L
IMMATURE GRANULOCYTES %: 0.7 % (ref 0–5)
LYMPHOCYTES ABSOLUTE: 1.04 E9/L (ref 1.5–4)
LYMPHOCYTES RELATIVE PERCENT: 7.5 % (ref 20–42)
MAGNESIUM: 2.2 MG/DL (ref 1.6–2.6)
MCH RBC QN AUTO: 30.9 PG (ref 26–35)
MCHC RBC AUTO-ENTMCNC: 32.5 % (ref 32–34.5)
MCV RBC AUTO: 95.1 FL (ref 80–99.9)
MONOCYTES ABSOLUTE: 0.51 E9/L (ref 0.1–0.95)
MONOCYTES RELATIVE PERCENT: 3.7 % (ref 2–12)
NEUTROPHILS ABSOLUTE: 12.16 E9/L (ref 1.8–7.3)
NEUTROPHILS RELATIVE PERCENT: 88 % (ref 43–80)
PDW BLD-RTO: 13 FL (ref 11.5–15)
PHOSPHORUS: 3 MG/DL (ref 2.5–4.5)
PLATELET # BLD: 216 E9/L (ref 130–450)
PMV BLD AUTO: 9.7 FL (ref 7–12)
POTASSIUM SERPL-SCNC: 4.5 MMOL/L (ref 3.5–5)
PROCALCITONIN: 0.05 NG/ML (ref 0–0.08)
RBC # BLD: 4.5 E12/L (ref 3.8–5.8)
REASON FOR REJECTION: NORMAL
REJECTED TEST: NORMAL
SODIUM BLD-SCNC: 140 MMOL/L (ref 132–146)
TOTAL PROTEIN: 5.9 G/DL (ref 6.4–8.3)
WBC # BLD: 13.8 E9/L (ref 4.5–11.5)

## 2022-08-23 PROCEDURE — 94640 AIRWAY INHALATION TREATMENT: CPT

## 2022-08-23 PROCEDURE — 94660 CPAP INITIATION&MGMT: CPT

## 2022-08-23 PROCEDURE — 97161 PT EVAL LOW COMPLEX 20 MIN: CPT | Performed by: PHYSICAL THERAPIST

## 2022-08-23 PROCEDURE — 99232 SBSQ HOSP IP/OBS MODERATE 35: CPT | Performed by: INTERNAL MEDICINE

## 2022-08-23 PROCEDURE — 97165 OT EVAL LOW COMPLEX 30 MIN: CPT

## 2022-08-23 PROCEDURE — 84145 PROCALCITONIN (PCT): CPT

## 2022-08-23 PROCEDURE — 82104 ALPHA-1-ANTITRYPSIN PHENO: CPT

## 2022-08-23 PROCEDURE — 6360000002 HC RX W HCPCS: Performed by: STUDENT IN AN ORGANIZED HEALTH CARE EDUCATION/TRAINING PROGRAM

## 2022-08-23 PROCEDURE — 6360000002 HC RX W HCPCS: Performed by: INTERNAL MEDICINE

## 2022-08-23 PROCEDURE — 2580000003 HC RX 258: Performed by: INTERNAL MEDICINE

## 2022-08-23 PROCEDURE — 6370000000 HC RX 637 (ALT 250 FOR IP): Performed by: STUDENT IN AN ORGANIZED HEALTH CARE EDUCATION/TRAINING PROGRAM

## 2022-08-23 PROCEDURE — 2700000000 HC OXYGEN THERAPY PER DAY

## 2022-08-23 PROCEDURE — 36415 COLL VENOUS BLD VENIPUNCTURE: CPT

## 2022-08-23 PROCEDURE — 6360000002 HC RX W HCPCS: Performed by: NURSE PRACTITIONER

## 2022-08-23 PROCEDURE — 6370000000 HC RX 637 (ALT 250 FOR IP): Performed by: NURSE PRACTITIONER

## 2022-08-23 PROCEDURE — 97110 THERAPEUTIC EXERCISES: CPT

## 2022-08-23 PROCEDURE — APPSS30 APP SPLIT SHARED TIME 16-30 MINUTES: Performed by: NURSE PRACTITIONER

## 2022-08-23 PROCEDURE — 2580000003 HC RX 258: Performed by: STUDENT IN AN ORGANIZED HEALTH CARE EDUCATION/TRAINING PROGRAM

## 2022-08-23 PROCEDURE — 6370000000 HC RX 637 (ALT 250 FOR IP): Performed by: INTERNAL MEDICINE

## 2022-08-23 PROCEDURE — 82103 ALPHA-1-ANTITRYPSIN TOTAL: CPT

## 2022-08-23 PROCEDURE — 2060000000 HC ICU INTERMEDIATE R&B

## 2022-08-23 PROCEDURE — 82785 ASSAY OF IGE: CPT

## 2022-08-23 PROCEDURE — 83735 ASSAY OF MAGNESIUM: CPT

## 2022-08-23 PROCEDURE — 2580000003 HC RX 258: Performed by: NURSE PRACTITIONER

## 2022-08-23 PROCEDURE — 84100 ASSAY OF PHOSPHORUS: CPT

## 2022-08-23 PROCEDURE — 85025 COMPLETE CBC W/AUTO DIFF WBC: CPT

## 2022-08-23 PROCEDURE — 80053 COMPREHEN METABOLIC PANEL: CPT

## 2022-08-23 RX ORDER — METHYLPREDNISOLONE SODIUM SUCCINATE 40 MG/ML
40 INJECTION, POWDER, LYOPHILIZED, FOR SOLUTION INTRAMUSCULAR; INTRAVENOUS EVERY 6 HOURS
Status: DISCONTINUED | OUTPATIENT
Start: 2022-08-23 | End: 2022-08-24

## 2022-08-23 RX ORDER — FAMOTIDINE 20 MG/1
20 TABLET, FILM COATED ORAL 2 TIMES DAILY
Status: DISCONTINUED | OUTPATIENT
Start: 2022-08-23 | End: 2022-08-29 | Stop reason: HOSPADM

## 2022-08-23 RX ORDER — SODIUM CHLORIDE 9 MG/ML
INJECTION, SOLUTION INTRAVENOUS CONTINUOUS
Status: DISCONTINUED | OUTPATIENT
Start: 2022-08-23 | End: 2022-08-24

## 2022-08-23 RX ORDER — METHYLPREDNISOLONE SODIUM SUCCINATE 40 MG/ML
40 INJECTION, POWDER, LYOPHILIZED, FOR SOLUTION INTRAMUSCULAR; INTRAVENOUS EVERY 6 HOURS
Status: DISCONTINUED | OUTPATIENT
Start: 2022-08-23 | End: 2022-08-23

## 2022-08-23 RX ADMIN — METHYLPREDNISOLONE SODIUM SUCCINATE 40 MG: 40 INJECTION, POWDER, FOR SOLUTION INTRAMUSCULAR; INTRAVENOUS at 17:18

## 2022-08-23 RX ADMIN — IPRATROPIUM BROMIDE AND ALBUTEROL SULFATE 1 AMPULE: .5; 2.5 SOLUTION RESPIRATORY (INHALATION) at 10:22

## 2022-08-23 RX ADMIN — QUETIAPINE FUMARATE 200 MG: 200 TABLET, EXTENDED RELEASE ORAL at 20:18

## 2022-08-23 RX ADMIN — ROFLUMILAST 500 MCG: 500 TABLET ORAL at 17:19

## 2022-08-23 RX ADMIN — IPRATROPIUM BROMIDE AND ALBUTEROL SULFATE 1 AMPULE: .5; 2.5 SOLUTION RESPIRATORY (INHALATION) at 06:23

## 2022-08-23 RX ADMIN — FAMOTIDINE 20 MG: 20 TABLET, FILM COATED ORAL at 20:18

## 2022-08-23 RX ADMIN — NYSTATIN 500000 UNITS: 100000 SUSPENSION ORAL at 20:18

## 2022-08-23 RX ADMIN — ARFORMOTEROL TARTRATE 15 MCG: 15 SOLUTION RESPIRATORY (INHALATION) at 19:30

## 2022-08-23 RX ADMIN — METHYLPREDNISOLONE SODIUM SUCCINATE 40 MG: 40 INJECTION, POWDER, FOR SOLUTION INTRAMUSCULAR; INTRAVENOUS at 13:15

## 2022-08-23 RX ADMIN — BUDESONIDE 500 MCG: 0.5 SUSPENSION RESPIRATORY (INHALATION) at 19:30

## 2022-08-23 RX ADMIN — ATORVASTATIN CALCIUM 40 MG: 40 TABLET, FILM COATED ORAL at 08:47

## 2022-08-23 RX ADMIN — BUDESONIDE 500 MCG: 0.5 SUSPENSION RESPIRATORY (INHALATION) at 06:24

## 2022-08-23 RX ADMIN — IPRATROPIUM BROMIDE AND ALBUTEROL SULFATE 1 AMPULE: .5; 2.5 SOLUTION RESPIRATORY (INHALATION) at 14:28

## 2022-08-23 RX ADMIN — METHYLPREDNISOLONE SODIUM SUCCINATE 40 MG: 40 INJECTION, POWDER, FOR SOLUTION INTRAMUSCULAR; INTRAVENOUS at 22:27

## 2022-08-23 RX ADMIN — NYSTATIN 500000 UNITS: 100000 SUSPENSION ORAL at 17:18

## 2022-08-23 RX ADMIN — Medication 10 ML: at 08:48

## 2022-08-23 RX ADMIN — AZITHROMYCIN DIHYDRATE 500 MG: 500 INJECTION, POWDER, LYOPHILIZED, FOR SOLUTION INTRAVENOUS at 20:12

## 2022-08-23 RX ADMIN — IPRATROPIUM BROMIDE AND ALBUTEROL SULFATE 1 AMPULE: .5; 2.5 SOLUTION RESPIRATORY (INHALATION) at 19:30

## 2022-08-23 RX ADMIN — ENOXAPARIN SODIUM 40 MG: 100 INJECTION SUBCUTANEOUS at 08:47

## 2022-08-23 RX ADMIN — GABAPENTIN 600 MG: 300 CAPSULE ORAL at 20:18

## 2022-08-23 RX ADMIN — ARFORMOTEROL TARTRATE 15 MCG: 15 SOLUTION RESPIRATORY (INHALATION) at 06:24

## 2022-08-23 RX ADMIN — SODIUM CHLORIDE: 9 INJECTION, SOLUTION INTRAVENOUS at 22:29

## 2022-08-23 RX ADMIN — NYSTATIN 500000 UNITS: 100000 SUSPENSION ORAL at 13:15

## 2022-08-23 RX ADMIN — METHYLPREDNISOLONE SODIUM SUCCINATE 40 MG: 40 INJECTION, POWDER, FOR SOLUTION INTRAMUSCULAR; INTRAVENOUS at 05:39

## 2022-08-23 RX ADMIN — SODIUM CHLORIDE: 9 INJECTION, SOLUTION INTRAVENOUS at 09:46

## 2022-08-23 ASSESSMENT — PAIN SCALES - GENERAL
PAINLEVEL_OUTOF10: 0
PAINLEVEL_OUTOF10: 0

## 2022-08-23 NOTE — PROGRESS NOTES
[address] [place], [place] [address]ist   Progress Note    Admitting Date and Time: 8/21/2022  4:07 PM  Admit Dx: COPD exacerbation ([address] [J44.1]    Subjective:    Patient was resting in bed at the time of the exam.  He reports some improvement in his breathing but said that he was significantly short of breath when walking from the bathroom. He continues to cough up yellow sputum intermittently. ROS: denies fever, chills, n/v, HA unless stated above.      nystatin  5 mL Oral 4x Daily    Arformoterol Tartrate  15 mcg Nebulization BID    methylPREDNISolone  40 mg IntraVENous Q8H    azithromycin  250 mg Oral Q MWF    gabapentin  600 mg Oral Nightly    [Held by provider] losartan  25 mg Oral Nightly    QUEtiapine  200 mg Oral Nightly    atorvastatin  40 mg Oral Daily    sodium chloride flush  5-40 mL IntraVENous 2 times per day    enoxaparin  40 mg SubCUTAneous Daily    ipratropium-albuterol  1 ampule Inhalation Q4H WA    [Held by provider] cloNIDine  0.1 mg Oral Daily    budesonide  0.5 mg Nebulization BID     sodium chloride flush, 5-40 mL, PRN  sodium chloride, , PRN  ondansetron, 4 mg, Q8H PRN   Or  ondansetron, 4 mg, Q6H PRN  polyethylene glycol, 17 g, Daily PRN  acetaminophen, 650 mg, Q6H PRN   Or  acetaminophen, 650 mg, Q6H PRN  albuterol, 2.5 mg, Q6H PRN       Objective:    BP (!) 98/55   Pulse 98   Temp 97.6 °F (36.4 °C) (Oral)   Resp 22   Ht 5' 7\" (1.702 m)   Wt 205 lb (93 kg)   SpO2 97%   BMI 32.11 kg/m²   General Appearance: alert and oriented to person, place and time and in no acute distress  Skin: warm and dry  Head: normocephalic and atraumatic  Eyes: pupils equal, round, and reactive to light, conjunctivae normal  Mouth: thrush  Neck: neck supple and non tender without mass, throat reddened but no pustules noted   Pulmonary/Chest: lungs diminished, inspiratory and expiratory wheezes, exertional dyspnea  Cardiovascular: normal rate, normal S1 and S2, murmur  Abdomen: soft, non-tender, non-distended, normal bowel sounds  Extremities: no cyanosis, no clubbing and no edema  Neurologic: no cranial nerve deficit and speech normal      Recent Labs     08/21/22  1626 08/22/22  0514 08/23/22  0501    140 140   K 4.2 4.5 4.5    105 105   CO2 25 21* 21*   BUN 12 19 20   CREATININE 1.2 1.0 1.0   GLUCOSE 180* 191* 189*   CALCIUM 9.4 9.0 9.0       Recent Labs     08/21/22  1626 08/23/22  0501   ALKPHOS 117 91   PROT 6.5 5.9*   LABALBU 4.0 3.8   BILITOT 0.5 0.3   AST 19 12   ALT 17 15       Recent Labs     08/21/22  1626 08/22/22  0514 08/23/22  0501   WBC 7.4 10.1 13.8*   RBC 4.89 4.64 4.50   HGB 15.6 14.6 13.9   HCT 46.3 44.0 42.8   MCV 94.7 94.8 95.1   MCH 31.9 31.5 30.9   MCHC 33.7 33.2 32.5   RDW 12.5 12.6 13.0    203 216   MPV 9.6 9.9 9.7           Radiology:   XR CHEST PORTABLE   Final Result   No acute process. Assessment:  Principal Problem:    COPD exacerbation ([facility])  Active Problems:    Primary hypertension    Depression    Acute on chronic respiratory failure ([facility]  Resolved Problems:    * No resolved hospital problems. *      Plan:  Acute on chronic respiratory failure: He was unable to be weaned from the [facility] in ED. He is now on his baseline 3 liters but dyspneic with conversation. Continue supplemental oxygen and Bipap at HS and prn. COPD exacerbation:  Continue aerosols and Solu-Medrol. He currently follows with [facility] for lung transplant. Rapid COVID and respiratory film array were negative. Sputum culture sent. On Zithromax from home. Consult Pulmonology. Check procalcitonin. Hypertension: Hold losartan and clonidine due to hypotension. Start NS at 50 ml/hr as BP is low and patient appears dry. Depression:  Continue Seroquel. Thrush:  Start Nystatin. Diabetes:  Hgb A1C was 7.2 on 9/7/21. He was discharged on home Metformin but does not currently take any medication. Repeat Hgb A1C is 6.4.      NOTE: This report was transcribed using voice recognition software. Every effort was made to ensure accuracy; however, inadvertent computerized transcription errors may be present.      Electronically signed by OSIRIS Bond CNP on 8/23/2022 at 9:38 AM

## 2022-08-23 NOTE — CONSULTS
Pulmonary/Critical Care Consult Note    CHIEF COMPLAINT: Acute hypoxemic respiratory failure secondary to COPD exacerbation, apparently on a list for lung transplant    HISTORY OF PRESENT ILLNESS: The patient is a 68-year-old male who stopped smoking approximately 6 years ago. He used to smoke 1-1/2 packs/day at least and did this for well over 30 years. Over the last day or 2 has become increasingly short of breath came to the emergency room and was found to be wheezing extensively. Chest x-ray of his chest and CT of his chest did not show any evidence of infiltrate or other parenchymal abnormalities with the exception of changes assessment with COPD. Patient has been placed on steroids aerosolized bronchodilators and antibiotics but is still dyspneic with movement. He wears 3 L of oxygen at home. ALLERGY:  Patient has no known allergies.     FAMILY HISTORY:  Family History   Problem Relation Age of Onset    No Known Problems Mother     Heart Disease Father     Heart Disease Brother     Heart Disease Sibling     Heart Disease Sibling     Heart Disease Sibling        SOCIAL HISTORY:  Social History     Socioeconomic History    Marital status:      Spouse name: Not on file    Number of children: Not on file    Years of education: Not on file    Highest education level: Not on file   Occupational History    Not on file   Tobacco Use    Smoking status: Former     Years: 40.00     Types: Cigarettes    Smokeless tobacco: Never   Substance and Sexual Activity    Alcohol use: Yes     Comment: rare    Drug use: No    Sexual activity: Not on file   Other Topics Concern    Not on file   Social History Narrative    Not on file     Social Determinants of Health     Financial Resource Strain: Low Risk     Difficulty of Paying Living Expenses: Not very hard   Food Insecurity: No Food Insecurity    Worried About Running Out of Food in the Last Year: Never true    [address] [facility] [address] in the Last Year: Never true Transportation Needs: No Transportation Needs    Lack of Transportation (Medical): No    Lack of Transportation (Non-Medical): No   Physical Activity: Inactive    Days of Exercise per Week: 0 days    Minutes of Exercise per Session: 0 min   Stress: No Stress Concern Present    Feeling of Stress : Only a little   Social Connections:  Moderately Integrated    Frequency of Communication with Friends and Family: Twice a week    Frequency of Social Gatherings with Friends and Family: Once a week    Attends Hoahaoism Services: 1 to 4 times per year    Active Member of [address] [place] [address] [employer] or Organizations: No    Attends Club or Organization Meetings: Never    Marital Status: Living with partner   Intimate Partner Violence: Not At Risk    Fear of Current or Ex-Partner: No    Emotionally Abused: No    Physically Abused: No    Sexually Abused: No   Housing Stability: Low Risk     Unable to Pay for Housing in the Last Year: No    Number of [place] in the Last Year: 1    Unstable Housing in the Last Year: No       MEDICAL HISTORY:  Past Medical History:   Diagnosis Date    Angina pectoris ([address]     states had neg heart cath    Cervical spinal stenosis     COPD (chronic obstructive pulmonary disease) ([facility])     Depression     Hyperlipidemia     Neck pain        MEDICATIONS:   nystatin  5 mL Oral 4x Daily    Roflumilast  500 mcg Oral Daily    methylPREDNISolone  40 mg IntraVENous Q6H    famotidine  20 mg Oral BID    Arformoterol Tartrate  15 mcg Nebulization BID    azithromycin  250 mg Oral Q MWF    gabapentin  600 mg Oral Nightly    [Held by provider] losartan  25 mg Oral Nightly    QUEtiapine  200 mg Oral Nightly    atorvastatin  40 mg Oral Daily    sodium chloride flush  5-40 mL IntraVENous 2 times per day    enoxaparin  40 mg SubCUTAneous Daily    ipratropium-albuterol  1 ampule Inhalation Q4H WA    [Held by provider] cloNIDine  0.1 mg Oral Daily    budesonide  0.5 mg Nebulization BID      sodium chloride 50 mL/hr at 08/23/22 0211 sodium chloride       perflutren lipid microspheres, sodium chloride flush, sodium chloride, ondansetron **OR** ondansetron, polyethylene glycol, acetaminophen **OR** acetaminophen, albuterol    REVIEW OF SYSTEMS:  Constitutional: Denies fever, weight loss, night sweats, and fatigue  Skin: Denies pigmentation, dark lesions, and rashes   HEENT: Denies hearing loss, tinnitus, ear drainage, epistaxis, sore throat, and hoarseness. Cardiovascular: Denies palpitations, chest pain, and chest pressure. Respiratory: Denies cough, POSITIVE for dyspnea at rest, negative for hemoptysis, apnea, and choking. Gastrointestinal: Denies nausea, vomiting, poor appetite, diarrhea, heartburn or reflux  Genitourinary: Denies dysuria, frequency, urgency or hematuria  Musculoskeletal: Denies myalgias, muscle weakness, and bone pain  Neurological: Denies dizziness, vertigo, headache, and focal weakness  Psychological: Denies anxiety and depression  Endocrine: Denies heat intolerance and cold intolerance  Hematopoietic/Lymphatic: Denies bleeding problems and blood transfusions    PHYSICAL EXAM:  Vitals:    08/23/22 1315   BP: 123/60   Pulse: (!) 105   Resp: 26   Temp: 97.7 °F (36.5 °C)   SpO2: 97%     FiO2 : 30 %  O2 Flow Rate (L/min): 3 L/min  O2 Device: Nasal cannula    Constitutional: No fever, chills, diaphoresis  Skin: No skin rash, no skin breakdown  HEENT: Unremarkable  Neck: Positive use of accessory muscles of breathing  Cardiovascular: S1, S2 regular. No S3 murmurs rubs present  Respiratory: Decreased breath sounds over both posterior lung fields with tight end expiratory wheezes over both anterior and posterior chest surfaces  Gastrointestinal: Soft, mildly obese, nontender  Genitourinary: No CVA tenderness  Extremities: No clubbing, cyanosis, or edema  Neurological: Awake, alert, oriented x3.   No evidence of focal motor or sensory deficits  Psychological: Anxious but appropriate affect    LABS:  WBC   Date Value Ref Range Status   08/23/2022 13.8 (H) 4.5 - 11.5 E9/L Final   08/22/2022 10.1 4.5 - 11.5 E9/L Final   08/21/2022 7.4 4.5 - 11.5 E9/L Final     Hemoglobin   Date Value Ref Range Status   08/23/2022 13.9 12.5 - 16.5 g/dL Final   08/22/2022 14.6 12.5 - 16.5 g/dL Final   08/21/2022 15.6 12.5 - 16.5 g/dL Final     Hematocrit   Date Value Ref Range Status   08/23/2022 42.8 37.0 - 54.0 % Final   08/22/2022 44.0 37.0 - 54.0 % Final   08/21/2022 46.3 37.0 - 54.0 % Final     MCV   Date Value Ref Range Status   08/23/2022 95.1 80.0 - 99.9 fL Final   08/22/2022 94.8 80.0 - 99.9 fL Final   08/21/2022 94.7 80.0 - 99.9 fL Final     Platelets   Date Value Ref Range Status   08/23/2022 216 130 - 450 E9/L Final   08/22/2022 203 130 - 450 E9/L Final   08/21/2022 196 130 - 450 E9/L Final     Sodium   Date Value Ref Range Status   08/23/2022 140 132 - 146 mmol/L Final   08/22/2022 140 132 - 146 mmol/L Final   08/21/2022 139 132 - 146 mmol/L Final     Potassium   Date Value Ref Range Status   08/23/2022 4.5 3.5 - 5.0 mmol/L Final   09/13/2021 4.2 3.5 - 5.0 mmol/L Final   09/12/2021 3.9 3.5 - 5.0 mmol/L Final     Potassium reflex Magnesium   Date Value Ref Range Status   08/22/2022 4.5 3.5 - 5.0 mmol/L Final   08/21/2022 4.2 3.5 - 5.0 mmol/L Final   09/07/2021 3.7 3.5 - 5.0 mmol/L Final     Chloride   Date Value Ref Range Status   08/23/2022 105 98 - 107 mmol/L Final   08/22/2022 105 98 - 107 mmol/L Final   08/21/2022 102 98 - 107 mmol/L Final     CO2   Date Value Ref Range Status   08/23/2022 21 (L) 22 - 29 mmol/L Final   08/22/2022 21 (L) 22 - 29 mmol/L Final   08/21/2022 25 22 - 29 mmol/L Final     BUN   Date Value Ref Range Status   08/23/2022 20 6 - 23 mg/dL Final   08/22/2022 19 6 - 23 mg/dL Final   08/21/2022 12 6 - 23 mg/dL Final     Creatinine   Date Value Ref Range Status   08/23/2022 1.0 0.7 - 1.2 mg/dL Final   08/22/2022 1.0 0.7 - 1.2 mg/dL Final   08/21/2022 1.2 0.7 - 1.2 mg/dL Final     Glucose   Date Value Ref Range Status American   Date Value Ref Range Status   08/23/2022 >60  Final   08/22/2022 >60  Final   08/21/2022 >60  Final     Magnesium   Date Value Ref Range Status   08/23/2022 2.2 1.6 - 2.6 mg/dL Final   09/13/2021 2.3 1.6 - 2.6 mg/dL Final   09/12/2021 2.2 1.6 - 2.6 mg/dL Final     Phosphorus   Date Value Ref Range Status   08/23/2022 3.0 2.5 - 4.5 mg/dL Final   09/13/2021 3.0 2.5 - 4.5 mg/dL Final   09/12/2021 3.1 2.5 - 4.5 mg/dL Final     Recent Labs     08/21/22  1629   HCO3 23.1   BE 0.0   O2SAT 99.9*       RADIOLOGY:  XR CHEST PORTABLE   Final Result   No acute process. IMPRESSION:  Acute hypoxemic respiratory failure  Advanced COPD    PLAN:  Inhaled bronchodilators, inhaled steroids  Increase IV steroids  Increase azithromycin and change to IV  Add phosphodiesterase inhibitor Roflumilast  Check IgE level  Check alpha-1 antitrypsin level and phenotype  Labs in a.m.         Electronically signed by Mark Mcpherson MD on 8/23/2022 at 4:18 PM

## 2022-08-23 NOTE — PROGRESS NOTES
[address] [facility]  [facility] [place]. [place]        Date:2022                                                  Patient Name: Tran Davila    MRN: [id number]    : 1955    Room: [address] [place] [address]      Evaluating OT: Nahum Feliciano OTR/L #[id number]     Referring Provider and Specific Provider Orders/Date:      22  OT eval and treat  Start:  22,   End:  22,   ONE TIME,   Standing Count:  1 Occurrences,   R         Rhett Delatorre, APRN - CNP      Placement Recommendation: Subacute vs Home Health Occupational Therapy if patient is able to meet goals        Diagnosis:   1. COPD exacerbation [facility])         Surgery: None      Pertinent Medical History:       Past Medical History:   Diagnosis Date    Angina pectoris ([address]     states had neg heart cath    Cervical spinal stenosis     COPD (chronic obstructive pulmonary disease) (HCC)     Depression     Hyperlipidemia     Neck pain          Past Surgical History:   Procedure Laterality Date    COLONOSCOPY      HAND SURGERY Left 2019    LEFT HAND-PALN, DUPUYTREN'S CONTRACTURE, MAJOR RESECTION. POSSIBLE FULL THICKNESS SKIN GRAFT    LARYNGOSCOPY      removal non malignant lesion    NECK SURGERY      cervical fusion x2    WRIST SURGERY Left 2019    LEFT HAND DUPUYTRENS CONTRACTURE MAJOR RESECTION,  Z-PLASTY performed by Verner Can, MD at [address] [place] [address]        Precautions:  Fall Risk, O2, 3L at baseline, dyspnea on exertion, follows with [facility] for lung transplant (per chart).       Assessment of current deficits    [x] Functional mobility  [x]ADLs  [x] Strength               []Cognition    [x] Functional transfers   [x] IADLs         [] Safety Awareness   [x]Endurance    [] Fine Coordination              [x] Balance      [] Vision/perception   []Sensation     []Gross Motor Coordination  [] ROM  [] Delirium                   [] Motor for Bathing?: A Lot  How much help for Toileting?: A Lot  How much help for putting on and taking off regular upper body clothing?: A Little  How much help for taking care of personal grooming?: A Little  How much help for eating meals?: None  AM-PAC Inpatient Daily Activity Raw Score: 16  AM-PAC Inpatient ADL T-Scale Score : 35.96  ADL Inpatient CMS 0-100% Score: 53.32  ADL Inpatient CMS G-Code Modifier : CK     Functional Assessment:    Initial Eval Status  Date: 8/23/22   Treatment Status  Date: STGs = LTGs  Time frame: 10-14 days   Feeding Independent    Independent    Grooming Minimal Assist  Seated     Moderate [place]    UB Dressing Minimal Assist    Moderate [place]    LB Dressing Moderate Assist     Moderate [place]    Bathing Moderate Assist  Discussed DME benefits for improved endurance and safety with bathing     Moderate [place]    Toileting Moderate Assist   Pt states he was up to bathroom earlier and nursing had to assist him back to bed due to wheezing and severe shortness of breath. Pt would benefit from 3:1 commode at bedside. Moderate [place]ie    Bed Mobility  Supine to sit: Supervision   Sit to supine: Supervision     Supine to sit: Independent   Sit to supine: Independent    Functional Transfers Sit to stand: Supervision   Stand to sit: Supervision     Independent    Functional Mobility Minimal Assist with hand held assist to improve balance from EOB to/from chair, verbal cues for overall safety. Moderate [place]ie with use of appropriate AD    Balance Sitting:     Static: good    Dynamic: good  Standing: fair plus     Sitting:     Static: good    Dynamic: good  Standing: good    Activity Tolerance fair  minus ; fatigues easily and reports \"moderate\" shortness of breath with minimal activity. Wheezing also noted. Therapeutic rest breaks required for recovery due to shortness of breath.      Increase standing tolerance >3 minutes for improved engagement with functional transfers and indep in ADLs     Visual/  Perceptual Glasses: Readers     Reports changes in vision since admission: No      NA      Hand Dominance: Right      AROM (PROM) Strength Additional Info:  Goal:   RUE  WFL 4-/5 good  and wfl FMC/dexterity noted during ADL tasks   Improve overall RUE strength  for participation in functional tasks   LUE WFL 4-/5 good  and wfl FMC/dexterity noted during ADL tasks   Improve overall LUE strength  for participation in functional tasks     Hearing: slightly hard of hearing   Sensation:  No c/o numbness or tingling  Tone:  WFL   Edema: None      Vitals: 3L   HR at rest: 112 bpm HR with activity: 113-120s bpm HR at end of session: 111 bpm   SpO2 at rest: 96% SpO2 with activity: 94-96% SpO2 at end of session: 97%   BP at rest:  BP with activity:  BP at end of session:      Comments: RN cleared patient for OT. Upon arrival patient in supine. Therapist facilitated and instructed pt on adapted  techniques & compensatory strategies to improve safety and independence with basic ADLs, bed mobility, functional transfers and mobility to allow pt to achieve highest level of independence and safely. Pt demonstrated fair understanding of education & follow through. At end of session, patient was supine with call light and phone within reach, all lines and tubes intact. Overall, patient demonstrated  decreased independence and safety during completion of ADL tasks. Pt would benefit from continued skilled OT to increase safety and independence with completion of ADL tasks and functional mobility for improved quality of life. Treatment: OT treatment provided this date includes:  Instructions/training on safety, sequencing, and adapted techniques for completion of ADLs.   Instruction/training on safe functional mobility/transfer techniques including hand and feet placement   Instruction/training on energy conservation/work simplification for completion of ADLs  Sitting EOB x 10 minutes to improve dynamic sitting balance and activity tolerance during ADLs  Skilled monitoring of O2 sats - see section above     Rehab Potential: Good for established goals. Patient / Family Goal:  Pt in agreement with POC - patient agreeable to ENEDINA if needed     Patient and/or family were instructed on functional diagnosis, prognosis/goals and OT plan of care. Demonstrated fair plus understanding. Eval Complexity: Low    Time In: 1:35 PM   Time Out: 2:05 PM    Total Treatment Time: 10       Min Units   OT Eval Low 97165  X  1    OT Eval Medium 00754      OT Eval High 37182      OT Re-Eval [id number]            ADL/Self Care 27553     Therapeutic Activities 59000       Therapeutic Ex 36350  10 1   Orthotic Management 48254       Manual [id number]     Neuro Re-Ed 15018       Non-Billable Time        Evaluation Time additionally includes thorough review of current medical information, gathering information on past medical history/social history and prior level of function, interpretation of standardized testing/informal observation of tasks, assessment of data and development of plan of care and goals.         Evaluating OT: Lenora Mejia OTR/L #[id number]

## 2022-08-23 NOTE — PROGRESS NOTES
Physical Therapy    Physical Therapy Initial Evaluation/Plan of Care    Room #:  2723/2883-25  Patient Name: Patricia Randall  YOB: 1955  MRN: [id number]    Date of Service: 8/23/2022     Tentative placement recommendation: Home  Equipment recommendation: None      Evaluating Physical Therapist: Maria Del Carmen Lewis, PT  #[id number]      Specific Provider Orders/Date/Referring Provider :  08/23/22 0915    PT eval and treat  Start:  08/23/22 0915,   End:  08/23/22 0915,   ONE TIME,   Standing Count:  1 Occurrences,   R         Mihaela Soto, APRN - CNP     Admitting Diagnosis:   COPD exacerbation ([facility] [J44.1]    Admitted with    respiratory distress,   Surgery: none      Patient Active Problem List   Diagnosis    COPD exacerbation ([facility]    Acute on chronic respiratory failure with hypoxia and hypercapnia ([facility]    History of tobacco abuse    Primary hypertension    Depression    Acute on chronic respiratory failure ([facility]        ASSESSMENT of Current Deficits Patient exhibits decreased balance and endurance impairing functional mobility, gait distance, and tolerance to activity and ability to exchange air effectively and  mobilize independently  Discussed team approach with lashay participating much as possible and feeling empowered to limit session based on perceived exertion.  Pt with limited reserve of 30 sec standing tolerance with 2-3 minutes to recover in supine      PHYSICAL THERAPY  PLAN OF CARE       Physical therapy plan of care is established based on physician order,  patient diagnosis and clinical assessment    Current Treatment Recommendations:    -Gait: Gait training and Standing activities to improve: base of support, weight shift, weight bearing    -Endurance: Utilize Supervised activities to increase level of endurance to allow for safe functional mobility including transfers and gait  and Use graduated activities to promote good breathing techniques and provide support and education to maximize respiratory function  -Stairs: Stair training with instruction on proper technique and hand placement on rail    PT long term treatment goals are located in below grid    Patient and or family understand(s) diagnosis, prognosis, and plan of care. Frequency of treatments: Patient will be seen  daily. Prior Level of Function: Patient ambulated independently    Rehab Potential: fair   for baseline    Past medical history:   Past Medical History:   Diagnosis Date    Angina pectoris ([address]     states had neg heart cath    Cervical spinal stenosis     COPD (chronic obstructive pulmonary disease) (HCC)     Depression     Hyperlipidemia     Neck pain      Past Surgical History:   Procedure Laterality Date    COLONOSCOPY      HAND SURGERY Left 11/13/2019    LEFT HAND-PALN, DUPUYTREN'S CONTRACTURE, MAJOR RESECTION.  POSSIBLE FULL THICKNESS SKIN GRAFT    LARYNGOSCOPY      removal non malignant lesion    NECK SURGERY      cervical fusion x2    WRIST SURGERY Left 11/13/2019    LEFT HAND DUPUYTRENS CONTRACTURE MAJOR RESECTION,  Z-PLASTY performed by Blayne Omer MD at [facility]:    Precautions: Up as tolerated, falls, alarm, and O2 , 3 Liters of o2 via nasal cannula     Social history: Patient lives with fiance  in a two story home bedroom and bathroom 2nd floor full flight stairs with Rail  with 3 steps  to enter with [facility] in [facility] owned: Walt Areola, Wheeled Walker, and O2,  3 Liters of o2 via nasal cannula     AM-PAC Basic Mobility        AM-PAC Mobility Inpatient   How much difficulty turning over in bed?: None  How much difficulty sitting down on / standing up from a chair with arms?: None  How much difficulty moving from lying on back to sitting on side of bed?: None  How much help from another person moving to and from a bed to a chair?: A Little  How much help from another person needed to walk in hospital room?: A Little  How much help from another person for climbing 3-5 steps with a railing?: A Lot  AM-PAC Inpatient Mobility Raw Score : 20  AM-PAC Inpatient T-Scale Score : 47.67  Mobility Inpatient CMS 0-100% Score: 35.83  Mobility Inpatient CMS G-Code Modifier : [address] cleared patient for PT evaluation. The admitting diagnosis and active problem list as listed above have been reviewed prior to the initiation of this evaluation. OBJECTIVE;   Initial Evaluation  Date: 8/23/2022 Treatment Date:     Short Term/ Long Term   Goals   Was pt agreeable to Eval/treatment? Yes  To be met in 3 days   Pain level   0/10        Bed Mobility    Rolling: Independent    Supine to sit:  Independent    Sit to supine: Independent    Scooting: Independent        Transfers Sit to stand: Supervision   d/t poor ventilation in standing and risk for fall  Sit to stand: Independent     Ambulation     2 side steps using  no device with Supervision     d/t increased anxiety and shakiness     50 feet using  least restrictive device versus no device with Independent    Stair negotiation: ascended and descended   Not assessed     10 steps with rail supervision    ROM Within functional limits    Increase range of motion 10% of affected joints    Strength BUE:  refer to OT eval  RLE:  4/5  LLE:  4/5  Increase strength in affected mm groups by 1/3 grade   Balance Sitting EOB:  good    Dynamic Standing:  fair for 30 sec then deteriorated with need to sit and wheezing   Sitting EOB:  good    Dynamic Standing: good       Patient is Alert & Oriented x person, place, time, and situation and follows directions    Sensation:  Patient  denies numbness/tingling   Edema:  no   Endurance: poor tolerance    Vitals:  3 liters nasal cannula   Blood Pressure at rest  Blood Pressure during session    Heart Rate at rest 85 Heart Rate during session 110   SPO2 at rest 96%  SPO2 during session 95%     Patient education  Patient educated on role of Physical Therapy, risks of immobility, safety and plan of care, energy conservation, importance of positional changes for oxygen exchange,  importance of mobility while in hospital , and safety    Discussed team approach with lashay participating much as possible and feeling empowered to limit session based on perceived exertion  Patient response to education:   Pt verbalized understanding Pt demonstrated skill Pt requires further education in this area   Yes Partial Yes      Treatment:  Patient practiced and was instructed/facilitated in the following treatment: Patient   Sat edge of bed 5 minutes with Independent to increase dynamic sitting balance and activity tolerance. Therapeutic Exercises:  not performed         At end of session, patient in bed with  call light and phone within reach,  all lines and tubes intact, nursing notified. Patient would benefit from continued skilled Physical Therapy to improve functional independence and quality of life. Patient's/ family goals   home    Time in  440  Time out  500    Total Treatment Time  0 minutes    Evaluation time includes thorough review of current medical information, gathering information on past medical history/social history and prior level of function, completion of standardized testing/informal observation of tasks, assessment of data, and development of Plan of care and goals.      CPT codes:  Low Complexity PT evaluation (88912)  No treatment    Sofya Delaney, PT

## 2022-08-24 LAB
ALBUMIN SERPL-MCNC: 3.5 G/DL (ref 3.5–5.2)
ALP BLD-CCNC: 91 U/L (ref 40–129)
ALT SERPL-CCNC: 13 U/L (ref 0–40)
ANION GAP SERPL CALCULATED.3IONS-SCNC: 10 MMOL/L (ref 7–16)
AST SERPL-CCNC: 11 U/L (ref 0–39)
BASOPHILS ABSOLUTE: 0.02 E9/L (ref 0–0.2)
BASOPHILS RELATIVE PERCENT: 0.1 % (ref 0–2)
BILIRUB SERPL-MCNC: <0.2 MG/DL (ref 0–1.2)
BUN BLDV-MCNC: 19 MG/DL (ref 6–23)
CALCIUM SERPL-MCNC: 8.5 MG/DL (ref 8.6–10.2)
CHLORIDE BLD-SCNC: 108 MMOL/L (ref 98–107)
CO2: 22 MMOL/L (ref 22–29)
CREAT SERPL-MCNC: 0.9 MG/DL (ref 0.7–1.2)
EKG ATRIAL RATE: 99 BPM
EKG P AXIS: 77 DEGREES
EKG P-R INTERVAL: 170 MS
EKG Q-T INTERVAL: 364 MS
EKG QRS DURATION: 86 MS
EKG QTC CALCULATION (BAZETT): 518 MS
EKG R AXIS: 68 DEGREES
EKG T AXIS: 63 DEGREES
EKG VENTRICULAR RATE: 122 BPM
EOSINOPHILS ABSOLUTE: 0.03 E9/L (ref 0.05–0.5)
EOSINOPHILS RELATIVE PERCENT: 0.2 % (ref 0–6)
GFR AFRICAN AMERICAN: >60
GFR NON-AFRICAN AMERICAN: >60 ML/MIN/1.73
GLUCOSE BLD-MCNC: 214 MG/DL (ref 74–99)
HCT VFR BLD CALC: 40.5 % (ref 37–54)
HEMOGLOBIN: 13.3 G/DL (ref 12.5–16.5)
IMMATURE GRANULOCYTES #: 0.14 E9/L
IMMATURE GRANULOCYTES %: 1 % (ref 0–5)
LV EF: 55 %
LVEF MODALITY: NORMAL
LYMPHOCYTES ABSOLUTE: 1.07 E9/L (ref 1.5–4)
LYMPHOCYTES RELATIVE PERCENT: 7.6 % (ref 20–42)
MAGNESIUM: 2.3 MG/DL (ref 1.6–2.6)
MCH RBC QN AUTO: 31.7 PG (ref 26–35)
MCHC RBC AUTO-ENTMCNC: 32.8 % (ref 32–34.5)
MCV RBC AUTO: 96.7 FL (ref 80–99.9)
METER GLUCOSE: 258 MG/DL (ref 74–99)
METER GLUCOSE: 276 MG/DL (ref 74–99)
METER GLUCOSE: 287 MG/DL (ref 74–99)
MONOCYTES ABSOLUTE: 0.66 E9/L (ref 0.1–0.95)
MONOCYTES RELATIVE PERCENT: 4.7 % (ref 2–12)
NEUTROPHILS ABSOLUTE: 12.21 E9/L (ref 1.8–7.3)
NEUTROPHILS RELATIVE PERCENT: 86.4 % (ref 43–80)
PDW BLD-RTO: 13.3 FL (ref 11.5–15)
PHOSPHORUS: 2.7 MG/DL (ref 2.5–4.5)
PLATELET # BLD: 203 E9/L (ref 130–450)
PMV BLD AUTO: 10 FL (ref 7–12)
POTASSIUM SERPL-SCNC: 4.1 MMOL/L (ref 3.5–5)
RBC # BLD: 4.19 E12/L (ref 3.8–5.8)
SODIUM BLD-SCNC: 140 MMOL/L (ref 132–146)
TOTAL PROTEIN: 5.4 G/DL (ref 6.4–8.3)
WBC # BLD: 14.1 E9/L (ref 4.5–11.5)

## 2022-08-24 PROCEDURE — 94640 AIRWAY INHALATION TREATMENT: CPT

## 2022-08-24 PROCEDURE — 80053 COMPREHEN METABOLIC PANEL: CPT

## 2022-08-24 PROCEDURE — 99232 SBSQ HOSP IP/OBS MODERATE 35: CPT | Performed by: INTERNAL MEDICINE

## 2022-08-24 PROCEDURE — 6370000000 HC RX 637 (ALT 250 FOR IP): Performed by: INTERNAL MEDICINE

## 2022-08-24 PROCEDURE — 6360000002 HC RX W HCPCS: Performed by: INTERNAL MEDICINE

## 2022-08-24 PROCEDURE — 36415 COLL VENOUS BLD VENIPUNCTURE: CPT

## 2022-08-24 PROCEDURE — 83735 ASSAY OF MAGNESIUM: CPT

## 2022-08-24 PROCEDURE — 82962 GLUCOSE BLOOD TEST: CPT

## 2022-08-24 PROCEDURE — 2060000000 HC ICU INTERMEDIATE R&B

## 2022-08-24 PROCEDURE — 6360000002 HC RX W HCPCS: Performed by: STUDENT IN AN ORGANIZED HEALTH CARE EDUCATION/TRAINING PROGRAM

## 2022-08-24 PROCEDURE — 84100 ASSAY OF PHOSPHORUS: CPT

## 2022-08-24 PROCEDURE — 6370000000 HC RX 637 (ALT 250 FOR IP): Performed by: NURSE PRACTITIONER

## 2022-08-24 PROCEDURE — 6360000002 HC RX W HCPCS: Performed by: NURSE PRACTITIONER

## 2022-08-24 PROCEDURE — APPSS30 APP SPLIT SHARED TIME 16-30 MINUTES: Performed by: NURSE PRACTITIONER

## 2022-08-24 PROCEDURE — 93306 TTE W/DOPPLER COMPLETE: CPT

## 2022-08-24 PROCEDURE — 94660 CPAP INITIATION&MGMT: CPT

## 2022-08-24 PROCEDURE — 2580000003 HC RX 258: Performed by: STUDENT IN AN ORGANIZED HEALTH CARE EDUCATION/TRAINING PROGRAM

## 2022-08-24 PROCEDURE — 2580000003 HC RX 258: Performed by: INTERNAL MEDICINE

## 2022-08-24 PROCEDURE — 6370000000 HC RX 637 (ALT 250 FOR IP): Performed by: STUDENT IN AN ORGANIZED HEALTH CARE EDUCATION/TRAINING PROGRAM

## 2022-08-24 PROCEDURE — 85025 COMPLETE CBC W/AUTO DIFF WBC: CPT

## 2022-08-24 PROCEDURE — 2700000000 HC OXYGEN THERAPY PER DAY

## 2022-08-24 RX ORDER — METHYLPREDNISOLONE SODIUM SUCCINATE 40 MG/ML
40 INJECTION, POWDER, LYOPHILIZED, FOR SOLUTION INTRAMUSCULAR; INTRAVENOUS EVERY 8 HOURS
Status: DISCONTINUED | OUTPATIENT
Start: 2022-08-25 | End: 2022-08-25

## 2022-08-24 RX ORDER — CLONIDINE HYDROCHLORIDE 0.1 MG/1
0.1 TABLET ORAL DAILY
Status: DISCONTINUED | OUTPATIENT
Start: 2022-08-24 | End: 2022-08-29 | Stop reason: HOSPADM

## 2022-08-24 RX ORDER — INSULIN LISPRO 100 [IU]/ML
0-4 INJECTION, SOLUTION INTRAVENOUS; SUBCUTANEOUS NIGHTLY
Status: DISCONTINUED | OUTPATIENT
Start: 2022-08-24 | End: 2022-08-29 | Stop reason: HOSPADM

## 2022-08-24 RX ORDER — INSULIN LISPRO 100 [IU]/ML
0-4 INJECTION, SOLUTION INTRAVENOUS; SUBCUTANEOUS
Status: DISCONTINUED | OUTPATIENT
Start: 2022-08-24 | End: 2022-08-29 | Stop reason: HOSPADM

## 2022-08-24 RX ORDER — DEXTROSE MONOHYDRATE 100 MG/ML
INJECTION, SOLUTION INTRAVENOUS CONTINUOUS PRN
Status: DISCONTINUED | OUTPATIENT
Start: 2022-08-24 | End: 2022-08-29 | Stop reason: HOSPADM

## 2022-08-24 RX ADMIN — IPRATROPIUM BROMIDE AND ALBUTEROL SULFATE 1 AMPULE: .5; 2.5 SOLUTION RESPIRATORY (INHALATION) at 05:26

## 2022-08-24 RX ADMIN — FAMOTIDINE 20 MG: 20 TABLET, FILM COATED ORAL at 20:28

## 2022-08-24 RX ADMIN — IPRATROPIUM BROMIDE AND ALBUTEROL SULFATE 1 AMPULE: .5; 2.5 SOLUTION RESPIRATORY (INHALATION) at 12:53

## 2022-08-24 RX ADMIN — ARFORMOTEROL TARTRATE 15 MCG: 15 SOLUTION RESPIRATORY (INHALATION) at 16:04

## 2022-08-24 RX ADMIN — INSULIN LISPRO 2 UNITS: 100 INJECTION, SOLUTION INTRAVENOUS; SUBCUTANEOUS at 16:00

## 2022-08-24 RX ADMIN — ROFLUMILAST 500 MCG: 500 TABLET ORAL at 09:03

## 2022-08-24 RX ADMIN — Medication 10 ML: at 20:31

## 2022-08-24 RX ADMIN — ENOXAPARIN SODIUM 40 MG: 100 INJECTION SUBCUTANEOUS at 09:01

## 2022-08-24 RX ADMIN — METHYLPREDNISOLONE SODIUM SUCCINATE 40 MG: 40 INJECTION, POWDER, FOR SOLUTION INTRAMUSCULAR; INTRAVENOUS at 04:32

## 2022-08-24 RX ADMIN — METHYLPREDNISOLONE SODIUM SUCCINATE 40 MG: 40 INJECTION, POWDER, FOR SOLUTION INTRAMUSCULAR; INTRAVENOUS at 16:00

## 2022-08-24 RX ADMIN — AZITHROMYCIN DIHYDRATE 500 MG: 500 INJECTION, POWDER, LYOPHILIZED, FOR SOLUTION INTRAVENOUS at 17:54

## 2022-08-24 RX ADMIN — QUETIAPINE FUMARATE 200 MG: 200 TABLET, EXTENDED RELEASE ORAL at 20:29

## 2022-08-24 RX ADMIN — METHYLPREDNISOLONE SODIUM SUCCINATE 40 MG: 40 INJECTION, POWDER, FOR SOLUTION INTRAMUSCULAR; INTRAVENOUS at 11:31

## 2022-08-24 RX ADMIN — BUDESONIDE 500 MCG: 0.5 SUSPENSION RESPIRATORY (INHALATION) at 16:04

## 2022-08-24 RX ADMIN — ATORVASTATIN CALCIUM 40 MG: 40 TABLET, FILM COATED ORAL at 09:02

## 2022-08-24 RX ADMIN — ARFORMOTEROL TARTRATE 15 MCG: 15 SOLUTION RESPIRATORY (INHALATION) at 05:26

## 2022-08-24 RX ADMIN — METHYLPREDNISOLONE SODIUM SUCCINATE 40 MG: 40 INJECTION, POWDER, FOR SOLUTION INTRAMUSCULAR; INTRAVENOUS at 23:29

## 2022-08-24 RX ADMIN — INSULIN LISPRO 1 UNITS: 100 INJECTION, SOLUTION INTRAVENOUS; SUBCUTANEOUS at 09:00

## 2022-08-24 RX ADMIN — NYSTATIN 500000 UNITS: 100000 SUSPENSION ORAL at 15:53

## 2022-08-24 RX ADMIN — GABAPENTIN 600 MG: 300 CAPSULE ORAL at 20:28

## 2022-08-24 RX ADMIN — IPRATROPIUM BROMIDE AND ALBUTEROL SULFATE 1 AMPULE: .5; 2.5 SOLUTION RESPIRATORY (INHALATION) at 08:51

## 2022-08-24 RX ADMIN — IPRATROPIUM BROMIDE AND ALBUTEROL SULFATE 1 AMPULE: .5; 2.5 SOLUTION RESPIRATORY (INHALATION) at 16:04

## 2022-08-24 RX ADMIN — BUDESONIDE 500 MCG: 0.5 SUSPENSION RESPIRATORY (INHALATION) at 05:26

## 2022-08-24 RX ADMIN — CLONIDINE HYDROCHLORIDE 0.1 MG: 0.1 TABLET ORAL at 11:32

## 2022-08-24 RX ADMIN — NYSTATIN 500000 UNITS: 100000 SUSPENSION ORAL at 20:28

## 2022-08-24 RX ADMIN — Medication 10 ML: at 09:02

## 2022-08-24 RX ADMIN — INSULIN LISPRO 2 UNITS: 100 INJECTION, SOLUTION INTRAVENOUS; SUBCUTANEOUS at 11:32

## 2022-08-24 RX ADMIN — NYSTATIN 500000 UNITS: 100000 SUSPENSION ORAL at 09:02

## 2022-08-24 RX ADMIN — FAMOTIDINE 20 MG: 20 TABLET, FILM COATED ORAL at 09:03

## 2022-08-24 RX ADMIN — NYSTATIN 500000 UNITS: 100000 SUSPENSION ORAL at 12:40

## 2022-08-24 NOTE — PROGRESS NOTES
[address] [place], [place] [address]ist   Progress Note    Admitting Date and Time: 8/21/2022  4:07 PM  Admit Dx: COPD exacerbation ([address] [J44.1]    Subjective:    Patient reports some improvement in his breathing and his throat pain. ROS: denies fever, chills, n/v, HA unless stated above.      insulin lispro  0-4 Units SubCUTAneous TID WC    insulin lispro  0-4 Units SubCUTAneous Nightly    nystatin  5 mL Oral 4x Daily    Roflumilast  500 mcg Oral Daily    famotidine  20 mg Oral BID    methylPREDNISolone  40 mg IntraVENous Q6H    azithromycin  500 mg IntraVENous Q24H    Arformoterol Tartrate  15 mcg Nebulization BID    gabapentin  600 mg Oral Nightly    [Held by provider] losartan  25 mg Oral Nightly    QUEtiapine  200 mg Oral Nightly    atorvastatin  40 mg Oral Daily    sodium chloride flush  5-40 mL IntraVENous 2 times per day    enoxaparin  40 mg SubCUTAneous Daily    ipratropium-albuterol  1 ampule Inhalation Q4H WA    [Held by provider] cloNIDine  0.1 mg Oral Daily    budesonide  0.5 mg Nebulization BID     glucose, 4 tablet, PRN  dextrose bolus, 125 mL, PRN   Or  dextrose bolus, 250 mL, PRN  glucagon (rDNA), 1 mg, PRN  dextrose, , Continuous PRN  perflutren lipid microspheres, 1.5 mL, ONCE PRN  sodium chloride flush, 5-40 mL, PRN  sodium chloride, , PRN  ondansetron, 4 mg, Q8H PRN   Or  ondansetron, 4 mg, Q6H PRN  polyethylene glycol, 17 g, Daily PRN  acetaminophen, 650 mg, Q6H PRN   Or  acetaminophen, 650 mg, Q6H PRN  albuterol, 2.5 mg, Q6H PRN       Objective:    /60   Pulse 100   Temp 97.6 °F (36.4 °C) (Oral)   Resp 22   Ht 5' 7\" (1.702 m)   Wt 205 lb (93 kg)   SpO2 98%   BMI 32.11 kg/m²   General Appearance: alert and oriented to person, place and time and in no acute distress  Skin: warm and dry  Head: normocephalic and atraumatic  Eyes: pupils equal, round, and reactive to light, conjunctivae normal  Mouth: thrush  Neck: neck supple and non tender without mass, throat reddened but no pustules noted   Pulmonary/Chest: lungs diminished, inspiratory and expiratory wheezes, exertional dyspnea  Cardiovascular: normal rate, normal S1 and S2  Abdomen: soft, non-tender, non-distended, normal bowel sounds  Extremities: no cyanosis, no clubbing and no edema  Neurologic: no cranial nerve deficit and speech normal      Recent Labs     08/22/22  0514 08/23/22  0501 08/24/22  0428    140 140   K 4.5 4.5 4.1    105 108*   CO2 21* 21* 22   BUN 19 20 19   CREATININE 1.0 1.0 0.9   GLUCOSE 191* 189* 214*   CALCIUM 9.0 9.0 8.5*         Recent Labs     08/21/22  1626 08/23/22  0501 08/24/22  0428   ALKPHOS 117 91 91   PROT 6.5 5.9* 5.4*   LABALBU 4.0 3.8 3.5   BILITOT 0.5 0.3 <0.2   AST 19 12 11   ALT 17 15 13         Recent Labs     08/22/22  0514 08/23/22  0501 08/24/22  0428   WBC 10.1 13.8* 14.1*   RBC 4.64 4.50 4.19   HGB 14.6 13.9 13.3   HCT 44.0 42.8 40.5   MCV 94.8 95.1 96.7   MCH 31.5 30.9 31.7   MCHC 33.2 32.5 32.8   RDW 12.6 13.0 13.3    216 203   MPV 9.9 9.7 10.0             Radiology:   XR CHEST PORTABLE   Final Result   No acute process. Assessment:  Principal Problem:    COPD exacerbation (HCC)  Active Problems:    Primary hypertension    Depression    Acute on chronic respiratory failure ([facility]  Resolved Problems:    * No resolved hospital problems. *      Plan:  Acute on chronic respiratory failure: He was unable to be weaned from the [facility] in ED. He is now on his baseline 3 liters but dyspneic with conversation. Continue supplemental oxygen and Bipap at HS and prn. Echo ordered by Pulmonology to evaluate for CHF. Echo on 10/11/21 at [facility] showed an EF of 58 +/- 5%, circumferential small pericardia effusion. COPD exacerbation:  Solu-Medrol increased by Pulmonology to 40 mg every 6 hours and roflumilast and pepcid were added. Zithromax was changed to IV and ordered daily. Rapid COVID and respiratory film array were negative. Alpha-1 antitrypsin and IgE pending. Hypertension: Losartan and clonidine were held yesterday due to hypotension. Blood pressures have started to trend up. Resume clonidine today and will gradually add back losartan. Depression:  Continue Seroquel. Thrush:  Continue Nystatin. Diabetes:  Hgb A1C was 7.2 on 9/7/21. He was discharged on home Metformin but does not currently take any medication. Repeat Hgb A1C is 6.4. Blood sugars have been elevated, likely worsening by steroids. Continue Humalog sliding scale. NOTE: This report was transcribed using voice recognition software. Every effort was made to ensure accuracy; however, inadvertent computerized transcription errors may be present.      Electronically signed by OSIRIS Weldon CNP on 8/24/2022 at 10:30 AM

## 2022-08-24 NOTE — CARE COORDINATION
8/24/2022 0915 JESSICA Note:  Met with pt at the bedside for transition of care needs at d/c.  Pt's plan remains to return home with his fiance. Pt ambulates with a cane and is independent for ADL's/drives. Pt has O2 and Nebulizer from [facility]. Pt is on IV Zithromax and IV Solumedrol. Pt was started on Daliresp(may need prior auth if going home on it) last evening. Pt states that he is on the lung transplant  at [facility] and is in the process of having all the needed testing for the transplant. Pt doesn't anticipate any home going needs at this time. Pt's fiance will provide transportation at discharge. CM will follow.   Bert Mensah RN

## 2022-08-24 NOTE — PROGRESS NOTES
Platelets   Date Value Ref Range Status   08/24/2022 203 130 - 450 E9/L Final   08/23/2022 216 130 - 450 E9/L Final   08/22/2022 203 130 - 450 E9/L Final     Sodium   Date Value Ref Range Status   08/24/2022 140 132 - 146 mmol/L Final   08/23/2022 140 132 - 146 mmol/L Final   08/22/2022 140 132 - 146 mmol/L Final     Potassium   Date Value Ref Range Status   08/24/2022 4.1 3.5 - 5.0 mmol/L Final   08/23/2022 4.5 3.5 - 5.0 mmol/L Final   09/13/2021 4.2 3.5 - 5.0 mmol/L Final     Potassium reflex Magnesium   Date Value Ref Range Status   08/22/2022 4.5 3.5 - 5.0 mmol/L Final   08/21/2022 4.2 3.5 - 5.0 mmol/L Final   09/07/2021 3.7 3.5 - 5.0 mmol/L Final     Chloride   Date Value Ref Range Status   08/24/2022 108 (H) 98 - 107 mmol/L Final   08/23/2022 105 98 - 107 mmol/L Final   08/22/2022 105 98 - 107 mmol/L Final     CO2   Date Value Ref Range Status   08/24/2022 22 22 - 29 mmol/L Final   08/23/2022 21 (L) 22 - 29 mmol/L Final   08/22/2022 21 (L) 22 - 29 mmol/L Final     BUN   Date Value Ref Range Status   08/24/2022 19 6 - 23 mg/dL Final   08/23/2022 20 6 - 23 mg/dL Final   08/22/2022 19 6 - 23 mg/dL Final     Creatinine   Date Value Ref Range Status   08/24/2022 0.9 0.7 - 1.2 mg/dL Final   08/23/2022 1.0 0.7 - 1.2 mg/dL Final   08/22/2022 1.0 0.7 - 1.2 mg/dL Final     Glucose   Date Value Ref Range Status   08/24/2022 214 (H) 74 - 99 mg/dL Final   08/23/2022 189 (H) 74 - 99 mg/dL Final   08/22/2022 191 (H) 74 - 99 mg/dL Final     Calcium   Date Value Ref Range Status   08/24/2022 8.5 (L) 8.6 - 10.2 mg/dL Final   08/23/2022 9.0 8.6 - 10.2 mg/dL Final   08/22/2022 9.0 8.6 - 10.2 mg/dL Final     Total Protein   Date Value Ref Range Status   08/24/2022 5.4 (L) 6.4 - 8.3 g/dL Final   08/23/2022 5.9 (L) 6.4 - 8.3 g/dL Final   08/21/2022 6.5 6.4 - 8.3 g/dL Final     Albumin   Date Value Ref Range Status   08/24/2022 3.5 3.5 - 5.2 g/dL Final   08/23/2022 3.8 3.5 - 5.2 g/dL Final   08/21/2022 4.0 3.5 - 5.2 g/dL Final Total Bilirubin   Date Value Ref Range Status   08/24/2022 <0.2 0.0 - 1.2 mg/dL Final   08/23/2022 0.3 0.0 - 1.2 mg/dL Final   08/21/2022 0.5 0.0 - 1.2 mg/dL Final     Alkaline Phosphatase   Date Value Ref Range Status   08/24/2022 91 40 - 129 U/L Final   08/23/2022 91 40 - 129 U/L Final   08/21/2022 117 40 - 129 U/L Final     AST   Date Value Ref Range Status   08/24/2022 11 0 - 39 U/L Final   08/23/2022 12 0 - 39 U/L Final   08/21/2022 19 0 - 39 U/L Final     ALT   Date Value Ref Range Status   08/24/2022 13 0 - 40 U/L Final   08/23/2022 15 0 - 40 U/L Final   08/21/2022 17 0 - 40 U/L Final     GFR Non-   Date Value Ref Range Status   08/24/2022 >60 >=60 mL/min/1.73 Final     Comment:     Chronic Kidney Disease: less than 60 ml/min/1.73 sq.m. Kidney Failure: less than 15 ml/min/1.73 sq.m. Results valid for patients 18 years and older. 08/23/2022 >60 >=60 mL/min/1.73 Final     Comment:     Chronic Kidney Disease: less than 60 ml/min/1.73 sq.m. Kidney Failure: less than 15 ml/min/1.73 sq.m. Results valid for patients 18 years and older. 08/22/2022 >60 >=60 mL/min/1.73 Final     Comment:     Chronic Kidney Disease: less than 60 ml/min/1.73 sq.m. Kidney Failure: less than 15 ml/min/1.73 sq.m. Results valid for patients 18 years and older. GFR    Date Value Ref Range Status   08/24/2022 >60  Final   08/23/2022 >60  Final   08/22/2022 >60  Final     Magnesium   Date Value Ref Range Status   08/24/2022 2.3 1.6 - 2.6 mg/dL Final   08/23/2022 2.2 1.6 - 2.6 mg/dL Final   09/13/2021 2.3 1.6 - 2.6 mg/dL Final     Phosphorus   Date Value Ref Range Status   08/24/2022 2.7 2.5 - 4.5 mg/dL Final   08/23/2022 3.0 2.5 - 4.5 mg/dL Final   09/13/2021 3.0 2.5 - 4.5 mg/dL Final     Recent Labs     08/21/22  1629   HCO3 23.1   BE 0.0   O2SAT 99.9*       RADIOLOGY:  XR CHEST PORTABLE   Final Result   No acute process.                  PROBLEM LIST:  Principal Problem:    COPD exacerbation (HCC)  Active Problems:    Primary hypertension    Depression    Acute on chronic respiratory failure ([address]  Resolved Problems:    * No resolved hospital problems.  *      IMPRESSION:  Acute hypoxemic respiratory failure  Advanced COPD    PLAN:  Begin to taper steroids slowly  Continue aggressive bronchodilator treatments and inhaled steroids  Continue IV azithromycin  Continue Roflumilast  Await IgE level and alpha-1 antitrypsin level with phenotype  ABGs tomorrow      Electronically signed by Zelalem Saxena MD on 8/24/2022 at 4:01 PM

## 2022-08-24 NOTE — PLAN OF CARE
Problem: Discharge Planning  Goal: Discharge to home or other facility with appropriate resources  Outcome: Progressing  Flowsheets (Taken 8/23/2022 0830 by Ciaran Sharp RN)  Discharge to home or other facility with appropriate resources:   Identify barriers to discharge with patient and caregiver   Arrange for needed discharge resources and transportation as appropriate   Identify discharge learning needs (meds, wound care, etc)   Arrange for interpreters to assist at discharge as needed   Refer to discharge planning if patient needs post-hospital services based on physician order or complex needs related to functional status, cognitive ability or social support system     Problem: Pain  Goal: Verbalizes/displays adequate comfort level or baseline comfort level  Outcome: Progressing     Problem: Safety - Adult  Goal: Free from fall injury  Outcome: Progressing  Flowsheets (Taken 8/23/2022 1825 by Ciaran Sharp RN)  Free From Fall Injury:   Instruct family/caregiver on patient safety   Based on caregiver fall risk screen, instruct family/caregiver to ask for assistance with transferring infant if caregiver noted to have fall risk factors

## 2022-08-25 LAB
ALBUMIN SERPL-MCNC: 3.3 G/DL (ref 3.5–5.2)
ALP BLD-CCNC: 85 U/L (ref 40–129)
ALT SERPL-CCNC: 16 U/L (ref 0–40)
ANION GAP SERPL CALCULATED.3IONS-SCNC: 7 MMOL/L (ref 7–16)
AST SERPL-CCNC: 14 U/L (ref 0–39)
B.E.: 0 MMOL/L (ref -3–3)
BASOPHILS ABSOLUTE: 0.02 E9/L (ref 0–0.2)
BASOPHILS RELATIVE PERCENT: 0.2 % (ref 0–2)
BILIRUB SERPL-MCNC: 0.2 MG/DL (ref 0–1.2)
BUN BLDV-MCNC: 18 MG/DL (ref 6–23)
CALCIUM SERPL-MCNC: 8.5 MG/DL (ref 8.6–10.2)
CHLORIDE BLD-SCNC: 107 MMOL/L (ref 98–107)
CO2: 25 MMOL/L (ref 22–29)
COHB: 0.1 % (ref 0–1.5)
CREAT SERPL-MCNC: 0.9 MG/DL (ref 0.7–1.2)
CRITICAL: NORMAL
DATE ANALYZED: NORMAL
DATE OF COLLECTION: NORMAL
EOSINOPHILS ABSOLUTE: 0.03 E9/L (ref 0.05–0.5)
EOSINOPHILS RELATIVE PERCENT: 0.2 % (ref 0–6)
GFR AFRICAN AMERICAN: >60
GFR NON-AFRICAN AMERICAN: >60 ML/MIN/1.73
GLUCOSE BLD-MCNC: 189 MG/DL (ref 74–99)
HCO3: 23.9 MMOL/L (ref 22–26)
HCT VFR BLD CALC: 38.9 % (ref 37–54)
HEMOGLOBIN: 12.9 G/DL (ref 12.5–16.5)
HHB: 3.3 % (ref 0–5)
IMMATURE GRANULOCYTES #: 0.11 E9/L
IMMATURE GRANULOCYTES %: 0.9 % (ref 0–5)
LAB: NORMAL
LYMPHOCYTES ABSOLUTE: 1.09 E9/L (ref 1.5–4)
LYMPHOCYTES RELATIVE PERCENT: 8.8 % (ref 20–42)
Lab: NORMAL
MAGNESIUM: 2.3 MG/DL (ref 1.6–2.6)
MCH RBC QN AUTO: 31.8 PG (ref 26–35)
MCHC RBC AUTO-ENTMCNC: 33.2 % (ref 32–34.5)
MCV RBC AUTO: 95.8 FL (ref 80–99.9)
METER GLUCOSE: 175 MG/DL (ref 74–99)
METER GLUCOSE: 220 MG/DL (ref 74–99)
METER GLUCOSE: 250 MG/DL (ref 74–99)
METER GLUCOSE: 281 MG/DL (ref 74–99)
METHB: 0.4 % (ref 0–1.5)
MODE: NORMAL
MONOCYTES ABSOLUTE: 0.63 E9/L (ref 0.1–0.95)
MONOCYTES RELATIVE PERCENT: 5.1 % (ref 2–12)
NEUTROPHILS ABSOLUTE: 10.54 E9/L (ref 1.8–7.3)
NEUTROPHILS RELATIVE PERCENT: 84.8 % (ref 43–80)
O2 CONTENT: 19 ML/DL
O2 SATURATION: 96.7 % (ref 92–98.5)
O2HB: 96.2 % (ref 94–97)
OPERATOR ID: 2220
PATIENT TEMP: 37 C
PCO2: 36.6 MMHG (ref 35–45)
PDW BLD-RTO: 13.2 FL (ref 11.5–15)
PH BLOOD GAS: 7.43 (ref 7.35–7.45)
PHOSPHORUS: 3.3 MG/DL (ref 2.5–4.5)
PLATELET # BLD: 198 E9/L (ref 130–450)
PMV BLD AUTO: 9.9 FL (ref 7–12)
PO2: 90.9 MMHG (ref 75–100)
POTASSIUM SERPL-SCNC: 4.7 MMOL/L (ref 3.5–5)
RBC # BLD: 4.06 E12/L (ref 3.8–5.8)
SODIUM BLD-SCNC: 139 MMOL/L (ref 132–146)
SOURCE, BLOOD GAS: NORMAL
THB: 14 G/DL (ref 11.5–16.5)
TIME ANALYZED: 426
TOTAL PROTEIN: 5.3 G/DL (ref 6.4–8.3)
WBC # BLD: 12.4 E9/L (ref 4.5–11.5)

## 2022-08-25 PROCEDURE — 6370000000 HC RX 637 (ALT 250 FOR IP): Performed by: STUDENT IN AN ORGANIZED HEALTH CARE EDUCATION/TRAINING PROGRAM

## 2022-08-25 PROCEDURE — 6370000000 HC RX 637 (ALT 250 FOR IP): Performed by: INTERNAL MEDICINE

## 2022-08-25 PROCEDURE — 82962 GLUCOSE BLOOD TEST: CPT

## 2022-08-25 PROCEDURE — 94660 CPAP INITIATION&MGMT: CPT

## 2022-08-25 PROCEDURE — 94640 AIRWAY INHALATION TREATMENT: CPT

## 2022-08-25 PROCEDURE — 85025 COMPLETE CBC W/AUTO DIFF WBC: CPT

## 2022-08-25 PROCEDURE — 6360000002 HC RX W HCPCS: Performed by: INTERNAL MEDICINE

## 2022-08-25 PROCEDURE — 82805 BLOOD GASES W/O2 SATURATION: CPT

## 2022-08-25 PROCEDURE — 80053 COMPREHEN METABOLIC PANEL: CPT

## 2022-08-25 PROCEDURE — 6360000002 HC RX W HCPCS: Performed by: STUDENT IN AN ORGANIZED HEALTH CARE EDUCATION/TRAINING PROGRAM

## 2022-08-25 PROCEDURE — 84100 ASSAY OF PHOSPHORUS: CPT

## 2022-08-25 PROCEDURE — 83735 ASSAY OF MAGNESIUM: CPT

## 2022-08-25 PROCEDURE — 2700000000 HC OXYGEN THERAPY PER DAY

## 2022-08-25 PROCEDURE — 36600 WITHDRAWAL OF ARTERIAL BLOOD: CPT

## 2022-08-25 PROCEDURE — 2060000000 HC ICU INTERMEDIATE R&B

## 2022-08-25 PROCEDURE — 6370000000 HC RX 637 (ALT 250 FOR IP): Performed by: NURSE PRACTITIONER

## 2022-08-25 PROCEDURE — APPSS30 APP SPLIT SHARED TIME 16-30 MINUTES: Performed by: NURSE PRACTITIONER

## 2022-08-25 PROCEDURE — 2580000003 HC RX 258: Performed by: INTERNAL MEDICINE

## 2022-08-25 PROCEDURE — 97530 THERAPEUTIC ACTIVITIES: CPT

## 2022-08-25 PROCEDURE — 6360000002 HC RX W HCPCS: Performed by: NURSE PRACTITIONER

## 2022-08-25 PROCEDURE — 36415 COLL VENOUS BLD VENIPUNCTURE: CPT

## 2022-08-25 PROCEDURE — 99232 SBSQ HOSP IP/OBS MODERATE 35: CPT | Performed by: INTERNAL MEDICINE

## 2022-08-25 PROCEDURE — 2580000003 HC RX 258: Performed by: STUDENT IN AN ORGANIZED HEALTH CARE EDUCATION/TRAINING PROGRAM

## 2022-08-25 RX ORDER — METHYLPREDNISOLONE SODIUM SUCCINATE 40 MG/ML
40 INJECTION, POWDER, LYOPHILIZED, FOR SOLUTION INTRAMUSCULAR; INTRAVENOUS EVERY 6 HOURS
Status: DISCONTINUED | OUTPATIENT
Start: 2022-08-25 | End: 2022-08-26

## 2022-08-25 RX ORDER — MONTELUKAST SODIUM 10 MG/1
10 TABLET ORAL NIGHTLY
Status: DISCONTINUED | OUTPATIENT
Start: 2022-08-25 | End: 2022-08-29 | Stop reason: HOSPADM

## 2022-08-25 RX ADMIN — GABAPENTIN 600 MG: 300 CAPSULE ORAL at 20:51

## 2022-08-25 RX ADMIN — NYSTATIN 500000 UNITS: 100000 SUSPENSION ORAL at 13:22

## 2022-08-25 RX ADMIN — METHYLPREDNISOLONE SODIUM SUCCINATE 40 MG: 40 INJECTION, POWDER, FOR SOLUTION INTRAMUSCULAR; INTRAVENOUS at 22:16

## 2022-08-25 RX ADMIN — IPRATROPIUM BROMIDE AND ALBUTEROL SULFATE 1 AMPULE: .5; 2.5 SOLUTION RESPIRATORY (INHALATION) at 09:00

## 2022-08-25 RX ADMIN — NYSTATIN 500000 UNITS: 100000 SUSPENSION ORAL at 08:02

## 2022-08-25 RX ADMIN — ATORVASTATIN CALCIUM 40 MG: 40 TABLET, FILM COATED ORAL at 08:03

## 2022-08-25 RX ADMIN — ROFLUMILAST 500 MCG: 500 TABLET ORAL at 08:03

## 2022-08-25 RX ADMIN — Medication 10 ML: at 20:53

## 2022-08-25 RX ADMIN — INSULIN LISPRO 2 UNITS: 100 INJECTION, SOLUTION INTRAVENOUS; SUBCUTANEOUS at 16:22

## 2022-08-25 RX ADMIN — BUDESONIDE 500 MCG: 0.5 SUSPENSION RESPIRATORY (INHALATION) at 17:31

## 2022-08-25 RX ADMIN — CLONIDINE HYDROCHLORIDE 0.1 MG: 0.1 TABLET ORAL at 08:03

## 2022-08-25 RX ADMIN — QUETIAPINE FUMARATE 200 MG: 200 TABLET, EXTENDED RELEASE ORAL at 20:53

## 2022-08-25 RX ADMIN — NYSTATIN 500000 UNITS: 100000 SUSPENSION ORAL at 20:51

## 2022-08-25 RX ADMIN — ENOXAPARIN SODIUM 40 MG: 100 INJECTION SUBCUTANEOUS at 08:02

## 2022-08-25 RX ADMIN — Medication 10 ML: at 08:03

## 2022-08-25 RX ADMIN — NYSTATIN 500000 UNITS: 100000 SUSPENSION ORAL at 16:21

## 2022-08-25 RX ADMIN — FAMOTIDINE 20 MG: 20 TABLET, FILM COATED ORAL at 08:03

## 2022-08-25 RX ADMIN — ARFORMOTEROL TARTRATE 15 MCG: 15 SOLUTION RESPIRATORY (INHALATION) at 17:31

## 2022-08-25 RX ADMIN — MONTELUKAST 10 MG: 10 TABLET, FILM COATED ORAL at 20:52

## 2022-08-25 RX ADMIN — IPRATROPIUM BROMIDE AND ALBUTEROL SULFATE 1 AMPULE: .5; 2.5 SOLUTION RESPIRATORY (INHALATION) at 17:31

## 2022-08-25 RX ADMIN — IPRATROPIUM BROMIDE AND ALBUTEROL SULFATE 1 AMPULE: .5; 2.5 SOLUTION RESPIRATORY (INHALATION) at 14:08

## 2022-08-25 RX ADMIN — AZITHROMYCIN DIHYDRATE 500 MG: 500 INJECTION, POWDER, LYOPHILIZED, FOR SOLUTION INTRAVENOUS at 18:22

## 2022-08-25 RX ADMIN — METHYLPREDNISOLONE SODIUM SUCCINATE 40 MG: 40 INJECTION, POWDER, FOR SOLUTION INTRAMUSCULAR; INTRAVENOUS at 16:15

## 2022-08-25 RX ADMIN — INSULIN LISPRO 1 UNITS: 100 INJECTION, SOLUTION INTRAVENOUS; SUBCUTANEOUS at 11:21

## 2022-08-25 RX ADMIN — FAMOTIDINE 20 MG: 20 TABLET, FILM COATED ORAL at 20:52

## 2022-08-25 RX ADMIN — METHYLPREDNISOLONE SODIUM SUCCINATE 40 MG: 40 INJECTION, POWDER, FOR SOLUTION INTRAMUSCULAR; INTRAVENOUS at 08:03

## 2022-08-25 ASSESSMENT — PAIN SCALES - GENERAL: PAINLEVEL_OUTOF10: 0

## 2022-08-25 NOTE — PROGRESS NOTES
Pulmonary/Critical Care Progress Note    We are following patient for severe COPD with exacerbation, questionable lung transplant candidate    SUBJECTIVE:  Patient is having a difficult day today probably secondary to low barometric pressure outside and humidity. We will leave IV steroids same and will add montelukast since we do not have an IgE level back at this time. It may be worth adding a low-dose theophylline drip if the patient continues to have a difficult time this evening. I will try to see him in the morning and initiate this therapy if appropriate. Careful monitoring will be required. MEDICATIONS:   montelukast  10 mg Oral Nightly    insulin lispro  0-4 Units SubCUTAneous TID WC    insulin lispro  0-4 Units SubCUTAneous Nightly    cloNIDine  0.1 mg Oral Daily    methylPREDNISolone  40 mg IntraVENous Q8H    nystatin  5 mL Oral 4x Daily    Roflumilast  500 mcg Oral Daily    famotidine  20 mg Oral BID    azithromycin  500 mg IntraVENous Q24H    Arformoterol Tartrate  15 mcg Nebulization BID    gabapentin  600 mg Oral Nightly    [Held by provider] losartan  25 mg Oral Nightly    QUEtiapine  200 mg Oral Nightly    atorvastatin  40 mg Oral Daily    sodium chloride flush  5-40 mL IntraVENous 2 times per day    enoxaparin  40 mg SubCUTAneous Daily    ipratropium-albuterol  1 ampule Inhalation Q4H WA    budesonide  0.5 mg Nebulization BID      dextrose      sodium chloride       glucose, dextrose bolus **OR** dextrose bolus, glucagon (rDNA), dextrose, perflutren lipid microspheres, sodium chloride flush, sodium chloride, ondansetron **OR** ondansetron, polyethylene glycol, acetaminophen **OR** acetaminophen, albuterol      REVIEW OF SYSTEMS:  Constitutional: Denies fever, weight loss, night sweats, and fatigue  Skin: Denies pigmentation, dark lesions, and rashes   HEENT: Denies hearing loss, tinnitus, ear drainage, epistaxis, sore throat, and hoarseness.   Cardiovascular: Denies palpitations, chest pain, and chest pressure. Respiratory: Denies cough, but positive for dyspnea at rest, denies hemoptysis, apnea, and choking. Gastrointestinal: Denies nausea, vomiting, poor appetite, diarrhea, heartburn or reflux  Genitourinary: Denies dysuria, frequency, urgency or hematuria  Musculoskeletal: Denies myalgias, muscle weakness, and bone pain  Neurological: Denies dizziness, vertigo, headache, and focal weakness  Psychological: Denies anxiety and depression  Endocrine: Denies heat intolerance and cold intolerance  Hematopoietic/Lymphatic: Denies bleeding problems and blood transfusions    OBJECTIVE:  Vitals:    08/25/22 1602   BP: 110/63   Pulse: 73   Resp: 18   Temp: 97.7 °F (36.5 °C)   SpO2: 97%     FiO2 : 30 %  O2 Flow Rate (L/min): 3 L/min  O2 Device: Nasal cannula    PHYSICAL EXAM:  Constitutional: No fever, chills, diaphoresis  Skin: No skin rash, no skin breakdown  HEENT: Unremarkable  Neck: No JVD, lymphadenopathy, thyromegaly  Cardiovascular: S1, S2 regular. No S3 or S4 present  Respiratory: Marked reduction in expiratory airflow. Very faint high-pitched expiratory wheeze bilaterally. No inspiratory crackles or pleural rubs are present  Gastrointestinal: Soft, obese nontender  Genitourinary: No CVA tenderness  Extremities: No clubbing, cyanosis, or edema  Neurological: No CVA tenderness  Psychological: Intact.   Anxious affect    LABS:  WBC   Date Value Ref Range Status   08/25/2022 12.4 (H) 4.5 - 11.5 E9/L Final   08/24/2022 14.1 (H) 4.5 - 11.5 E9/L Final   08/23/2022 13.8 (H) 4.5 - 11.5 E9/L Final     Hemoglobin   Date Value Ref Range Status   08/25/2022 12.9 12.5 - 16.5 g/dL Final   08/24/2022 13.3 12.5 - 16.5 g/dL Final   08/23/2022 13.9 12.5 - 16.5 g/dL Final     Hematocrit   Date Value Ref Range Status   08/25/2022 38.9 37.0 - 54.0 % Final   08/24/2022 40.5 37.0 - 54.0 % Final   08/23/2022 42.8 37.0 - 54.0 % Final     MCV   Date Value Ref Range Status   08/25/2022 95.8 80.0 - 99.9 fL Final   08/24/2022 96.7 80.0 - 99.9 fL Final   08/23/2022 95.1 80.0 - 99.9 fL Final     Platelets   Date Value Ref Range Status   08/25/2022 198 130 - 450 E9/L Final   08/24/2022 203 130 - 450 E9/L Final   08/23/2022 216 130 - 450 E9/L Final     Sodium   Date Value Ref Range Status   08/25/2022 139 132 - 146 mmol/L Final   08/24/2022 140 132 - 146 mmol/L Final   08/23/2022 140 132 - 146 mmol/L Final     Potassium   Date Value Ref Range Status   08/25/2022 4.7 3.5 - 5.0 mmol/L Final   08/24/2022 4.1 3.5 - 5.0 mmol/L Final   08/23/2022 4.5 3.5 - 5.0 mmol/L Final     Potassium reflex Magnesium   Date Value Ref Range Status   08/22/2022 4.5 3.5 - 5.0 mmol/L Final   08/21/2022 4.2 3.5 - 5.0 mmol/L Final   09/07/2021 3.7 3.5 - 5.0 mmol/L Final     Chloride   Date Value Ref Range Status   08/25/2022 107 98 - 107 mmol/L Final   08/24/2022 108 (H) 98 - 107 mmol/L Final   08/23/2022 105 98 - 107 mmol/L Final     CO2   Date Value Ref Range Status   08/25/2022 25 22 - 29 mmol/L Final   08/24/2022 22 22 - 29 mmol/L Final   08/23/2022 21 (L) 22 - 29 mmol/L Final     BUN   Date Value Ref Range Status   08/25/2022 18 6 - 23 mg/dL Final   08/24/2022 19 6 - 23 mg/dL Final   08/23/2022 20 6 - 23 mg/dL Final     Creatinine   Date Value Ref Range Status   08/25/2022 0.9 0.7 - 1.2 mg/dL Final   08/24/2022 0.9 0.7 - 1.2 mg/dL Final   08/23/2022 1.0 0.7 - 1.2 mg/dL Final     Glucose   Date Value Ref Range Status   08/25/2022 189 (H) 74 - 99 mg/dL Final   08/24/2022 214 (H) 74 - 99 mg/dL Final   08/23/2022 189 (H) 74 - 99 mg/dL Final     Calcium   Date Value Ref Range Status   08/25/2022 8.5 (L) 8.6 - 10.2 mg/dL Final   08/24/2022 8.5 (L) 8.6 - 10.2 mg/dL Final   08/23/2022 9.0 8.6 - 10.2 mg/dL Final     Total Protein   Date Value Ref Range Status   08/25/2022 5.3 (L) 6.4 - 8.3 g/dL Final   08/24/2022 5.4 (L) 6.4 - 8.3 g/dL Final   08/23/2022 5.9 (L) 6.4 - 8.3 g/dL Final     Albumin   Date Value Ref Range Status   08/25/2022 3.3 (L) 3.5 - 5.2 g/dL Final 08/24/2022 3.5 3.5 - 5.2 g/dL Final   08/23/2022 3.8 3.5 - 5.2 g/dL Final     Total Bilirubin   Date Value Ref Range Status   08/25/2022 0.2 0.0 - 1.2 mg/dL Final   08/24/2022 <0.2 0.0 - 1.2 mg/dL Final   08/23/2022 0.3 0.0 - 1.2 mg/dL Final     Alkaline Phosphatase   Date Value Ref Range Status   08/25/2022 85 40 - 129 U/L Final   08/24/2022 91 40 - 129 U/L Final   08/23/2022 91 40 - 129 U/L Final     AST   Date Value Ref Range Status   08/25/2022 14 0 - 39 U/L Final     Comment:     Specimen is slightly Hemolyzed. Result may be artificially increased. 08/24/2022 11 0 - 39 U/L Final   08/23/2022 12 0 - 39 U/L Final     ALT   Date Value Ref Range Status   08/25/2022 16 0 - 40 U/L Final   08/24/2022 13 0 - 40 U/L Final   08/23/2022 15 0 - 40 U/L Final     GFR Non-   Date Value Ref Range Status   08/25/2022 >60 >=60 mL/min/1.73 Final     Comment:     Chronic Kidney Disease: less than 60 ml/min/1.73 sq.m. Kidney Failure: less than 15 ml/min/1.73 sq.m. Results valid for patients 18 years and older. 08/24/2022 >60 >=60 mL/min/1.73 Final     Comment:     Chronic Kidney Disease: less than 60 ml/min/1.73 sq.m. Kidney Failure: less than 15 ml/min/1.73 sq.m. Results valid for patients 18 years and older. 08/23/2022 >60 >=60 mL/min/1.73 Final     Comment:     Chronic Kidney Disease: less than 60 ml/min/1.73 sq.m. Kidney Failure: less than 15 ml/min/1.73 sq.m. Results valid for patients 18 years and older.        GFR    Date Value Ref Range Status   08/25/2022 >60  Final   08/24/2022 >60  Final   08/23/2022 >60  Final     Magnesium   Date Value Ref Range Status   08/25/2022 2.3 1.6 - 2.6 mg/dL Final   08/24/2022 2.3 1.6 - 2.6 mg/dL Final   08/23/2022 2.2 1.6 - 2.6 mg/dL Final     Phosphorus   Date Value Ref Range Status   08/25/2022 3.3 2.5 - 4.5 mg/dL Final   08/24/2022 2.7 2.5 - 4.5 mg/dL Final   08/23/2022 3.0 2.5 - 4.5 mg/dL Final     Recent Labs 08/25/22  0426   PH 7.433   PO2 90.9   PCO2 36.6   HCO3 23.9   BE 0.0   O2SAT 96.7       RADIOLOGY:  XR CHEST PORTABLE   Final Result   No acute process. PROBLEM LIST:  Principal Problem:    COPD exacerbation ([facility]  Active Problems:    Primary hypertension    Depression    Acute on chronic respiratory failure ([facility]  Resolved Problems:    * No resolved hospital problems.  *      IMPRESSION:  Severe COPD with exacerbation  Acute respiratory failure    PLAN:  Increase steroids back to 40 mg every 6 hours  Add montelukast because we are still waiting for IgE level  Consider IV theophylline drip      Electronically signed by Verena Lopez MD on 8/25/2022 at 5:47 PM

## 2022-08-25 NOTE — PROGRESS NOTES
[address] [place], [place] [address]   Progress Note    Admitting Date and Time: 8/21/2022  4:07 PM  Admit Dx: COPD exacerbation ([address] [J44.1]    Subjective:    Patient reports continued improvement in his breathing while he was resting in the bed but was short of breath and was wheezing when he sat at the side of the bed. He remains on his 3 liters of oxygen. ROS: denies fever, chills, n/v, HA unless stated above.      insulin lispro  0-4 Units SubCUTAneous TID WC    insulin lispro  0-4 Units SubCUTAneous Nightly    cloNIDine  0.1 mg Oral Daily    methylPREDNISolone  40 mg IntraVENous Q8H    nystatin  5 mL Oral 4x Daily    Roflumilast  500 mcg Oral Daily    famotidine  20 mg Oral BID    azithromycin  500 mg IntraVENous Q24H    Arformoterol Tartrate  15 mcg Nebulization BID    gabapentin  600 mg Oral Nightly    [Held by provider] losartan  25 mg Oral Nightly    QUEtiapine  200 mg Oral Nightly    atorvastatin  40 mg Oral Daily    sodium chloride flush  5-40 mL IntraVENous 2 times per day    enoxaparin  40 mg SubCUTAneous Daily    ipratropium-albuterol  1 ampule Inhalation Q4H WA    budesonide  0.5 mg Nebulization BID     glucose, 4 tablet, PRN  dextrose bolus, 125 mL, PRN   Or  dextrose bolus, 250 mL, PRN  glucagon (rDNA), 1 mg, PRN  dextrose, , Continuous PRN  perflutren lipid microspheres, 1.5 mL, ONCE PRN  sodium chloride flush, 5-40 mL, PRN  sodium chloride, , PRN  ondansetron, 4 mg, Q8H PRN   Or  ondansetron, 4 mg, Q6H PRN  polyethylene glycol, 17 g, Daily PRN  acetaminophen, 650 mg, Q6H PRN   Or  acetaminophen, 650 mg, Q6H PRN  albuterol, 2.5 mg, Q6H PRN       Objective:    BP (!) 98/47   Pulse 72   Temp 97.1 °F (36.2 °C) (Infrared)   Resp 20   Ht 5' 7\" (1.702 m)   Wt 205 lb (93 kg)   SpO2 98%   BMI 32.11 kg/m²   General Appearance: alert and oriented to person, place and time and in no acute distress  Skin: warm and dry  Head: normocephalic and atraumatic  Eyes: pupils equal, round, and reactive to light, conjunctivae normal  Mouth: thrush  Neck: neck supple and non tender without mass, throat reddened but no pustules noted   Pulmonary/Chest: lungs diminished, inspiratory and expiratory wheezes, exertional dyspnea  Cardiovascular: normal rate, normal S1 and S2  Abdomen: soft, non-tender, non-distended, normal bowel sounds  Extremities: no cyanosis, no clubbing and no edema  Neurologic: no cranial nerve deficit and speech normal      Recent Labs     08/23/22  0501 08/24/22  0428 08/25/22  0515    140 139   K 4.5 4.1 4.7    108* 107   CO2 21* 22 25   BUN 20 19 18   CREATININE 1.0 0.9 0.9   GLUCOSE 189* 214* 189*   CALCIUM 9.0 8.5* 8.5*         Recent Labs     08/23/22  0501 08/24/22  0428 08/25/22  0515   ALKPHOS 91 91 85   PROT 5.9* 5.4* 5.3*   LABALBU 3.8 3.5 3.3*   BILITOT 0.3 <0.2 0.2   AST 12 11 14   ALT 15 13 16         Recent Labs     08/23/22  0501 08/24/22  0428 08/25/22  0515   WBC 13.8* 14.1* 12.4*   RBC 4.50 4.19 4.06   HGB 13.9 13.3 12.9   HCT 42.8 40.5 38.9   MCV 95.1 96.7 95.8   MCH 30.9 31.7 31.8   MCHC 32.5 32.8 33.2   RDW 13.0 13.3 13.2    203 198   MPV 9.7 10.0 9.9             Radiology:   XR CHEST PORTABLE   Final Result   No acute process. Assessment:  Principal Problem:    COPD exacerbation ([facility])  Active Problems:    Primary hypertension    Depression    Acute on chronic respiratory failure ([facility]  Resolved Problems:    * No resolved hospital problems. *      Plan:  Acute on chronic respiratory failure:  Continue supplemental oxygen 3 lliters and Bipap at HS and prn. Echo on 8/24 with an EF of 38% and diastolic dysfunction (also the pericardial fat pad or insignificant pericardial effusion). Echo on 10/11/21 at [facility] showed an EF of 58 +/- 5%, circumferential small pericardial effusion. COPD exacerbation:  Continue Solu-Medrol 40 mg every 8 hours (decreased from every 6), roflumilast, pepcid and IV Zithromax.   Rapid COVID and respiratory film array were negative. Alpha-1 antitrypsin and IgE pending. Hypertension: Losartan and clonidine were placed on hold. Blood pressures started to trend up and clonidine was resumed. Blood pressure was low again this am, parameters on clonidine. Depression:  Continue Seroquel. Thrush:  Continue Nystatin. Diabetes:  Hgb A1C was 7.2 on 9/7/21. He was discharged on home Metformin but does not currently take any medication. Repeat Hgb A1C is 6.4. Blood sugars have been elevated, likely worsening by steroids. Continue Humalog sliding scale. NOTE: This report was transcribed using voice recognition software. Every effort was made to ensure accuracy; however, inadvertent computerized transcription errors may be present.      Electronically signed by OSIRIS Azevedo CNP on 8/25/2022 at 9:04 AM

## 2022-08-25 NOTE — PLAN OF CARE
Problem: Safety - Adult  Goal: Free from fall injury  8/25/2022 1614 by Rod Rater, RN  Outcome: Progressing     Problem: ABCDS Injury Assessment  Goal: Absence of physical injury  Outcome: Progressing

## 2022-08-25 NOTE — PROGRESS NOTES
OT SESSION ATTEMPT     Date:2022  Patient Name: Chapin Galeano  MRN: [id number]  : 1955  Room: [address] [place] [address]     OCCUPATIONAL THERAPY TREATMENT NOTE    [address]      Attempted OT session this date:    [] unavailable due to other medical staff currently with pt   [] on hold per nursing staff   [] on hold per nursing staff secondary to lab / radiology results    [x] declined treatment  this date due to c/o fatigue and SOB. Benefits of participation in therapy reviewed with pt.    [] off unit   [] Other:     Continue with current OT P. [facility] at a later date/time.       Aly GONZALES/AZAM [id number]

## 2022-08-25 NOTE — PROGRESS NOTES
Physical Therapy    Physical Therapy Treatment Note/Plan of Care    Room #:  5601/6201-73  Patient Name: Chichi Garcia  YOB: 1955  MRN: [id number]    Date of Service: 8/25/2022     Tentative placement recommendation: Home  Equipment recommendation: None      Evaluating Physical Therapist: Rod Tony, PT  #[id number]      Specific Provider Orders/Date/Referring Provider :  08/23/22 0915    PT eval and treat  Start:  08/23/22 0915,   End:  08/23/22 0915,   ONE TIME,   Standing Count:  1 Occurrences,   R         Constanza Mail, APRN - CNP     Admitting Diagnosis:   COPD exacerbation ([facility] [J44.1]    Admitted with    respiratory distress,   Surgery: none      Patient Active Problem List   Diagnosis    COPD exacerbation ([facility]    Acute on chronic respiratory failure with hypoxia and hypercapnia ([facility]    History of tobacco abuse    Primary hypertension    Depression    Acute on chronic respiratory failure ([facility]        ASSESSMENT of Current Deficits Patient exhibits decreased balance and endurance impairing functional mobility, gait distance, and tolerance to activity. Patient only able to tolerate ~8 to10 minutes at edge of bed patient is exerted and wheezing on exhale, patient needing to lay supine to recover. Patient needing AAROM to perform bed exercise, patient stating he was having a hard time breathing with spo2 at 97%, this therapist terminated session. Respiratory therapy also arrived for breathing treatment and stated no more exertion, as this therapist was ending session.          PHYSICAL THERAPY  PLAN OF CARE       Physical therapy plan of care is established based on physician order,  patient diagnosis and clinical assessment    Current Treatment Recommendations:    -Gait: Gait training and Standing activities to improve: base of support, weight shift, weight bearing    -Endurance: Utilize Supervised activities to increase level of endurance to allow for safe functional mobility including transfers and gait  and Use graduated activities to promote good breathing techniques and provide support and education to maximize respiratory function  -Stairs: Stair training with instruction on proper technique and hand placement on rail    PT long term treatment goals are located in below grid    Patient and or family understand(s) diagnosis, prognosis, and plan of care. Frequency of treatments: Patient will be seen  daily. Prior Level of Function: Patient ambulated independently    Rehab Potential: fair   for baseline    Past medical history:   Past Medical History:   Diagnosis Date    Angina pectoris ([address]     states had neg heart cath    Cervical spinal stenosis     COPD (chronic obstructive pulmonary disease) (HCC)     Depression     Hyperlipidemia     Neck pain      Past Surgical History:   Procedure Laterality Date    COLONOSCOPY      HAND SURGERY Left 11/13/2019    LEFT HAND-PALN, DUPUYTREN'S CONTRACTURE, MAJOR RESECTION.  POSSIBLE FULL THICKNESS SKIN GRAFT    LARYNGOSCOPY      removal non malignant lesion    NECK SURGERY      cervical fusion x2    WRIST SURGERY Left 11/13/2019    LEFT HAND DUPUYTRENS CONTRACTURE MAJOR RESECTION,  Z-PLASTY performed by Lambert Eugene MD at [address]:    Precautions: Up as tolerated, falls, alarm, and O2 , 3 Liters of o2 via nasal cannula     Social history: Patient lives with fiance  in a two story home bedroom and bathroom 2nd floor full flight stairs with Rail  with 3 steps  to enter with Jenae in [facility] owned: [employer], [address] [employer], and O2,  3 Liters of o2 via nasal cannula     AM-PAC Basic Mobility        AM-PAC Mobility Inpatient   How much difficulty turning over in bed?: None  How much difficulty sitting down on / standing up from a chair with arms?: A Little  How much difficulty moving from lying on back to sitting on side of bed?: A Little  How much help from another person moving to and from a bed to a chair?: A Little  How much help from another person needed to walk in hospital room?: A Little  How much help from another person for climbing 3-5 steps with a railing?: A Lot  AM-PAC Inpatient Mobility Raw Score : 18  AM-PAC Inpatient T-Scale Score : 43.63  Mobility Inpatient CMS 0-100% Score: 46.58  Mobility Inpatient CMS G-Code Modifier : CK    Nursing cleared patient for PT treatment. OBJECTIVE;   Initial Evaluation  Date: 8/23/2022 Treatment Date:    8/25/2022   Short Term/ Long Term   Goals   Was pt agreeable to Eval/treatment? Yes Yes  To be met in 3 days   Pain level   0/10    0/10    Bed Mobility    Rolling: Independent    Supine to sit:  Independent    Sit to supine: Independent    Scooting: Independent    Rolling: Independent   Supine to sit: Supervision    Sit to supine: Supervision    Scooting: Independent       Transfers Sit to stand: Supervision   d/t poor ventilation in standing and risk for fall Sit to stand: Not assessed     Sit to stand: Independent     Ambulation     2 side steps using  no device with Supervision     d/t increased anxiety and shakiness  not assessed      50 feet using  least restrictive device versus no device with Independent    Stair negotiation: ascended and descended   Not assessed  Not assessed      10 steps with rail supervision    ROM Within functional limits    Increase range of motion 10% of affected joints    Strength BUE:  refer to OT eval  RLE:  4/5  LLE:  4/5  Increase strength in affected mm groups by 1/3 grade   Balance Sitting EOB:  good    Dynamic Standing:  fair for 30 sec then deteriorated with need to sit and wheezing  Sitting EOB: good   Dynamic Standing: not assessed    Sitting EOB:  good    Dynamic Standing: good       Patient is Alert & Oriented x person, place, time, and situation and follows directions    Sensation:  Patient  denies numbness/tingling   Edema:  no   Endurance: poor tolerance    Vitals:  3 liters nasal cannula   Blood Pressure at rest  Blood Pressure during session    Heart Rate at rest  Heart Rate during session    SPO2 at rest %  SPO2 during session 97%     Patient education  Patient educated on role of Physical Therapy, risks of immobility, safety and plan of care, energy conservation, importance of positional changes for oxygen exchange,  importance of mobility while in hospital , and safety    Discussed team approach with lashay participating much as possible and feeling empowered to limit session based on perceived exertion  Patient response to education:   Pt verbalized understanding Pt demonstrated skill Pt requires further education in this area   Yes Partial Yes      Treatment:  Patient practiced and was instructed/facilitated in the following treatment: Patient   Sat edge of bed 8-10 minutes with Supervision  to increase dynamic sitting balance and activity tolerance. Assisted back to supine. Educated on incentive spirometer. Performed exercise below. Terminated session due to hard of breathing. Respiratory therapy present        Therapeutic Exercises:  ankle pumps and straight leg raise x 5 reps         At end of session, patient in bed with alarm and respiratory therapy call light and phone within reach,  all lines and tubes intact, nursing notified. Patient would benefit from continued skilled Physical Therapy to improve functional independence and quality of life.          Patient's/ family goals   home    Time in 845  Time out  902    Total Treatment Time 17 minutes    CPT codes:    Therapeutic activities (47462)   13 minutes  1 unit(s)  Non billable time 4 minutes    ALEM Batista  [facility]#[id number]

## 2022-08-26 ENCOUNTER — APPOINTMENT (OUTPATIENT)
Dept: GENERAL RADIOLOGY | Age: 67
DRG: 190 | End: 2022-08-26
Payer: MEDICARE

## 2022-08-26 LAB
ALBUMIN SERPL-MCNC: 3.1 G/DL (ref 3.5–5.2)
ALP BLD-CCNC: 104 U/L (ref 40–129)
ALT SERPL-CCNC: 17 U/L (ref 0–40)
ANION GAP SERPL CALCULATED.3IONS-SCNC: 10 MMOL/L (ref 7–16)
AST SERPL-CCNC: 11 U/L (ref 0–39)
BILIRUB SERPL-MCNC: <0.2 MG/DL (ref 0–1.2)
BUN BLDV-MCNC: 18 MG/DL (ref 6–23)
CALCIUM SERPL-MCNC: 8.3 MG/DL (ref 8.6–10.2)
CHLORIDE BLD-SCNC: 106 MMOL/L (ref 98–107)
CO2: 24 MMOL/L (ref 22–29)
CREAT SERPL-MCNC: 0.9 MG/DL (ref 0.7–1.2)
GFR AFRICAN AMERICAN: >60
GFR NON-AFRICAN AMERICAN: >60 ML/MIN/1.73
GLUCOSE BLD-MCNC: 188 MG/DL (ref 74–99)
HCT VFR BLD CALC: 39.9 % (ref 37–54)
HEMOGLOBIN: 13.4 G/DL (ref 12.5–16.5)
IGE: 81 KU/L
MAGNESIUM: 2.5 MG/DL (ref 1.6–2.6)
MCH RBC QN AUTO: 31.7 PG (ref 26–35)
MCHC RBC AUTO-ENTMCNC: 33.6 % (ref 32–34.5)
MCV RBC AUTO: 94.3 FL (ref 80–99.9)
METER GLUCOSE: 188 MG/DL (ref 74–99)
METER GLUCOSE: 196 MG/DL (ref 74–99)
METER GLUCOSE: 246 MG/DL (ref 74–99)
METER GLUCOSE: 284 MG/DL (ref 74–99)
PDW BLD-RTO: 13.1 FL (ref 11.5–15)
PHOSPHORUS: 2.9 MG/DL (ref 2.5–4.5)
PLATELET # BLD: 216 E9/L (ref 130–450)
PMV BLD AUTO: 10.1 FL (ref 7–12)
POTASSIUM SERPL-SCNC: 4 MMOL/L (ref 3.5–5)
PRO-BNP: 1379 PG/ML (ref 0–125)
RBC # BLD: 4.23 E12/L (ref 3.8–5.8)
SODIUM BLD-SCNC: 140 MMOL/L (ref 132–146)
TOTAL PROTEIN: 5.2 G/DL (ref 6.4–8.3)
WBC # BLD: 12.7 E9/L (ref 4.5–11.5)

## 2022-08-26 PROCEDURE — 94660 CPAP INITIATION&MGMT: CPT

## 2022-08-26 PROCEDURE — 83880 ASSAY OF NATRIURETIC PEPTIDE: CPT

## 2022-08-26 PROCEDURE — 99232 SBSQ HOSP IP/OBS MODERATE 35: CPT | Performed by: INTERNAL MEDICINE

## 2022-08-26 PROCEDURE — 84100 ASSAY OF PHOSPHORUS: CPT

## 2022-08-26 PROCEDURE — 6360000002 HC RX W HCPCS: Performed by: INTERNAL MEDICINE

## 2022-08-26 PROCEDURE — 82962 GLUCOSE BLOOD TEST: CPT

## 2022-08-26 PROCEDURE — 36415 COLL VENOUS BLD VENIPUNCTURE: CPT

## 2022-08-26 PROCEDURE — 94640 AIRWAY INHALATION TREATMENT: CPT

## 2022-08-26 PROCEDURE — APPSS30 APP SPLIT SHARED TIME 16-30 MINUTES: Performed by: NURSE PRACTITIONER

## 2022-08-26 PROCEDURE — 6360000002 HC RX W HCPCS: Performed by: NURSE PRACTITIONER

## 2022-08-26 PROCEDURE — 2580000003 HC RX 258: Performed by: INTERNAL MEDICINE

## 2022-08-26 PROCEDURE — 80053 COMPREHEN METABOLIC PANEL: CPT

## 2022-08-26 PROCEDURE — 2700000000 HC OXYGEN THERAPY PER DAY

## 2022-08-26 PROCEDURE — 6370000000 HC RX 637 (ALT 250 FOR IP): Performed by: NURSE PRACTITIONER

## 2022-08-26 PROCEDURE — 71046 X-RAY EXAM CHEST 2 VIEWS: CPT

## 2022-08-26 PROCEDURE — 6360000002 HC RX W HCPCS: Performed by: STUDENT IN AN ORGANIZED HEALTH CARE EDUCATION/TRAINING PROGRAM

## 2022-08-26 PROCEDURE — 2060000000 HC ICU INTERMEDIATE R&B

## 2022-08-26 PROCEDURE — 85027 COMPLETE CBC AUTOMATED: CPT

## 2022-08-26 PROCEDURE — 97530 THERAPEUTIC ACTIVITIES: CPT | Performed by: OCCUPATIONAL THERAPIST

## 2022-08-26 PROCEDURE — 6370000000 HC RX 637 (ALT 250 FOR IP): Performed by: INTERNAL MEDICINE

## 2022-08-26 PROCEDURE — 2580000003 HC RX 258: Performed by: STUDENT IN AN ORGANIZED HEALTH CARE EDUCATION/TRAINING PROGRAM

## 2022-08-26 PROCEDURE — 6370000000 HC RX 637 (ALT 250 FOR IP): Performed by: STUDENT IN AN ORGANIZED HEALTH CARE EDUCATION/TRAINING PROGRAM

## 2022-08-26 PROCEDURE — 83735 ASSAY OF MAGNESIUM: CPT

## 2022-08-26 RX ORDER — METHYLPREDNISOLONE SODIUM SUCCINATE 40 MG/ML
40 INJECTION, POWDER, LYOPHILIZED, FOR SOLUTION INTRAMUSCULAR; INTRAVENOUS EVERY 8 HOURS
Status: DISCONTINUED | OUTPATIENT
Start: 2022-08-26 | End: 2022-08-28

## 2022-08-26 RX ADMIN — CLONIDINE HYDROCHLORIDE 0.1 MG: 0.1 TABLET ORAL at 08:23

## 2022-08-26 RX ADMIN — ARFORMOTEROL TARTRATE 15 MCG: 15 SOLUTION RESPIRATORY (INHALATION) at 18:12

## 2022-08-26 RX ADMIN — FAMOTIDINE 20 MG: 20 TABLET, FILM COATED ORAL at 08:23

## 2022-08-26 RX ADMIN — Medication 10 ML: at 20:19

## 2022-08-26 RX ADMIN — Medication 10 ML: at 08:29

## 2022-08-26 RX ADMIN — NYSTATIN 500000 UNITS: 100000 SUSPENSION ORAL at 13:29

## 2022-08-26 RX ADMIN — ATORVASTATIN CALCIUM 40 MG: 40 TABLET, FILM COATED ORAL at 08:23

## 2022-08-26 RX ADMIN — METHYLPREDNISOLONE SODIUM SUCCINATE 40 MG: 40 INJECTION, POWDER, FOR SOLUTION INTRAMUSCULAR; INTRAVENOUS at 05:15

## 2022-08-26 RX ADMIN — IPRATROPIUM BROMIDE AND ALBUTEROL SULFATE 1 AMPULE: .5; 2.5 SOLUTION RESPIRATORY (INHALATION) at 04:45

## 2022-08-26 RX ADMIN — IPRATROPIUM BROMIDE AND ALBUTEROL SULFATE 1 AMPULE: .5; 2.5 SOLUTION RESPIRATORY (INHALATION) at 13:32

## 2022-08-26 RX ADMIN — IPRATROPIUM BROMIDE AND ALBUTEROL SULFATE 1 AMPULE: .5; 2.5 SOLUTION RESPIRATORY (INHALATION) at 18:12

## 2022-08-26 RX ADMIN — ARFORMOTEROL TARTRATE 15 MCG: 15 SOLUTION RESPIRATORY (INHALATION) at 04:45

## 2022-08-26 RX ADMIN — ROFLUMILAST 500 MCG: 500 TABLET ORAL at 08:24

## 2022-08-26 RX ADMIN — AZITHROMYCIN DIHYDRATE 500 MG: 500 INJECTION, POWDER, LYOPHILIZED, FOR SOLUTION INTRAVENOUS at 18:12

## 2022-08-26 RX ADMIN — INSULIN LISPRO 2 UNITS: 100 INJECTION, SOLUTION INTRAVENOUS; SUBCUTANEOUS at 16:41

## 2022-08-26 RX ADMIN — MONTELUKAST 10 MG: 10 TABLET, FILM COATED ORAL at 20:08

## 2022-08-26 RX ADMIN — BUDESONIDE 500 MCG: 0.5 SUSPENSION RESPIRATORY (INHALATION) at 18:12

## 2022-08-26 RX ADMIN — NYSTATIN 500000 UNITS: 100000 SUSPENSION ORAL at 08:24

## 2022-08-26 RX ADMIN — METHYLPREDNISOLONE SODIUM SUCCINATE 40 MG: 40 INJECTION, POWDER, FOR SOLUTION INTRAMUSCULAR; INTRAVENOUS at 12:02

## 2022-08-26 RX ADMIN — ENOXAPARIN SODIUM 40 MG: 100 INJECTION SUBCUTANEOUS at 08:23

## 2022-08-26 RX ADMIN — IPRATROPIUM BROMIDE AND ALBUTEROL SULFATE 1 AMPULE: .5; 2.5 SOLUTION RESPIRATORY (INHALATION) at 09:39

## 2022-08-26 RX ADMIN — METHYLPREDNISOLONE SODIUM SUCCINATE 40 MG: 40 INJECTION, POWDER, FOR SOLUTION INTRAMUSCULAR; INTRAVENOUS at 20:10

## 2022-08-26 RX ADMIN — NYSTATIN 500000 UNITS: 100000 SUSPENSION ORAL at 16:41

## 2022-08-26 RX ADMIN — GABAPENTIN 600 MG: 300 CAPSULE ORAL at 20:08

## 2022-08-26 RX ADMIN — BUDESONIDE 500 MCG: 0.5 SUSPENSION RESPIRATORY (INHALATION) at 04:45

## 2022-08-26 RX ADMIN — NYSTATIN 500000 UNITS: 100000 SUSPENSION ORAL at 20:09

## 2022-08-26 RX ADMIN — FAMOTIDINE 20 MG: 20 TABLET, FILM COATED ORAL at 20:09

## 2022-08-26 RX ADMIN — QUETIAPINE FUMARATE 200 MG: 200 TABLET, EXTENDED RELEASE ORAL at 20:09

## 2022-08-26 ASSESSMENT — PAIN SCALES - GENERAL: PAINLEVEL_OUTOF10: 0

## 2022-08-26 NOTE — PROGRESS NOTES
Comprehensive Nutrition Assessment    Type and Reason for Visit:  Initial, RD Nutrition Re-Screen/LOS    Nutrition Recommendations/Plan:   Continue current diet & monitor     Malnutrition Assessment:  Malnutrition Status:  No malnutrition (08/26/22 1342)    Context:  Chronic Illness     Findings of the 6 clinical characteristics of malnutrition:  Energy Intake:  No significant decrease in energy intake  Weight Loss:  Unable to assess (no wt hx in EMR.)     Body Fat Loss:  No significant body fat loss     Muscle Mass Loss:  No significant muscle mass loss    Fluid Accumulation:  No significant fluid accumulation     Strength:  Not Performed    Nutrition Assessment:    Pt admits w/ SOB dx COPD exacerbation. Hx COPD, DM, HTN. End stage Lung disease on list for lung transplant at [facility]. Pt meal intake is .75%. Will continue to monitor. Nutrition Related Findings:    A/O x4, rounded abd +BS, I/O -3.8L, no edema noted,  (H). Wound Type: None       Current Nutrition Intake & Therapies:    Average Meal Intake: %  Average Supplements Intake: None Ordered  ADULT DIET; Regular; 5 carb choices (75 gm/meal)    Anthropometric Measures:  Height: 5' 7\" (170.2 cm)  Ideal Body Weight (IBW): 148 lbs (67 kg)    Admission Body Weight: 205 lb (93 kg) (8/21 estimated)  Current Body Weight: 205 lb (93 kg), 138.5 % IBW. Current BMI (kg/m2): 32.1  Usual Body Weight:  (no wt hx ijn EMR.)        BMI Categories: Obese Class 1 (BMI 30.0-34. 9)    Estimated Daily Nutrient Needs:  Energy Requirements Based On: Formula  Weight Used for Energy Requirements: Admission  Energy (kcal/day): 3642-7432  Weight Used for Protein Requirements: Ideal  Protein (g/day): 95 - 110 (1.4-1.6 gm/kg)  Method Used for Fluid Requirements: 1 ml/kcal  Fluid (ml/day): 9080-8751    Nutrition Diagnosis:   No nutrition diagnosis at this time related to   as evidenced by      Nutrition Interventions:   Food and/or Nutrient Delivery: Continue Current Diet  Nutrition Education/Counseling: No recommendation at this time  Coordination of Nutrition Care: Continue to monitor while inpatient     Goals:     Goals: PO intake 75% or greater     Nutrition Monitoring and Evaluation:   Behavioral-Environmental Outcomes: None Identified  Food/Nutrient Intake Outcomes: Food and Nutrient Intake  Physical Signs/Symptoms Outcomes: Biochemical Data, GI Status, Fluid Status or Edema, Weight, Skin, Nutrition Focused Physical Findings    Discharge Planning:     Too soon to determine     Maeve Swartz RD  Contact: [phone]

## 2022-08-26 NOTE — CARE COORDINATION
8/26/2022 1214 CM Note:  Met with pt for transition of care needs on d/c.  Pt's plan is to return home with his fiance. Pt has home O2 and nebulizer through [employer]. Pt was started on [employer](may need prior auth if going home on it). An order was placed for pulmonary rehab as outpt. CM spoke with Dr Tuttle's(pt's local pulmonologist) they use [phone] [phone] frequently. CM spoke with [facility] and fax the requested information and they will reach out to the pt to set up appt for eval. Pt and fiance updated of all info. Pt states that he is on the lung transplant  at [facility] and is in the process of having all the needed testing for the transplant. Pt doesn't anticipate any home going needs at this time. Pt's fiance will provide transportation at discharge. CM will follow.   Burgess Mg LUGO

## 2022-08-26 NOTE — PROGRESS NOTES
without mass, throat reddened but no pustules noted   Pulmonary/Chest: lungs diminished, inspiratory and expiratory wheezes, exertional dyspnea  Cardiovascular: normal rate, normal S1 and S2  Abdomen: soft, non-tender, non-distended, normal bowel sounds  Extremities: no cyanosis, no clubbing and no edema  Neurologic: no cranial nerve deficit and speech normal      Recent Labs     08/24/22 0428 08/25/22  0515 08/26/22  0524    139 140   K 4.1 4.7 4.0   * 107 106   CO2 22 25 24   BUN 19 18 18   CREATININE 0.9 0.9 0.9   GLUCOSE 214* 189* 188*   CALCIUM 8.5* 8.5* 8.3*         Recent Labs     08/24/22 0428 08/25/22 0515 08/26/22  0524   ALKPHOS 91 85 104   PROT 5.4* 5.3* 5.2*   LABALBU 3.5 3.3* 3.1*   BILITOT <0.2 0.2 <0.2   AST 11 14 11   ALT 13 16 17         Recent Labs     08/24/22 0428 08/25/22 0515 08/26/22  0524   WBC 14.1* 12.4* 12.7*   RBC 4.19 4.06 4.23   HGB 13.3 12.9 13.4   HCT 40.5 38.9 39.9   MCV 96.7 95.8 94.3   MCH 31.7 31.8 31.7   MCHC 32.8 33.2 33.6   RDW 13.3 13.2 13.1    198 216   MPV 10.0 9.9 10.1             Radiology:   XR CHEST PORTABLE   Final Result   No acute process. XR CHEST (2 VW)    (Results Pending)       Assessment:  Principal Problem:    COPD exacerbation ([facility] [address]  Active Problems:    Primary hypertension    Depression    Acute on chronic respiratory failure ([facility] [address]  Resolved Problems:    * No resolved hospital problems. *      Plan:  Acute on chronic respiratory failure:  Continue supplemental oxygen 3 lliters and Bipap at HS and prn. Echo on 8/24 with an EF of 17% and diastolic dysfunction (also the pericardial fat pad or insignificant pericardial effusion). Echo on 10/11/21 at [facility] showed an EF of 58 +/- 5%, circumferential small pericardial effusion. Repeat chest xray due to worsening shortness of breath and check pro-BNP. COPD exacerbation:  Solu-Medrol 40 mg was increased back up to every 6 hours by Pulmonology yesterday and montelukast was added.

## 2022-08-26 NOTE — PROGRESS NOTES
Occupational Therapy  OCCUPATIONAL THERAPY Treatment note  [facility]  [address]    Date:2022                                                   Patient Name: Dontae Jackson     MRN: [id number]     : 1955     Room: [address] [place] [address]     EVAL Evaluating OT: Nikki Kirby OTR/L #[id number]      Referring Provider and Specific Provider Orders/Date:      22   OT eval and treat  Start:  22,   End:  22,   ONE TIME,   Standing Count:  1 Occurrences,   R         Hernan Galvan, APRN - CNP       Placement Recommendation: Home Health Occupational Therapy if patient is able to meet goals        Diagnosis:   1. COPD exacerbation [facility])         Surgery: None       Pertinent Medical History:       Past Medical History        Past Medical History:   Diagnosis Date    Angina pectoris ([facility] [address]       states had neg heart cath    Cervical spinal stenosis      COPD (chronic obstructive pulmonary disease) (HCC)      Depression      Hyperlipidemia      Neck pain              Past Surgical History         Past Surgical History:   Procedure Laterality Date    COLONOSCOPY        HAND SURGERY Left 2019     LEFT HAND-PALN, DUPUYTREN'S CONTRACTURE, MAJOR RESECTION. POSSIBLE FULL THICKNESS SKIN GRAFT    LARYNGOSCOPY         removal non malignant lesion    NECK SURGERY         cervical fusion x2    WRIST SURGERY Left 2019     LEFT HAND DUPUYTRENS CONTRACTURE MAJOR RESECTION,  Z-PLASTY performed by Natalie Davies MD at [address] [place] [address]             Precautions:  Fall Risk, O2, 3L at baseline, dyspnea on exertion, follows with [facility] for lung transplant (per chart).        Assessment of current deficits    [x] Functional mobility            [x]ADLs           [x] Strength                   []Cognition    [x] Functional transfers          [x] IADLs          [] Safety Awareness   [x]Endurance    [] Fine Coordination              [x] Balance      [] Vision/perception    []Sensation      []Gross Motor Coordination  [] ROM           [] Delirium                   [] Motor Control      OT PLAN OF CARE   OT POC based on physician orders, patient diagnosis and results of clinical assessment     Frequency/Duration 1-3 days/wk for 2 weeks PRN      Specific OT Treatment Interventions to include:   * Instruction/training on adapted ADL techniques and AE recommendations to increase functional independence within precautions       * Training on energy conservation strategies, correct breathing pattern and techniques to improve independence/tolerance for self-care routine  * Functional transfer/mobility training/DME recommendations for increased independence, safety, and fall prevention  * Patient/Family education to increase follow through with safety techniques and functional independence  * Recommendation of environmental modifications for increased safety with functional transfers/mobility and ADLs  * Therapeutic exercise to improve motor endurance, ROM, and functional strength for ADLs/functional transfers  * Therapeutic activities to facilitate/challenge dynamic balance, stand tolerance for increased safety and independence with ADLs     Recommended Adaptive Equipment: shower chair, possible 3:1 commode       Home Living: Lives with fiance, single family home, 2 story, 3 steps to enter with rail. Flight of stairs to 2nd floor bedroom and full bathroom. Half bathroom on 1st floor. Bathroom set-up: Walk-in shower, standard height commode. Equipment owned: cane, wheeled walker, O2 dependent      Prior Level of Function: Independent with ADLs , fiance assists with IADLs; ambulated independently with cane. Driving: Yes   Occupation: Retired   Enjoys: N/a       Pain Level: Pt denied pain; Nursing notified.                  Cognition: A&O: 4/4; Follows 2-3 step directions              Memory: intact              Sequencing: intact Problem solving: intact              Judgement/safety: intact      [facility]   AM-PAC Daily Activity Inpatient   How much help for putting on and taking off regular lower body clothing?: A Little  How much help for Bathing?: A Little  How much help for Toileting?: A Little  How much help for putting on and taking off regular upper body clothing?: A Little  How much help for taking care of personal grooming?: A Little  How much help for eating meals?: None  AM-PAC Inpatient Daily Activity Raw Score: 19  AM-PAC Inpatient ADL T-Scale Score : 40.22  ADL Inpatient CMS 0-100% Score: 42.8  ADL Inpatient CMS G-Code Modifier : CK                  Functional Assessment:     Initial Eval Status  Date: 8/23/22    Treatment Status  Date:8/26/22 STGs = LTGs  Time frame: 10-14 days   Feeding Independent    Indep with drinks  Independent    Grooming Minimal Assist  Seated      Set up for face wash from sitting up in chair due to SOB in standing; declining oral care Moderate [place]    UB Dressing Minimal Assist     Min A for gown management in standing Moderate [place]    LB Dressing Moderate Assist      NT with min A suspected due to SOB. Recommending AE, with pt declining at this time due to SOB. Moderate [place]    Bathing Moderate Assist  Discussed DME benefits for improved endurance and safety with bathing      Min A suspected, but pt declining. Moderate [place]    Toileting Moderate Assist   Pt states he was up to bathroom earlier and nursing had to assist him back to bed due to wheezing and severe shortness of breath. Pt would benefit from 3:1 commode at bedside. NT with nursing reporting that pt is indep in room for toileting. Moderate [place]    Bed Mobility  Supine to sit: Supervision   Sit to supine: Supervision     Indep for all bed mobility  Supine to sit:  Independent   Sit to supine: Independent    Functional Transfers Sit to stand: Supervision   Stand to sit: Supervision      SBA from bed and arm chair x 2 each without AD. Limited due to SOB Independent    Functional Mobility Minimal Assist with hand held assist to improve balance from EOB to/from chair, verbal cues for overall safety. SBA without AD for short distance functional mobility throughout the room to simulate house hold distances. Limited with SOB   Moderate [place] with use of appropriate AD    Balance Sitting:     Static: good    Dynamic: good  Standing: fair plus      Sitting:     Static: good    Dynamic: good  Standing: fair plus  Sitting:     Static: good    Dynamic: good  Standing: good    Activity Tolerance fair  minus ; fatigues easily and reports \"moderate\" shortness of breath with minimal activity. Wheezing also noted. Therapeutic rest breaks required for recovery due to shortness of breath. Fair- with pt showing signs of SOB; vitals reading [employer]; cues for breathing and energy conservation. 96-97% HR 99 on 3L; NP present and recommending breathing treatment. Standing tolerance <30sec. Sitting up in arm chair for 10min average with fatigue  Increase standing tolerance >3 minutes for improved engagement with functional transfers and indep in ADLs      Visual/  Perceptual Glasses: Readers      Reports changes in vision since admission: No       NA       Hand Dominance: Right        AROM (PROM) Strength Additional Info:  Goal:   RUE  WFL 4-/5 good  and wfl FMC/dexterity noted during ADL tasks    Improve overall RUE strength  for participation in functional tasks   LUE WFL 4-/5 good  and wfl FMC/dexterity noted during ADL tasks    Improve overall LUE strength  for participation in functional tasks      Hearing: slightly hard of hearing   Sensation:  No c/o numbness or tingling  Tone:  WFL   Edema: None                    Comments: Upon arrival patient sitting EOB with NP. Pt required min A for most UB ADLs and min A LB ADLs tasks. Limited with SB A for standing during LB ADLs and functional transfers.  The biggest barriers reflect that of functional transfers, functional mobility, UB/LB ADLs, activity tolerance, balance, safety and strengthening. At end of session, patient supine with call light and phone within reach, all lines and tubes intact. Overall patient demonstrated decreased independence and safety during completion of ADL/functional transfer/mobility tasks. Nursing updated on pt position and status following OT treatment. Pt would benefit from continued skilled OT to increase safety and independence with completion of ADL/IADL tasks for functional independence and quality of life. Treatment: OT treatment provided this date includes:  Instruction, education and training on safe facilitation and adapted techniques for completion of ADLs. These include neuromuscular reeducation to facilitate balance/righting reactions,safe functional transfer techniques, proper positioning/alignment to improve interaction with environment and overall function and on adapted techniques/work simplification for completion of ADLs. Education provided on hand/feet placement with bed rails, arm  chair and body mechanics for fall prevention. Cues for energy conservation and safety for in the home at [place], including modifications and DME. Extended time to complete all tasks, including skilled monitoring of patient's response during treatment session and vital signs. Prior to and at the end of session, environmental modifications / line management completed for patients safety and efficiency of treatment session. See above for further details. Pt has made fair progress towards set goals    OT 1-3x/week for 5-7 days during hospitalization      Treatment Time also includes thorough review of current medical information, gathering information on past medical history/social history and prior level of function, informal observation of tasks, assessment of data and education on plan of care and goals.      Treatment Time In: 2991 Treatment Time Out: 0995     Treatment Charges: Mins Units   ADL/Home Mgt     [address]     [address] 15 1   Ther Ex                 [id number]       Manual Therapy    [address]       Neuro Re-ed         [id number]       Orthotic manage/training                               [address]       Non Billable Time       Total Timed Treatment 15 1     Ruth Carmen OTR/L; [id number]

## 2022-08-26 NOTE — PROGRESS NOTES
Pulmonary/Critical Care Progress Note    We are following patient for acute superimposed on chronic respiratory failure, severe COPD with exacerbation    SUBJECTIVE:  Patient says he feels slightly better today and in fact there is less wheezing today. However, I believe a lot of his shortness of breath has to do with respiratory muscle fatigue. He is not using BiPAP at all during the day or at night. I have spoken with the patient's nurse and she will make sure that the respiratory therapy team places him on BiPAP for an hour or 2 in the morning an hour or 2 in the afternoon and all night. This will probably be the biggest benefit he will derive. We will again begin to start tapering his IV steroids and continue all other medicines he is receiving, for now.     MEDICATIONS:   methylPREDNISolone  40 mg IntraVENous Q8H    montelukast  10 mg Oral Nightly    insulin lispro  0-4 Units SubCUTAneous TID WC    insulin lispro  0-4 Units SubCUTAneous Nightly    cloNIDine  0.1 mg Oral Daily    nystatin  5 mL Oral 4x Daily    Roflumilast  500 mcg Oral Daily    famotidine  20 mg Oral BID    azithromycin  500 mg IntraVENous Q24H    Arformoterol Tartrate  15 mcg Nebulization BID    gabapentin  600 mg Oral Nightly    [Held by provider] losartan  25 mg Oral Nightly    QUEtiapine  200 mg Oral Nightly    atorvastatin  40 mg Oral Daily    sodium chloride flush  5-40 mL IntraVENous 2 times per day    enoxaparin  40 mg SubCUTAneous Daily    ipratropium-albuterol  1 ampule Inhalation Q4H WA    budesonide  0.5 mg Nebulization BID      dextrose      sodium chloride       glucose, dextrose bolus **OR** dextrose bolus, glucagon (rDNA), dextrose, perflutren lipid microspheres, sodium chloride flush, sodium chloride, ondansetron **OR** ondansetron, polyethylene glycol, acetaminophen **OR** acetaminophen, albuterol      REVIEW OF SYSTEMS:  Constitutional: Denies fever, weight loss, night sweats, and fatigue  Skin: Denies pigmentation, dark lesions, and rashes   HEENT: Denies hearing loss, tinnitus, ear drainage, epistaxis, sore throat, and hoarseness. Cardiovascular: Denies palpitations, chest pain, and chest pressure. Respiratory: Denies cough, admits to dyspnea at rest, denies hemoptysis, apnea, and choking. Gastrointestinal: Denies nausea, vomiting, poor appetite, diarrhea, heartburn or reflux  Genitourinary: Denies dysuria, frequency, urgency or hematuria  Musculoskeletal: Denies myalgias, muscle weakness, and bone pain  Neurological: Denies dizziness, vertigo, headache, and focal weakness  Psychological: Denies anxiety and depression  Endocrine: Denies heat intolerance and cold intolerance  Hematopoietic/Lymphatic: Denies bleeding problems and blood transfusions    OBJECTIVE:  Vitals:    08/26/22 1500   BP: 132/61   Pulse: 95   Resp: 28   Temp: 97.6 °F (36.4 °C)   SpO2: 97%     FiO2 : 30 %  O2 Flow Rate (L/min): 3 L/min  O2 Device: Nasal cannula    PHYSICAL EXAM:  Constitutional: No fever, chills, diaphoresis  Skin: No skin rash, no skin breakdown  HEENT: Unremarkable  Neck: No JVD, lymphadenopathy, thyromegaly  Cardiovascular: S1, S2 regular. No S3 or rubs present  Respiratory: Soft wheezes bilaterally. Airflow is slightly better today than yesterday. No inspiratory crackles are present  Gastrointestinal: Soft, modestly obese, nontender  Genitourinary: No CVA tenderness  Extremities: Clubbing, cyanosis, or edema  Neurological: Awake, alert, oriented x3. No evidence of focal motor or sensory deficits  Psychological: Depressed affect.     LABS:  WBC   Date Value Ref Range Status   08/26/2022 12.7 (H) 4.5 - 11.5 E9/L Final   08/25/2022 12.4 (H) 4.5 - 11.5 E9/L Final   08/24/2022 14.1 (H) 4.5 - 11.5 E9/L Final     Hemoglobin   Date Value Ref Range Status   08/26/2022 13.4 12.5 - 16.5 g/dL Final   08/25/2022 12.9 12.5 - 16.5 g/dL Final   08/24/2022 13.3 12.5 - 16.5 g/dL Final     Hematocrit   Date Value Ref Range Status   08/26/2022 39.9 37.0 - 54.0 % Final   08/25/2022 38.9 37.0 - 54.0 % Final   08/24/2022 40.5 37.0 - 54.0 % Final     MCV   Date Value Ref Range Status   08/26/2022 94.3 80.0 - 99.9 fL Final   08/25/2022 95.8 80.0 - 99.9 fL Final   08/24/2022 96.7 80.0 - 99.9 fL Final     Platelets   Date Value Ref Range Status   08/26/2022 216 130 - 450 E9/L Final   08/25/2022 198 130 - 450 E9/L Final   08/24/2022 203 130 - 450 E9/L Final     Sodium   Date Value Ref Range Status   08/26/2022 140 132 - 146 mmol/L Final   08/25/2022 139 132 - 146 mmol/L Final   08/24/2022 140 132 - 146 mmol/L Final     Potassium   Date Value Ref Range Status   08/26/2022 4.0 3.5 - 5.0 mmol/L Final   08/25/2022 4.7 3.5 - 5.0 mmol/L Final   08/24/2022 4.1 3.5 - 5.0 mmol/L Final     Potassium reflex Magnesium   Date Value Ref Range Status   08/22/2022 4.5 3.5 - 5.0 mmol/L Final   08/21/2022 4.2 3.5 - 5.0 mmol/L Final   09/07/2021 3.7 3.5 - 5.0 mmol/L Final     Chloride   Date Value Ref Range Status   08/26/2022 106 98 - 107 mmol/L Final   08/25/2022 107 98 - 107 mmol/L Final   08/24/2022 108 (H) 98 - 107 mmol/L Final     CO2   Date Value Ref Range Status   08/26/2022 24 22 - 29 mmol/L Final   08/25/2022 25 22 - 29 mmol/L Final   08/24/2022 22 22 - 29 mmol/L Final     BUN   Date Value Ref Range Status   08/26/2022 18 6 - 23 mg/dL Final   08/25/2022 18 6 - 23 mg/dL Final   08/24/2022 19 6 - 23 mg/dL Final     Creatinine   Date Value Ref Range Status   08/26/2022 0.9 0.7 - 1.2 mg/dL Final   08/25/2022 0.9 0.7 - 1.2 mg/dL Final   08/24/2022 0.9 0.7 - 1.2 mg/dL Final     Glucose   Date Value Ref Range Status   08/26/2022 188 (H) 74 - 99 mg/dL Final   08/25/2022 189 (H) 74 - 99 mg/dL Final   08/24/2022 214 (H) 74 - 99 mg/dL Final     Calcium   Date Value Ref Range Status   08/26/2022 8.3 (L) 8.6 - 10.2 mg/dL Final   08/25/2022 8.5 (L) 8.6 - 10.2 mg/dL Final   08/24/2022 8.5 (L) 8.6 - 10.2 mg/dL Final     Total Protein   Date Value Ref Range Status   08/26/2022 5.2 (L) 6.4 - Final     Phosphorus   Date Value Ref Range Status   08/26/2022 2.9 2.5 - 4.5 mg/dL Final   08/25/2022 3.3 2.5 - 4.5 mg/dL Final   08/24/2022 2.7 2.5 - 4.5 mg/dL Final     Recent Labs     08/25/22  0426   PH 7.433   PO2 90.9   PCO2 36.6   HCO3 23.9   BE 0.0   O2SAT 96.7       RADIOLOGY:  XR CHEST (2 VW)   Final Result   No acute process. XR CHEST PORTABLE   Final Result   No acute process. PROBLEM LIST:  Principal Problem:    COPD exacerbation ([facility] [address]  Active Problems:    Primary hypertension    Depression    Acute on chronic respiratory failure ([facility] [address]  Resolved Problems:    * No resolved hospital problems.  *      IMPRESSION:  Severe COPD with exacerbation  Acute superimposed on chronic respiratory failure with a large component of respiratory muscle fatigue    PLAN:  Decrease IV steroids  Discontinue montelukast  Needs BiPAP during the day and all night        Electronically signed by Salvador Singh MD on 8/26/2022 at 3:28 PM

## 2022-08-27 LAB
ALBUMIN SERPL-MCNC: 3 G/DL (ref 3.5–5.2)
ALP BLD-CCNC: 105 U/L (ref 40–129)
ALT SERPL-CCNC: 22 U/L (ref 0–40)
ANION GAP SERPL CALCULATED.3IONS-SCNC: 13 MMOL/L (ref 7–16)
AST SERPL-CCNC: 17 U/L (ref 0–39)
BASOPHILS ABSOLUTE: 0 E9/L (ref 0–0.2)
BASOPHILS RELATIVE PERCENT: 0.3 % (ref 0–2)
BILIRUB SERPL-MCNC: 0.3 MG/DL (ref 0–1.2)
BUN BLDV-MCNC: 21 MG/DL (ref 6–23)
CALCIUM SERPL-MCNC: 8.3 MG/DL (ref 8.6–10.2)
CHLORIDE BLD-SCNC: 107 MMOL/L (ref 98–107)
CO2: 21 MMOL/L (ref 22–29)
CREAT SERPL-MCNC: 0.8 MG/DL (ref 0.7–1.2)
EOSINOPHILS ABSOLUTE: 0 E9/L (ref 0.05–0.5)
EOSINOPHILS RELATIVE PERCENT: 0 % (ref 0–6)
GFR AFRICAN AMERICAN: >60
GFR NON-AFRICAN AMERICAN: >60 ML/MIN/1.73
GLUCOSE BLD-MCNC: 178 MG/DL (ref 74–99)
HCT VFR BLD CALC: 39.6 % (ref 37–54)
HEMOGLOBIN: 13.4 G/DL (ref 12.5–16.5)
LYMPHOCYTES ABSOLUTE: 1.07 E9/L (ref 1.5–4)
LYMPHOCYTES RELATIVE PERCENT: 7.8 % (ref 20–42)
MAGNESIUM: 2.7 MG/DL (ref 1.6–2.6)
MCH RBC QN AUTO: 31.8 PG (ref 26–35)
MCHC RBC AUTO-ENTMCNC: 33.8 % (ref 32–34.5)
MCV RBC AUTO: 94.1 FL (ref 80–99.9)
METAMYELOCYTES RELATIVE PERCENT: 0.9 % (ref 0–1)
METER GLUCOSE: 172 MG/DL (ref 74–99)
METER GLUCOSE: 191 MG/DL (ref 74–99)
METER GLUCOSE: 197 MG/DL (ref 74–99)
METER GLUCOSE: 212 MG/DL (ref 74–99)
MONOCYTES ABSOLUTE: 1.07 E9/L (ref 0.1–0.95)
MONOCYTES RELATIVE PERCENT: 7.8 % (ref 2–12)
NEUTROPHILS ABSOLUTE: 11.26 E9/L (ref 1.8–7.3)
NEUTROPHILS RELATIVE PERCENT: 83.5 % (ref 43–80)
PDW BLD-RTO: 13.2 FL (ref 11.5–15)
PHOSPHORUS: 3.2 MG/DL (ref 2.5–4.5)
PLATELET # BLD: 224 E9/L (ref 130–450)
PMV BLD AUTO: 9.9 FL (ref 7–12)
POTASSIUM SERPL-SCNC: 4.5 MMOL/L (ref 3.5–5)
RBC # BLD: 4.21 E12/L (ref 3.8–5.8)
SODIUM BLD-SCNC: 141 MMOL/L (ref 132–146)
TOTAL PROTEIN: 5 G/DL (ref 6.4–8.3)
WBC # BLD: 13.4 E9/L (ref 4.5–11.5)

## 2022-08-27 PROCEDURE — 36415 COLL VENOUS BLD VENIPUNCTURE: CPT

## 2022-08-27 PROCEDURE — 2060000000 HC ICU INTERMEDIATE R&B

## 2022-08-27 PROCEDURE — 6370000000 HC RX 637 (ALT 250 FOR IP): Performed by: INTERNAL MEDICINE

## 2022-08-27 PROCEDURE — 2580000003 HC RX 258: Performed by: STUDENT IN AN ORGANIZED HEALTH CARE EDUCATION/TRAINING PROGRAM

## 2022-08-27 PROCEDURE — APPSS30 APP SPLIT SHARED TIME 16-30 MINUTES: Performed by: NURSE PRACTITIONER

## 2022-08-27 PROCEDURE — 82962 GLUCOSE BLOOD TEST: CPT

## 2022-08-27 PROCEDURE — 83735 ASSAY OF MAGNESIUM: CPT

## 2022-08-27 PROCEDURE — 94640 AIRWAY INHALATION TREATMENT: CPT

## 2022-08-27 PROCEDURE — 6360000002 HC RX W HCPCS: Performed by: STUDENT IN AN ORGANIZED HEALTH CARE EDUCATION/TRAINING PROGRAM

## 2022-08-27 PROCEDURE — 6370000000 HC RX 637 (ALT 250 FOR IP): Performed by: STUDENT IN AN ORGANIZED HEALTH CARE EDUCATION/TRAINING PROGRAM

## 2022-08-27 PROCEDURE — 6360000002 HC RX W HCPCS: Performed by: NURSE PRACTITIONER

## 2022-08-27 PROCEDURE — 85025 COMPLETE CBC W/AUTO DIFF WBC: CPT

## 2022-08-27 PROCEDURE — 6360000002 HC RX W HCPCS: Performed by: INTERNAL MEDICINE

## 2022-08-27 PROCEDURE — 84100 ASSAY OF PHOSPHORUS: CPT

## 2022-08-27 PROCEDURE — 2580000003 HC RX 258: Performed by: INTERNAL MEDICINE

## 2022-08-27 PROCEDURE — 94660 CPAP INITIATION&MGMT: CPT

## 2022-08-27 PROCEDURE — 6370000000 HC RX 637 (ALT 250 FOR IP): Performed by: NURSE PRACTITIONER

## 2022-08-27 PROCEDURE — 2700000000 HC OXYGEN THERAPY PER DAY

## 2022-08-27 PROCEDURE — 99232 SBSQ HOSP IP/OBS MODERATE 35: CPT | Performed by: INTERNAL MEDICINE

## 2022-08-27 PROCEDURE — 80053 COMPREHEN METABOLIC PANEL: CPT

## 2022-08-27 RX ADMIN — IPRATROPIUM BROMIDE AND ALBUTEROL SULFATE 1 AMPULE: .5; 2.5 SOLUTION RESPIRATORY (INHALATION) at 14:25

## 2022-08-27 RX ADMIN — METHYLPREDNISOLONE SODIUM SUCCINATE 40 MG: 40 INJECTION, POWDER, FOR SOLUTION INTRAMUSCULAR; INTRAVENOUS at 11:17

## 2022-08-27 RX ADMIN — BUDESONIDE 500 MCG: 0.5 SUSPENSION RESPIRATORY (INHALATION) at 18:06

## 2022-08-27 RX ADMIN — MONTELUKAST 10 MG: 10 TABLET, FILM COATED ORAL at 20:39

## 2022-08-27 RX ADMIN — IPRATROPIUM BROMIDE AND ALBUTEROL SULFATE 1 AMPULE: .5; 2.5 SOLUTION RESPIRATORY (INHALATION) at 06:21

## 2022-08-27 RX ADMIN — NYSTATIN 500000 UNITS: 100000 SUSPENSION ORAL at 18:03

## 2022-08-27 RX ADMIN — IPRATROPIUM BROMIDE AND ALBUTEROL SULFATE 1 AMPULE: .5; 2.5 SOLUTION RESPIRATORY (INHALATION) at 09:56

## 2022-08-27 RX ADMIN — ATORVASTATIN CALCIUM 40 MG: 40 TABLET, FILM COATED ORAL at 07:57

## 2022-08-27 RX ADMIN — FAMOTIDINE 20 MG: 20 TABLET, FILM COATED ORAL at 20:39

## 2022-08-27 RX ADMIN — CLONIDINE HYDROCHLORIDE 0.1 MG: 0.1 TABLET ORAL at 07:56

## 2022-08-27 RX ADMIN — Medication 10 ML: at 20:41

## 2022-08-27 RX ADMIN — AZITHROMYCIN DIHYDRATE 500 MG: 500 INJECTION, POWDER, LYOPHILIZED, FOR SOLUTION INTRAVENOUS at 18:05

## 2022-08-27 RX ADMIN — QUETIAPINE FUMARATE 200 MG: 200 TABLET, EXTENDED RELEASE ORAL at 20:40

## 2022-08-27 RX ADMIN — METHYLPREDNISOLONE SODIUM SUCCINATE 40 MG: 40 INJECTION, POWDER, FOR SOLUTION INTRAMUSCULAR; INTRAVENOUS at 20:39

## 2022-08-27 RX ADMIN — ENOXAPARIN SODIUM 40 MG: 100 INJECTION SUBCUTANEOUS at 07:57

## 2022-08-27 RX ADMIN — NYSTATIN 500000 UNITS: 100000 SUSPENSION ORAL at 07:56

## 2022-08-27 RX ADMIN — ARFORMOTEROL TARTRATE 15 MCG: 15 SOLUTION RESPIRATORY (INHALATION) at 06:21

## 2022-08-27 RX ADMIN — BUDESONIDE 500 MCG: 0.5 SUSPENSION RESPIRATORY (INHALATION) at 06:21

## 2022-08-27 RX ADMIN — INSULIN LISPRO 1 UNITS: 100 INJECTION, SOLUTION INTRAVENOUS; SUBCUTANEOUS at 11:17

## 2022-08-27 RX ADMIN — Medication 10 ML: at 08:00

## 2022-08-27 RX ADMIN — ARFORMOTEROL TARTRATE 15 MCG: 15 SOLUTION RESPIRATORY (INHALATION) at 18:06

## 2022-08-27 RX ADMIN — METHYLPREDNISOLONE SODIUM SUCCINATE 40 MG: 40 INJECTION, POWDER, FOR SOLUTION INTRAMUSCULAR; INTRAVENOUS at 05:19

## 2022-08-27 RX ADMIN — NYSTATIN 500000 UNITS: 100000 SUSPENSION ORAL at 14:27

## 2022-08-27 RX ADMIN — ROFLUMILAST 500 MCG: 500 TABLET ORAL at 07:56

## 2022-08-27 RX ADMIN — FAMOTIDINE 20 MG: 20 TABLET, FILM COATED ORAL at 07:57

## 2022-08-27 RX ADMIN — IPRATROPIUM BROMIDE AND ALBUTEROL SULFATE 1 AMPULE: .5; 2.5 SOLUTION RESPIRATORY (INHALATION) at 18:06

## 2022-08-27 RX ADMIN — GABAPENTIN 600 MG: 300 CAPSULE ORAL at 20:39

## 2022-08-27 RX ADMIN — NYSTATIN 500000 UNITS: 100000 SUSPENSION ORAL at 20:39

## 2022-08-27 NOTE — PROGRESS NOTES
[address] [place], [place] [address]ist   Progress Note    Admitting Date and Time: 8/21/2022  4:07 PM  Admit Dx: COPD exacerbation ([address] [J44.1]    Subjective:    Patient reports improvement in his breathing. He was refusing to wear the Bipap for 2 hours during the day as he said that it causes chest pressure. He is agreeable to at least wear it for 1 hour. ROS: denies fever, chills, n/v, HA unless stated above.      methylPREDNISolone  40 mg IntraVENous Q8H    montelukast  10 mg Oral Nightly    insulin lispro  0-4 Units SubCUTAneous TID WC    insulin lispro  0-4 Units SubCUTAneous Nightly    cloNIDine  0.1 mg Oral Daily    nystatin  5 mL Oral 4x Daily    Roflumilast  500 mcg Oral Daily    famotidine  20 mg Oral BID    azithromycin  500 mg IntraVENous Q24H    Arformoterol Tartrate  15 mcg Nebulization BID    gabapentin  600 mg Oral Nightly    [Held by provider] losartan  25 mg Oral Nightly    QUEtiapine  200 mg Oral Nightly    atorvastatin  40 mg Oral Daily    sodium chloride flush  5-40 mL IntraVENous 2 times per day    enoxaparin  40 mg SubCUTAneous Daily    ipratropium-albuterol  1 ampule Inhalation Q4H WA    budesonide  0.5 mg Nebulization BID     glucose, 4 tablet, PRN  dextrose bolus, 125 mL, PRN   Or  dextrose bolus, 250 mL, PRN  glucagon (rDNA), 1 mg, PRN  dextrose, , Continuous PRN  perflutren lipid microspheres, 1.5 mL, ONCE PRN  sodium chloride flush, 5-40 mL, PRN  sodium chloride, , PRN  ondansetron, 4 mg, Q8H PRN   Or  ondansetron, 4 mg, Q6H PRN  polyethylene glycol, 17 g, Daily PRN  acetaminophen, 650 mg, Q6H PRN   Or  acetaminophen, 650 mg, Q6H PRN  albuterol, 2.5 mg, Q6H PRN       Objective:    BP (!) 148/80   Pulse (!) 115   Temp 97.1 °F (36.2 °C) (Infrared)   Resp 24   Ht 5' 7\" (1.702 m)   Wt 205 lb (93 kg)   SpO2 97%   BMI 32.11 kg/m²   General Appearance: alert and oriented to person, place and time  Skin: warm and dry  Head: normocephalic and atraumatic  Eyes: pupils equal, round, and reactive to light, conjunctivae normal  Mouth: thrush  Neck: neck supple and non tender without mass, throat reddened but no pustules noted   Pulmonary/Chest: lungs diminished, exertional dyspnea  Cardiovascular: normal rate, normal S1 and S2  Abdomen: soft, non-tender, non-distended, normal bowel sounds  Extremities: no cyanosis, no clubbing and no edema  Neurologic: no cranial nerve deficit and speech normal      Recent Labs     08/25/22  0515 08/26/22  0524 08/27/22  0620    140 141   K 4.7 4.0 4.5    106 107   CO2 25 24 21*   BUN 18 18 21   CREATININE 0.9 0.9 0.8   GLUCOSE 189* 188* 178*   CALCIUM 8.5* 8.3* 8.3*         Recent Labs     08/25/22  0515 08/26/22  0524 08/27/22  0620   ALKPHOS 85 104 105   PROT 5.3* 5.2* 5.0*   LABALBU 3.3* 3.1* 3.0*   BILITOT 0.2 <0.2 0.3   AST 14 11 17   ALT 16 17 22         Recent Labs     08/25/22  0515 08/26/22  0524 08/27/22  0620   WBC 12.4* 12.7* 13.4*   RBC 4.06 4.23 4.21   HGB 12.9 13.4 13.4   HCT 38.9 39.9 39.6   MCV 95.8 94.3 94.1   MCH 31.8 31.7 31.8   MCHC 33.2 33.6 33.8   RDW 13.2 13.1 13.2    216 224   MPV 9.9 10.1 9.9             Radiology:   XR CHEST (2 VW)   Final Result   No acute process. XR CHEST PORTABLE   Final Result   No acute process. Assessment:  Principal Problem:    COPD exacerbation (HCC)  Active Problems:    Primary hypertension    Depression    Acute on chronic respiratory failure ([address]  Resolved Problems:    * No resolved hospital problems. *      Plan:  Acute on chronic respiratory failure:  Continue supplemental oxygen 3 lliters and Bipap at HS and prn. Echo on 8/24 with an EF of 46% and diastolic dysfunction (also the pericardial fat pad or insignificant pericardial effusion). Echo on 10/11/21 at [facility] showed an EF of 58 +/- 5%, circumferential small pericardial effusion. Repeat chest xray with no acute findings.    COPD exacerbation:  Solu-Medrol 40 mg was decreased to every 8  hours by Pulmonology yesterday. Montelukast was discontinued. Continue roflumilast, pepcid and IV Zithromax. Rapid COVID and respiratory film array were negative. Alpha-1 antitrypsin is pending. IgE is 81. [facility] re[facility] (rehab used by outpatient Pulmonology group) was arranged by Case Management. Hypertension: Losartan and clonidine were placed on hold. Blood pressures started to trend up and clonidine was resumed. Resume losartan when blood pressure permits. .    Depression:  Continue Seroquel. Thrush:  Continue Nystatin. Diabetes:  Hgb A1C was 7.2 on 9/7/21. He was discharged on home Metformin but does not currently take any medication. Repeat Hgb A1C is 6.4. Blood sugars have been elevated, likely worsening by steroids. Continue Humalog sliding scale. NOTE: This report was transcribed using voice recognition software. Every effort was made to ensure accuracy; however, inadvertent computerized transcription errors may be present.      Electronically signed by OSIRIS Azevedo CNP on 8/27/2022 at 9:19 AM

## 2022-08-27 NOTE — PLAN OF CARE
Problem: Pain  Goal: Verbalizes/displays adequate comfort level or baseline comfort level  8/27/2022 0652 by Salazar Paniagua RN  Outcome: Progressing     Problem: Safety - Adult  Goal: Free from fall injury  8/27/2022 4475 by Salazar Paniagua RN  Outcome: Progressing     Problem: ABCDS Injury Assessment  Goal: Absence of physical injury  8/27/2022 6481 by Salazar Paniagua RN  Outcome: Progressing

## 2022-08-27 NOTE — PROGRESS NOTES
Pt is not agreeable to wearing the bipap all night. He states he was informed he had to wear it for 2-3 hours tonight. Pt wore bipap uninterrupted from 20:15-22:45.  Pt now on his baseline NC.

## 2022-08-27 NOTE — PROGRESS NOTES
Explained to pt the need to wear bipap periodically. Pt still not agreeable to wear bipap this morning. Pt encouraged to wear bipap until breakfast comes, pt still refusing. Pt aware of the treatment plan and benefits of wearing bipap.

## 2022-08-27 NOTE — PLAN OF CARE
Problem: Discharge Planning  Goal: Discharge to home or other facility with appropriate resources  Outcome: Progressing     Problem: Pain  Goal: Verbalizes/displays adequate comfort level or baseline comfort level  8/27/2022 1326 by Elyse Patterson RN  Outcome: Progressing  8/27/2022 0652 by Alice Chavez RN  Outcome: Progressing     Problem: Safety - Adult  Goal: Free from fall injury  8/27/2022 1326 by Elyse Patterson RN  Outcome: Progressing  8/27/2022 0652 by Alice Chavez RN  Outcome: Progressing     Problem: ABCDS Injury Assessment  Goal: Absence of physical injury  8/27/2022 1326 by Elyse Patterson RN  Outcome: Progressing  8/27/2022 0652 by Alice Chavez RN  Outcome: Progressing

## 2022-08-28 LAB
ALPHA-1 ANTITRYPSIN PHENOTYPE: NORMAL
ALPHA-1 ANTITRYPSIN: 131 MG/DL (ref 90–200)
ANION GAP SERPL CALCULATED.3IONS-SCNC: 13 MMOL/L (ref 7–16)
BUN BLDV-MCNC: 22 MG/DL (ref 6–23)
CALCIUM SERPL-MCNC: 8.3 MG/DL (ref 8.6–10.2)
CHLORIDE BLD-SCNC: 106 MMOL/L (ref 98–107)
CO2: 21 MMOL/L (ref 22–29)
CREAT SERPL-MCNC: 0.9 MG/DL (ref 0.7–1.2)
GFR AFRICAN AMERICAN: >60
GFR NON-AFRICAN AMERICAN: >60 ML/MIN/1.73
GLUCOSE BLD-MCNC: 185 MG/DL (ref 74–99)
HCT VFR BLD CALC: 40.6 % (ref 37–54)
HEMOGLOBIN: 13.8 G/DL (ref 12.5–16.5)
MAGNESIUM: 2.6 MG/DL (ref 1.6–2.6)
MCH RBC QN AUTO: 31.9 PG (ref 26–35)
MCHC RBC AUTO-ENTMCNC: 34 % (ref 32–34.5)
MCV RBC AUTO: 93.8 FL (ref 80–99.9)
METER GLUCOSE: 183 MG/DL (ref 74–99)
METER GLUCOSE: 193 MG/DL (ref 74–99)
METER GLUCOSE: 205 MG/DL (ref 74–99)
METER GLUCOSE: 208 MG/DL (ref 74–99)
PDW BLD-RTO: 13.4 FL (ref 11.5–15)
PLATELET # BLD: 223 E9/L (ref 130–450)
PMV BLD AUTO: 9.7 FL (ref 7–12)
POTASSIUM SERPL-SCNC: 4.4 MMOL/L (ref 3.5–5)
RBC # BLD: 4.33 E12/L (ref 3.8–5.8)
SODIUM BLD-SCNC: 140 MMOL/L (ref 132–146)
WBC # BLD: 15.5 E9/L (ref 4.5–11.5)

## 2022-08-28 PROCEDURE — 6370000000 HC RX 637 (ALT 250 FOR IP): Performed by: INTERNAL MEDICINE

## 2022-08-28 PROCEDURE — 6360000002 HC RX W HCPCS: Performed by: INTERNAL MEDICINE

## 2022-08-28 PROCEDURE — 94640 AIRWAY INHALATION TREATMENT: CPT

## 2022-08-28 PROCEDURE — 6370000000 HC RX 637 (ALT 250 FOR IP): Performed by: STUDENT IN AN ORGANIZED HEALTH CARE EDUCATION/TRAINING PROGRAM

## 2022-08-28 PROCEDURE — 2580000003 HC RX 258: Performed by: STUDENT IN AN ORGANIZED HEALTH CARE EDUCATION/TRAINING PROGRAM

## 2022-08-28 PROCEDURE — APPSS30 APP SPLIT SHARED TIME 16-30 MINUTES: Performed by: NURSE PRACTITIONER

## 2022-08-28 PROCEDURE — 6370000000 HC RX 637 (ALT 250 FOR IP): Performed by: NURSE PRACTITIONER

## 2022-08-28 PROCEDURE — 6360000002 HC RX W HCPCS: Performed by: NURSE PRACTITIONER

## 2022-08-28 PROCEDURE — 2060000000 HC ICU INTERMEDIATE R&B

## 2022-08-28 PROCEDURE — 99232 SBSQ HOSP IP/OBS MODERATE 35: CPT | Performed by: INTERNAL MEDICINE

## 2022-08-28 PROCEDURE — 80048 BASIC METABOLIC PNL TOTAL CA: CPT

## 2022-08-28 PROCEDURE — 85027 COMPLETE CBC AUTOMATED: CPT

## 2022-08-28 PROCEDURE — 6360000002 HC RX W HCPCS: Performed by: STUDENT IN AN ORGANIZED HEALTH CARE EDUCATION/TRAINING PROGRAM

## 2022-08-28 PROCEDURE — 36415 COLL VENOUS BLD VENIPUNCTURE: CPT

## 2022-08-28 PROCEDURE — 82962 GLUCOSE BLOOD TEST: CPT

## 2022-08-28 PROCEDURE — 83735 ASSAY OF MAGNESIUM: CPT

## 2022-08-28 PROCEDURE — 94660 CPAP INITIATION&MGMT: CPT

## 2022-08-28 PROCEDURE — 2700000000 HC OXYGEN THERAPY PER DAY

## 2022-08-28 RX ORDER — METHYLPREDNISOLONE SODIUM SUCCINATE 40 MG/ML
40 INJECTION, POWDER, LYOPHILIZED, FOR SOLUTION INTRAMUSCULAR; INTRAVENOUS EVERY 12 HOURS
Status: DISCONTINUED | OUTPATIENT
Start: 2022-08-29 | End: 2022-08-29 | Stop reason: HOSPADM

## 2022-08-28 RX ADMIN — FAMOTIDINE 20 MG: 20 TABLET, FILM COATED ORAL at 08:36

## 2022-08-28 RX ADMIN — FAMOTIDINE 20 MG: 20 TABLET, FILM COATED ORAL at 21:17

## 2022-08-28 RX ADMIN — METHYLPREDNISOLONE SODIUM SUCCINATE 40 MG: 40 INJECTION, POWDER, FOR SOLUTION INTRAMUSCULAR; INTRAVENOUS at 05:39

## 2022-08-28 RX ADMIN — QUETIAPINE FUMARATE 200 MG: 200 TABLET, EXTENDED RELEASE ORAL at 21:17

## 2022-08-28 RX ADMIN — IPRATROPIUM BROMIDE AND ALBUTEROL SULFATE 1 AMPULE: .5; 2.5 SOLUTION RESPIRATORY (INHALATION) at 04:38

## 2022-08-28 RX ADMIN — METHYLPREDNISOLONE SODIUM SUCCINATE 40 MG: 40 INJECTION, POWDER, FOR SOLUTION INTRAMUSCULAR; INTRAVENOUS at 12:51

## 2022-08-28 RX ADMIN — MONTELUKAST 10 MG: 10 TABLET, FILM COATED ORAL at 21:17

## 2022-08-28 RX ADMIN — CLONIDINE HYDROCHLORIDE 0.1 MG: 0.1 TABLET ORAL at 08:36

## 2022-08-28 RX ADMIN — ROFLUMILAST 500 MCG: 500 TABLET ORAL at 08:39

## 2022-08-28 RX ADMIN — IPRATROPIUM BROMIDE AND ALBUTEROL SULFATE 1 AMPULE: .5; 2.5 SOLUTION RESPIRATORY (INHALATION) at 15:08

## 2022-08-28 RX ADMIN — NYSTATIN 500000 UNITS: 100000 SUSPENSION ORAL at 12:51

## 2022-08-28 RX ADMIN — IPRATROPIUM BROMIDE AND ALBUTEROL SULFATE 1 AMPULE: .5; 2.5 SOLUTION RESPIRATORY (INHALATION) at 17:58

## 2022-08-28 RX ADMIN — Medication 10 ML: at 08:36

## 2022-08-28 RX ADMIN — BUDESONIDE 500 MCG: 0.5 SUSPENSION RESPIRATORY (INHALATION) at 17:58

## 2022-08-28 RX ADMIN — BUDESONIDE 500 MCG: 0.5 SUSPENSION RESPIRATORY (INHALATION) at 04:38

## 2022-08-28 RX ADMIN — ARFORMOTEROL TARTRATE 15 MCG: 15 SOLUTION RESPIRATORY (INHALATION) at 17:59

## 2022-08-28 RX ADMIN — NYSTATIN 500000 UNITS: 100000 SUSPENSION ORAL at 17:33

## 2022-08-28 RX ADMIN — NYSTATIN 500000 UNITS: 100000 SUSPENSION ORAL at 21:16

## 2022-08-28 RX ADMIN — ATORVASTATIN CALCIUM 40 MG: 40 TABLET, FILM COATED ORAL at 08:36

## 2022-08-28 RX ADMIN — Medication 10 ML: at 21:18

## 2022-08-28 RX ADMIN — NYSTATIN 500000 UNITS: 100000 SUSPENSION ORAL at 08:36

## 2022-08-28 RX ADMIN — INSULIN LISPRO 1 UNITS: 100 INJECTION, SOLUTION INTRAVENOUS; SUBCUTANEOUS at 12:49

## 2022-08-28 RX ADMIN — IPRATROPIUM BROMIDE AND ALBUTEROL SULFATE 1 AMPULE: .5; 2.5 SOLUTION RESPIRATORY (INHALATION) at 10:20

## 2022-08-28 RX ADMIN — GABAPENTIN 600 MG: 300 CAPSULE ORAL at 21:17

## 2022-08-28 RX ADMIN — ENOXAPARIN SODIUM 40 MG: 100 INJECTION SUBCUTANEOUS at 08:35

## 2022-08-28 RX ADMIN — ARFORMOTEROL TARTRATE 15 MCG: 15 SOLUTION RESPIRATORY (INHALATION) at 04:38

## 2022-08-28 ASSESSMENT — PAIN SCALES - GENERAL: PAINLEVEL_OUTOF10: 0

## 2022-08-28 NOTE — PROGRESS NOTES
[address] [place], [place] [address]   Progress Note    Admitting Date and Time: 8/21/2022  4:07 PM  Admit Dx: COPD exacerbation ([address] [J44.1]    Subjective:    Patient reports improvement in his breathing and said that he has not been wheezing with activity today. He remains on his 3 liters of oxygen. He follows as an outpatient with Dr. Artem Gaona and is follows with [facility] for lung transplant. He is to start pulmonary rehab at [facility] after discharge. ROS: denies fever, chills, n/v, HA unless stated above.      methylPREDNISolone  40 mg IntraVENous Q8H    montelukast  10 mg Oral Nightly    insulin lispro  0-4 Units SubCUTAneous TID WC    insulin lispro  0-4 Units SubCUTAneous Nightly    cloNIDine  0.1 mg Oral Daily    nystatin  5 mL Oral 4x Daily    Roflumilast  500 mcg Oral Daily    famotidine  20 mg Oral BID    Arformoterol Tartrate  15 mcg Nebulization BID    gabapentin  600 mg Oral Nightly    [Held by provider] losartan  25 mg Oral Nightly    QUEtiapine  200 mg Oral Nightly    atorvastatin  40 mg Oral Daily    sodium chloride flush  5-40 mL IntraVENous 2 times per day    enoxaparin  40 mg SubCUTAneous Daily    ipratropium-albuterol  1 ampule Inhalation Q4H WA    budesonide  0.5 mg Nebulization BID     glucose, 4 tablet, PRN  dextrose bolus, 125 mL, PRN   Or  dextrose bolus, 250 mL, PRN  glucagon (rDNA), 1 mg, PRN  dextrose, , Continuous PRN  perflutren lipid microspheres, 1.5 mL, ONCE PRN  sodium chloride flush, 5-40 mL, PRN  sodium chloride, , PRN  ondansetron, 4 mg, Q8H PRN   Or  ondansetron, 4 mg, Q6H PRN  polyethylene glycol, 17 g, Daily PRN  acetaminophen, 650 mg, Q6H PRN   Or  acetaminophen, 650 mg, Q6H PRN  albuterol, 2.5 mg, Q6H PRN       Objective:    /76   Pulse (!) 113   Temp 98.1 °F (36.7 °C)   Resp 20   Ht 5' 7\" (1.702 m)   Wt 205 lb (93 kg)   SpO2 94%   BMI 32.11 kg/m²   General Appearance: alert and oriented to person, place and time  Skin: warm and dry  Head: normocephalic and atraumatic  Eyes: pupils equal, round, and reactive to light, conjunctivae normal  Mouth: thrush  Neck: neck supple and non tender without mass    Pulmonary/Chest: lungs diminished, no respiratory distress  Cardiovascular: normal rate, normal S1 and S2  Abdomen: soft, non-tender, non-distended, normal bowel sounds  Extremities: no cyanosis, no clubbing and no edema  Neurologic: no cranial nerve deficit and speech normal      Recent Labs     08/26/22  0524 08/27/22  0620 08/28/22  0500    141 140   K 4.0 4.5 4.4    107 106   CO2 24 21* 21*   BUN 18 21 22   CREATININE 0.9 0.8 0.9   GLUCOSE 188* 178* 185*   CALCIUM 8.3* 8.3* 8.3*         Recent Labs     08/26/22  0524 08/27/22  0620   ALKPHOS 104 105   PROT 5.2* 5.0*   LABALBU 3.1* 3.0*   BILITOT <0.2 0.3   AST 11 17   ALT 17 22         Recent Labs     08/26/22  0524 08/27/22  0620 08/28/22  0500   WBC 12.7* 13.4* 15.5*   RBC 4.23 4.21 4.33   HGB 13.4 13.4 13.8   HCT 39.9 39.6 40.6   MCV 94.3 94.1 93.8   MCH 31.7 31.8 31.9   MCHC 33.6 33.8 34.0   RDW 13.1 13.2 13.4    224 223   MPV 10.1 9.9 9.7             Radiology:   XR CHEST (2 VW)   Final Result   No acute process. XR CHEST PORTABLE   Final Result   No acute process. Assessment:  Principal Problem:    COPD exacerbation (HCC)  Active Problems:    Primary hypertension    Depression    Acute on chronic respiratory failure ([address]  Resolved Problems:    * No resolved hospital problems. *      Plan:  Acute on chronic respiratory failure:  Continue supplemental oxygen 3 lliters and Bipap at HS and prn. Echo on 8/24 with an EF of 13% and diastolic dysfunction (also the pericardial fat pad or insignificant pericardial effusion). Echo on 10/11/21 at [facility] showed an EF of 58 +/- 5%, circumferential small pericardial effusion. Repeat chest xray on 8/26 with no acute findings. COPD exacerbation:  Continue Solu-Medrol 40 mg every 8  hours, roflumilast and pepcid.   IV Zithromax

## 2022-08-28 NOTE — PROGRESS NOTES
Denies myalgias, muscle weakness, and bone pain  Neurological: Denies dizziness, vertigo, headache, and focal weakness  Psychological: Denies anxiety and depression  Endocrine: Denies heat intolerance and cold intolerance  Hematopoietic/Lymphatic: Denies bleeding problems and blood transfusions    OBJECTIVE:  Vitals:    08/28/22 0745   BP: 132/76   Pulse: (!) 113   Resp: 20   Temp: 98.1 °F (36.7 °C)   SpO2: 94%     FiO2 : 30 %  O2 Flow Rate (L/min): 3 L/min  O2 Device: Nasal cannula    PHYSICAL EXAM:  Constitutional: No fever, chills, diaphoresis  Skin: Skin rash, no skin breakdown  HEENT: Unremarkable  Neck: JVD, lymphadenopathy, thyromegaly  Cardiovascular: S1, S2 regular. No S3 murmurs rubs present  Respiratory: Decreased breath sounds but no wheezes. This is the best lung exam he has had since admission  Gastrointestinal: Soft, obese, nontender  Genitourinary: No CVA tenderness  Extremities: No clubbing, cyanosis, or edema  Neurological: Awake, alert, oriented x3. No evidence of focal motor or sensory deficits  Psychological: Appropriate affect; in good spirits.     LABS:  WBC   Date Value Ref Range Status   08/28/2022 15.5 (H) 4.5 - 11.5 E9/L Final   08/27/2022 13.4 (H) 4.5 - 11.5 E9/L Final   08/26/2022 12.7 (H) 4.5 - 11.5 E9/L Final     Hemoglobin   Date Value Ref Range Status   08/28/2022 13.8 12.5 - 16.5 g/dL Final   08/27/2022 13.4 12.5 - 16.5 g/dL Final   08/26/2022 13.4 12.5 - 16.5 g/dL Final     Hematocrit   Date Value Ref Range Status   08/28/2022 40.6 37.0 - 54.0 % Final   08/27/2022 39.6 37.0 - 54.0 % Final   08/26/2022 39.9 37.0 - 54.0 % Final     MCV   Date Value Ref Range Status   08/28/2022 93.8 80.0 - 99.9 fL Final   08/27/2022 94.1 80.0 - 99.9 fL Final   08/26/2022 94.3 80.0 - 99.9 fL Final     Platelets   Date Value Ref Range Status   08/28/2022 223 130 - 450 E9/L Final   08/27/2022 224 130 - 450 E9/L Final   08/26/2022 216 130 - 450 E9/L Final     Sodium   Date Value Ref Range Status 08/28/2022 140 132 - 146 mmol/L Final   08/27/2022 141 132 - 146 mmol/L Final   08/26/2022 140 132 - 146 mmol/L Final     Potassium   Date Value Ref Range Status   08/28/2022 4.4 3.5 - 5.0 mmol/L Final   08/27/2022 4.5 3.5 - 5.0 mmol/L Final   08/26/2022 4.0 3.5 - 5.0 mmol/L Final     Potassium reflex Magnesium   Date Value Ref Range Status   08/22/2022 4.5 3.5 - 5.0 mmol/L Final   08/21/2022 4.2 3.5 - 5.0 mmol/L Final   09/07/2021 3.7 3.5 - 5.0 mmol/L Final     Chloride   Date Value Ref Range Status   08/28/2022 106 98 - 107 mmol/L Final   08/27/2022 107 98 - 107 mmol/L Final   08/26/2022 106 98 - 107 mmol/L Final     CO2   Date Value Ref Range Status   08/28/2022 21 (L) 22 - 29 mmol/L Final   08/27/2022 21 (L) 22 - 29 mmol/L Final   08/26/2022 24 22 - 29 mmol/L Final     BUN   Date Value Ref Range Status   08/28/2022 22 6 - 23 mg/dL Final   08/27/2022 21 6 - 23 mg/dL Final   08/26/2022 18 6 - 23 mg/dL Final     Creatinine   Date Value Ref Range Status   08/28/2022 0.9 0.7 - 1.2 mg/dL Final   08/27/2022 0.8 0.7 - 1.2 mg/dL Final   08/26/2022 0.9 0.7 - 1.2 mg/dL Final     Glucose   Date Value Ref Range Status   08/28/2022 185 (H) 74 - 99 mg/dL Final   08/27/2022 178 (H) 74 - 99 mg/dL Final   08/26/2022 188 (H) 74 - 99 mg/dL Final     Calcium   Date Value Ref Range Status   08/28/2022 8.3 (L) 8.6 - 10.2 mg/dL Final   08/27/2022 8.3 (L) 8.6 - 10.2 mg/dL Final   08/26/2022 8.3 (L) 8.6 - 10.2 mg/dL Final     Total Protein   Date Value Ref Range Status   08/27/2022 5.0 (L) 6.4 - 8.3 g/dL Final   08/26/2022 5.2 (L) 6.4 - 8.3 g/dL Final   08/25/2022 5.3 (L) 6.4 - 8.3 g/dL Final     Albumin   Date Value Ref Range Status   08/27/2022 3.0 (L) 3.5 - 5.2 g/dL Final   08/26/2022 3.1 (L) 3.5 - 5.2 g/dL Final   08/25/2022 3.3 (L) 3.5 - 5.2 g/dL Final     Total Bilirubin   Date Value Ref Range Status   08/27/2022 0.3 0.0 - 1.2 mg/dL Final   08/26/2022 <0.2 0.0 - 1.2 mg/dL Final   08/25/2022 0.2 0.0 - 1.2 mg/dL Final     Alkaline

## 2022-08-29 VITALS
HEIGHT: 67 IN | WEIGHT: 205 LBS | BODY MASS INDEX: 32.18 KG/M2 | HEART RATE: 92 BPM | RESPIRATION RATE: 18 BRPM | SYSTOLIC BLOOD PRESSURE: 130 MMHG | OXYGEN SATURATION: 96 % | TEMPERATURE: 98.4 F | DIASTOLIC BLOOD PRESSURE: 68 MMHG

## 2022-08-29 LAB
ANION GAP SERPL CALCULATED.3IONS-SCNC: 12 MMOL/L (ref 7–16)
BUN BLDV-MCNC: 23 MG/DL (ref 6–23)
CALCIUM SERPL-MCNC: 8.1 MG/DL (ref 8.6–10.2)
CHLORIDE BLD-SCNC: 105 MMOL/L (ref 98–107)
CO2: 23 MMOL/L (ref 22–29)
CREAT SERPL-MCNC: 0.9 MG/DL (ref 0.7–1.2)
GFR AFRICAN AMERICAN: >60
GFR NON-AFRICAN AMERICAN: >60 ML/MIN/1.73
GLUCOSE BLD-MCNC: 183 MG/DL (ref 74–99)
HCT VFR BLD CALC: 40.4 % (ref 37–54)
HEMOGLOBIN: 14 G/DL (ref 12.5–16.5)
MCH RBC QN AUTO: 32.6 PG (ref 26–35)
MCHC RBC AUTO-ENTMCNC: 34.7 % (ref 32–34.5)
MCV RBC AUTO: 94 FL (ref 80–99.9)
METER GLUCOSE: 142 MG/DL (ref 74–99)
METER GLUCOSE: 168 MG/DL (ref 74–99)
PDW BLD-RTO: 13.6 FL (ref 11.5–15)
PLATELET # BLD: 201 E9/L (ref 130–450)
PMV BLD AUTO: 9.6 FL (ref 7–12)
POTASSIUM SERPL-SCNC: 4.7 MMOL/L (ref 3.5–5)
RBC # BLD: 4.3 E12/L (ref 3.8–5.8)
SODIUM BLD-SCNC: 140 MMOL/L (ref 132–146)
WBC # BLD: 11.3 E9/L (ref 4.5–11.5)

## 2022-08-29 PROCEDURE — 6360000002 HC RX W HCPCS: Performed by: STUDENT IN AN ORGANIZED HEALTH CARE EDUCATION/TRAINING PROGRAM

## 2022-08-29 PROCEDURE — 6370000000 HC RX 637 (ALT 250 FOR IP): Performed by: NURSE PRACTITIONER

## 2022-08-29 PROCEDURE — 94660 CPAP INITIATION&MGMT: CPT

## 2022-08-29 PROCEDURE — 6370000000 HC RX 637 (ALT 250 FOR IP): Performed by: INTERNAL MEDICINE

## 2022-08-29 PROCEDURE — 6370000000 HC RX 637 (ALT 250 FOR IP): Performed by: STUDENT IN AN ORGANIZED HEALTH CARE EDUCATION/TRAINING PROGRAM

## 2022-08-29 PROCEDURE — 80048 BASIC METABOLIC PNL TOTAL CA: CPT

## 2022-08-29 PROCEDURE — APPSS60 APP SPLIT SHARED TIME 46-60 MINUTES: Performed by: PHYSICIAN ASSISTANT

## 2022-08-29 PROCEDURE — 36415 COLL VENOUS BLD VENIPUNCTURE: CPT

## 2022-08-29 PROCEDURE — 85027 COMPLETE CBC AUTOMATED: CPT

## 2022-08-29 PROCEDURE — 82962 GLUCOSE BLOOD TEST: CPT

## 2022-08-29 PROCEDURE — 2700000000 HC OXYGEN THERAPY PER DAY

## 2022-08-29 PROCEDURE — 94640 AIRWAY INHALATION TREATMENT: CPT

## 2022-08-29 PROCEDURE — 6360000002 HC RX W HCPCS: Performed by: NURSE PRACTITIONER

## 2022-08-29 PROCEDURE — 6360000002 HC RX W HCPCS: Performed by: INTERNAL MEDICINE

## 2022-08-29 PROCEDURE — 2580000003 HC RX 258: Performed by: STUDENT IN AN ORGANIZED HEALTH CARE EDUCATION/TRAINING PROGRAM

## 2022-08-29 RX ORDER — FAMOTIDINE 20 MG/1
20 TABLET, FILM COATED ORAL 2 TIMES DAILY
Qty: 60 TABLET | Refills: 0 | Status: SHIPPED | OUTPATIENT
Start: 2022-08-29

## 2022-08-29 RX ORDER — MONTELUKAST SODIUM 10 MG/1
10 TABLET ORAL NIGHTLY
Qty: 30 TABLET | Refills: 3 | Status: SHIPPED | OUTPATIENT
Start: 2022-08-29

## 2022-08-29 RX ORDER — FLUCONAZOLE 100 MG/1
100 TABLET ORAL DAILY
Qty: 15 TABLET | Refills: 0 | Status: SHIPPED | OUTPATIENT
Start: 2022-08-30 | End: 2022-09-14

## 2022-08-29 RX ORDER — PREDNISONE 10 MG/1
TABLET ORAL
Qty: 30 TABLET | Refills: 0 | Status: SHIPPED | OUTPATIENT
Start: 2022-08-30 | End: 2022-09-11

## 2022-08-29 RX ADMIN — METHYLPREDNISOLONE SODIUM SUCCINATE 40 MG: 40 INJECTION, POWDER, FOR SOLUTION INTRAMUSCULAR; INTRAVENOUS at 01:01

## 2022-08-29 RX ADMIN — NYSTATIN 500000 UNITS: 100000 SUSPENSION ORAL at 11:52

## 2022-08-29 RX ADMIN — FAMOTIDINE 20 MG: 20 TABLET, FILM COATED ORAL at 08:42

## 2022-08-29 RX ADMIN — IPRATROPIUM BROMIDE AND ALBUTEROL SULFATE 1 AMPULE: .5; 2.5 SOLUTION RESPIRATORY (INHALATION) at 09:08

## 2022-08-29 RX ADMIN — ENOXAPARIN SODIUM 40 MG: 100 INJECTION SUBCUTANEOUS at 08:41

## 2022-08-29 RX ADMIN — ROFLUMILAST 500 MCG: 500 TABLET ORAL at 08:42

## 2022-08-29 RX ADMIN — NYSTATIN 500000 UNITS: 100000 SUSPENSION ORAL at 08:41

## 2022-08-29 RX ADMIN — ATORVASTATIN CALCIUM 40 MG: 40 TABLET, FILM COATED ORAL at 08:42

## 2022-08-29 RX ADMIN — BUDESONIDE 500 MCG: 0.5 SUSPENSION RESPIRATORY (INHALATION) at 04:20

## 2022-08-29 RX ADMIN — Medication 10 ML: at 08:41

## 2022-08-29 RX ADMIN — IPRATROPIUM BROMIDE AND ALBUTEROL SULFATE 1 AMPULE: .5; 2.5 SOLUTION RESPIRATORY (INHALATION) at 04:20

## 2022-08-29 RX ADMIN — METHYLPREDNISOLONE SODIUM SUCCINATE 40 MG: 40 INJECTION, POWDER, FOR SOLUTION INTRAMUSCULAR; INTRAVENOUS at 11:52

## 2022-08-29 RX ADMIN — CLONIDINE HYDROCHLORIDE 0.1 MG: 0.1 TABLET ORAL at 08:42

## 2022-08-29 RX ADMIN — ARFORMOTEROL TARTRATE 15 MCG: 15 SOLUTION RESPIRATORY (INHALATION) at 04:20

## 2022-08-29 NOTE — CARE COORDINATION
8/29/2022 1555 CM Update: If pt would be going home on Daliresp checked with [address]  and prior Debbi Kang is needed. CM called [employer] ([phone]) AM#[id number] and did prior auth by phone and Gregory Lopez was obtained ref #[id number] and auth date is 5/30/2022-8/29/2023. Per [employer] pt's co-pay is $113.70, the pharmacy also attempted to qualify pt to use a discount coupon but pt didn't qualify. Discussed with pt and pt states that he cannot afford medication and won't be getting it. Anisha PERES aware. CM will follow.   Caleb Arroyo RN

## 2022-08-29 NOTE — CARE COORDINATION
8/29/2022 1300 CM Note:  Met with pt for transition of care needs on d/c.  Pt's plan is to return home with his fiance. [facility]. Pt plans to be discharged today, if discharging on Daliresp prior auth may be  needed. An order was placed for pulmonary rehab as outpt. CM spoke with Dr Tuttle's(pt's local pulmonologist) they use [phone] [phone] frequently. CM spoke with [facility] and fax the requested information and they will reach out to the pt to set up appt for eval. CM followed up with Diane Kemp today and they did not receive fax on Friday, CM re-faxed today and confirmation received that they got it. Agency name and phone number provided for pt on d/c instructions. Pt states that he is on the lung transplant  at [facility] and is in the process of having all the needed testing for the transplant. Pt doesn't anticipate any home going needs at this time. Pt's fiance will provide transportation at discharge. CM will follow.   Brayden Marino RN

## 2022-08-29 NOTE — PROGRESS NOTES
Physical Therapy      Physical Therapy    Room #:   2635/2255-39    Date: 2022       Patient Name: Brad Patel  : 1955      MRN: [id number]     Patient unavailable for physical therapy treatment due to refusal. Patient states that he is going to be discharged today and will be going to pulmonary rehab after hospital stay. Patient HR: 85 and o2 97%. Patient states that he feels a lot better, patient sitting up in the chair watching tv, no wheezing present. Physical therapy will check back at a later time/date.        ALEM Batista

## 2022-08-29 NOTE — PLAN OF CARE
Problem: Discharge Planning  Goal: Discharge to home or other facility with appropriate resources  8/29/2022 0910 by Haydee Lockett RN  Outcome: Progressing  8/28/2022 2329 by Saul Vicente RN  Outcome: Progressing  Flowsheets (Taken 8/28/2022 2000)  Discharge to home or other facility with appropriate resources:   Identify barriers to discharge with patient and caregiver   Arrange for needed discharge resources and transportation as appropriate   Identify discharge learning needs (meds, wound care, etc)   Refer to discharge planning if patient needs post-hospital services based on physician order or complex needs related to functional status, cognitive ability or social support system     Problem: Pain  Goal: Verbalizes/displays adequate comfort level or baseline comfort level  8/29/2022 0910 by Haydee Lockett RN  Outcome: Progressing  8/28/2022 2329 by Sual Vicente RN  Outcome: Progressing     Problem: Safety - Adult  Goal: Free from fall injury  8/29/2022 0910 by Haydee Lockett RN  Outcome: Progressing  8/28/2022 2329 by Saul Vicente RN  Outcome: Progressing  Flowsheets (Taken 8/28/2022 2000)  Free From Fall Injury: Instruct family/caregiver on patient safety     Problem: ABCDS Injury Assessment  Goal: Absence of physical injury  8/29/2022 0910 by Haydee Lockett RN  Outcome: Progressing  8/28/2022 2329 by Saul Vicente RN  Outcome: Progressing  Flowsheets (Taken 8/28/2022 2000)  Absence of Physical Injury: Implement safety measures based on patient assessment

## 2022-08-29 NOTE — DISCHARGE SUMMARY
[facility] Physician Discharge Summary       Incoming Media  [facility]  [phone]  Follow up  Pt wants [address] location but the phone number is the main number, they should be reaching out to you but if not call them. Rika Weber [address] [place] [phone]    Schedule an appointment as soon as possible for a visit in 1 week(s)  Follow-up your hospitalization. [facility]plant    Call in 2 day(s)  To follow-up with [facility] lung transplant division per the scheduled appointment., As needed    Activity level: As tolerated    Diet: ADULT DIET; Regular; 5 carb choices (75 gm/meal)    Labs: Per your PMD follow-up    Condition at discharge: Improved    Dispo: Home    Continue supplemental oxygen via nasal canula @ 3LPM round-the-clock. Continue CPAP / BiPAP during sleep as prior to admission as tolerated. Patient ID:  Tran Davila  [id number]  75 y.o.  1955    Admit date: 8/21/2022    Discharge date and time:  8/29/2022  4:31 PM    Admission Diagnoses: Principal Problem:    COPD exacerbation ([address]  Active Problems:    Primary hypertension    Depression    Acute on chronic respiratory failure ([address]  Resolved Problems:    * No resolved hospital problems. *    Discharge Diagnoses: Principal Problem:    COPD exacerbation ([address]  Active Problems:    Primary hypertension    Depression    Acute on chronic respiratory failure ([facility]  Resolved Problems:    * No resolved hospital problems. *    Consults:  IP CONSULT TO PULMONOLOGY    Procedures: None    Hospital Course:   Acute on chronic respiratory failure:    -Continue supplemental oxygen 3 lliters and Bipap at HS and prn.  -Echo on 8/24 with an EF of 93% and diastolic dysfunction (also the pericardial fat pad or insignificant pericardial effusion).     -Echo on 10/11/21 at [facility] showed an EF of 58 +/- 5%, circumferential small pericardial effusion.    -Repeat chest xray on 8/26 with no acute findings. COPD exacerbation:    -Completed Solu-Medrol 40 mg every 8 hours, pulmonary requests tapering dose of oral steroids in their note  -Continue home nebulizer and inhaled therapy  -Roflumilast which pulmonary prescribed, has $125 co-pay per month which he cannot afford. -IV Zithromax completed on 8/27.    -Rapid COVID and respiratory film array were negative. Alpha-1 antitrypsin is pending. IgE is 81.     -[facility] pulmonary rehab (rehab used by outpatient Pulmonology group) was arranged by Case Management. Hypertension:   -Losartan and clonidine were placed on hold on 8/23. Blood pressures started to trend up and clonidine was resumed on 8/24. Will not resume losartan as blood pressure is stable with clonidine  Depression:    -Continue Seroquel. Thrush:    -Continue Diflucan at discharge for 13 more days to cover the time he is on oral steroid taper  Diabetes:    -Hgb A1C was 7.2 on 9/7/21.    -He was discharged on home Metformin but does not currently take any medication.    -Repeat Hgb A1C is 6.4.   Blood sugars have been elevated, likely worsening by steroids     Discharge Exam:  General Appearance: alert and oriented to person, place and time  Skin: warm and dry  Head: normocephalic and atraumatic  Eyes: pupils equal, round, and reactive to light, conjunctivae normal  Mouth: thrush  Neck: neck supple and non tender without mass    Pulmonary/Chest: lungs diminished, no respiratory distress  Cardiovascular: normal rate, normal S1 and S2  Abdomen: soft, non-tender, non-distended, normal bowel sounds  Extremities: no cyanosis, no clubbing and no edema  Neurologic: no cranial nerve deficit and speech normal    Vitals:    08/28/22 2110 08/28/22 2113 08/29/22 0100 08/29/22 0715   BP: 136/72  (!) 145/74 130/68   Pulse: 79 80 81 92   Resp: 22 18   Temp: 98.2 °F (36.8 °C)   98.4 °F (36.9 °C)   TempSrc: Oral      SpO2: 96%  95% 96% OXYGEN Inhale 3 L into the lungs continuous      Budeson-Glycopyrrol-Formoterol (BREZTRI AEROSPHERE) 160-9-4.8 MCG/ACT AERO Inhale 2 puffs into the lungs in the morning and at bedtime      losartan (COZAAR) 25 MG tablet Take 25 mg by mouth nightly      predniSONE (DELTASONE) 10 MG tablet Take 30 mg by mouth See Admin Instructions Taper Dosing      simvastatin (ZOCOR) 80 MG tablet Take 10 mg by mouth nightly      gabapentin (NEURONTIN) 600 MG tablet Take 600 mg by mouth nightly. Note that greater than 30 minutes was spent in preparing discharge papers, discussing discharge with patient, medication review, etc.    NOTE: This report was transcribed using voice recognition software. Every effort was made to ensure accuracy; however, inadvertent computerized transcription errors may be present.      Signed:  Electronically signed by Isaac Coles, Ph.D. on 8/29/2022 at @NOW

## 2022-11-10 RX ORDER — LOSARTAN POTASSIUM 25 MG/1
25 TABLET ORAL EVERY EVENING
COMMUNITY

## 2022-11-10 RX ORDER — CARVEDILOL 3.12 MG/1
3.12 TABLET ORAL 2 TIMES DAILY WITH MEALS
Status: ON HOLD | COMMUNITY
End: 2022-11-15 | Stop reason: ALTCHOICE

## 2022-11-10 RX ORDER — BUDESONIDE, GLYCOPYRROLATE, AND FORMOTEROL FUMARATE 160; 9; 4.8 UG/1; UG/1; UG/1
AEROSOL, METERED RESPIRATORY (INHALATION)
COMMUNITY
End: 2022-11-10 | Stop reason: SDUPTHER

## 2022-11-10 RX ORDER — AZITHROMYCIN 250 MG/1
TABLET, FILM COATED ORAL
COMMUNITY

## 2022-11-13 NOTE — H&P
History and Physical    Patient's Name/Date of Birth: Yusra Anderson / 1955 (77 y.o.)    Date: November 13, 2022     Chief Complaint: Decreased vision of the left eye    HPI: Mature left cataract with decreased vision. Risks and complications as well as options and benefits were discussed with the patient and he has elected to proceed with left cataract extraction with intra ocular lens implant. Past Medical History:   Diagnosis Date    Angina pectoris ([address]     states had neg heart cath    Asthma     Cervical spinal stenosis     COPD (chronic obstructive pulmonary disease) (HCC)     Depression     Hyperlipidemia     Hypertension     Neck pain        Past Surgical History:   Procedure Laterality Date    COLONOSCOPY      HAND SURGERY Left 11/13/2019    LEFT HAND-PALN, DUPUYTREN'S CONTRACTURE, MAJOR RESECTION. POSSIBLE FULL THICKNESS SKIN GRAFT    LARYNGOSCOPY      removal non malignant lesion    NECK SURGERY      cervical fusion x2    WRIST SURGERY Left 11/13/2019    LEFT HAND DUPUYTRENS CONTRACTURE MAJOR RESECTION,  Z-PLASTY performed by Lashawn Anderson MD at [address] [place] [address]       Prior to Admission medications    Medication Sig Start Date End Date Taking?  Authorizing Provider   carvedilol (COREG) 3.125 MG tablet Take 3.125 mg by mouth 2 times daily (with meals)   Yes Historical Provider, MD   losartan (COZAAR) 25 MG tablet Take 25 mg by mouth every evening   Yes Historical Provider, MD   azithromycin (ZITHROMAX) 250 MG tablet Every Monday, Wed, Fri for chronic COPD    Historical Provider, MD   famotidine (PEPCID) 20 MG tablet Take 1 tablet by mouth 2 times daily  Patient not taking: Reported on 11/10/2022 8/29/22   Kyra Nunez PA-C   montelukast (SINGULAIR) 10 MG tablet Take 1 tablet by mouth nightly  Patient not taking: Reported on 11/10/2022 8/29/22   Kyra Nuenz PA-C   QUEtiapine (SEROQUEL XR) 200 MG extended release tablet Take 200 mg by mouth nightly    Historical Provider, MD   cloNIDine (CATAPRES) 0.1 MG tablet Take 0.1 mg by mouth daily  Patient not taking: Reported on 11/10/2022    Historical Provider, MD   ipratropium-albuterol (DUONEB) 0.5-2.5 (3) MG/3ML SOLN nebulizer solution Inhale 1 vial into the lungs 3 times daily As needed    Historical Provider, MD   albuterol sulfate HFA (PROVENTIL;VENTOLIN;PROAIR) 108 (90 Base) MCG/ACT inhaler Inhale 2 puffs into the lungs every 6 hours as needed for Wheezing    Historical Provider, MD   OXYGEN Inhale 3 L into the lungs continuous    Historical Provider, MD   Budeson-Glycopyrrol-Formoterol (BREZTRI AEROSPHERE) 160-9-4.8 MCG/ACT AERO Inhale 2 puffs into the lungs in the morning and at bedtime    Historical Provider, MD   simvastatin (ZOCOR) 80 MG tablet Take 10 mg by mouth nightly    Historical Provider, MD   gabapentin (NEURONTIN) 600 MG tablet Take 600 mg by mouth nightly. Historical Provider, MD       Patient has no known allergies.     Family History   Problem Relation Age of Onset    No Known Problems Mother     Heart Disease Father     Heart Disease Brother     Heart Disease Sibling     Heart Disease Sibling     Heart Disease Sibling        Social History     Socioeconomic History    Marital status:      Spouse name: Not on file    Number of children: Not on file    Years of education: Not on file    Highest education level: Not on file   Occupational History    Not on file   Tobacco Use    Smoking status: Former     Packs/day: 1.50     Years: 40.00     Pack years: 60.00     Types: Cigarettes     Quit date: 2016     Years since quittin.8    Smokeless tobacco: Never   Substance and Sexual Activity    Alcohol use: Not Currently    Drug use: No    Sexual activity: Not on file   Other Topics Concern    Not on file   Social History Narrative    Not on file     Social Determinants of Health     Financial Resource Strain: Not on file   Food Insecurity: Not on file   Transportation Needs: Not on file   Physical Activity: Not on file Stress: Not on file   Social Connections: Not on file   Intimate Partner Violence: Not on file   Housing Stability: Not on file       Review of Systems:   CONSTITUTIONAL:  Oriented to person, place and time  EYES:  Mature left cataract with decreased vision effecting reading, driving and all routine home activities. Visual acuity is 20/FC  HEENT:  negative  RESPIRATORY:  negative  CARDIOVASCULAR:  negative  GASTROINTESTINAL:  negative  GENITOURINARY:  negative  INTEGUMENT/BREAST:  negative  HEMATOLOGIC/LYMPHATIC:  negative  ALLERGIC/IMMUNOLOGIC:  negative  ENDOCRINE:  negative  MUSCULOSKELETAL:  negative  NEUROLOGICAL:  negative  BEHAVIOR/PSYCH:  negative    Physical Exam:  Vitals:    11/10/22 1532   Weight: 208 lb (94.3 kg)   Height: 5' 10\" (1.778 m)       CONSTITUTIONAL:  awake, alert, cooperative, no apparent distress, and appears stated age  EYES:  Lids and lashes normal, pupils equal, round and reactive to light, extra ocular muscles intact, sclera clear, conjunctiva normal  ENT:  Normocephalic, without obvious abnormality, atraumatic, sinuses nontender on palpation, external ears without lesions, oral pharynx with moist mucus membranes, tonsils without erythema or exudates, gums normal and good dentition.   NECK:  Supple, symmetrical, trachea midline, no adenopathy, thyroid symmetric, not enlarged and no tenderness, skin normal  HEMATOLOGIC/LYMPHATICS:  no cervical lymphadenopathy and no supraclavicular lymphadenopathy  BACK:  Symmetric, no curvature, spinous processes are non-tender on palpation, paraspinous muscles are non-tender on palpation, no costal vertebral tenderness  LUNGS:  No increased work of breathing, good air exchange, clear to auscultation bilaterally, no crackles or wheezing  CARDIOVASCULAR:  Normal apical impulse, regular rate and rhythm, normal S1 and S2, no S3 or S4, and no murmur noted  ABDOMEN:  No scars, normal bowel sounds, soft, non-distended, non-tender, no masses palpated, no hepatosplenomegally  CHEST/BREASTS:  Breasts symmetrical, skin without lesion(s), no nipple retraction or dimpling, no nipple discharge, no masses palpated, no axillary or supraclavicular adenopathy  GENITAL/URINARY:  Deferred  MUSCULOSKELETAL:  There is no redness, warmth, or swelling of the joints. Full range of motion noted. Motor strength is 5 out of 5 all extremities bilaterally. Tone is normal.  NEUROLOGIC:  Awake, alert, oriented to name, place and time. Cranial nerves II-XII are grossly intact. Motor is 5 out of 5 bilaterally. Cerebellar finger to nose, heel to shin intact. Sensory is intact. Babinski down going, Romberg negative, and gait is normal.  SKIN:  no bruising or bleeding, normal skin color, texture, turgor, no redness, warmth, or swelling, and no rashes    Assessment:  Active Problems:    * No active hospital problems. *  Resolved Problems:    * No resolved hospital problems.  *      Plan:  Left cataract extraction with intra ocular lens implant    Electronically signed by Teto Lacy MD on 11/13/22 at 5:59 PM EST

## 2022-11-14 ENCOUNTER — ANESTHESIA EVENT (OUTPATIENT)
Dept: OPERATING ROOM | Age: 67
End: 2022-11-14
Payer: MEDICARE

## 2022-11-14 ASSESSMENT — LIFESTYLE VARIABLES: SMOKING_STATUS: 0

## 2022-11-14 ASSESSMENT — COPD QUESTIONNAIRES: CAT_SEVERITY: MODERATE

## 2022-11-14 ASSESSMENT — ENCOUNTER SYMPTOMS
SHORTNESS OF BREATH: 1
DYSPNEA ACTIVITY LEVEL: NO INTERVAL CHANGE

## 2022-11-14 NOTE — ANESTHESIA PRE PROCEDURE
Department of Anesthesiology  Preprocedure Note       Name:  Jan Velasco   Age:  75 y.o.  :  1955                                          MRN:  [id number]         Date:  2022      Surgeon: Isa Delgado):  Larisa Chappell MD    Procedure: Procedure(s):  CATARACT EXTRACTION WITH IOL,COMPLEX VISCOAT, VISION BLUE, POSSIBLE ECCE-LEFT    Medications prior to admission:   Prior to Admission medications    Medication Sig Start Date End Date Taking?  Authorizing Provider   carvedilol (COREG) 3.125 MG tablet Take 3.125 mg by mouth 2 times daily (with meals)   Yes Historical Provider, MD   losartan (COZAAR) 25 MG tablet Take 25 mg by mouth every evening   Yes Historical Provider, MD   azithromycin (ZITHROMAX) 250 MG tablet Every Monday, Wed, Fri for chronic COPD    Historical Provider, MD   famotidine (PEPCID) 20 MG tablet Take 1 tablet by mouth 2 times daily  Patient not taking: Reported on 11/10/2022 8/29/22   Jayne Mccrary PA-C   montelukast (SINGULAIR) 10 MG tablet Take 1 tablet by mouth nightly  Patient not taking: Reported on 11/10/2022 8/29/22   Jayne Mccrary PA-C   QUEtiapine (SEROQUEL XR) 200 MG extended release tablet Take 200 mg by mouth nightly    Historical Provider, MD   cloNIDine (CATAPRES) 0.1 MG tablet Take 0.1 mg by mouth daily  Patient not taking: Reported on 11/10/2022    Historical Provider, MD   ipratropium-albuterol (DUONEB) 0.5-2.5 (3) MG/3ML SOLN nebulizer solution Inhale 1 vial into the lungs 3 times daily As needed    Historical Provider, MD   albuterol sulfate HFA (PROVENTIL;VENTOLIN;PROAIR) 108 (90 Base) MCG/ACT inhaler Inhale 2 puffs into the lungs every 6 hours as needed for Wheezing    Historical Provider, MD   OXYGEN Inhale 3 L into the lungs continuous    Historical Provider, MD   Budeson-Glycopyrrol-Formoterol (BREZTRI AEROSPHERE) 160-9-4.8 MCG/ACT AERO Inhale 2 puffs into the lungs in the morning and at bedtime    Historical Provider, MD   simvastatin (ZOCOR) 80 MG tablet Take 10 mg by mouth nightly    Historical Provider, MD   gabapentin (NEURONTIN) 600 MG tablet Take 600 mg by mouth nightly. Historical Provider, MD       Current medications:    No current facility-administered medications for this encounter. Current Outpatient Medications   Medication Sig Dispense Refill    carvedilol (COREG) 3.125 MG tablet Take 3.125 mg by mouth 2 times daily (with meals)      losartan (COZAAR) 25 MG tablet Take 25 mg by mouth every evening      azithromycin (ZITHROMAX) 250 MG tablet Every Monday, Wed, Fri for chronic COPD      famotidine (PEPCID) 20 MG tablet Take 1 tablet by mouth 2 times daily (Patient not taking: Reported on 11/10/2022) 60 tablet 0    montelukast (SINGULAIR) 10 MG tablet Take 1 tablet by mouth nightly (Patient not taking: Reported on 11/10/2022) 30 tablet 3    QUEtiapine (SEROQUEL XR) 200 MG extended release tablet Take 200 mg by mouth nightly      cloNIDine (CATAPRES) 0.1 MG tablet Take 0.1 mg by mouth daily (Patient not taking: Reported on 11/10/2022)      ipratropium-albuterol (DUONEB) 0.5-2.5 (3) MG/3ML SOLN nebulizer solution Inhale 1 vial into the lungs 3 times daily As needed      albuterol sulfate HFA (PROVENTIL;VENTOLIN;PROAIR) 108 (90 Base) MCG/ACT inhaler Inhale 2 puffs into the lungs every 6 hours as needed for Wheezing      OXYGEN Inhale 3 L into the lungs continuous      Budeson-Glycopyrrol-Formoterol (BREZTRI AEROSPHERE) 160-9-4.8 MCG/ACT AERO Inhale 2 puffs into the lungs in the morning and at bedtime      simvastatin (ZOCOR) 80 MG tablet Take 10 mg by mouth nightly      gabapentin (NEURONTIN) 600 MG tablet Take 600 mg by mouth nightly.          Allergies:  No Known Allergies    Problem List:    Patient Active Problem List   Diagnosis Code    COPD exacerbation ([facility] J44.1    Acute on chronic respiratory failure with hypoxia and hypercapnia (HCC) J96.21, J96.22    History of tobacco abuse Z87.891    Primary hypertension I10    Depression F32. A    Acute on chronic respiratory failure (HCC) J96.20       Past Medical History:        Diagnosis Date    Angina p[facility]is ([facility])     states had neg heart cath    Asthma     Cervical spinal stenosis     COPD (chronic obstructive pulmonary disease) ([facility] [address]     Depression     Hyperlipidemia     Hypertension     Neck pain        Past Surgical History:        Procedure Laterality Date    COLONOSCOPY      HAND SURGERY Left 2019    LEFT HAND-PALN, DUPUYTREN'S CONTRACTURE, MAJOR RESECTION. POSSIBLE FULL THICKNESS SKIN GRAFT    LARYNGOSCOPY      removal non malignant lesion    NECK SURGERY      cervical fusion x2    WRIST SURGERY Left 2019    LEFT HAND DUPUYTRENS CONTRACTURE MAJOR RESECTION,  Z-PLASTY performed by Carmela Reese MD at [address] History:    Social History     Tobacco Use    Smoking status: Former     Packs/day: 1.50     Years: 40.00     Pack years: 60.00     Types: Cigarettes     Quit date: 2016     Years since quittin.8    Smokeless tobacco: Never   Substance Use Topics    Alcohol use: Not Currently                                Counseling given: Not Answered      Vital Signs (Current):   Vitals:    11/10/22 1532   Weight: 208 lb (94.3 kg)   Height: 5' 10\" (1.778 m)                                              BP Readings from Last 3 Encounters:   22 130/68   21 130/85   19 102/63       NPO Status:                                                                                 BMI:   Wt Readings from Last 3 Encounters:   22 205 lb (93 kg)   21 205 lb (93 kg)   19 209 lb (94.8 kg)     Body mass index is 29.84 kg/m².     CBC:   Lab Results   Component Value Date/Time    WBC 11.3 2022 05:54 AM    RBC 4.30 2022 05:54 AM    HGB 14.0 2022 05:54 AM    HCT 40.4 2022 05:54 AM    MCV 94.0 2022 05:54 AM    RDW 13.6 2022 05:54 AM     2022 05:54 AM       CMP:   Lab Results   Component Value Date/Time     08/29/2022 05:54 AM    K 4.7 08/29/2022 05:54 AM    K 4.5 08/22/2022 05:14 AM     08/29/2022 05:54 AM    CO2 23 08/29/2022 05:54 AM    BUN 23 08/29/2022 05:54 AM    CREATININE 0.9 08/29/2022 05:54 AM    GFRAA >60 08/29/2022 05:54 AM    LABGLOM >60 08/29/2022 05:54 AM    GLUCOSE 183 08/29/2022 05:54 AM    PROT 5.0 08/27/2022 06:20 AM    CALCIUM 8.1 08/29/2022 05:54 AM    BILITOT 0.3 08/27/2022 06:20 AM    ALKPHOS 105 08/27/2022 06:20 AM    AST 17 08/27/2022 06:20 AM    ALT 22 08/27/2022 06:20 AM       POC Tests: No results for input(s): POCGLU, POCNA, POCK, POCCL, POCBUN, POCHEMO, POCHCT in the last 72 hours. Coags:   Lab Results   Component Value Date/Time    PROTIME 10.5 08/21/2022 04:26 PM    INR 0.9 08/21/2022 04:26 PM    APTT 30.9 08/21/2022 04:26 PM       HCG (If Applicable): No results found for: PREGTESTUR, PREGSERUM, HCG, HCGQUANT     ABGs: No results found for: PHART, PO2ART, WWV3MSG, NJH6MDG, BEART, N0MIWRYK     Type & Screen (If Applicable):  No results found for: LABABO, LABRH    Drug/Infectious Status (If Applicable):  No results found for: HIV, HEPCAB    COVID-19 Screening (If Applicable):   Lab Results   Component Value Date/Time    COVID19 Not Detected 08/22/2022 10:20 AM           Anesthesia Evaluation  Patient summary reviewed and Nursing notes reviewed no history of anesthetic complications:   Airway: Mallampati: III  TM distance: >3 FB   Neck ROM: full  Mouth opening: > = 3 FB   Dental: normal exam         Pulmonary: breath sounds clear to auscultation  (+) COPD (Oxygen dependent): moderate, severe and no interval change,  shortness of breath: no interval change,  asthma:     (-) not a current smoker (60+ pyhx.  quit in 2016)                          ROS comment: Acute on chronic respiratory failure with hypoxia and hypercapnia   Cardiovascular:  Exercise tolerance: good (>4 METS),   (+) hypertension: mild and no interval change, angina: no interval change, PASCUAL: no interval change, hyperlipidemia      ECG reviewed      Echocardiogram reviewed         Beta Blocker:  Dose within 24 Hrs      ROS comment: EKG:  sinus tachycardia  Otherwise normal ECG  When compared with ECG of 12-SEP-2021 12:21,  sinus tachycardia has replaced sinus rhythm    ECHO:  No previous echo for comparison. Technically adequate study. Left ventricle size is normal.  Normal left ventricular wall thickness. Ejection fraction is visually estimated at 55%. No regional wall motion abnormalities seen. E/A flow reversal noted. Suggestive of diastolic dysfunction. Physiologic and/or trace tricuspid regurgitation. RVSP is normal     Neuro/Psych:   (+) neuromuscular disease (Neuropathy):, psychiatric history:depression/anxiety             GI/Hepatic/Renal:   (+) GERD: no interval change,           Endo/Other:    (+) Diabetes (No meds - A1C 6.4% on 8/22/22)Type II DM, , : arthritis (Cervical stenosis): OA., . Pt had no PAT visit       Abdominal:   (+) obese,     Abdomen: soft. Vascular: negative vascular ROS. Other Findings:           Anesthesia Plan      MAC     ASA 4     (On the lung transplant protocol. Heavy smoker in the past. Is on O2 24/7 3 l/m. )  Induction: intravenous. MIPS: Prophylactic antiemetics administered. Anesthetic plan and risks discussed with patient. Plan discussed with CRNA. Blas Galeas DO   11/14/2022      History, data, and pertinent studies from chart review. Above represents information available via the shared medical record including previous anesthetic, medication, and allergy history. Confirmation of above and final disposition per DOS anesthesiologist.    Blas Galeas DO  Staff Anesthesiologist  November 14, 2022  11:12 AM  Pt seen questions answered plan outlined H&P reviewed accepts MAC. Stephen Sánchez M.D 11/15/2022 1095.

## 2022-11-14 NOTE — CARE COORDINATION
8/23/2022 1010 CM Note:  CM met with pt for transition of care needs. Pt's plan is to return home with his fiance. Pt ambulates with a cane and is independent with ADLs and drives. Pt has O2 and a nebulizer through [employer]. Pt states that he is on the lung transplant  at [facility] and is in the process of having all the needed testing for the transplant. Pt doesn't anticipate any home going needs at this time. Pt's fiance will provide transportation at discharge. CM will follow.   Elinor Brown RN DISCHARGE

## 2022-11-15 ENCOUNTER — ANESTHESIA (OUTPATIENT)
Dept: OPERATING ROOM | Age: 67
End: 2022-11-15
Payer: MEDICARE

## 2022-11-15 ENCOUNTER — HOSPITAL ENCOUNTER (OUTPATIENT)
Age: 67
Setting detail: OUTPATIENT SURGERY
Discharge: HOME | End: 2022-11-15
Attending: OPHTHALMOLOGY | Admitting: OPHTHALMOLOGY
Payer: MEDICARE

## 2022-11-15 VITALS
HEIGHT: 70 IN | TEMPERATURE: 98.6 F | RESPIRATION RATE: 18 BRPM | HEART RATE: 95 BPM | OXYGEN SATURATION: 99 % | SYSTOLIC BLOOD PRESSURE: 179 MMHG | WEIGHT: 208 LBS | DIASTOLIC BLOOD PRESSURE: 100 MMHG | BODY MASS INDEX: 29.78 KG/M2

## 2022-11-15 PROBLEM — H26.9 LEFT CATARACT: Status: ACTIVE | Noted: 2022-11-15

## 2022-11-15 PROCEDURE — 3600000013 HC SURGERY LEVEL 3 ADDTL 15MIN: Performed by: OPHTHALMOLOGY

## 2022-11-15 PROCEDURE — 7100000011 HC PHASE II RECOVERY - ADDTL 15 MIN: Performed by: OPHTHALMOLOGY

## 2022-11-15 PROCEDURE — 2709999900 HC NON-CHARGEABLE SUPPLY: Performed by: OPHTHALMOLOGY

## 2022-11-15 PROCEDURE — 3700000000 HC ANESTHESIA ATTENDED CARE: Performed by: OPHTHALMOLOGY

## 2022-11-15 PROCEDURE — 2580000003 HC RX 258: Performed by: ANESTHESIOLOGY

## 2022-11-15 PROCEDURE — 6370000000 HC RX 637 (ALT 250 FOR IP): Performed by: OPHTHALMOLOGY

## 2022-11-15 PROCEDURE — 3600000003 HC SURGERY LEVEL 3 BASE: Performed by: OPHTHALMOLOGY

## 2022-11-15 PROCEDURE — 3700000001 HC ADD 15 MINUTES (ANESTHESIA): Performed by: OPHTHALMOLOGY

## 2022-11-15 PROCEDURE — 7100000010 HC PHASE II RECOVERY - FIRST 15 MIN: Performed by: OPHTHALMOLOGY

## 2022-11-15 PROCEDURE — 6360000002 HC RX W HCPCS: Performed by: NURSE ANESTHETIST, CERTIFIED REGISTERED

## 2022-11-15 PROCEDURE — V2632 POST CHMBR INTRAOCULAR LENS: HCPCS | Performed by: OPHTHALMOLOGY

## 2022-11-15 PROCEDURE — 2500000003 HC RX 250 WO HCPCS: Performed by: OPHTHALMOLOGY

## 2022-11-15 PROCEDURE — 2500000003 HC RX 250 WO HCPCS: Performed by: NURSE ANESTHETIST, CERTIFIED REGISTERED

## 2022-11-15 DEVICE — LENS INTOCU +20.0 DIOPT A CONSTANT 118.8 L13MM DIA6MM 0DEG: Type: IMPLANTABLE DEVICE | Site: EYE | Status: FUNCTIONAL

## 2022-11-15 RX ORDER — ALFENTANIL HYDROCHLORIDE 500 UG/ML
INJECTION INTRAVENOUS PRN
Status: DISCONTINUED | OUTPATIENT
Start: 2022-11-15 | End: 2022-11-15 | Stop reason: SDUPTHER

## 2022-11-15 RX ORDER — PHENYLEPHRINE HYDROCHLORIDE 100 MG/ML
1 SOLUTION/ DROPS OPHTHALMIC PRN
Status: DISCONTINUED | OUTPATIENT
Start: 2022-11-15 | End: 2022-11-15 | Stop reason: HOSPADM

## 2022-11-15 RX ORDER — TETRACAINE HYDROCHLORIDE 5 MG/ML
1 SOLUTION OPHTHALMIC ONCE
Status: COMPLETED | OUTPATIENT
Start: 2022-11-15 | End: 2022-11-15

## 2022-11-15 RX ORDER — TETRACAINE HYDROCHLORIDE 5 MG/ML
SOLUTION OPHTHALMIC PRN
Status: DISCONTINUED | OUTPATIENT
Start: 2022-11-15 | End: 2022-11-15 | Stop reason: ALTCHOICE

## 2022-11-15 RX ORDER — BALANCED SALT SOLUTION 6.4; .75; .48; .3; 3.9; 1.7 MG/ML; MG/ML; MG/ML; MG/ML; MG/ML; MG/ML
SOLUTION OPHTHALMIC PRN
Status: DISCONTINUED | OUTPATIENT
Start: 2022-11-15 | End: 2022-11-15 | Stop reason: ALTCHOICE

## 2022-11-15 RX ORDER — SODIUM CHLORIDE, SODIUM LACTATE, POTASSIUM CHLORIDE, CALCIUM CHLORIDE 600; 310; 30; 20 MG/100ML; MG/100ML; MG/100ML; MG/100ML
INJECTION, SOLUTION INTRAVENOUS CONTINUOUS
Status: DISCONTINUED | OUTPATIENT
Start: 2022-11-15 | End: 2022-11-15 | Stop reason: HOSPADM

## 2022-11-15 RX ORDER — PROPARACAINE HYDROCHLORIDE 5 MG/ML
1 SOLUTION/ DROPS OPHTHALMIC
Status: COMPLETED | OUTPATIENT
Start: 2022-11-15 | End: 2022-11-15

## 2022-11-15 RX ORDER — PHENYLEPHRINE HCL 2.5 %
1 DROPS OPHTHALMIC (EYE)
Status: COMPLETED | OUTPATIENT
Start: 2022-11-15 | End: 2022-11-15

## 2022-11-15 RX ORDER — CYCLOPENTOLATE HYDROCHLORIDE 10 MG/ML
1 SOLUTION/ DROPS OPHTHALMIC
Status: COMPLETED | OUTPATIENT
Start: 2022-11-15 | End: 2022-11-15

## 2022-11-15 RX ORDER — FLURBIPROFEN SODIUM 0.3 MG/ML
1 SOLUTION/ DROPS OPHTHALMIC
Status: COMPLETED | OUTPATIENT
Start: 2022-11-15 | End: 2022-11-15

## 2022-11-15 RX ORDER — MIDAZOLAM HYDROCHLORIDE 1 MG/ML
INJECTION INTRAMUSCULAR; INTRAVENOUS PRN
Status: DISCONTINUED | OUTPATIENT
Start: 2022-11-15 | End: 2022-11-15 | Stop reason: SDUPTHER

## 2022-11-15 RX ADMIN — PHENYLEPHRINE HYDROCHLORIDE 1 DROP: 25 SOLUTION/ DROPS OPHTHALMIC at 08:20

## 2022-11-15 RX ADMIN — SODIUM CHLORIDE, POTASSIUM CHLORIDE, SODIUM LACTATE AND CALCIUM CHLORIDE: 600; 310; 30; 20 INJECTION, SOLUTION INTRAVENOUS at 08:26

## 2022-11-15 RX ADMIN — CYCLOPENTOLATE HYDROCHLORIDE 1 DROP: 10 SOLUTION/ DROPS OPHTHALMIC at 08:15

## 2022-11-15 RX ADMIN — ALFENTANIL HYDROCHLORIDE 250 MCG: 500 INJECTION INTRAVENOUS at 09:09

## 2022-11-15 RX ADMIN — CYCLOPENTOLATE HYDROCHLORIDE 1 DROP: 10 SOLUTION/ DROPS OPHTHALMIC at 08:20

## 2022-11-15 RX ADMIN — FLURBIPROFEN SODIUM 1 DROP: 0.3 SOLUTION/ DROPS OPHTHALMIC at 08:20

## 2022-11-15 RX ADMIN — PROPARACAINE HYDROCHLORIDE 1 DROP: 5 SOLUTION/ DROPS OPHTHALMIC at 08:20

## 2022-11-15 RX ADMIN — PROPARACAINE HYDROCHLORIDE 1 DROP: 5 SOLUTION/ DROPS OPHTHALMIC at 08:15

## 2022-11-15 RX ADMIN — TETRACAINE HYDROCHLORIDE 1 DROP: 25 LIQUID OPHTHALMIC at 08:29

## 2022-11-15 RX ADMIN — ALFENTANIL HYDROCHLORIDE 250 MCG: 500 INJECTION INTRAVENOUS at 09:12

## 2022-11-15 RX ADMIN — FLURBIPROFEN SODIUM 1 DROP: 0.3 SOLUTION/ DROPS OPHTHALMIC at 08:25

## 2022-11-15 RX ADMIN — PROPARACAINE HYDROCHLORIDE 1 DROP: 5 SOLUTION/ DROPS OPHTHALMIC at 08:25

## 2022-11-15 RX ADMIN — PHENYLEPHRINE HYDROCHLORIDE 1 DROP: 25 SOLUTION/ DROPS OPHTHALMIC at 08:25

## 2022-11-15 RX ADMIN — MIDAZOLAM 1 MG: 1 INJECTION INTRAMUSCULAR; INTRAVENOUS at 09:09

## 2022-11-15 RX ADMIN — FLURBIPROFEN SODIUM 1 DROP: 0.3 SOLUTION/ DROPS OPHTHALMIC at 08:15

## 2022-11-15 RX ADMIN — CYCLOPENTOLATE HYDROCHLORIDE 1 DROP: 10 SOLUTION/ DROPS OPHTHALMIC at 08:25

## 2022-11-15 RX ADMIN — PHENYLEPHRINE HYDROCHLORIDE 1 DROP: 25 SOLUTION/ DROPS OPHTHALMIC at 08:15

## 2022-11-15 ASSESSMENT — PAIN - FUNCTIONAL ASSESSMENT: PAIN_FUNCTIONAL_ASSESSMENT: NONE - DENIES PAIN

## 2022-11-15 NOTE — H&P
Update History & Physical    The patient's History and Physical of 11 / 13 / 2022 was reviewed with the patient and there were no significant changes. I examined the patient and there were no significant changes from the previous History and Physical.    Plan: The risk, benefits, expected outcome, and alternative to the recommended procedure have been discussed with the patient. Patient understands and wants to proceed with the procedure.     Electronically signed by Phillip Alvarado MD on 11/15/22 at 7:30 AM EST

## 2022-11-15 NOTE — OP NOTE
PREOPERATIVE DIAGNOSIS:  Left Cataract    POSTOPERATIVE DIAGNOSIS: Left Cataract    PROCEDURE:  Left phacoemulsification with intraocular lens implant. ANESTHESIA:  Local Mac    ESTIMATED BLOOD LOSS:  Minimal    COMPLICATIONS:  None    DESCRIPTION OF PROCEDURE:  The patient was brought into the operating room. The operative eye was marked, which was the left eye. The left eye was then prepped with a full-strength Betadine preparation. The periorbital area was copiously washed with Betadine. The lashes were washed with Betadine. Dilute Betadine was then also put in the inferior and superior fornices and left in place for approximately a minute or 2. This was then irrigated with sterile water. The face was then wiped and the preparation was repeated 1 more time. The drape was then placed over the operative eye with the sticky adhesive placed at the lid margins so that it draped the lashes out of the operative field superiorly and inferiorly, as well as isolated the meibomian glands behind the drape. This cleared the operative field of any meibomian gland secretions and eyelashes. Next, a lid speculum was positioned in the left eye. A super sharp blade was used to make a side-port incision at the 2 o'clock position. A clear corneal incision was fashioned over the 12 o;clock meridian with a 2.3mm keratome at the limbus. Healon was used to reform the anterior chamber. A continuous tear anterior capsulotomy was then performed with a bent needle cystotome. Hydrodissection was performed by irrigating with balanced salt solution through a syringe underneath the anterior capsular flap to loosen and allow free rotation of the lens nucleus. Phacoemulsification was used and the entire lens nucleus was removed. The I A unit was instilled in the eye, and the remaining cortex was removed. Healon was used to separate the anterior and posterior capsular flaps.   The PCBOO 20.0 diopter lens was then instilled into the capsular bag and dialed to 3 and 9 o'clock positions. Healon was removed from in front of and behind the lens. The lens was found to be well centered, well positioned in the bag and stable. The wound was checked and found to be airtight and watertight. The lid speculum was removed and the drape was removed. The patient was brought to the recovery room in excellent condition, given postoperative instructions, and discharge in excellent condition. Operative Note      Patient: Teresita Lopez  YOB: 1955  MRN: [id number]    Date of Procedure: 11/15/2022    Pre-Op Diagnosis: Combined forms of age-related cataract of left eye [H25.812]    Post-Op Diagnosis: Same       Procedure(s):  CATARACT EXTRACTION WITH IOL,COMPLEX VISCOAT, VISION BLUE, POSSIBLE ECCE-LEFT    Surgeon(s):  Mago Sandoval MD    Assistant:   * No surgical staff found *    Anesthesia: Monitor Anesthesia Care    Estimated Blood Loss (mL): Minimal    Complications: None    Specimens:   * No specimens in log *    Implants:  Implant Name Type Inv.  Item Serial No.  Lot No. LRB No. Used Action   LENS INTOCU +20.0 DI[employer] A CONSTANT 118.8 L13MM DIA6MM 0DEG - [id number]  LENS INTOCU +20.0 DIOPT A CONSTANT 118.8 L13MM DIA6MM 0DEG [id number] [facility] [employer]  Left 1 Implanted         Drains: * No LDAs found *    Findings: Left cataract    Detailed Description of Procedure:   Left cataract extraction with intra ocular lens implant    Electronically signed by Horace Blanco MD on 11/15/2022 at 9:19 AM

## 2022-11-15 NOTE — ANESTHESIA POSTPROCEDURE EVALUATION
Department of Anesthesiology  Postprocedure Note    Patient: Dany Kowalski  MRN: [id number]  YOB: 1955  Date of evaluation: 11/15/2022      Procedure Summary     Date: 11/15/22 Room / Location: [address] [place] [address]    Anesthesia Start: 9696 Anesthesia Stop: 6549    Procedure: CATARACT EXTRACTION WITH IOL,COMPLEX VISCOAT, VISION BLUE, POSSIBLE ECCE-LEFT (Left: Eye) Diagnosis:       Combined forms of age-related cataract of left eye      (Combined forms of age-related cataract of left eye [H25.812])    Surgeons: Mark Molina MD Responsible Provider: Randy Anderson MD    Anesthesia Type: MAC ASA Status: 4          Anesthesia Type: MAC    Derrek Phase I: Derrek Score: 10    Derrek Phase II: Derrek Score: 9      Anesthesia Post Evaluation    Patient location during evaluation: PACU  Patient participation: complete - patient participated  Level of consciousness: awake  Pain score: 2  Airway patency: patent  Nausea & Vomiting: no nausea  Complications: no  Cardiovascular status: blood pressure returned to baseline  Respiratory status: acceptable  Hydration status: euvolemic

## 2022-11-15 NOTE — DISCHARGE INSTRUCTIONS
Cataract Post-Op Instructions  Ciara Hernandez M.D.  [phone]    Dr. Brynn Grimm has used the most modern surgical techniques during your cataract extraction; therefore, there are few restrictions. You may move your eye in any direction, watch TV, read, cook dinner, clean house, and go up and down steps. There are no physical restrictions, though you should avoid extreme exertion, do not drive day of surgery, and avoid swimming for three weeks. We make every attempt to provide a pain-free procedure. At times you may notice a dull headache or even a sharp pain. This is normal.  Should the discomfort persist, please call the office. If you see any flashes of light, floaters, or a black cloud over the eyes, call the office immediately. The vision in the operated eye will be blurry at first. Be patient. Your vision will improve dramatically over the next few days. You will see glares and halos around lights for the first day or so. This is a result of the dilating drops used for the surgery. You may also notice red or pink tinged vision. This is normal and will go away in a few days. Your eye will continue to heal over the next two to three weeks. At the end of the healing time you will see Dr. Brynn Grimm in the office to check your vision and determine your new glasses prescription. Resume regular diet and medications (unless otherwise instructed by your doctor). Medicine drops will be necessary to ensure as rapid healing as possible. These include:    Vigamox one drop three times a day  [place] one drop three times a day   Pred Forte one drop three times a day    Your post-op visit will be ________11/16__ at _____8 to 11_____ at the office. Date                 Time    Your satisfaction is our primary concern. If you have any questions, please contact the office. It is our pleasure to be your choice in eye care.     ***DO NOT RUB OR TOUCH THE OPERATIVE EYE***      If any problems occur or if you have any further questions, please call your doctor as soon as possible. If you find that you cannot reach your doctor but feel that your condition needs a doctors attention go to an emergency room. Nausea and Vomiting After Surgery: Care Instructions  Your Care Instructions     After you've had surgery, you may feel sick to your stomach (nauseated) or you may vomit. Sometimes anesthesia can make you feel sick. It's a common side effect and often doesn't last long. Pain also can make you feel sick or vomit. After the anesthesia wears off, you may feel pain from the incision (cut). That pain could then upset your stomach. Taking pain medicine can also make you feel sick to your stomach. Whatever the cause, you may get medicine that can help. There are also some things you can do at home to prevent nausea and feel better. The doctor has checked you carefully, but problems can develop later. If you notice any problems or new symptoms, get medical treatment right away. Follow-up care is a key part of your treatment and safety. Be sure to make and go to all appointments, and call your doctor if you are having problems. It's also a good idea to know your test results and keep a list of the medicines you take. How can you care for yourself at home? Be safe with medicines. Read and follow all instructions on the label. If the doctor gave you a prescription medicine for pain, take it as prescribed. If you are not taking a prescription pain medicine, ask your doctor if you can take an over-the-counter medicine. Take your pain medicine as soon as you have pain. It works better if you take it before the pain gets bad. Call your doctor if you have any problems with your medicine. Rest in bed until you feel better. To prevent dehydration, drink plenty of fluids. Choose water and other clear liquids until you feel better.  If you have kidney, heart, or liver disease and have to limit fluids, talk with your doctor before you increase the amount of fluids you drink. When you are able to eat, try clear soups, mild foods, and liquids until all symptoms are gone for 12 to 48 hours. Other good choices include dry toast, crackers, cooked cereal, and gelatin dessert, such as Jell-O. Do not smoke. Smoking and being around smoke can make nausea worse. If you need help quitting, talk to your doctor about stop-smoking programs and medicines. These can increase your chances of quitting for good. When should you call for help? Call 911  anytime you think you may need emergency care. For example, call if:    You passed out (lost consciousness). Call your doctor now or seek immediate medical care if:    You have new or worse nausea or vomiting. You are too sick to your stomach to drink any fluids. You cannot keep down fluids. You have symptoms of dehydration, such as:  Dry eyes and a dry mouth. Passing only a little urine. Feeling thirstier than usual.     Your pain medicine is not helping. You are dizzy or lightheaded, or you feel like you may faint. Watch closely for changes in your health, and be sure to contact your doctor if:    You do not get better as expected. Current as of: March 9, 2022               Content Version: 13.4  © 2006-2022 Healthwise, Inc[employer]. Care instructions adapted under license by [facility] ([facility]). If you have questions about a medical condition or this instruction, always ask your healthcare professional. [place] [address] any warranty or liability for your use of this information. Infection After Surgery: Care Instructions  Overview  After surgery, an infection is always possible. It doesn't mean that the surgery didn't go well. Because an infection can be serious, your doctor has taken steps to manage it.   Your doctor checked the infection and cleaned it if necessary. Your doctor may have made an opening in the area so that the pus can drain out. You may have gauze in the cut so that the area will stay open and keep draining. You may need antibiotics. You will need to follow up with your doctor to make sure the infection has gone away. Follow-up care is a key part of your treatment and safety. Be sure to make and go to all appointments, and call your doctor if you are having problems. It's also a good idea to know your test results and keep a list of the medicines you take. How can you care for yourself at home? Make sure your surgeon knows about the infection, especially if you saw another doctor about your symptoms. If your doctor prescribed antibiotics, take them as directed. Do not stop taking them just because you feel better. You need to take the full course of antibiotics. Ask your doctor if you can take an over-the-counter pain medicine, such as acetaminophen (Tylenol), ibuprofen (Advil, Motrin), or naproxen (Aleve). Be safe with medicines. Read and follow all instructions on the label. Do not take two or more pain medicines at the same time unless the doctor told you to. Many pain medicines have acetaminophen, which is Tylenol. Too much acetaminophen (Tylenol) can be harmful. Prop up the area on a pillow anytime you sit or lie down during the next 3 days. Try to keep it above the level of your heart. This will help reduce swelling. Keep the skin clean and dry. You may have a bandage over the cut (incision). A bandage helps the incision heal and protects it. Your doctor will tell you how to take care of this. Keep it clean and dry. You may have drainage from the wound. If your doctor told you how to care for your incision, follow your doctor's instructions. If you did not get instructions, follow this general advice:  Wash around the incision with clean water 2 times a day. Don't use hydrogen peroxide or alcohol, which can slow healing.   When should you call for help? Call your doctor now or seek immediate medical care if:    You have signs that your infection is getting worse, such as: Increased pain, swelling, warmth, or redness in the area. Red streaks leading from the area. Pus draining from the wound. A new or higher fever. Watch closely for changes in your health, and be sure to contact your doctor if you have any problems. Where can you learn more? Go to https://[employer]. org and sign in to your [employer] account. Enter C340 in the [employer] box to learn more about \"Infection After Surgery: Care Instructions. \"     If you do not have an account, please click on the \"Sign Up Now\" link. Current as of: January 20, 2022               Content Version: 13.4  © 8984-8644 Healthwise, Incorporated. Care instructions adapted under license by [facility] ([facility]). If you have questions about a medical condition or this instruction, always ask your healthcare professional. [place] [address] any warranty or liability for your use of this information.

## 2023-01-23 ENCOUNTER — APPOINTMENT (OUTPATIENT)
Dept: GENERAL RADIOLOGY | Age: 68
DRG: 190 | End: 2023-01-23
Payer: MEDICARE

## 2023-01-23 ENCOUNTER — HOSPITAL ENCOUNTER (INPATIENT)
Age: 68
DRG: 190 | End: 2023-01-23
Attending: EMERGENCY MEDICINE | Admitting: INTERNAL MEDICINE
Payer: MEDICARE

## 2023-01-23 DIAGNOSIS — J44.1 COPD EXACERBATION (HCC): Primary | ICD-10-CM

## 2023-01-23 LAB
AADO2: 98.8 MMHG
ADENOVIRUS BY PCR: NOT DETECTED
ALBUMIN SERPL-MCNC: 4.2 G/DL (ref 3.5–5.2)
ALP BLD-CCNC: 107 U/L (ref 40–129)
ALT SERPL-CCNC: 19 U/L (ref 0–40)
ANION GAP SERPL CALCULATED.3IONS-SCNC: 14 MMOL/L (ref 7–16)
AST SERPL-CCNC: 18 U/L (ref 0–39)
B.E.: -0.1 MMOL/L (ref -3–3)
BASOPHILS ABSOLUTE: 0.07 E9/L (ref 0–0.2)
BASOPHILS RELATIVE PERCENT: 0.8 % (ref 0–2)
BILIRUB SERPL-MCNC: 0.3 MG/DL (ref 0–1.2)
BORDETELLA PARAPERTUSSIS BY PCR: NOT DETECTED
BORDETELLA PERTUSSIS BY PCR: NOT DETECTED
BUN BLDV-MCNC: 12 MG/DL (ref 6–23)
CALCIUM SERPL-MCNC: 9.3 MG/DL (ref 8.6–10.2)
CHLAMYDOPHILIA PNEUMONIAE BY PCR: NOT DETECTED
CHLORIDE BLD-SCNC: 106 MMOL/L (ref 98–107)
CO2: 22 MMOL/L (ref 22–29)
COHB: 0.1 % (ref 0–1.5)
CORONAVIRUS 229E BY PCR: NOT DETECTED
CORONAVIRUS HKU1 BY PCR: NOT DETECTED
CORONAVIRUS NL63 BY PCR: NOT DETECTED
CORONAVIRUS OC43 BY PCR: NOT DETECTED
CREAT SERPL-MCNC: 1.1 MG/DL (ref 0.7–1.2)
CRITICAL: ABNORMAL
DATE ANALYZED: ABNORMAL
DATE OF COLLECTION: ABNORMAL
EOSINOPHILS ABSOLUTE: 0.21 E9/L (ref 0.05–0.5)
EOSINOPHILS RELATIVE PERCENT: 2.5 % (ref 0–6)
FIO2: 50 %
GFR SERPL CREATININE-BSD FRML MDRD: >60 ML/MIN/1.73
GLUCOSE BLD-MCNC: 104 MG/DL (ref 74–99)
HCO3: 19.2 MMOL/L (ref 22–26)
HCT VFR BLD CALC: 47 % (ref 37–54)
HEMOGLOBIN: 16.5 G/DL (ref 12.5–16.5)
HHB: 0.8 % (ref 0–5)
HUMAN METAPNEUMOVIRUS BY PCR: NOT DETECTED
HUMAN RHINOVIRUS/ENTEROVIRUS BY PCR: NOT DETECTED
IMMATURE GRANULOCYTES #: 0.03 E9/L
IMMATURE GRANULOCYTES %: 0.4 % (ref 0–5)
INFLUENZA A BY PCR: NOT DETECTED
INFLUENZA A BY PCR: NOT DETECTED
INFLUENZA B BY PCR: NOT DETECTED
INFLUENZA B BY PCR: NOT DETECTED
INR BLD: 0.9
LAB: ABNORMAL
LYMPHOCYTES ABSOLUTE: 2.79 E9/L (ref 1.5–4)
LYMPHOCYTES RELATIVE PERCENT: 33.8 % (ref 20–42)
Lab: ABNORMAL
MCH RBC QN AUTO: 32 PG (ref 26–35)
MCHC RBC AUTO-ENTMCNC: 35.1 % (ref 32–34.5)
MCV RBC AUTO: 91.3 FL (ref 80–99.9)
METHB: 0.4 % (ref 0–1.5)
MODE: ABNORMAL
MONOCYTES ABSOLUTE: 1.15 E9/L (ref 0.1–0.95)
MONOCYTES RELATIVE PERCENT: 13.9 % (ref 2–12)
MYCOPLASMA PNEUMONIAE BY PCR: NOT DETECTED
NEUTROPHILS ABSOLUTE: 4.01 E9/L (ref 1.8–7.3)
NEUTROPHILS RELATIVE PERCENT: 48.6 % (ref 43–80)
O2 CONTENT: 23.6 ML/DL
O2 SATURATION: 99.2 % (ref 92–98.5)
O2HB: 98.7 % (ref 94–97)
OPERATOR ID: 8217
PARAINFLUENZA VIRUS 1 BY PCR: NOT DETECTED
PARAINFLUENZA VIRUS 2 BY PCR: NOT DETECTED
PARAINFLUENZA VIRUS 3 BY PCR: NOT DETECTED
PARAINFLUENZA VIRUS 4 BY PCR: NOT DETECTED
PATIENT TEMP: 37 C
PCO2: 21.1 MMHG (ref 35–45)
PDW BLD-RTO: 12 FL (ref 11.5–15)
PEEP/CPAP: 5 CMH2O
PFO2: 4.43 MMHG/%
PH BLOOD GAS: 7.58 (ref 7.35–7.45)
PIP: 15 CMH2O
PLATELET # BLD: 236 E9/L (ref 130–450)
PMV BLD AUTO: 9.2 FL (ref 7–12)
PO2: 221.5 MMHG (ref 75–100)
POTASSIUM SERPL-SCNC: 4.2 MMOL/L (ref 3.5–5)
PRO-BNP: 91 PG/ML (ref 0–125)
PROTHROMBIN TIME: 10.6 SEC (ref 9.3–12.4)
RBC # BLD: 5.15 E12/L (ref 3.8–5.8)
RESPIRATORY SYNCYTIAL VIRUS BY PCR: NOT DETECTED
RI(T): 0.45
SARS-COV-2, NAAT: NOT DETECTED
SARS-COV-2, PCR: NOT DETECTED
SODIUM BLD-SCNC: 142 MMOL/L (ref 132–146)
SOURCE, BLOOD GAS: ABNORMAL
THB: 16.7 G/DL (ref 11.5–16.5)
TIME ANALYZED: 1447
TOTAL PROTEIN: 6.4 G/DL (ref 6.4–8.3)
TROPONIN, HIGH SENSITIVITY: 16 NG/L (ref 0–11)
TROPONIN, HIGH SENSITIVITY: 20 NG/L (ref 0–11)
WBC # BLD: 8.3 E9/L (ref 4.5–11.5)

## 2023-01-23 PROCEDURE — 6360000002 HC RX W HCPCS: Performed by: INTERNAL MEDICINE

## 2023-01-23 PROCEDURE — 83880 ASSAY OF NATRIURETIC PEPTIDE: CPT

## 2023-01-23 PROCEDURE — 6370000000 HC RX 637 (ALT 250 FOR IP): Performed by: INTERNAL MEDICINE

## 2023-01-23 PROCEDURE — 85610 PROTHROMBIN TIME: CPT

## 2023-01-23 PROCEDURE — 93005 ELECTROCARDIOGRAM TRACING: CPT | Performed by: EMERGENCY MEDICINE

## 2023-01-23 PROCEDURE — 99223 1ST HOSP IP/OBS HIGH 75: CPT | Performed by: INTERNAL MEDICINE

## 2023-01-23 PROCEDURE — 2700000000 HC OXYGEN THERAPY PER DAY

## 2023-01-23 PROCEDURE — 99285 EMERGENCY DEPT VISIT HI MDM: CPT

## 2023-01-23 PROCEDURE — 84484 ASSAY OF TROPONIN QUANT: CPT

## 2023-01-23 PROCEDURE — 94640 AIRWAY INHALATION TREATMENT: CPT

## 2023-01-23 PROCEDURE — 6370000000 HC RX 637 (ALT 250 FOR IP): Performed by: EMERGENCY MEDICINE

## 2023-01-23 PROCEDURE — 87502 INFLUENZA DNA AMP PROBE: CPT

## 2023-01-23 PROCEDURE — 87635 SARS-COV-2 COVID-19 AMP PRB: CPT

## 2023-01-23 PROCEDURE — 0202U NFCT DS 22 TRGT SARS-COV-2: CPT

## 2023-01-23 PROCEDURE — 6360000002 HC RX W HCPCS: Performed by: EMERGENCY MEDICINE

## 2023-01-23 PROCEDURE — 2060000000 HC ICU INTERMEDIATE R&B

## 2023-01-23 PROCEDURE — 96365 THER/PROPH/DIAG IV INF INIT: CPT

## 2023-01-23 PROCEDURE — 2580000003 HC RX 258: Performed by: INTERNAL MEDICINE

## 2023-01-23 PROCEDURE — 82805 BLOOD GASES W/O2 SATURATION: CPT

## 2023-01-23 PROCEDURE — 85025 COMPLETE CBC W/AUTO DIFF WBC: CPT

## 2023-01-23 PROCEDURE — 80053 COMPREHEN METABOLIC PANEL: CPT

## 2023-01-23 PROCEDURE — 71045 X-RAY EXAM CHEST 1 VIEW: CPT

## 2023-01-23 PROCEDURE — 94660 CPAP INITIATION&MGMT: CPT

## 2023-01-23 RX ORDER — IPRATROPIUM BROMIDE AND ALBUTEROL SULFATE 2.5; .5 MG/3ML; MG/3ML
1 SOLUTION RESPIRATORY (INHALATION) 3 TIMES DAILY
Status: DISCONTINUED | OUTPATIENT
Start: 2023-01-23 | End: 2023-01-27

## 2023-01-23 RX ORDER — ALBUTEROL SULFATE 90 UG/1
2 AEROSOL, METERED RESPIRATORY (INHALATION) EVERY 6 HOURS PRN
Status: DISCONTINUED | OUTPATIENT
Start: 2023-01-23 | End: 2023-01-23 | Stop reason: CLARIF

## 2023-01-23 RX ORDER — ACETAMINOPHEN 650 MG/1
650 SUPPOSITORY RECTAL EVERY 6 HOURS PRN
Status: DISCONTINUED | OUTPATIENT
Start: 2023-01-23 | End: 2023-01-31 | Stop reason: HOSPADM

## 2023-01-23 RX ORDER — IPRATROPIUM BROMIDE AND ALBUTEROL SULFATE 2.5; .5 MG/3ML; MG/3ML
3 SOLUTION RESPIRATORY (INHALATION) ONCE
Status: COMPLETED | OUTPATIENT
Start: 2023-01-23 | End: 2023-01-23

## 2023-01-23 RX ORDER — SODIUM CHLORIDE 9 MG/ML
INJECTION, SOLUTION INTRAVENOUS PRN
Status: DISCONTINUED | OUTPATIENT
Start: 2023-01-23 | End: 2023-01-31 | Stop reason: HOSPADM

## 2023-01-23 RX ORDER — ENOXAPARIN SODIUM 100 MG/ML
40 INJECTION SUBCUTANEOUS DAILY
Status: DISCONTINUED | OUTPATIENT
Start: 2023-01-23 | End: 2023-01-31 | Stop reason: HOSPADM

## 2023-01-23 RX ORDER — SODIUM CHLORIDE 0.9 % (FLUSH) 0.9 %
5-40 SYRINGE (ML) INJECTION PRN
Status: DISCONTINUED | OUTPATIENT
Start: 2023-01-23 | End: 2023-01-31 | Stop reason: HOSPADM

## 2023-01-23 RX ORDER — LOSARTAN POTASSIUM 50 MG/1
25 TABLET ORAL EVERY EVENING
Status: DISCONTINUED | OUTPATIENT
Start: 2023-01-23 | End: 2023-01-31 | Stop reason: HOSPADM

## 2023-01-23 RX ORDER — GABAPENTIN 300 MG/1
600 CAPSULE ORAL NIGHTLY
Status: DISCONTINUED | OUTPATIENT
Start: 2023-01-23 | End: 2023-01-31 | Stop reason: HOSPADM

## 2023-01-23 RX ORDER — ACETAMINOPHEN 325 MG/1
650 TABLET ORAL EVERY 6 HOURS PRN
Status: DISCONTINUED | OUTPATIENT
Start: 2023-01-23 | End: 2023-01-31 | Stop reason: HOSPADM

## 2023-01-23 RX ORDER — ALBUTEROL SULFATE 2.5 MG/3ML
2.5 SOLUTION RESPIRATORY (INHALATION) EVERY 6 HOURS PRN
Status: DISCONTINUED | OUTPATIENT
Start: 2023-01-23 | End: 2023-01-27

## 2023-01-23 RX ORDER — LEVOFLOXACIN 5 MG/ML
500 INJECTION, SOLUTION INTRAVENOUS ONCE
Status: COMPLETED | OUTPATIENT
Start: 2023-01-23 | End: 2023-01-23

## 2023-01-23 RX ORDER — ARFORMOTEROL TARTRATE 15 UG/2ML
15 SOLUTION RESPIRATORY (INHALATION) 2 TIMES DAILY
Status: DISCONTINUED | OUTPATIENT
Start: 2023-01-23 | End: 2023-01-31 | Stop reason: HOSPADM

## 2023-01-23 RX ORDER — BUDESONIDE 0.5 MG/2ML
0.5 INHALANT ORAL 2 TIMES DAILY
Status: DISCONTINUED | OUTPATIENT
Start: 2023-01-23 | End: 2023-01-31 | Stop reason: HOSPADM

## 2023-01-23 RX ORDER — ONDANSETRON 4 MG/1
4 TABLET, ORALLY DISINTEGRATING ORAL EVERY 8 HOURS PRN
Status: DISCONTINUED | OUTPATIENT
Start: 2023-01-23 | End: 2023-01-31 | Stop reason: HOSPADM

## 2023-01-23 RX ORDER — ONDANSETRON 2 MG/ML
4 INJECTION INTRAMUSCULAR; INTRAVENOUS EVERY 6 HOURS PRN
Status: DISCONTINUED | OUTPATIENT
Start: 2023-01-23 | End: 2023-01-31 | Stop reason: HOSPADM

## 2023-01-23 RX ORDER — MAGNESIUM SULFATE IN WATER 40 MG/ML
2000 INJECTION, SOLUTION INTRAVENOUS ONCE
Status: COMPLETED | OUTPATIENT
Start: 2023-01-23 | End: 2023-01-23

## 2023-01-23 RX ORDER — ATORVASTATIN CALCIUM 40 MG/1
40 TABLET, FILM COATED ORAL DAILY
Status: DISCONTINUED | OUTPATIENT
Start: 2023-01-23 | End: 2023-01-31 | Stop reason: HOSPADM

## 2023-01-23 RX ORDER — SODIUM CHLORIDE 0.9 % (FLUSH) 0.9 %
5-40 SYRINGE (ML) INJECTION EVERY 12 HOURS SCHEDULED
Status: DISCONTINUED | OUTPATIENT
Start: 2023-01-23 | End: 2023-01-31 | Stop reason: HOSPADM

## 2023-01-23 RX ORDER — POLYETHYLENE GLYCOL 3350 17 G/17G
17 POWDER, FOR SOLUTION ORAL DAILY PRN
Status: DISCONTINUED | OUTPATIENT
Start: 2023-01-23 | End: 2023-01-31 | Stop reason: HOSPADM

## 2023-01-23 RX ORDER — QUETIAPINE 200 MG/1
200 TABLET, FILM COATED, EXTENDED RELEASE ORAL NIGHTLY
Status: DISCONTINUED | OUTPATIENT
Start: 2023-01-23 | End: 2023-01-31 | Stop reason: HOSPADM

## 2023-01-23 RX ADMIN — BUDESONIDE 500 MCG: 0.5 SUSPENSION RESPIRATORY (INHALATION) at 20:30

## 2023-01-23 RX ADMIN — MAGNESIUM SULFATE HEPTAHYDRATE 2000 MG: 40 INJECTION, SOLUTION INTRAVENOUS at 14:53

## 2023-01-23 RX ADMIN — ARFORMOTEROL TARTRATE 15 MCG: 15 SOLUTION RESPIRATORY (INHALATION) at 20:30

## 2023-01-23 RX ADMIN — IPRATROPIUM BROMIDE AND ALBUTEROL SULFATE 3 ML: .5; 2.5 SOLUTION RESPIRATORY (INHALATION) at 20:30

## 2023-01-23 RX ADMIN — ENOXAPARIN SODIUM 40 MG: 100 INJECTION SUBCUTANEOUS at 20:21

## 2023-01-23 RX ADMIN — Medication 10 ML: at 20:25

## 2023-01-23 RX ADMIN — QUETIAPINE FUMARATE 200 MG: 200 TABLET, EXTENDED RELEASE ORAL at 20:24

## 2023-01-23 RX ADMIN — LOSARTAN POTASSIUM 25 MG: 50 TABLET, FILM COATED ORAL at 20:22

## 2023-01-23 RX ADMIN — IPRATROPIUM BROMIDE AND ALBUTEROL SULFATE 3 AMPULE: .5; 2.5 SOLUTION RESPIRATORY (INHALATION) at 14:39

## 2023-01-23 RX ADMIN — GABAPENTIN 600 MG: 300 CAPSULE ORAL at 20:21

## 2023-01-23 RX ADMIN — LEVOFLOXACIN 500 MG: 5 INJECTION, SOLUTION INTRAVENOUS at 17:40

## 2023-01-23 ASSESSMENT — ENCOUNTER SYMPTOMS
SHORTNESS OF BREATH: 1
COUGH: 1
ABDOMINAL PAIN: 0
BACK PAIN: 0

## 2023-01-23 NOTE — H&P
Department of Internal Medicine  General Internal Medicine  Attending History and Physical      CHIEF COMPLAINT:  dyspnea    Reason for Admission:  COPD Exacerbation    History Obtained From:  patient    HISTORY OF PRESENT ILLNESS:      The patient is a 70 y.o. male with significant past medical history of COPD Exacerbation who presents with severe dyspnea. He denied fever and chills. Noted that he's been having non productive cough. He was placed on bipap and noted improved breathing. Past Medical History:        Diagnosis Date    Angina pectoris ([address]     states had neg heart cath    Asthma     Cervical spinal stenosis     COPD (chronic obstructive pulmonary disease) (HCC)     Depression     Hyperlipidemia     Hypertension     Neck pain      Past Surgical History:        Procedure Laterality Date    COLONOSCOPY      HAND SURGERY Left 11/13/2019    LEFT HAND-PALN, DUPUYTREN'S CONTRACTURE, MAJOR RESECTION. POSSIBLE FULL THICKNESS SKIN GRAFT    INTRACAPSULAR CATARACT EXTRACTION Left 11/15/2022    CATARACT EXTRACTION WITH IOL,COMPLEX VISCOAT, VISION BLUE, POSSIBLE ECCE-LEFT performed by Braydon De Santiago MD at [address]      removal non malignant lesion    NECK SURGERY      cervical fusion x2    WRIST SURGERY Left 11/13/2019    LEFT HAND DUPUYTRENS CONTRACTURE MAJOR RESECTION,  Z-PLASTY performed by Parviz Valadez MD at [address] [facility]         Medications Prior to Admission:    Losartan  Zocor    Allergies:  Patient has no known allergies. Social History:   TOBACCO:   reports that he quit smoking about 7 years ago. His smoking use included cigarettes. He has a 60.00 pack-year smoking history. He has never used smokeless tobacco.  ETOH:   reports that he does not currently use alcohol.     Family History:       Problem Relation Age of Onset    No Known Problems Mother     Heart Disease Father     Heart Disease Brother     Heart Disease Sibling     Heart Disease Sibling     Heart Disease Sibling      REVIEW OF SYSTEMS:  CONSTITUTIONAL:  negative for  fevers  EYES:  negative for  contacts and double vision  HEENT:  negative for  hearing loss and ear drainage  RESPIRATORY:  Positive for dry cough, dyspnea  CARDIOVASCULAR:  negative for  chest pain, palpitations, orthopnea  GASTROINTESTINAL:  negative for nausea, vomiting, and diarrhea  ALLERGIC/IMMUNOLOGIC:  negative for recurrent infections  ENDOCRINE:  negative for heat intolerance  MUSCULOSKELETAL:  negative for  myalgias, pain, and joint swelling  NEUROLOGICAL:  negative for headaches, seizures, and memory problems  BEHAVIOR/PSYCH:  negative for poor appetite and decreased sleep  PHYSICAL EXAM:    Vitals:  BP (!) 165/93   Pulse 91   Temp 97.6 °F (36.4 °C) (Axillary)   Resp 24   Ht 5' 10\" (1.778 m)   Wt 210 lb (95.3 kg)   SpO2 98%   BMI 30.13 kg/m²     CONSTITUTIONAL:  awake, cooperative, and no apparent distress  EYES:  extra-ocular muscles intact  ENT:  normocepalic, without obvious abnormality  NECK:  supple, symmetrical, trachea midline  BACK:  symmetric  LUNGS:  no increased work of breathing, no retractions, and diminished breath sounds throughout lungs  CARDIOVASCULAR:  normal apical pulses and normal S1 and S2  ABDOMEN:  normal bowel sounds, non-distended, and non-tender  MUSCULOSKELETAL:  there is no redness, warmth, or swelling of the joints  NEUROLOGIC:  Mental Status Exam:  Level of Alertness:   awake  Orientation:   person, place, time  SKIN:  no bruising or bleeding    DATA:  CBC with Differential:    Lab Results   Component Value Date/Time    WBC 8.3 01/23/2023 02:36 PM    RBC 5.15 01/23/2023 02:36 PM    HGB 16.5 01/23/2023 02:36 PM    HCT 47.0 01/23/2023 02:36 PM     01/23/2023 02:36 PM    MCV 91.3 01/23/2023 02:36 PM    MCH 32.0 01/23/2023 02:36 PM    MCHC 35.1 01/23/2023 02:36 PM    RDW 12.0 01/23/2023 02:36 PM    METASPCT 0.9 08/27/2022 06:20 AM    LYMPHOPCT 33.8 01/23/2023 02:36 PM    MONOPCT 13.9 01/23/2023 02:36 PM    BASOPCT 0.8 01/23/2023 02:36 PM    MONOSABS 1.15 01/23/2023 02:36 PM    LYMPHSABS 2.79 01/23/2023 02:36 PM    EOSABS 0.21 01/23/2023 02:36 PM    BASOSABS 0.07 01/23/2023 02:36 PM     BMP:    Lab Results   Component Value Date/Time     01/23/2023 02:36 PM    K 4.2 01/23/2023 02:36 PM    K 4.5 08/22/2022 05:14 AM     01/23/2023 02:36 PM    CO2 22 01/23/2023 02:36 PM    BUN 12 01/23/2023 02:36 PM    LABALBU 4.2 01/23/2023 02:36 PM    CREATININE 1.1 01/23/2023 02:36 PM    CALCIUM 9.3 01/23/2023 02:36 PM    GFRAA >60 08/29/2022 05:54 AM    LABGLOM >60 01/23/2023 02:36 PM    GLUCOSE 104 01/23/2023 02:36 PM     ASSESSMENT AND PLAN:      COPD exacerbation: breathing treatment. Due to severity of patient's COPD, will treat with levaquin. Check viral respiratory panel  Acute on chronic respiratory failure with hypoxia: Bipap prn. Supplemental oxygen when able to wean off  Hypertension Essential: start home meds  Hyperlipidemia: statin.

## 2023-01-23 NOTE — ED PROVIDER NOTES
This is a 68-year-old male with a past medical history of COPD and hypertension who presents to the ED for evaluation of shortness of breath. Patient states that his baseline he wears 2 to 3 L of oxygen patient states that today this afternoon he developed worsening shortness of breath which prompted him to come to the ER by way of EMS. Apparently according to EMS the patient was tripoding and he was placed on CPAP. Patient was given steroids. Patient does endorse a cough denies any fevers or chills. No other reported mitigating or exacerbating factors. The history is provided by the patient. Review of Systems   Constitutional:  Negative for fever. HENT:  Negative for congestion. Eyes:  Negative for visual disturbance. Respiratory:  Positive for cough and shortness of breath. Cardiovascular:  Negative for chest pain. Gastrointestinal:  Negative for abdominal pain. Endocrine: Negative for polyuria. Genitourinary:  Negative for dysuria. Musculoskeletal:  Negative for back pain. Skin:  Negative for rash. Allergic/Immunologic: Negative for immunocompromised state. Neurological:  Negative for headaches. Hematological:  Does not bruise/bleed easily. Psychiatric/Behavioral:  Negative for confusion. Physical Exam  Vitals and nursing note reviewed. Constitutional:       General: He is not in acute distress. Appearance: He is well-developed. HENT:      Head: Normocephalic and atraumatic. Eyes:      Extraocular Movements: Extraocular movements intact. Pupils: Pupils are equal, round, and reactive to light. Neck:      Vascular: No JVD. Cardiovascular:      Rate and Rhythm: Normal rate and regular rhythm. Pulmonary:      Comments: Increased effort, tachypneic, poor air movement  Abdominal:      General: There is no distension. Palpations: Abdomen is soft. Musculoskeletal:      Cervical back: Normal range of motion and neck supple.       Right lower leg: No edema. Left lower leg: No edema. Skin:     General: Skin is warm and dry. Capillary Refill: Capillary refill takes less than 2 seconds. Neurological:      General: No focal deficit present. Mental Status: He is alert and oriented to person, place, and time. Cranial Nerves: No cranial nerve deficit. Psychiatric:         Mood and Affect: Mood normal.         Behavior: Behavior normal.        Procedures     MDM  Number of Diagnoses or Management Options  COPD exacerbation ([facility] [address]  Diagnosis management comments: Patient is a 70-year-old male with severe emphysema who presented to the ED in acute respiratory distress on CPAP by EMS patient had poor air movement and expiratory inspiratory wheezing. Patient was treated with magnesium as well as duo nebs and steroids in addition to BiPAP. On recheck patient responded quite well to this treatment was resting comfortably stable vital signs.   History and physical most consistent with a COPD exacerbation I did speak with the hospitalist who recommended starting patient on Levaquin low suspicion for PE given the patient's history and physical is most consistent with a COPD exacerbation given his history patient family were agreeable to mission         Differential diagnosis: PNA, ACS, CHF  Review of ED/ Outpatient Records: Dr. Doron Nunez office visit  Historians that case was discussed with: EMS  My EKG interpretation: Prolonged QT, no st elevation, no st depressions, no ectopy  Imaging interpretation by myself: No signs of PTX, PNA or effusion  Independent Interpretation of labs: No change significant in troponin  Discussed with other providers: Dr. Raissa Walter  Tests Considered but not ordered: CTA however clinically appeared most consistent with a COPD exeascbation  Decision making tools/risks stratification: None  Disposition decision making/shared decision making: Shared  Chronic Conditions affecting care: COPD  Social Determinants of health impacting treatment or disposition: None  CODE status and Discussions: Full                      --------------------------------------------- PAST HISTORY ---------------------------------------------  Past Medical History:  has a past medical history of Angina pectoris ([facility] [address] Asthma, Cervical spinal stenosis, COPD (chronic obstructive pulmonary disease) ([facility] [address] Depression, Hyperlipidemia, Hypertension, and Neck pain. Past Surgical History:  has a past surgical history that includes Neck surgery; Colonoscopy; laryngoscopy; Hand surgery (Left, 11/13/2019); Wrist surgery (Left, 11/13/2019); and Intracapsular cataract extraction (Left, 11/15/2022). Social History:  reports that he quit smoking about 7 years ago. His smoking use included cigarettes. He has a 60.00 pack-year smoking history. He has never used smokeless tobacco. He reports that he does not currently use alcohol. He reports that he does not use drugs. Family History: family history includes Heart Disease in his brother, father, sibling, sibling, and sibling; No Known Problems in his mother. The patients home medications have been reviewed. Allergies: Patient has no known allergies.     -------------------------------------------------- RESULTS -------------------------------------------------    LABS:  Results for orders placed or performed during the hospital encounter of 01/23/23   COVID-19, Rapid    Specimen: Nasopharyngeal Swab   Result Value Ref Range    SARS-CoV-2, NAAT Not Detected Not Detected   RAPID INFLUENZA A/B ANTIGENS    Specimen: Nasopharyngeal   Result Value Ref Range    Influenza A by PCR Not Detected Not Detected    Influenza B by PCR Not Detected Not Detected   Respiratory Panel, Molecular, with COVID-19 (Restricted: peds pts or suitable admitted adults)    Specimen: Nasopharyngeal   Result Value Ref Range    Adenovirus by PCR Not Detected Not Detected    Bordetella parapertussis by PCR Not Detected Not Detected Bordetella pertussis by PCR Not Detected Not Detected    Chlamydophilia pneumoniae by PCR Not Detected Not Detected    Coronavirus 229E by PCR Not Detected Not Detected    Coronavirus HKU1 by PCR Not Detected Not Detected    Coronavirus NL63 by PCR Not Detected Not Detected    Coronavirus OC43 by PCR Not Detected Not Detected    SARS-CoV-2, PCR Not Detected Not Detected    Human Metapneumovirus by PCR Not Detected Not Detected    Human Rhinovirus/Enterovirus by PCR Not Detected Not Detected    Influenza A by PCR Not Detected Not Detected    Influenza B by PCR Not Detected Not Detected    Mycoplasma pneumoniae by PCR Not Detected Not Detected    Parainfluenza Virus 1 by PCR Not Detected Not Detected    Parainfluenza Virus 2 by PCR Not Detected Not Detected    Parainfluenza Virus 3 by PCR Not Detected Not Detected    Parainfluenza Virus 4 by PCR Not Detected Not Detected    Respiratory Syncytial Virus by PCR Not Detected Not Detected   CMP   Result Value Ref Range    Sodium 142 132 - 146 mmol/L    Potassium 4.2 3.5 - 5.0 mmol/L    Chloride 106 98 - 107 mmol/L    CO2 22 22 - 29 mmol/L    Anion Gap 14 7 - 16 mmol/L    Glucose 104 (H) 74 - 99 mg/dL    BUN 12 6 - 23 mg/dL    Creatinine 1.1 0.7 - 1.2 mg/dL    Est, Glom Filt Rate >60 >=60 mL/min/1.73    Calcium 9.3 8.6 - 10.2 mg/dL    Total Protein 6.4 6.4 - 8.3 g/dL    Albumin 4.2 3.5 - 5.2 g/dL    Total Bilirubin 0.3 0.0 - 1.2 mg/dL    Alkaline Phosphatase 107 40 - 129 U/L    ALT 19 0 - 40 U/L    AST 18 0 - 39 U/L   CBC with Auto Differential   Result Value Ref Range    WBC 8.3 4.5 - 11.5 E9/L    RBC 5.15 3.80 - 5.80 E12/L    Hemoglobin 16.5 12.5 - 16.5 g/dL    Hematocrit 47.0 37.0 - 54.0 %    MCV 91.3 80.0 - 99.9 fL    MCH 32.0 26.0 - 35.0 pg    MCHC 35.1 (H) 32.0 - 34.5 %    RDW 12.0 11.5 - 15.0 fL    Platelets 454 854 - 840 E9/L    MPV 9.2 7.0 - 12.0 fL    Neutrophils % 48.6 43.0 - 80.0 %    Immature Granulocytes % 0.4 0.0 - 5.0 %    Lymphocytes % 33.8 20.0 - 42.0 % Monocytes % 13.9 (H) 2.0 - 12.0 %    Eosinophils % 2.5 0.0 - 6.0 %    Basophils % 0.8 0.0 - 2.0 %    Neutrophils Absolute 4.01 1.80 - 7.30 E9/L    Immature Granulocytes # 0.03 E9/L    Lymphocytes Absolute 2.79 1.50 - 4.00 E9/L    Monocytes Absolute 1.15 (H) 0.10 - 0.95 E9/L    Eosinophils Absolute 0.21 0.05 - 0.50 E9/L    Basophils Absolute 0.07 0.00 - 0.20 E9/L   Troponin   Result Value Ref Range    Troponin, High Sensitivity 20 (H) 0 - 11 ng/L   Brain Natriuretic Peptide   Result Value Ref Range    Pro-BNP 91 0 - 125 pg/mL   Protime-INR   Result Value Ref Range    Protime 10.6 9.3 - 12.4 sec    INR 0.9    Blood Gas, Arterial   Result Value Ref Range    Date Analyzed [id number]     Time Analyzed 1447     Source: Blood Arterial     pH, Blood Gas 7.576 (H) 7.350 - 7.450    PCO2 21.1 (L) 35.0 - 45.0 mmHg    PO2 221.5 (H) 75.0 - 100.0 mmHg    HCO3 19.2 (L) 22.0 - 26.0 mmol/L    B.E. -0.1 -3.0 - 3.0 mmol/L    O2 Sat 99.2 (H) 92.0 - 98.5 %    PO2/FIO2 4.43 mmHg/%    AaDO2 98.8 mmHg    RI(T) 0.45     O2Hb 98.7 (H) 94.0 - 97.0 %    COHb 0.1 0.0 - 1.5 %    MetHb 0.4 0.0 - 1.5 %    O2 Content 23.6 mL/dL    HHb 0.8 0.0 - 5.0 %    tHb (est) 16.7 (H) 11.5 - 16.5 g/dL    Mode BILEVEL     FIO2 50.0 %    Peep/Cpap 5.0 cmH2O    PIP 15.0 cmH2O    Date Of Collection      Time Collected      Pt Temp 37.0 C     ID [id number]     Lab [id number]     Critical(s) Notified .  No Critical Values    Troponin   Result Value Ref Range    Troponin, High Sensitivity 16 (H) 0 - 11 ng/L   EKG 12 Lead   Result Value Ref Range    Ventricular Rate 92 BPM    Atrial Rate 92 BPM    P-R Interval 190 ms    QRS Duration 108 ms    Q-T Interval 390 ms    QTc Calculation (Bazett) 482 ms    P Axis 78 degrees    R Axis 72 degrees    T Axis 64 degrees   EKG 12 Lead   Result Value Ref Range    Ventricular Rate 88 BPM    Atrial Rate 88 BPM    P-R Interval 202 ms    QRS Duration 88 ms    Q-T Interval 388 ms    QTc Calculation (Bazett) 469 ms    P Axis 68 degrees    R Axis 52 degrees    T Axis 56 degrees       RADIOLOGY:  XR CHEST PORTABLE   Final Result   No acute process. ------------------------- NURSING NOTES AND VITALS REVIEWED ---------------------------  Date / Time Roomed:  1/23/2023  2:28 PM  ED Bed Assignment:  0629/0629-01    The nursing notes within the ED encounter and vital signs as below have been reviewed. Patient Vitals for the past 24 hrs:   BP Temp Temp src Pulse Resp SpO2 Height Weight   01/23/23 1945 -- -- -- -- -- -- 5' 10\" (1.778 m) 212 lb 8.4 oz (96.4 kg)   01/23/23 1930 (!) 167/102 97.9 °F (36.6 °C) Oral 98 24 98 % -- --   01/23/23 1912 -- -- -- 94 23 99 % -- --   01/23/23 1902 (!) 151/98 -- -- 90 15 99 % -- --   01/23/23 1852 -- -- -- 92 16 99 % -- --   01/23/23 1842 -- -- -- 92 21 100 % -- --   01/23/23 1832 (!) 163/97 -- -- 93 21 100 % -- --   01/23/23 1822 -- -- -- 95 17 99 % -- --   01/23/23 1812 -- -- -- 94 19 98 % -- --   01/23/23 1802 (!) 154/97 -- -- 95 18 98 % -- --   01/23/23 1730 (!) 165/93 -- -- 91 24 98 % -- --   01/23/23 1630 (!) 146/90 -- -- 85 18 99 % -- --   01/23/23 1517 -- -- -- 93 17 99 % -- --   01/23/23 1500 134/89 -- -- 95 27 100 % -- --   01/23/23 1440 -- -- -- 96 30 100 % -- --   01/23/23 1439 -- -- -- 96 30 100 % -- --   01/23/23 1438 -- -- -- (!) 106 (!) 32 100 % -- --   01/23/23 1430 (!) 166/116 97.6 °F (36.4 °C) Axillary (!) 109 -- 100 % 5' 10\" (1.778 m) 210 lb (95.3 kg)       Oxygen Saturation Interpretation: Normal    ------------------------------------------ PROGRESS NOTES ------------------------------------------  Re-evaluation(s):  Time: 9110  Patients symptoms are improving  Repeat physical examination is improved    Counseling:  I have spoken with the patient and discussed todays results, in addition to providing specific details for the plan of care and counseling regarding the diagnosis and prognosis.   Their questions are answered at this time and they are agreeable with the plan of admission.    Please note that the withdrawal or failure to initiate urgent interventions for this patient would likely result in a life threatening deterioration or permanent disability.  Systems at risk for deterioration include: CV/Pulm    Accordingly this patient received 32 minutes of critical care time, excluding separately billable procedures.     --------------------------------- ADDITIONAL PROVIDER NOTES ---------------------------------  Consultations:  Spoke with Dr. Paiz Discussed case.  They will admit the patient.  This patient's ED course included: a personal history and physicial examination, re-evaluation prior to disposition, multiple bedside re-evaluations, IV medications, cardiac monitoring, continuous pulse oximetry, and complex medical decision making and emergency management    This patient has remained hemodynamically stable during their ED course.    Diagnosis:  1. COPD exacerbation (HCC)        Disposition:  Patient's disposition: Admit to telemetry  Patient's condition is stable.          Michael Moody DO  01/24/23 1206

## 2023-01-23 NOTE — Clinical Note
Discharge Plan[de-identified] Other/[facility])   Telemetry/Cardiac Monitoring Required?: Yes

## 2023-01-24 LAB
ALBUMIN SERPL-MCNC: 4 G/DL (ref 3.5–5.2)
ALP BLD-CCNC: 96 U/L (ref 40–129)
ALT SERPL-CCNC: 14 U/L (ref 0–40)
ANION GAP SERPL CALCULATED.3IONS-SCNC: 11 MMOL/L (ref 7–16)
AST SERPL-CCNC: 14 U/L (ref 0–39)
BASOPHILS ABSOLUTE: 0.01 E9/L (ref 0–0.2)
BASOPHILS RELATIVE PERCENT: 0.1 % (ref 0–2)
BILIRUB SERPL-MCNC: 0.3 MG/DL (ref 0–1.2)
BUN BLDV-MCNC: 16 MG/DL (ref 6–23)
CALCIUM SERPL-MCNC: 9.1 MG/DL (ref 8.6–10.2)
CHLORIDE BLD-SCNC: 104 MMOL/L (ref 98–107)
CO2: 20 MMOL/L (ref 22–29)
CREAT SERPL-MCNC: 1.1 MG/DL (ref 0.7–1.2)
EKG ATRIAL RATE: 88 BPM
EKG ATRIAL RATE: 92 BPM
EKG P AXIS: 68 DEGREES
EKG P AXIS: 78 DEGREES
EKG P-R INTERVAL: 190 MS
EKG P-R INTERVAL: 202 MS
EKG Q-T INTERVAL: 388 MS
EKG Q-T INTERVAL: 390 MS
EKG QRS DURATION: 108 MS
EKG QRS DURATION: 88 MS
EKG QTC CALCULATION (BAZETT): 469 MS
EKG QTC CALCULATION (BAZETT): 482 MS
EKG R AXIS: 52 DEGREES
EKG R AXIS: 72 DEGREES
EKG T AXIS: 56 DEGREES
EKG T AXIS: 64 DEGREES
EKG VENTRICULAR RATE: 88 BPM
EKG VENTRICULAR RATE: 92 BPM
EOSINOPHILS ABSOLUTE: 0.01 E9/L (ref 0.05–0.5)
EOSINOPHILS RELATIVE PERCENT: 0.1 % (ref 0–6)
GFR SERPL CREATININE-BSD FRML MDRD: >60 ML/MIN/1.73
GLUCOSE BLD-MCNC: 232 MG/DL (ref 74–99)
HCT VFR BLD CALC: 45.7 % (ref 37–54)
HEMOGLOBIN: 15.8 G/DL (ref 12.5–16.5)
IMMATURE GRANULOCYTES #: 0.04 E9/L
IMMATURE GRANULOCYTES %: 0.5 % (ref 0–5)
LYMPHOCYTES ABSOLUTE: 1.15 E9/L (ref 1.5–4)
LYMPHOCYTES RELATIVE PERCENT: 14.8 % (ref 20–42)
MCH RBC QN AUTO: 31.6 PG (ref 26–35)
MCHC RBC AUTO-ENTMCNC: 34.6 % (ref 32–34.5)
MCV RBC AUTO: 91.4 FL (ref 80–99.9)
METER GLUCOSE: 247 MG/DL (ref 74–99)
METER GLUCOSE: 249 MG/DL (ref 74–99)
METER GLUCOSE: 259 MG/DL (ref 74–99)
MONOCYTES ABSOLUTE: 0.12 E9/L (ref 0.1–0.95)
MONOCYTES RELATIVE PERCENT: 1.5 % (ref 2–12)
NEUTROPHILS ABSOLUTE: 6.42 E9/L (ref 1.8–7.3)
NEUTROPHILS RELATIVE PERCENT: 83 % (ref 43–80)
PDW BLD-RTO: 12.4 FL (ref 11.5–15)
PLATELET # BLD: 221 E9/L (ref 130–450)
PMV BLD AUTO: 9.3 FL (ref 7–12)
POTASSIUM REFLEX MAGNESIUM: 4.7 MMOL/L (ref 3.5–5)
RBC # BLD: 5 E12/L (ref 3.8–5.8)
SODIUM BLD-SCNC: 135 MMOL/L (ref 132–146)
TOTAL PROTEIN: 6.1 G/DL (ref 6.4–8.3)
WBC # BLD: 7.8 E9/L (ref 4.5–11.5)

## 2023-01-24 PROCEDURE — 94660 CPAP INITIATION&MGMT: CPT

## 2023-01-24 PROCEDURE — 82962 GLUCOSE BLOOD TEST: CPT

## 2023-01-24 PROCEDURE — 97165 OT EVAL LOW COMPLEX 30 MIN: CPT

## 2023-01-24 PROCEDURE — 6370000000 HC RX 637 (ALT 250 FOR IP): Performed by: INTERNAL MEDICINE

## 2023-01-24 PROCEDURE — 99232 SBSQ HOSP IP/OBS MODERATE 35: CPT | Performed by: INTERNAL MEDICINE

## 2023-01-24 PROCEDURE — 6360000002 HC RX W HCPCS: Performed by: INTERNAL MEDICINE

## 2023-01-24 PROCEDURE — 36415 COLL VENOUS BLD VENIPUNCTURE: CPT

## 2023-01-24 PROCEDURE — 85025 COMPLETE CBC W/AUTO DIFF WBC: CPT

## 2023-01-24 PROCEDURE — 97161 PT EVAL LOW COMPLEX 20 MIN: CPT | Performed by: PHYSICAL THERAPIST

## 2023-01-24 PROCEDURE — 2700000000 HC OXYGEN THERAPY PER DAY

## 2023-01-24 PROCEDURE — 97110 THERAPEUTIC EXERCISES: CPT | Performed by: PHYSICAL THERAPIST

## 2023-01-24 PROCEDURE — 2580000003 HC RX 258: Performed by: INTERNAL MEDICINE

## 2023-01-24 PROCEDURE — 2060000000 HC ICU INTERMEDIATE R&B

## 2023-01-24 PROCEDURE — 93010 ELECTROCARDIOGRAM REPORT: CPT | Performed by: INTERNAL MEDICINE

## 2023-01-24 PROCEDURE — 94640 AIRWAY INHALATION TREATMENT: CPT

## 2023-01-24 PROCEDURE — 6360000002 HC RX W HCPCS: Performed by: FAMILY MEDICINE

## 2023-01-24 PROCEDURE — 97110 THERAPEUTIC EXERCISES: CPT

## 2023-01-24 PROCEDURE — 80053 COMPREHEN METABOLIC PANEL: CPT

## 2023-01-24 RX ORDER — DEXTROSE MONOHYDRATE 100 MG/ML
INJECTION, SOLUTION INTRAVENOUS CONTINUOUS PRN
Status: DISCONTINUED | OUTPATIENT
Start: 2023-01-24 | End: 2023-01-31 | Stop reason: HOSPADM

## 2023-01-24 RX ORDER — INSULIN LISPRO 100 [IU]/ML
0-4 INJECTION, SOLUTION INTRAVENOUS; SUBCUTANEOUS NIGHTLY
Status: DISCONTINUED | OUTPATIENT
Start: 2023-01-24 | End: 2023-01-31 | Stop reason: HOSPADM

## 2023-01-24 RX ORDER — LEVOFLOXACIN 5 MG/ML
500 INJECTION, SOLUTION INTRAVENOUS EVERY 24 HOURS
Status: COMPLETED | OUTPATIENT
Start: 2023-01-24 | End: 2023-01-27

## 2023-01-24 RX ORDER — METHYLPREDNISOLONE SODIUM SUCCINATE 125 MG/2ML
125 INJECTION, POWDER, LYOPHILIZED, FOR SOLUTION INTRAMUSCULAR; INTRAVENOUS ONCE
Status: COMPLETED | OUTPATIENT
Start: 2023-01-24 | End: 2023-01-24

## 2023-01-24 RX ORDER — METHYLPREDNISOLONE SODIUM SUCCINATE 40 MG/ML
40 INJECTION, POWDER, LYOPHILIZED, FOR SOLUTION INTRAMUSCULAR; INTRAVENOUS EVERY 8 HOURS
Status: DISCONTINUED | OUTPATIENT
Start: 2023-01-24 | End: 2023-01-27

## 2023-01-24 RX ORDER — INSULIN LISPRO 100 [IU]/ML
0-4 INJECTION, SOLUTION INTRAVENOUS; SUBCUTANEOUS
Status: DISCONTINUED | OUTPATIENT
Start: 2023-01-24 | End: 2023-01-31 | Stop reason: HOSPADM

## 2023-01-24 RX ADMIN — IPRATROPIUM BROMIDE AND ALBUTEROL SULFATE 3 ML: .5; 2.5 SOLUTION RESPIRATORY (INHALATION) at 18:14

## 2023-01-24 RX ADMIN — ALBUTEROL SULFATE 2.5 MG: 2.5 SOLUTION RESPIRATORY (INHALATION) at 00:04

## 2023-01-24 RX ADMIN — LEVOFLOXACIN 500 MG: 5 INJECTION, SOLUTION INTRAVENOUS at 18:17

## 2023-01-24 RX ADMIN — ARFORMOTEROL TARTRATE 15 MCG: 15 SOLUTION RESPIRATORY (INHALATION) at 06:11

## 2023-01-24 RX ADMIN — ENOXAPARIN SODIUM 40 MG: 100 INJECTION SUBCUTANEOUS at 20:21

## 2023-01-24 RX ADMIN — QUETIAPINE FUMARATE 200 MG: 200 TABLET, EXTENDED RELEASE ORAL at 20:21

## 2023-01-24 RX ADMIN — Medication 10 ML: at 08:21

## 2023-01-24 RX ADMIN — METHYLPREDNISOLONE SODIUM SUCCINATE 40 MG: 40 INJECTION, POWDER, FOR SOLUTION INTRAMUSCULAR; INTRAVENOUS at 12:52

## 2023-01-24 RX ADMIN — IPRATROPIUM BROMIDE AND ALBUTEROL SULFATE 3 ML: .5; 2.5 SOLUTION RESPIRATORY (INHALATION) at 11:48

## 2023-01-24 RX ADMIN — BUDESONIDE 500 MCG: 0.5 SUSPENSION RESPIRATORY (INHALATION) at 18:14

## 2023-01-24 RX ADMIN — METHYLPREDNISOLONE SODIUM SUCCINATE 40 MG: 40 INJECTION, POWDER, FOR SOLUTION INTRAMUSCULAR; INTRAVENOUS at 20:21

## 2023-01-24 RX ADMIN — BUDESONIDE 500 MCG: 0.5 SUSPENSION RESPIRATORY (INHALATION) at 06:11

## 2023-01-24 RX ADMIN — LOSARTAN POTASSIUM 25 MG: 50 TABLET, FILM COATED ORAL at 18:15

## 2023-01-24 RX ADMIN — METHYLPREDNISOLONE SODIUM SUCCINATE 125 MG: 125 INJECTION, POWDER, FOR SOLUTION INTRAMUSCULAR; INTRAVENOUS at 00:14

## 2023-01-24 RX ADMIN — INSULIN LISPRO 1 UNITS: 100 INJECTION, SOLUTION INTRAVENOUS; SUBCUTANEOUS at 12:54

## 2023-01-24 RX ADMIN — Medication 10 ML: at 20:21

## 2023-01-24 RX ADMIN — ATORVASTATIN CALCIUM 40 MG: 40 TABLET, FILM COATED ORAL at 08:20

## 2023-01-24 RX ADMIN — IPRATROPIUM BROMIDE AND ALBUTEROL SULFATE 3 ML: .5; 2.5 SOLUTION RESPIRATORY (INHALATION) at 06:11

## 2023-01-24 RX ADMIN — GABAPENTIN 600 MG: 300 CAPSULE ORAL at 20:21

## 2023-01-24 RX ADMIN — ARFORMOTEROL TARTRATE 15 MCG: 15 SOLUTION RESPIRATORY (INHALATION) at 18:14

## 2023-01-24 RX ADMIN — INSULIN LISPRO 1 UNITS: 100 INJECTION, SOLUTION INTRAVENOUS; SUBCUTANEOUS at 18:18

## 2023-01-24 NOTE — CARE COORDINATION
Case Management Assessment  Initial Evaluation    Date/Time of Evaluation: 1/24/2023 3:38 PM  Assessment Completed by: Mckayla Calhoun RN    If patient is discharged prior to next notation, then this note serves as note for discharge by case management. Patient Name: Jarret Quezada                   YOB: 1955  Diagnosis: COPD exacerbation [facility]) [J44.1]                   Date / Time: 1/23/2023  2:28 PM    Patient Admission Status: Inpatient   Readmission Risk (Low < 19, Mod (19-27), High > 27): Readmission Risk Score: 9.5    Current PCP: Yvrose Aguilera MD  PCP verified by CM? Yes    Chart Reviewed: Yes      History Provided by: Patient  Patient Orientation: Alert and Oriented    Patient Cognition: Alert    Hospitalization in the last 30 days (Readmission):  No    If yes, Readmission Assessment in CM Navigator will be completed. Advance Directives:      Code Status: Full Code   Patient's Primary Decision Maker is:  (Pt's fiance)    [facility] - [phone]    Discharge Planning:    Patient lives with: Spouse/Significant Other Type of Home: House  Primary Care Giver: Self  Patient Support Systems include: Spouse/Significant Other   Current Financial resources: None  Current community resources:  (pt had went to Pulmonary Rehab)  Current services prior to admission: Oxygen Therapy            Current DME:              Type of Home Care services:  None    ADLS  Prior functional level: Independent in ADLs/IADLs  Current functional level: Independent in ADLs/IADLs    PT AM-PAC: 11 /24  OT AM-PAC: 16 /24    Family can provide assistance at DC: Yes  Would you like Case Management to discuss the discharge plan with any other family members/significant others, and if so, who?  No  Plans to Return to Present Housing: Yes  Other Identified Issues/Barriers to RETURNING to current housing: None  Potential Assistance needed at discharge: N/A            Potential DME:    Patient expects to discharge to: [address] [facility] for transportation at discharge: Family    Financial    Payor: [place] / Plan: Addie [employer]e ESSENTIAL/PLUS / Product Type: *No Product type* /     Does insurance require precert for SNF: Yes    Potential assistance Purchasing Medications: No  Meds-to-Beds request: Yes      [address] - [place] [facility] [place] [address] [place] [phone] Hany Dumont [phone]  [address]  Abundio Bence) [address]  Phone: [phone] Fax: [phone]      Notes:    Factors facilitating achievement of predicted outcomes: Family support, Cooperative, Pleasant, and Good insight into deficits    Barriers to discharge: None    Additional Case Management Notes: 1/24/2023 1530 CM Note:  Pt resides with his fiance in a 2 story house with 3 stp entry and 14 steps to go upstairs where his bedroom is located. Pt uses a cane and has O2 3 liters and a nebulizer from [employer]. Pt follows with [facility] to be placed on lung transplant list.  Pt states that he will be officially on the list on 2/20/2023. CM set up pt with pulmonary rehab through [facility]([phone] in Aug of 2021(pt's local pulmonologist is Dr Mikey Corbin). Pt states that his insurance stopped paying for pulmonary rehab. Pcp is Dr Anju Giordano and uses [facility] in [place]. Pt's family will provide ride home.       Renea Oneal RN  Case Management Department

## 2023-01-24 NOTE — PLAN OF CARE
Problem: Discharge Planning  Goal: Discharge to home or other facility with appropriate resources  Outcome: Progressing  Flowsheets (Taken 1/23/2023 2057)  Discharge to home or other facility with appropriate resources: Identify barriers to discharge with patient and caregiver     Problem: Safety - Adult  Goal: Free from fall injury  Outcome: Progressing

## 2023-01-24 NOTE — PROGRESS NOTES
Per P&T Committee and Medical Executive Committee approval, respiratory inhalers are nonformulary and inter[facility] to [facility] unless the prescriber rejects the interchange and orders the inhaler as dispense as written (FELICE). FELICE orders for inhalers will require the patient/caregiver to bring the nonformulary inhaler into the hospital from home. Please note that per P&T Committee and Medical Executive Committee approval, Pharmacy has discontinued the duplicate order for the following respiratory medications: ipratropium nebulizer solution. Please call the Pharmacy with any questions.

## 2023-01-24 NOTE — PROGRESS NOTES
Department of Internal Medicine  General Internal Medicine  Attending Progress Note  Chief Complaint   Patient presents with    Shortness of Breath     Pt c/o shortness of breath that started around 1330 this afternoon. Hx COPD, on 2-3L O2 at home. Pt given 125 mg solumedrol and 1 duoneb treatment by EMS. 18 g LAC. SUBJECTIVE:    Reports that he is very short of breath. Noted that he just got back into bed and feels dyspneic. Aware of plans to continue solu-medrol.     OBJECTIVE      Medications    Current Facility-Administered Medications: methylPREDNISolone sodium (SOLU-MEDROL) injection 40 mg, 40 mg, IntraVENous, Q8H  levoFLOXacin (LEVAQUIN) 500 MG/100ML infusion 500 mg, 500 mg, IntraVENous, Q24H  insulin lispro (HUMALOG) injection vial 0-4 Units, 0-4 Units, SubCUTAneous, TID WC  insulin lispro (HUMALOG) injection vial 0-4 Units, 0-4 Units, SubCUTAneous, Nightly  glucose chewable tablet 16 g, 4 tablet, Oral, PRN  dextrose bolus 10% 125 mL, 125 mL, IntraVENous, PRN **OR** dextrose bolus 10% 250 mL, 250 mL, IntraVENous, PRN  glucagon (rDNA) injection 1 mg, 1 mg, SubCUTAneous, PRN  dextrose 10 % infusion, , IntraVENous, Continuous PRN  gabapentin (NEURONTIN) capsule 600 mg, 600 mg, Oral, Nightly  ipratropium-albuterol (DUONEB) nebulizer solution 3 mL, 1 vial, Inhalation, TID  losartan (COZAAR) tablet 25 mg, 25 mg, Oral, QPM  QUEtiapine (SEROQUEL XR) extended release tablet 200 mg, 200 mg, Oral, Nightly  atorvastatin (LIPITOR) tablet 40 mg, 40 mg, Oral, Daily  sodium chloride flush 0.9 % injection 5-40 mL, 5-40 mL, IntraVENous, 2 times per day  sodium chloride flush 0.9 % injection 5-40 mL, 5-40 mL, IntraVENous, PRN  0.9 % sodium chloride infusion, , IntraVENous, PRN  enoxaparin (LOVENOX) injection 40 mg, 40 mg, SubCUTAneous, Daily  ondansetron (ZOFRAN-ODT) disintegrating tablet 4 mg, 4 mg, Oral, Q8H PRN **OR** ondansetron (ZOFRAN) injection 4 mg, 4 mg, IntraVENous, Q6H PRN  polyethylene glycol (GLYCOLAX) packet 17 g, 17 g, Oral, Daily PRN  acetaminophen (TYLENOL) tablet 650 mg, 650 mg, Oral, Q6H PRN **OR** acetaminophen (TYLENOL) suppository 650 mg, 650 mg, Rectal, Q6H PRN  albuterol (PROVENTIL) nebulizer solution 2.5 mg, 2.5 mg, Nebulization, Q6H PRN **AND** Nebulizer tx intermittent, , , Q6H PRN  arformoterol tartrate (BROVANA) nebulizer solution 15 mcg, 15 mcg, Nebulization, BID **AND** budesonide (PULMICORT) nebulizer suspension 500 mcg, 0.5 mg, Nebulization, BID  Physical    VITALS:  /63   Pulse (!) 111   Temp 97.8 °F (36.6 °C) (Oral)   Resp 22   Ht 5' 10\" (1.778 m)   Wt 212 lb 8.4 oz (96.4 kg)   SpO2 93%   BMI 30.49 kg/m²   CONSTITUTIONAL:  awake, cooperative, and no apparent distress  EYES:  extra-ocular muscles intact  ENT:  normocepalic, without obvious abnormality  NECK:  supple, symmetrical, trachea midline  LUNGS:  tachypneic, no retractions, and wheeze diffuse  CARDIOVASCULAR:  normal apical pulses and normal S1 and S2  ABDOMEN:  normal bowel sounds, non-distended, and non-tender  MUSCULOSKELETAL:  full range of motion noted  NEUROLOGIC:  Mental Status Exam:  Level of Alertness:   awake  Orientation:   person, place, time  SKIN:  no bruising or bleeding  Data    CBC:   Lab Results   Component Value Date/Time    WBC 7.8 01/24/2023 06:39 AM    RBC 5.00 01/24/2023 06:39 AM    HGB 15.8 01/24/2023 06:39 AM    HCT 45.7 01/24/2023 06:39 AM    MCV 91.4 01/24/2023 06:39 AM    MCH 31.6 01/24/2023 06:39 AM    MCHC 34.6 01/24/2023 06:39 AM    RDW 12.4 01/24/2023 06:39 AM     01/24/2023 06:39 AM    MPV 9.3 01/24/2023 06:39 AM     BMP:    Lab Results   Component Value Date/Time     01/24/2023 06:39 AM    K 4.7 01/24/2023 06:39 AM     01/24/2023 06:39 AM    CO2 20 01/24/2023 06:39 AM    BUN 16 01/24/2023 06:39 AM    LABALBU 4.0 01/24/2023 06:39 AM    CREATININE 1.1 01/24/2023 06:39 AM    CALCIUM 9.1 01/24/2023 06:39 AM    GFRAA >60 08/29/2022 05:54 AM    LABGLOM >60 01/24/2023 06:39 AM GLUCOSE 232 01/24/2023 06:39 AM       ASSESSMENT AND PLAN      COPD exacerbation: continue breathing treatment. Consult placed to Pulm. Continue levaquin. Continue Breathing treatment. Appreciate additional recommendation. Respiratory panel is negative for any viral infection. Tachycardia: related to Pulm. Continue to monitor  Essential Hypertension: Continue home medications  Hyperlipidemia: continue statin.

## 2023-01-24 NOTE — PROGRESS NOTES
[address] [facility]  [facility] [place]        Date:2023                                                  Patient Name: Kaleigh Alvarez    MRN: [id number]    : 1955    Room: [address] [place] [address]      Evaluating OT: Isela Murry OTR/L #[id number]     Referring Provider and Specific Provider Orders/Date:      23  OT eval and treat  Start:  23,   End:  23,   ONE TIME,   Standing Count:  1 Occurrences,   Nando Paiz MD      Placement Recommendation: Subacute vs Home Health Occupational Therapy if patient is able to meet goals        Diagnosis:   1. COPD exacerbation [facility])         Surgery: None       Pertinent Medical History:       Past Medical History:   Diagnosis Date    Angina pectoris ([address]     states had neg heart cath    Asthma     Cervical spinal stenosis     COPD (chronic obstructive pulmonary disease) (HCC)     Depression     Hyperlipidemia     Hypertension     Neck pain          Past Surgical History:   Procedure Laterality Date    COLONOSCOPY      HAND SURGERY Left 2019    LEFT HAND-PALN, DUPUYTREN'S CONTRACTURE, MAJOR RESECTION.  POSSIBLE FULL THICKNESS SKIN GRAFT    INTRACAPSULAR CATARACT EXTRACTION Left 11/15/2022    CATARACT EXTRACTION WITH IOL,COMPLEX VISCOAT, VISION BLUE, POSSIBLE ECCE-LEFT performed by Libia Olivas MD at [address] [facility]      removal non malignant lesion    NECK SURGERY      cervical fusion x2    WRIST SURGERY Left 2019    LEFT HAND DUPUYTRENS CONTRACTURE MAJOR RESECTION,  Z-PLASTY performed by Alicia Mosqueda MD at [address] [place] [address] [facility]      Precautions:  Fall Risk, up as tolerated, 3L O2 via nasal canula, dyspnea on exertion      Assessment of current deficits    [x] Functional mobility  [x]ADLs  [x] Strength               []Cognition    [x] Functional transfers   [x] IADLs         [x] Safety Awareness [x]Endurance    [] Fine Coordination              [x] Balance      [] Vision/perception   []Sensation     []Gross Motor Coordination  [] ROM  [] Delirium                   [] Motor Control     OT PLAN OF CARE   OT POC based on physician orders, patient diagnosis and results of clinical assessment    Frequency/Duration 1-3 days/wk for 2 weeks PRN     Specific OT Treatment Interventions to include:   * Instruction/training on adapted ADL techniques and AE recommendations to increase functional independence within precautions       * Training on energy conservation strategies, correct breathing pattern and techniques to improve independence/tolerance for self-care routine  * Functional transfer/mobility training/DME recommendations for increased independence, safety, and fall prevention  * Patient/Family education to increase follow through with safety techniques and functional independence  * Recommendation of environmental modifications for increased safety with functional transfers/mobility and ADLs  * Therapeutic exercise to improve motor endurance, ROM, and functional strength for ADLs/functional transfers  * Therapeutic activities to facilitate/challenge dynamic balance, stand tolerance for increased safety and independence with ADLs    Recommended Adaptive Equipment: TBD      Home Living: Lives with fijanee, single family home, 2 story, 3 steps to enter with rail. 14 steps with rail to 2nd floor where bedroom and full bathroom is located. Half bathroom on 1st floor. Bathroom set-up: Walk-in shower  , standard commode     Equipment owned: [employer], wheeled walker, O2 dependent (3L)     Prior Level of Function: Moderate [place] with ADLs , fiance completes most IADLs; ambulated independently with cane. Driving: Yes      Pain Level: pt denied pain; Nursing notified.       Cognition: A&O: 4/4; Follows 3 step directions   Memory: intact   Sequencing: intact   Problem solving: fair    Judgement/safety: fair [facility]   AM-PAC Daily Activity Inpatient   How much help for putting on and taking off regular lower body clothing?: A Little  How much help for Bathing?: A Lot  How much help for Toileting?: A Lot  How much help for putting on and taking off regular upper body clothing?: A Little  How much help for taking care of personal grooming?: A Little  How much help for eating meals?: A Little  AM-PAC Inpatient Daily Activity Raw Score: 16  AM-PAC Inpatient ADL T-Scale Score : 35.96  ADL Inpatient CMS 0-100% Score: 53.32  ADL Inpatient CMS G-Code Modifier : CK     Functional Assessment:    Initial Eval Status  Date: 1/24/23   Treatment Status  Date: STGs = LTGs  Time frame: 10-14 days   Feeding Supervision     Independent    Grooming Stand by Assist   Seated with cueing for energy conservation     Moderate [place]    UB Dressing Minimal Assist  For gown mgmt seated in chair     Moderate [place]    LB Dressing Stand by Assist   Pt donned socks seated at EOB via figure-four technique. Moderate [place]    Bathing Moderate Assist     Moderate [place]    Toileting Moderate Assist     Moderate [place]    Bed Mobility  Supine to sit: Supervision   Sit to supine:  Not Assessed     Supine to sit: Independent   Sit to supine: Independent    Functional Transfers Sit to stand: Supervision   Stand to sit: Supervision     Transfer training with verbal cues for hand placement throughout session to improve safety. Independent    Functional Mobility Minimal Assist to improve balance from EOB to/from [facility] and chair, verbal cues for overall safety. Moderate [place]    Balance Sitting:     Static: fair plus unsupported     Dynamic: fair plus unsupported   Standing: fair     Sitting:     Static: good    Dynamic: good  Standing: good    Activity Tolerance fair  / fair minus; \"mild\" shortness of breath with minimal activity demands. Time needed for therapeutic rest breaks while seated.    Increase standing tolerance >3 minutes for improved engagement with functional transfers and indep in ADLs     Visual/  Perceptual Glasses: Yes     Reports changes in vision since admission: No      NA      Hand Dominance: Right      AROM (PROM) Strength Additional Info:  Goal:   RUE  WFL 4-/5 good  and wfl FMC/dexterity noted during ADL tasks   Improve overall RUE strength  for participation in functional tasks   LUE WFL 4-/5 good  and wfl FMC/dexterity noted during ADL tasks   Improve overall LUE strength  for participation in functional tasks     Hearing: [facility] , wears hearing aids   Sensation:  No c/o numbness or tingling  Tone: WFL   Edema: None      Vitals: 3L via nasal canula   HR at rest: 113 bpm HR with activity: 127 bpm HR at end of session: 117 bpm   SpO2 at rest: 95% SpO2 with activity: 94-95% SpO2 at end of session: 95%   BP at rest:  BP with activity:  BP at end of session:      Comments: RN cleared patient for OT. Upon arrival patient in supine. Therapist facilitated and instructed pt on adapted  techniques & compensatory strategies to improve safety and independence with basic ADLs, bed mobility, functional transfers and mobility to allow pt to achieve highest level of independence and safely. Pt demonstrated fair understanding of education & follow through. At end of session, patient was in chair with call light and phone within reach, all lines and tubes intact. Overall, patient demonstrated  decreased independence and safety during completion of ADL tasks. Pt would benefit from continued skilled OT to increase safety and independence with completion of ADL tasks and functional mobility for improved quality of life. Treatment: OT treatment provided this date includes:   Instructions/training on safety, sequencing, and adapted techniques for completion of ADLs. Facilitated bed mobility with cues for proper body mechanics and sequencing to prepare for ADL completion.   Instruction/training on safe functional mobility/transfer techniques including hand and feet placement   Instruction/training on energy conservation/work simplification for completion of ADLs  B UE there ex completed while seated in chair to increase strength/endurance with ADLs: 1x10-12 reps through available ranges   Skilled monitoring of O2 sats - see section above     Rehab Potential: Good for established goals. Patient / Family Goal: Not stated       Patient and/or family were instructed on functional diagnosis, prognosis/goals and OT plan of care. Demonstrated fair understanding. Eval Complexity: Low    Time In: 10:20 AM   Time Out: 10:50 AM    Total Treatment Time: 10       Min Units   OT Eval Low 97165  X  1    OT Eval Medium 35609      OT Eval High 28057      OT Re-Eval [id number]            ADL/Self Care 29077     Therapeutic Activities 73932       Therapeutic Ex 66541  10 1   Orthotic Management 04835       Manual [id number]     Neuro Re-Ed 15904       Non-Billable Time        Evaluation Time additionally includes thorough review of current medical information, gathering information on past medical history/social history and prior level of function, interpretation of standardized testing/informal observation of tasks, assessment of data and development of plan of care and goals.         Evaluating OT: Nestor Alejandro OTR/L #[id number]

## 2023-01-24 NOTE — CONSULTS
Pulmonary/Critical Care Consult Note    CHIEF COMPLAINT: SOB    ISSUES:    COPD exacerbation ([address]  Feeling better  Bronchodilators     BiPAP PRN    Continue present management            __________________________________________________    Referring MD: Dr. Diomedes Rivas    Reason for Consult: COPD exacerbation       HISTORY OF PRESENT ILLNESS:   Tiburcio Melton is a 70 y.o. male with hx of Severe COPD, on home O2 increasing SOB x 1 day    Baseline intermittent cp when SOB, baseline cough with yellow sputum. Admitted from ER, placed on BiPAP overnight  Feels significantly better    Pt reports being seen by pulmonary and being evaluated lung transplant  Currently on some drug study (? Azithro)           ALLERGY:  Patient has no known allergies.     FAMILY HISTORY:  Family History   Problem Relation Age of Onset    No Known Problems Mother     Heart Disease Father     Heart Disease Brother     Heart Disease Sibling     Heart Disease Sibling     Heart Disease Sibling        SOCIAL HISTORY:  Social History     Socioeconomic History    Marital status:      Spouse name: Not on file    Number of children: Not on file    Years of education: Not on file    Highest education level: Not on file   Occupational History    Not on file   Tobacco Use    Smoking status: Former     Packs/day: 1.50     Years: 40.00     Pack years: 60.00     Types: Cigarettes     Quit date: 2016     Years since quittin.0    Smokeless tobacco: Never   Substance and Sexual Activity    Alcohol use: Not Currently    Drug use: No    Sexual activity: Not on file   Other Topics Concern    Not on file   Social History Narrative    Not on file     Social Determinants of Health     Financial Resource Strain: Not on file   Food Insecurity: Not on file   Transportation Needs: Not on file   Physical Activity: Not on file   Stress: Not on file   Social Connections: Not on file   Intimate Partner Violence: Not on file   Housing Stability: Not on file       MEDICAL HISTORY:  Past Medical History:   Diagnosis Date    Angina pectoris ([address]     states had neg heart cath    Asthma     Cervical spinal stenosis     COPD (chronic obstructive pulmonary disease) ([facility])     Depression     Hyperlipidemia     Hypertension     Neck pain        MEDICATIONS:   methylPREDNISolone  40 mg IntraVENous Q8H    levofloxacin  500 mg IntraVENous Q24H    insulin lispro  0-4 Units SubCUTAneous TID WC    insulin lispro  0-4 Units SubCUTAneous Nightly    gabapentin  600 mg Oral Nightly    ipratropium-albuterol  1 vial Inhalation TID    losartan  25 mg Oral QPM    QUEtiapine  200 mg Oral Nightly    atorvastatin  40 mg Oral Daily    sodium chloride flush  5-40 mL IntraVENous 2 times per day    enoxaparin  40 mg SubCUTAneous Daily    arformoterol tartrate  15 mcg Nebulization BID    And    budesonide  0.5 mg Nebulization BID      dextrose      sodium chloride       glucose, dextrose bolus **OR** dextrose bolus, glucagon (rDNA), dextrose, sodium chloride flush, sodium chloride, ondansetron **OR** ondansetron, polyethylene glycol, acetaminophen **OR** acetaminophen, albuterol **AND** Nebulizer tx intermittent    REVIEW OF SYSTEMS:  Constitutional: Denies fever, weight loss, night sweats, and fatigue  Skin: Denies pigmentation, dark lesions, and rashes   HEENT: Denies hearing loss, tinnitus, ear drainage, epistaxis, sore throat, and hoarseness. Cardiovascular: Denies palpitations, positive chest pain and chest pressure.   Gastrointestinal: Denies nausea, vomiting, poor appetite, diarrhea, heartburn or reflux  Genitourinary: Denies dysuria, frequency, urgency or hematuria  Musculoskeletal: Denies myalgias, muscle weakness, and bone pain  Neurological: Denies dizziness, vertigo, headache, and focal weakness  Psychological: Denies anxiety and depression  Endocrine: Denies heat intolerance and cold intolerance  Hematopoietic/Lymphatic: Denies bleeding problems and blood transfusions    PHYSICAL EXAM:  Vitals:    01/24/23 0703   BP: 121/63   Pulse: (!) 111   Resp: 22   Temp: 97.8 °F (36.6 °C)   SpO2: 93%     FiO2 : 35 %  O2 Flow Rate (L/min): 4 L/min  O2 Device: Nasal cannula    General Appearance: alert and oriented to person, place and time, in no acute distress  Cardiovascular: normal rate, regular rhythm, normal S1 and S2, no murmurs, rubs, clicks, or gallops, distal pulses intact, no carotid bruits, no JVD  Pulmonary/Chest: diminished breath sounds  bilaterally- no wheezes, rales or rhonchi, normal air movement, no respiratory distress  Abdomen: soft, non-tender, non-distended, normal bowel sounds, no masses   Extremities: no cyanosis, clubbing or edema  Skin: warm and dry, no rash or erythema  Head: normocephalic and atraumatic  Eyes: pupils equal, round, and reactive to light  Neck: supple and non-tender without mass, no thyromegaly   Musculoskeletal: normal range of motion, no joint swelling, deformity or tenderness  Neurological: alert, oriented, normal speech, no focal findings or movement disorder noted    LABS:  WBC   Date Value Ref Range Status   01/24/2023 7.8 4.5 - 11.5 E9/L Final   01/23/2023 8.3 4.5 - 11.5 E9/L Final   08/29/2022 11.3 4.5 - 11.5 E9/L Final     Hemoglobin   Date Value Ref Range Status   01/24/2023 15.8 12.5 - 16.5 g/dL Final   01/23/2023 16.5 12.5 - 16.5 g/dL Final   08/29/2022 14.0 12.5 - 16.5 g/dL Final     Hematocrit   Date Value Ref Range Status   01/24/2023 45.7 37.0 - 54.0 % Final   01/23/2023 47.0 37.0 - 54.0 % Final   08/29/2022 40.4 37.0 - 54.0 % Final     MCV   Date Value Ref Range Status   01/24/2023 91.4 80.0 - 99.9 fL Final   01/23/2023 91.3 80.0 - 99.9 fL Final   08/29/2022 94.0 80.0 - 99.9 fL Final     Platelets   Date Value Ref Range Status   01/24/2023 221 130 - 450 E9/L Final   01/23/2023 236 130 - 450 E9/L Final   08/29/2022 201 130 - 450 E9/L Final     Sodium   Date Value Ref Range Status   01/24/2023 135 132 - 146 mmol/L Final   01/23/2023 142 132 - 146 mmol/L Final   08/29/2022 140 132 - 146 mmol/L Final     Potassium   Date Value Ref Range Status   01/23/2023 4.2 3.5 - 5.0 mmol/L Final   08/29/2022 4.7 3.5 - 5.0 mmol/L Final   08/28/2022 4.4 3.5 - 5.0 mmol/L Final     Potassium reflex Magnesium   Date Value Ref Range Status   01/24/2023 4.7 3.5 - 5.0 mmol/L Final   08/22/2022 4.5 3.5 - 5.0 mmol/L Final   08/21/2022 4.2 3.5 - 5.0 mmol/L Final     Chloride   Date Value Ref Range Status   01/24/2023 104 98 - 107 mmol/L Final   01/23/2023 106 98 - 107 mmol/L Final   08/29/2022 105 98 - 107 mmol/L Final     CO2   Date Value Ref Range Status   01/24/2023 20 (L) 22 - 29 mmol/L Final   01/23/2023 22 22 - 29 mmol/L Final   08/29/2022 23 22 - 29 mmol/L Final     BUN   Date Value Ref Range Status   01/24/2023 16 6 - 23 mg/dL Final   01/23/2023 12 6 - 23 mg/dL Final   08/29/2022 23 6 - 23 mg/dL Final     Creatinine   Date Value Ref Range Status   01/24/2023 1.1 0.7 - 1.2 mg/dL Final   01/23/2023 1.1 0.7 - 1.2 mg/dL Final   08/29/2022 0.9 0.7 - 1.2 mg/dL Final     Glucose   Date Value Ref Range Status   01/24/2023 232 (H) 74 - 99 mg/dL Final   01/23/2023 104 (H) 74 - 99 mg/dL Final   08/29/2022 183 (H) 74 - 99 mg/dL Final     Calcium   Date Value Ref Range Status   01/24/2023 9.1 8.6 - 10.2 mg/dL Final   01/23/2023 9.3 8.6 - 10.2 mg/dL Final   08/29/2022 8.1 (L) 8.6 - 10.2 mg/dL Final     Total Protein   Date Value Ref Range Status   01/24/2023 6.1 (L) 6.4 - 8.3 g/dL Final   01/23/2023 6.4 6.4 - 8.3 g/dL Final   08/27/2022 5.0 (L) 6.4 - 8.3 g/dL Final     Albumin   Date Value Ref Range Status   01/24/2023 4.0 3.5 - 5.2 g/dL Final   01/23/2023 4.2 3.5 - 5.2 g/dL Final   08/27/2022 3.0 (L) 3.5 - 5.2 g/dL Final     Total Bilirubin   Date Value Ref Range Status   01/24/2023 0.3 0.0 - 1.2 mg/dL Final   01/23/2023 0.3 0.0 - 1.2 mg/dL Final   08/27/2022 0.3 0.0 - 1.2 mg/dL Final     Alkaline Phosphatase   Date Value Ref Range Status   01/24/2023 96 40 - 129 U/L Final   01/23/2023 107 40 - 129 U/L Final   08/27/2022 105 40 - 129 U/L Final     AST   Date Value Ref Range Status   01/24/2023 14 0 - 39 U/L Final   01/23/2023 18 0 - 39 U/L Final   08/27/2022 17 0 - 39 U/L Final     Comment:     Specimen is slightly Hemolyzed. Result may be artificially increased. ALT   Date Value Ref Range Status   01/24/2023 14 0 - 40 U/L Final   01/23/2023 19 0 - 40 U/L Final   08/27/2022 22 0 - 40 U/L Final     Est, Glom Filt Rate   Date Value Ref Range Status   01/24/2023 >60 >=60 mL/min/1.73 Final     Comment:     Pediatric calculator link  [email or web address] org/[email or web address]/gfr_calculatorped  Effective Oct 3, 2022  These results are not intended for use in patients  <25years of age. eGFR results are calculated without  a race factor using the 2021 CKD-EPI equation. Careful  clinical correlation is recommended, particularly when  comparing to results calculated using previous equations. The CKD-EPI equation is less accurate in patients with  extremes of muscle mass, extra-renal metabolism of  creatinine, excessive creatinine ingestion, or following  therapy that affects renal tubular secretion. 01/23/2023 >60 >=60 mL/min/1.73 Final     Comment:     Pediatric calculator link  [email or web address] org/professionals/kdoqi/gfr_calculatorped  Effective Oct 3, 2022  These results are not intended for use in patients  <25years of age. eGFR results are calculated without  a race factor using the 2021 CKD-EPI equation. Careful  clinical correlation is recommended, particularly when  comparing to results calculated using previous equations. The CKD-EPI equation is less accurate in patients with  extremes of muscle mass, extra-renal metabolism of  creatinine, excessive creatinine ingestion, or following  therapy that affects renal tubular secretion.        GFR Non-   Date Value Ref Range Status   08/29/2022 >60 >=60 mL/min/1.73 Final     Comment:     Chronic Kidney Disease: less than 60 ml/min/1.73 sq.m. Kidney Failure: less than 15 ml/min/1.73 sq.m. Results valid for patients 18 years and older. 08/28/2022 >60 >=60 mL/min/1.73 Final     Comment:     Chronic Kidney Disease: less than 60 ml/min/1.73 sq.m. Kidney Failure: less than 15 ml/min/1.73 sq.m. Results valid for patients 18 years and older. 08/27/2022 >60 >=60 mL/min/1.73 Final     Comment:     Chronic Kidney Disease: less than 60 ml/min/1.73 sq.m. Kidney Failure: less than 15 ml/min/1.73 sq.m. Results valid for patients 18 years and older. GFR    Date Value Ref Range Status   08/29/2022 >60  Final   08/28/2022 >60  Final   08/27/2022 >60  Final     Magnesium   Date Value Ref Range Status   08/28/2022 2.6 1.6 - 2.6 mg/dL Final   08/27/2022 2.7 (H) 1.6 - 2.6 mg/dL Final   08/26/2022 2.5 1.6 - 2.6 mg/dL Final     Phosphorus   Date Value Ref Range Status   08/27/2022 3.2 2.5 - 4.5 mg/dL Final   08/26/2022 2.9 2.5 - 4.5 mg/dL Final   08/25/2022 3.3 2.5 - 4.5 mg/dL Final     Recent Labs     01/23/23  1447   PH 7.576*   PO2 221.5*   PCO2 21.1*   HCO3 19.2*   BE -0.1   O2SAT 99.2*       RADIOLOGY:  XR CHEST PORTABLE   Final Result   No acute process.            (Independently reviewed by me)      Yoel Wall M.D  Pulmonary & Critical Care  1/24/2023 3:08 PM

## 2023-01-24 NOTE — PROGRESS NOTES
Physical Therapy    Physical Therapy Initial Evaluation/Plan of Care    Room #:  0629/0629-01  Patient Name: Landon De La Cruz  YOB: 1955  MRN: [id number]    Date of Service: 1/24/2023     Tentative placement recommendation: Subacute Rehab  Equipment recommendation: Patient has needed equipment       Evaluating Physical Therapist: Gladys Horton, PT  #[id number]      Specific Provider Orders/Date/Referring Provider :  01/23/23 1930    PT evaluation and treat  Start:  01/23/23 1930,   End:  01/23/23 1930,   ONE TIME,   Standing Count:  1 Occurrences,   Gianfranco Tucker MD     Admitting Diagnosis:   COPD exacerbation ([facility] [J44.1]      Surgery: none      Patient Active Problem List   Diagnosis    COPD exacerbation ([facility] [address]    Acute on chronic respiratory failure with hypoxia and hypercapnia ([address]    History of tobacco abuse    Primary hypertension    Depression    Acute on chronic respiratory failure ([facility] [address]    Left cataract        ASSESSMENT of Current Deficits Patient exhibits decreased strength, balance, endurance, and coordination impairing functional mobility, transfers, gait , gait distance, and tolerance to activity are barriers to d/c and require skilled intervention to address concerns listed above to increase safety and independence at discharge. Decreased strength, balance and endurance  increases patient's risk for fall.    Patient with difficulty oxygenating impairing endurance and functional independence  tremors present with standing and exertion with 94% po2 on 3 Liters of o2 via nasal cannula        PHYSICAL THERAPY  PLAN OF CARE       Physical therapy plan of care is established based on physician order,  patient diagnosis and clinical assessment    Current Treatment Recommendations:    -Bed Mobility: Lower extremity exercises , Upper extremity exercises , and Trunk control activities   -Sitting Balance: Incorporate reaching activities to activate trunk muscles  and Facilitate postural control in all planes   -Standing Balance: Perform strengthening exercises in standing to promote motor control with or without upper extremity support   -Transfers: Provide instruction on proper hand and foot position for adequate transfer of weight onto lower extremities and use of gait device if needed and Cues for hand placement, technique and safety. Provide stabilization to prevent fall   -Gait: Gait training and Standing activities to improve: base of support, weight shift, weight bearing    -Endurance: Utilize Supervised activities to increase level of endurance to allow for safe functional mobility including transfers and gait   -Stairs: Stair training with instruction on proper technique and hand placement on rail    PT long term treatment goals are located in below grid    Patient and or family understand(s) diagnosis, prognosis, and plan of care. Frequency of treatments: Patient will be seen  daily. Prior Level of Function: Patient ambulated independently    Rehab Potential: good   for baseline    Past medical history:   Past Medical History:   Diagnosis Date    Angina pectoris ([address]     states had neg heart cath    Asthma     Cervical spinal stenosis     COPD (chronic obstructive pulmonary disease) (HCC)     Depression     Hyperlipidemia     Hypertension     Neck pain      Past Surgical History:   Procedure Laterality Date    COLONOSCOPY      HAND SURGERY Left 11/13/2019    LEFT HAND-PALN, DUPUYTREN'S CONTRACTURE, MAJOR RESECTION.  POSSIBLE FULL THICKNESS SKIN GRAFT    INTRACAPSULAR CATARACT EXTRACTION Left 11/15/2022    CATARACT EXTRACTION WITH IOL,COMPLEX VISCOAT, VISION BLUE, POSSIBLE ECCE-LEFT performed by Tracy Adamson MD at [facility]      removal non malignant lesion    NECK SURGERY      cervical fusion x2    WRIST SURGERY Left 11/13/2019    LEFT HAND DUPUYTRENS CONTRACTURE MAJOR RESECTION,  Z-PLASTY performed by Celina Cobb MD at [address] [place] [address] SUBJECTIVE:    Precautions: Up as tolerated, falls, alarm, and O2 , 2 Liters of o2 via nasal cannula     Social history: Patient lives with significant other in a two story home bedroom and bathroom 2nd floor full flight stairs with Rail  with 3 steps, with rail  to enter Walk in shower        Equipment owned: Leelee Sellers, and O2,  3 Liters of o2 via nasal cannula     AM-PAC Basic Mobility        AM-PAC Mobility Inpatient   How much difficulty turning over in bed?: A Little  How much difficulty sitting down on / standing up from a chair with arms?: A Lot  How much difficulty moving from lying on back to sitting on side of bed?: A Lot  How much help from another person moving to and from a bed to a chair?: A Lot  How much help from another person needed to walk in hospital room?: Total  How much help from another person for climbing 3-5 steps with a railing?: Total  AM-PAC Inpatient Mobility Raw Score : 11  AM-PAC Inpatient T-Scale Score : 33.86  Mobility Inpatient CMS 0-100% Score: 72.57  Mobility Inpatient CMS G-Code Modifier : CL    Nursing cleared patient for PT evaluation. The admitting diagnosis and active problem list as listed above have been reviewed prior to the initiation of this evaluation. OBJECTIVE;   Initial Evaluation  Date: 1/24/2023 Treatment Date:     Short Term/ Long Term   Goals   Was pt agreeable to Eval/treatment? Yes  To be met in 5 days   Pain level   0/10        Bed Mobility    Rolling: Supervision     Supine to sit: Supervision     Sit to supine: Supervision     Scooting: Supervision     Rolling: Independent    Supine to sit: Independent    Sit to supine: Independent    Scooting: Independent     Transfers Sit to stand:  Moderate assist of 1  x 3 reps  Sit to stand: Independent     Ambulation     2 forward/backward steps using  wheeled walker with Moderate assist of 1    significant   shortness of breath with abdomen distention    15 feet using  least restrictive device with Independent    Stair negotiation: ascended and descended   Not assessed       10 steps, 1 rail, Supervision       ROM Within functional limits        Strength BUE:  refer to OT eval  RLE:  4-/5  LLE:  4-/5  Increase strength in affected mm groups by 1/3 grade   Balance Sitting EOB:  not assessed    Dynamic Standing:  poor    Sitting EOB:  good    Dynamic Standing: good with least restrictive device      Patient is Alert & Oriented x person, place, time, and situation and follows directions    Sensation:  Patient  denies numbness/tingling   Edema:  yes  abdomen  Endurance: poor      Vitals:  3 liters nasal cannula   Blood Pressure at rest  Blood Pressure during session    Heart Rate at rest 116 Heart Rate during session  125-138   SPO2 at rest 95%  SPO2 during session 95%     Patient education  Patient educated on role of Physical Therapy, risks of immobility, safety and plan of care and  importance of mobility while in hospital      Patient response to education:   Pt verbalized understanding Pt demonstrated skill Pt requires further education in this area   Yes Partial Yes      Treatment:  Patient practiced and was instructed/facilitated in the following treatment: Patient    performed monitored standing activities challenging balance and breath control. Therapeutic Exercises:  ankle pumps  x 10 reps. At end of session, patient in chair with  call light and phone within reach,  all lines and tubes intact, nursing notified. Patient would benefit from continued skilled Physical Therapy to improve functional independence and quality of life.          Patient's/ family goals   home    Time in  1105  Time out  1135    Total Treatment Time  10 minutes    Evaluation time includes thorough review of current medical information, gathering information on past medical history/social history and prior level of function, completion of standardized testing/informal observation of tasks, assessment of data, and development of Plan of care and goals.      CPT codes:  Low Complexity PT evaluation (13102)  Therapeutic exercises (27769)   10 minutes  1 unit(s)    Radha Casillas, PT

## 2023-01-24 NOTE — ACP (ADVANCE CARE PLANNING)
Advance Care Planning   Healthcare Decision Maker:    Primary Decision Maker: Kenna President - [facility] - [phone]

## 2023-01-25 LAB
METER GLUCOSE: 159 MG/DL (ref 74–99)
METER GLUCOSE: 208 MG/DL (ref 74–99)
METER GLUCOSE: 297 MG/DL (ref 74–99)
METER GLUCOSE: 351 MG/DL (ref 74–99)

## 2023-01-25 PROCEDURE — 2700000000 HC OXYGEN THERAPY PER DAY

## 2023-01-25 PROCEDURE — 6360000002 HC RX W HCPCS: Performed by: INTERNAL MEDICINE

## 2023-01-25 PROCEDURE — 99232 SBSQ HOSP IP/OBS MODERATE 35: CPT | Performed by: INTERNAL MEDICINE

## 2023-01-25 PROCEDURE — 94660 CPAP INITIATION&MGMT: CPT

## 2023-01-25 PROCEDURE — 82962 GLUCOSE BLOOD TEST: CPT

## 2023-01-25 PROCEDURE — 94640 AIRWAY INHALATION TREATMENT: CPT

## 2023-01-25 PROCEDURE — 2060000000 HC ICU INTERMEDIATE R&B

## 2023-01-25 PROCEDURE — 2580000003 HC RX 258: Performed by: INTERNAL MEDICINE

## 2023-01-25 PROCEDURE — 6370000000 HC RX 637 (ALT 250 FOR IP): Performed by: INTERNAL MEDICINE

## 2023-01-25 PROCEDURE — 97530 THERAPEUTIC ACTIVITIES: CPT

## 2023-01-25 RX ADMIN — METHYLPREDNISOLONE SODIUM SUCCINATE 40 MG: 40 INJECTION, POWDER, FOR SOLUTION INTRAMUSCULAR; INTRAVENOUS at 06:04

## 2023-01-25 RX ADMIN — IPRATROPIUM BROMIDE AND ALBUTEROL SULFATE 3 ML: .5; 2.5 SOLUTION RESPIRATORY (INHALATION) at 17:42

## 2023-01-25 RX ADMIN — Medication 10 ML: at 20:22

## 2023-01-25 RX ADMIN — Medication 10 ML: at 08:49

## 2023-01-25 RX ADMIN — ARFORMOTEROL TARTRATE 15 MCG: 15 SOLUTION RESPIRATORY (INHALATION) at 17:43

## 2023-01-25 RX ADMIN — ENOXAPARIN SODIUM 40 MG: 100 INJECTION SUBCUTANEOUS at 20:22

## 2023-01-25 RX ADMIN — METHYLPREDNISOLONE SODIUM SUCCINATE 40 MG: 40 INJECTION, POWDER, FOR SOLUTION INTRAMUSCULAR; INTRAVENOUS at 12:35

## 2023-01-25 RX ADMIN — GABAPENTIN 600 MG: 300 CAPSULE ORAL at 20:22

## 2023-01-25 RX ADMIN — INSULIN LISPRO 1 UNITS: 100 INJECTION, SOLUTION INTRAVENOUS; SUBCUTANEOUS at 18:08

## 2023-01-25 RX ADMIN — LOSARTAN POTASSIUM 25 MG: 50 TABLET, FILM COATED ORAL at 18:07

## 2023-01-25 RX ADMIN — IPRATROPIUM BROMIDE AND ALBUTEROL SULFATE 3 ML: .5; 2.5 SOLUTION RESPIRATORY (INHALATION) at 09:35

## 2023-01-25 RX ADMIN — METHYLPREDNISOLONE SODIUM SUCCINATE 40 MG: 40 INJECTION, POWDER, FOR SOLUTION INTRAMUSCULAR; INTRAVENOUS at 20:22

## 2023-01-25 RX ADMIN — LEVOFLOXACIN 500 MG: 5 INJECTION, SOLUTION INTRAVENOUS at 16:56

## 2023-01-25 RX ADMIN — ATORVASTATIN CALCIUM 40 MG: 40 TABLET, FILM COATED ORAL at 08:48

## 2023-01-25 RX ADMIN — QUETIAPINE FUMARATE 200 MG: 200 TABLET, EXTENDED RELEASE ORAL at 20:22

## 2023-01-25 RX ADMIN — BUDESONIDE 500 MCG: 0.5 SUSPENSION RESPIRATORY (INHALATION) at 17:43

## 2023-01-25 RX ADMIN — INSULIN LISPRO 4 UNITS: 100 INJECTION, SOLUTION INTRAVENOUS; SUBCUTANEOUS at 12:36

## 2023-01-25 RX ADMIN — ARFORMOTEROL TARTRATE 15 MCG: 15 SOLUTION RESPIRATORY (INHALATION) at 06:06

## 2023-01-25 RX ADMIN — IPRATROPIUM BROMIDE AND ALBUTEROL SULFATE 3 ML: .5; 2.5 SOLUTION RESPIRATORY (INHALATION) at 06:06

## 2023-01-25 RX ADMIN — BUDESONIDE 500 MCG: 0.5 SUSPENSION RESPIRATORY (INHALATION) at 06:06

## 2023-01-25 NOTE — CARE COORDINATION
1/25/2023 0930 CM Note:  Pt plan is to return home with his fiance as prior. Pt uses a cane and has O2 3 liters and a nebulizer from [employer]. Pt follows with [facility] to be placed on lung transplant list.  Pt states that he will be officially on the list on 2/20/2023. CM set up pt with pulmonary rehab through [facility]([phone] in Aug of 2021(pt's local pulmonologist is Dr Elisa Heath). Pt states that his insurance stopped paying for pulmonary rehab. No home going needs identified at this time. Pt's fiance will provide transportation home. CM will follow.  Electronically signed by Pete Bradley RN on 1/25/2023 at 9:37 AM

## 2023-01-25 NOTE — PROGRESS NOTES
Pulmonary/Critical Care Progress Note    CHIEF COMPLAINT: SOB    ISSUES:    COPD exacerbation ([address]  Feeling better - only minimally today  Bronchodilators     BiPAP PRN    Continue present management    All questions answered           __________________________________________________    Referring MD: Dr. Damion Mathews    Reason for Consult: COPD exacerbation       HISTORY OF PRESENT ILLNESS:   Elena An is a 70 y.o. male with hx of Severe COPD, on home O2 increasing SOB x 1 day    Baseline intermittent cp when SOB, baseline cough with yellow sputum. Admitted from ER, placed on BiPAP overnight  Feels significantly better    Pt reports being seen by pulmonary and being evaluated lung transplant  Currently on some drug study (? Azithro)           ALLERGY:  Patient has no known allergies.     FAMILY HISTORY:  Family History   Problem Relation Age of Onset    No Known Problems Mother     Heart Disease Father     Heart Disease Brother     Heart Disease Sibling     Heart Disease Sibling     Heart Disease Sibling        SOCIAL HISTORY:  Social History     Socioeconomic History    Marital status:      Spouse name: Not on file    Number of children: Not on file    Years of education: Not on file    Highest education level: Not on file   Occupational History    Not on file   Tobacco Use    Smoking status: Former     Packs/day: 1.50     Years: 40.00     Pack years: 60.00     Types: Cigarettes     Quit date: 2016     Years since quittin.0    Smokeless tobacco: Never   Substance and Sexual Activity    Alcohol use: Not Currently    Drug use: No    Sexual activity: Not on file   Other Topics Concern    Not on file   Social History Narrative    Not on file     Social Determinants of Health     Financial Resource Strain: Not on file   Food Insecurity: Not on file   Transportation Needs: Not on file   Physical Activity: Not on file   Stress: Not on file   Social Connections: Not on file   Intimate Partner Violence: Not on file   Housing Stability: Not on file       MEDICAL HISTORY:  Past Medical History:   Diagnosis Date    Angina pectoris ([facility] [address]     states had neg heart cath    Asthma     Cervical spinal stenosis     COPD (chronic obstructive pulmonary disease) ([facility])     Depression     Hyperlipidemia     Hypertension     Neck pain        MEDICATIONS:   methylPREDNISolone  40 mg IntraVENous Q8H    levofloxacin  500 mg IntraVENous Q24H    insulin lispro  0-4 Units SubCUTAneous TID WC    insulin lispro  0-4 Units SubCUTAneous Nightly    gabapentin  600 mg Oral Nightly    ipratropium-albuterol  1 vial Inhalation TID    losartan  25 mg Oral QPM    QUEtiapine  200 mg Oral Nightly    atorvastatin  40 mg Oral Daily    sodium chloride flush  5-40 mL IntraVENous 2 times per day    enoxaparin  40 mg SubCUTAneous Daily    arformoterol tartrate  15 mcg Nebulization BID    And    budesonide  0.5 mg Nebulization BID      dextrose      sodium chloride       glucose, dextrose bolus **OR** dextrose bolus, glucagon (rDNA), dextrose, sodium chloride flush, sodium chloride, ondansetron **OR** ondansetron, polyethylene glycol, acetaminophen **OR** acetaminophen, albuterol **AND** Nebulizer tx intermittent        PHYSICAL EXAM:  Vitals:    01/25/23 0642   BP: 119/68   Pulse: 100   Resp: 20   Temp: 97.7 °F (36.5 °C)   SpO2: 97%     FiO2 : 35 %  O2 Flow Rate (L/min): 4 L/min  O2 Device: Nasal cannula    General Appearance: alert and oriented to person, place and time, in no acute distress  Cardiovascular: normal rate, regular rhythm, normal S1 and S2, no murmurs, rubs, clicks, or gallops, distal pulses intact, no carotid bruits, no JVD  Pulmonary/Chest: diminished breath sounds  bilaterally- no wheezes, rales or rhonchi, normal air movement, no respiratory distress  Abdomen: soft, non-tender, non-distended, normal bowel sounds, no masses   Extremities: no cyanosis, clubbing or edema  Skin: warm and dry, no rash or erythema  Head: normocephalic and atraumatic  Eyes: pupils equal, round, and reactive to light  Neck: supple and non-tender without mass, no thyromegaly   Musculoskeletal: normal range of motion, no joint swelling, deformity or tenderness  Neurological: alert, oriented, normal speech, no focal findings or movement disorder noted    LABS:  WBC   Date Value Ref Range Status   01/24/2023 7.8 4.5 - 11.5 E9/L Final   01/23/2023 8.3 4.5 - 11.5 E9/L Final   08/29/2022 11.3 4.5 - 11.5 E9/L Final     Hemoglobin   Date Value Ref Range Status   01/24/2023 15.8 12.5 - 16.5 g/dL Final   01/23/2023 16.5 12.5 - 16.5 g/dL Final   08/29/2022 14.0 12.5 - 16.5 g/dL Final     Hematocrit   Date Value Ref Range Status   01/24/2023 45.7 37.0 - 54.0 % Final   01/23/2023 47.0 37.0 - 54.0 % Final   08/29/2022 40.4 37.0 - 54.0 % Final     MCV   Date Value Ref Range Status   01/24/2023 91.4 80.0 - 99.9 fL Final   01/23/2023 91.3 80.0 - 99.9 fL Final   08/29/2022 94.0 80.0 - 99.9 fL Final     Platelets   Date Value Ref Range Status   01/24/2023 221 130 - 450 E9/L Final   01/23/2023 236 130 - 450 E9/L Final   08/29/2022 201 130 - 450 E9/L Final     Sodium   Date Value Ref Range Status   01/24/2023 135 132 - 146 mmol/L Final   01/23/2023 142 132 - 146 mmol/L Final   08/29/2022 140 132 - 146 mmol/L Final     Potassium   Date Value Ref Range Status   01/23/2023 4.2 3.5 - 5.0 mmol/L Final   08/29/2022 4.7 3.5 - 5.0 mmol/L Final   08/28/2022 4.4 3.5 - 5.0 mmol/L Final     Potassium reflex Magnesium   Date Value Ref Range Status   01/24/2023 4.7 3.5 - 5.0 mmol/L Final   08/22/2022 4.5 3.5 - 5.0 mmol/L Final   08/21/2022 4.2 3.5 - 5.0 mmol/L Final     Chloride   Date Value Ref Range Status   01/24/2023 104 98 - 107 mmol/L Final   01/23/2023 106 98 - 107 mmol/L Final   08/29/2022 105 98 - 107 mmol/L Final     CO2   Date Value Ref Range Status   01/24/2023 20 (L) 22 - 29 mmol/L Final   01/23/2023 22 22 - 29 mmol/L Final   08/29/2022 23 22 - 29 mmol/L Final     BUN Date Value Ref Range Status   01/24/2023 16 6 - 23 mg/dL Final   01/23/2023 12 6 - 23 mg/dL Final   08/29/2022 23 6 - 23 mg/dL Final     Creatinine   Date Value Ref Range Status   01/24/2023 1.1 0.7 - 1.2 mg/dL Final   01/23/2023 1.1 0.7 - 1.2 mg/dL Final   08/29/2022 0.9 0.7 - 1.2 mg/dL Final     Glucose   Date Value Ref Range Status   01/24/2023 232 (H) 74 - 99 mg/dL Final   01/23/2023 104 (H) 74 - 99 mg/dL Final   08/29/2022 183 (H) 74 - 99 mg/dL Final     Calcium   Date Value Ref Range Status   01/24/2023 9.1 8.6 - 10.2 mg/dL Final   01/23/2023 9.3 8.6 - 10.2 mg/dL Final   08/29/2022 8.1 (L) 8.6 - 10.2 mg/dL Final     Total Protein   Date Value Ref Range Status   01/24/2023 6.1 (L) 6.4 - 8.3 g/dL Final   01/23/2023 6.4 6.4 - 8.3 g/dL Final   08/27/2022 5.0 (L) 6.4 - 8.3 g/dL Final     Albumin   Date Value Ref Range Status   01/24/2023 4.0 3.5 - 5.2 g/dL Final   01/23/2023 4.2 3.5 - 5.2 g/dL Final   08/27/2022 3.0 (L) 3.5 - 5.2 g/dL Final     Total Bilirubin   Date Value Ref Range Status   01/24/2023 0.3 0.0 - 1.2 mg/dL Final   01/23/2023 0.3 0.0 - 1.2 mg/dL Final   08/27/2022 0.3 0.0 - 1.2 mg/dL Final     Alkaline Phosphatase   Date Value Ref Range Status   01/24/2023 96 40 - 129 U/L Final   01/23/2023 107 40 - 129 U/L Final   08/27/2022 105 40 - 129 U/L Final     AST   Date Value Ref Range Status   01/24/2023 14 0 - 39 U/L Final   01/23/2023 18 0 - 39 U/L Final   08/27/2022 17 0 - 39 U/L Final     Comment:     Specimen is slightly Hemolyzed. Result may be artificially increased. ALT   Date Value Ref Range Status   01/24/2023 14 0 - 40 U/L Final   01/23/2023 19 0 - 40 U/L Final   08/27/2022 22 0 - 40 U/L Final     Est, Glom Filt Rate   Date Value Ref Range Status   01/24/2023 >60 >=60 mL/min/1.73 Final     Comment:     Pediatric calculator link  [email or web address]  Effective Oct 3, 2022  These results are not intended for use in patients  <25years of age.   eGFR results are calculated without  a race factor using the 2021 CKD-EPI equation. Careful  clinical correlation is recommended, particularly when  comparing to results calculated using previous equations. The CKD-EPI equation is less accurate in patients with  extremes of muscle mass, extra-renal metabolism of  creatinine, excessive creatinine ingestion, or following  therapy that affects renal tubular secretion. 01/23/2023 >60 >=60 mL/min/1.73 Final     Comment:     Pediatric calculator link  [email or web address]_[email or web address]  Effective Oct 3, 2022  These results are not intended for use in patients  <25years of age. eGFR results are calculated without  a race factor using the 2021 CKD-EPI equation. Careful  clinical correlation is recommended, particularly when  comparing to results calculated using previous equations. The CKD-EPI equation is less accurate in patients with  extremes of muscle mass, extra-renal metabolism of  creatinine, excessive creatinine ingestion, or following  therapy that affects renal tubular secretion. GFR Non-   Date Value Ref Range Status   08/29/2022 >60 >=60 mL/min/1.73 Final     Comment:     Chronic Kidney Disease: less than 60 ml/min/1.73 sq.m. Kidney Failure: less than 15 ml/min/1.73 sq.m. Results valid for patients 18 years and older. 08/28/2022 >60 >=60 mL/min/1.73 Final     Comment:     Chronic Kidney Disease: less than 60 ml/min/1.73 sq.m. Kidney Failure: less than 15 ml/min/1.73 sq.m. Results valid for patients 18 years and older. 08/27/2022 >60 >=60 mL/min/1.73 Final     Comment:     Chronic Kidney Disease: less than 60 ml/min/1.73 sq.m. Kidney Failure: less than 15 ml/min/1.73 sq.m. Results valid for patients 18 years and older.        GFR    Date Value Ref Range Status   08/29/2022 >60  Final   08/28/2022 >60  Final   08/27/2022 >60  Final     Magnesium   Date Value Ref Range Status   08/28/2022 2.6 1.6 - 2.6 mg/dL Final   08/27/2022 2.7 (H) 1.6 - 2.6 mg/dL Final   08/26/2022 2.5 1.6 - 2.6 mg/dL Final     Phosphorus   Date Value Ref Range Status   08/27/2022 3.2 2.5 - 4.5 mg/dL Final   08/26/2022 2.9 2.5 - 4.5 mg/dL Final   08/25/2022 3.3 2.5 - 4.5 mg/dL Final     Recent Labs     01/23/23  1447   PH 7.576*   PO2 221.5*   PCO2 21.1*   HCO3 19.2*   BE -0.1   O2SAT 99.2*         RADIOLOGY:  XR CHEST PORTABLE   Final Result   No acute process.            (Independently reviewed by me)      Cyndie Hastings M.D  Pulmonary & Critical Care  1/25/2023 1:01 PM

## 2023-01-25 NOTE — PROGRESS NOTES
Physical Therapy    Physical Therapy Treatment Note/Plan of Care    Room #:  0629/0629-01  Patient Name: Petr Mobley  YOB: 1955  MRN: [id number]    Date of Service: 1/25/2023     Tentative placement recommendation: Subacute Rehab  Equipment recommendation: Patient has needed equipment       Evaluating Physical Therapist: Paige Purdy, PT  #[id number]      Specific Provider Orders/Date/Referring Provider :  01/23/23 1930    PT evaluation and treat  Start:  01/23/23 1930,   End:  01/23/23 1930,   ONE TIME,   Standing Count:  1 Occurrences,   Brooklynn Marie MD     Admitting Diagnosis:   COPD exacerbation ([address] [J44.1]      Surgery: none      Patient Active Problem List   Diagnosis    COPD exacerbation ([address]    Acute on chronic respiratory failure with hypoxia and hypercapnia ([address]    History of tobacco abuse    Primary hypertension    Depression    Acute on chronic respiratory failure ([address]    Left cataract        ASSESSMENT of Current Deficits Patient exhibits decreased strength, balance, endurance, and coordination impairing functional mobility, transfers, gait , gait distance, and tolerance to activity are barriers to d/c and require skilled intervention to address concerns listed above to increase safety and independence at discharge. Decreased strength, balance and endurance  increases patient's risk for fall. Patient largely limited due to  difficulty oxygenating. Patient experiences tremors due to fatigue when taking steps away from bed, chair quickly moved behind patient for seated rest. Wheezing noted and cues for pursed lip breathing given. Patient on 3L throughout with SPO2 93-95%. Patient tolerates supine exercises in bed. Patient improved with assist level needed for transfers today. Patient would benefit from further therapy to address above deficits and inc safety due to impairment of endurance. Patient remains at inc risk of falls.       PHYSICAL THERAPY  PLAN OF CARE Physical therapy plan of care is established based on physician order,  patient diagnosis and clinical assessment    Current Treatment Recommendations:    -Bed Mobility: Lower extremity exercises , Upper extremity exercises , and Trunk control activities   -Sitting Balance: Incorporate reaching activities to activate trunk muscles  and Facilitate postural control in all planes   -Standing Balance: Perform strengthening exercises in standing to promote motor control with or without upper extremity support   -Transfers: Provide instruction on proper hand and foot position for adequate transfer of weight onto lower extremities and use of gait device if needed and Cues for hand placement, technique and safety. Provide stabilization to prevent fall   -Gait: Gait training and Standing activities to improve: base of support, weight shift, weight bearing    -Endurance: Utilize Supervised activities to increase level of endurance to allow for safe functional mobility including transfers and gait   -Stairs: Stair training with instruction on proper technique and hand placement on rail    PT long term treatment goals are located in below grid    Patient and or family understand(s) diagnosis, prognosis, and plan of care. Frequency of treatments: Patient will be seen  daily. Prior Level of Function: Patient ambulated independently    Rehab Potential: good   for baseline    Past medical history:   Past Medical History:   Diagnosis Date    Angina pectoris ([address]     states had neg heart cath    Asthma     Cervical spinal stenosis     COPD (chronic obstructive pulmonary disease) (HCC)     Depression     Hyperlipidemia     Hypertension     Neck pain      Past Surgical History:   Procedure Laterality Date    COLONOSCOPY      HAND SURGERY Left 11/13/2019    LEFT HAND-PALN, DUPUYTREN'S CONTRACTURE, MAJOR RESECTION.  POSSIBLE FULL THICKNESS SKIN GRAFT    INTRACAPSULAR CATARACT EXTRACTION Left 11/15/2022    CATARACT EXTRACTION WITH IOL,COMPLEX VISCOAT, VISION BLUE, POSSIBLE ECCE-LEFT performed by Farheen Renteria MD at [address] [facility]      removal non malignant lesion    NECK SURGERY      cervical fusion x2    WRIST SURGERY Left 11/13/2019    LEFT HAND DUPUYTRENS CONTRACTURE MAJOR RESECTION,  Z-PLASTY performed by Blayne Omer MD at [address]:    Precautions: Up as tolerated, falls, alarm, and O2 , 3 Liters of o2 via nasal cannula, patient only has 27% function of lungs, short of breath with exertion     Social history: Patient lives with significant other in a two story home bedroom and bathroom 2nd floor full flight stairs with Rail  with 3 steps, with rail  to enter Walk in shower        Equipment owned: July Trevon, and O2,  3 Liters of o2 via nasal cannula     AM-PAC Basic Mobility        AM-PAC Mobility Inpatient   How much difficulty turning over in bed?: A Little  How much difficulty sitting down on / standing up from a chair with arms?: A Little  How much difficulty moving from lying on back to sitting on side of bed?: A Little  How much help from another person moving to and from a bed to a chair?: A Lot  How much help from another person needed to walk in hospital room?: Total  How much help from another person for climbing 3-5 steps with a railing?: Total  AM-PAC Inpatient Mobility Raw Score : 13  AM-PAC Inpatient T-Scale Score : 36.74  Mobility Inpatient CMS 0-100% Score: 64.91  Mobility Inpatient CMS G-Code Modifier : CL    Nursing cleared patient for PT treatment. Patient on 4L at start of session. OBJECTIVE;   Initial Evaluation  Date: 1/24/2023 Treatment Date:  1/25/2023     Short Term/ Long Term   Goals   Was pt agreeable to Eval/treatment?  Yes yes To be met in 5 days   Pain level   0/10    Pain in chest 4/10    Bed Mobility    Rolling: Supervision     Supine to sit: Supervision     Sit to supine: Supervision     Scooting: Supervision    Rolling: Supervision    Supine to sit: Supervision    Sit to supine: Supervision    Scooting: Supervision     Rolling: Independent    Supine to sit: Independent    Sit to supine: Independent    Scooting: Independent     Transfers Sit to stand: Moderate assist of 1  x 3 reps Sit to stand: Minimal assist of 1 cues for hand placement and safety   Sit to stand: Independent     Ambulation     2 forward/backward steps using  wheeled walker with Moderate assist of 1    significant   shortness of breath with abdomen distention 4 feet, 2-3 steps using  wheeled walker with Moderate assist of 1   for upright and cues for safety and pursed lip breathing     15 feet using  least restrictive device with Independent    Stair negotiation: ascended and descended   Not assessed     not assessed due to patient's overall debility, decreased activity tolerance, balance deficits, safety and fall risk.        10 steps, 1 rail, Supervision       ROM Within functional limits        Strength BUE:  refer to OT eval  RLE:  4-/5  LLE:  4-/5  Increase strength in affected mm groups by 1/3 grade   Balance Sitting EOB:  not assessed    Dynamic Standing:  poor   Sitting EOB: fair +  Dynamic Standing: fair - with wheeled walker, flexed posture   Sitting EOB:  good    Dynamic Standing: good with least restrictive device      Patient is Alert & Oriented x person, place, time, and situation and follows directions    Sensation:  Patient  denies numbness/tingling   Edema:  yes  abdomen  Endurance: poor      Vitals:  4 liters nasal cannula titrated to 3L  Blood Pressure at rest  Blood Pressure during session    Heart Rate at rest  Heart Rate during session up to 135   SPO2 at rest %  SPO2 during session 93-95%     Patient education  Patient educated on role of Physical Therapy, risks of immobility, safety and plan of care,  importance of mobility while in hospital , purse lip breathing, ankle pumps, quad set and glut set for edema control, blood clot prevention, and safety      Patient response to education:   Pt verbalized understanding Pt demonstrated skill Pt requires further education in this area   Yes Partial Yes      Treatment:  Patient practiced and was instructed/facilitated in the following treatment: Patient assisted to edge of bed and stood to amb away from bed, however became short of breath and started to tremor and wheezing, chair pulled up behind and patient took seated rest.    Patient stood and took steps back to bedside. Patient returned to supine position and performed supine exercises. Therapeutic Exercises:  ankle pumps, quad sets, and glut sets  x 10 reps. At end of session, patient in bed with  call light and phone within reach,  all lines and tubes intact, nursing notified. Patient would benefit from continued skilled Physical Therapy to improve functional independence and quality of life.          Patient's/ family goals   home    Time in  1  Time out  1023    Total Treatment Time  18 minutes    CPT codes:  Therapeutic activities (83577)   18 minutes  1 unit(s)    Arpita Taveras, [facility]    #[id number]

## 2023-01-25 NOTE — PROGRESS NOTES
Department of Internal Medicine  General Internal Medicine  Attending Progress Note  Chief Complaint   Patient presents with    Shortness of Breath     Pt c/o shortness of breath that started around 1330 this afternoon. Hx COPD, on 2-3L O2 at home. Pt given 125 mg solumedrol and 1 duoneb treatment by EMS. 18 g LAC. SUBJECTIVE:    Reports that his chest feels tight. He denied fever and chills. No nausea or vomiting. No diarrhea. Aware of plans to continue current treatment while awaiting to see if there is additional changes from pulm.      OBJECTIVE      Medications    Current Facility-Administered Medications: methylPREDNISolone sodium (SOLU-MEDROL) injection 40 mg, 40 mg, IntraVENous, Q8H  levoFLOXacin (LEVAQUIN) 500 MG/100ML infusion 500 mg, 500 mg, IntraVENous, Q24H  insulin lispro (HUMALOG) injection vial 0-4 Units, 0-4 Units, SubCUTAneous, TID WC  insulin lispro (HUMALOG) injection vial 0-4 Units, 0-4 Units, SubCUTAneous, Nightly  glucose chewable tablet 16 g, 4 tablet, Oral, PRN  dextrose bolus 10% 125 mL, 125 mL, IntraVENous, PRN **OR** dextrose bolus 10% 250 mL, 250 mL, IntraVENous, PRN  glucagon (rDNA) injection 1 mg, 1 mg, SubCUTAneous, PRN  dextrose 10 % infusion, , IntraVENous, Continuous PRN  gabapentin (NEURONTIN) capsule 600 mg, 600 mg, Oral, Nightly  ipratropium-albuterol (DUONEB) nebulizer solution 3 mL, 1 vial, Inhalation, TID  losartan (COZAAR) tablet 25 mg, 25 mg, Oral, QPM  QUEtiapine (SEROQUEL XR) extended release tablet 200 mg, 200 mg, Oral, Nightly  atorvastatin (LIPITOR) tablet 40 mg, 40 mg, Oral, Daily  sodium chloride flush 0.9 % injection 5-40 mL, 5-40 mL, IntraVENous, 2 times per day  sodium chloride flush 0.9 % injection 5-40 mL, 5-40 mL, IntraVENous, PRN  0.9 % sodium chloride infusion, , IntraVENous, PRN  enoxaparin (LOVENOX) injection 40 mg, 40 mg, SubCUTAneous, Daily  ondansetron (ZOFRAN-ODT) disintegrating tablet 4 mg, 4 mg, Oral, Q8H PRN **OR** ondansetron (ZOFRAN) injection 4 mg, 4 mg, IntraVENous, Q6H PRN  polyethylene glycol (GLYCOLAX) packet 17 g, 17 g, Oral, Daily PRN  acetaminophen (TYLENOL) tablet 650 mg, 650 mg, Oral, Q6H PRN **OR** acetaminophen (TYLENOL) suppository 650 mg, 650 mg, Rectal, Q6H PRN  albuterol (PROVENTIL) nebulizer solution 2.5 mg, 2.5 mg, Nebulization, Q6H PRN **AND** Nebulizer tx intermittent, , , Q6H PRN  arformoterol tartrate (BROVANA) nebulizer solution 15 mcg, 15 mcg, Nebulization, BID **AND** budesonide (PULMICORT) nebulizer suspension 500 mcg, 0.5 mg, Nebulization, BID  Physical    VITALS:  /68   Pulse 100   Temp 97.7 °F (36.5 °C) (Oral)   Resp 20   Ht 5' 10\" (1.778 m)   Wt 212 lb 8.4 oz (96.4 kg)   SpO2 97%   BMI 30.49 kg/m²   CONSTITUTIONAL:  awake, cooperative, and no apparent distress  EYES:  extra-ocular muscles intact  ENT:  normocepalic, without obvious abnormality  NECK:  supple, symmetrical, trachea midline  LUNGS:  no increased work of breathing, no retractions, and diminished breath sounds throughout lungs  CARDIOVASCULAR:  normal apical pulses and normal S1 and S2  ABDOMEN:  normal bowel sounds, non-distended, and non-tender  MUSCULOSKELETAL:  full range of motion noted  NEUROLOGIC:  Mental Status Exam:  Level of Alertness:   awake  SKIN:  no bruising or bleeding  Data    CBC:   Lab Results   Component Value Date/Time    WBC 7.8 01/24/2023 06:39 AM    RBC 5.00 01/24/2023 06:39 AM    HGB 15.8 01/24/2023 06:39 AM    HCT 45.7 01/24/2023 06:39 AM    MCV 91.4 01/24/2023 06:39 AM    MCH 31.6 01/24/2023 06:39 AM    MCHC 34.6 01/24/2023 06:39 AM    RDW 12.4 01/24/2023 06:39 AM     01/24/2023 06:39 AM    MPV 9.3 01/24/2023 06:39 AM     BMP:    Lab Results   Component Value Date/Time     01/24/2023 06:39 AM    K 4.7 01/24/2023 06:39 AM     01/24/2023 06:39 AM    CO2 20 01/24/2023 06:39 AM    BUN 16 01/24/2023 06:39 AM    LABALBU 4.0 01/24/2023 06:39 AM    CREATININE 1.1 01/24/2023 06:39 AM    CALCIUM 9.1 01/24/2023 06:39 AM    GFRAA >60 08/29/2022 05:54 AM    LABGLOM >60 01/24/2023 06:39 AM    GLUCOSE 232 01/24/2023 06:39 AM       ASSESSMENT AND PLAN      COPD exacerbation: seems like end stage lung disease. Continue current treatment. Continue abx and breathing treatment. Continue PRN bipap. Appreciate pulm recommendation. Follow up with Pulm transplant team at [facility]. Hypertension Essential: good control. Continue to monitor. Hyperlipidemia: continue statin  Acute on chronic respiratory failure with hypoxia: related to #1. Continue breathing treatment.  Bipap PRN

## 2023-01-26 LAB
METER GLUCOSE: 199 MG/DL (ref 74–99)
METER GLUCOSE: 228 MG/DL (ref 74–99)
METER GLUCOSE: 260 MG/DL (ref 74–99)
METER GLUCOSE: 316 MG/DL (ref 74–99)

## 2023-01-26 PROCEDURE — 2700000000 HC OXYGEN THERAPY PER DAY

## 2023-01-26 PROCEDURE — 2060000000 HC ICU INTERMEDIATE R&B

## 2023-01-26 PROCEDURE — 97530 THERAPEUTIC ACTIVITIES: CPT

## 2023-01-26 PROCEDURE — 6370000000 HC RX 637 (ALT 250 FOR IP): Performed by: INTERNAL MEDICINE

## 2023-01-26 PROCEDURE — 99232 SBSQ HOSP IP/OBS MODERATE 35: CPT | Performed by: INTERNAL MEDICINE

## 2023-01-26 PROCEDURE — 2580000003 HC RX 258: Performed by: INTERNAL MEDICINE

## 2023-01-26 PROCEDURE — 94660 CPAP INITIATION&MGMT: CPT

## 2023-01-26 PROCEDURE — 6360000002 HC RX W HCPCS: Performed by: INTERNAL MEDICINE

## 2023-01-26 PROCEDURE — 82962 GLUCOSE BLOOD TEST: CPT

## 2023-01-26 PROCEDURE — 94640 AIRWAY INHALATION TREATMENT: CPT

## 2023-01-26 RX ADMIN — METHYLPREDNISOLONE SODIUM SUCCINATE 40 MG: 40 INJECTION, POWDER, FOR SOLUTION INTRAMUSCULAR; INTRAVENOUS at 05:03

## 2023-01-26 RX ADMIN — Medication 10 ML: at 10:43

## 2023-01-26 RX ADMIN — ATORVASTATIN CALCIUM 40 MG: 40 TABLET, FILM COATED ORAL at 07:36

## 2023-01-26 RX ADMIN — ARFORMOTEROL TARTRATE 15 MCG: 15 SOLUTION RESPIRATORY (INHALATION) at 06:45

## 2023-01-26 RX ADMIN — BUDESONIDE 500 MCG: 0.5 SUSPENSION RESPIRATORY (INHALATION) at 21:35

## 2023-01-26 RX ADMIN — LOSARTAN POTASSIUM 25 MG: 50 TABLET, FILM COATED ORAL at 17:49

## 2023-01-26 RX ADMIN — METHYLPREDNISOLONE SODIUM SUCCINATE 40 MG: 40 INJECTION, POWDER, FOR SOLUTION INTRAMUSCULAR; INTRAVENOUS at 13:01

## 2023-01-26 RX ADMIN — INSULIN LISPRO 3 UNITS: 100 INJECTION, SOLUTION INTRAVENOUS; SUBCUTANEOUS at 16:57

## 2023-01-26 RX ADMIN — BUDESONIDE 500 MCG: 0.5 SUSPENSION RESPIRATORY (INHALATION) at 06:45

## 2023-01-26 RX ADMIN — IPRATROPIUM BROMIDE AND ALBUTEROL SULFATE 3 ML: .5; 2.5 SOLUTION RESPIRATORY (INHALATION) at 06:45

## 2023-01-26 RX ADMIN — GABAPENTIN 600 MG: 300 CAPSULE ORAL at 20:50

## 2023-01-26 RX ADMIN — IPRATROPIUM BROMIDE AND ALBUTEROL SULFATE 3 ML: .5; 2.5 SOLUTION RESPIRATORY (INHALATION) at 21:35

## 2023-01-26 RX ADMIN — INSULIN LISPRO 1 UNITS: 100 INJECTION, SOLUTION INTRAVENOUS; SUBCUTANEOUS at 13:19

## 2023-01-26 RX ADMIN — ARFORMOTEROL TARTRATE 15 MCG: 15 SOLUTION RESPIRATORY (INHALATION) at 21:35

## 2023-01-26 RX ADMIN — ENOXAPARIN SODIUM 40 MG: 100 INJECTION SUBCUTANEOUS at 20:50

## 2023-01-26 RX ADMIN — IPRATROPIUM BROMIDE AND ALBUTEROL SULFATE 3 ML: .5; 2.5 SOLUTION RESPIRATORY (INHALATION) at 10:02

## 2023-01-26 RX ADMIN — METHYLPREDNISOLONE SODIUM SUCCINATE 40 MG: 40 INJECTION, POWDER, FOR SOLUTION INTRAMUSCULAR; INTRAVENOUS at 20:50

## 2023-01-26 RX ADMIN — Medication 10 ML: at 20:50

## 2023-01-26 RX ADMIN — QUETIAPINE FUMARATE 200 MG: 200 TABLET, EXTENDED RELEASE ORAL at 20:50

## 2023-01-26 RX ADMIN — LEVOFLOXACIN 500 MG: 5 INJECTION, SOLUTION INTRAVENOUS at 17:51

## 2023-01-26 ASSESSMENT — PAIN SCALES - GENERAL: PAINLEVEL_OUTOF10: 0

## 2023-01-26 NOTE — PLAN OF CARE
Problem: Discharge Planning  Goal: Discharge to home or other facility with appropriate resources  1/25/2023 2042 by Selina Deras RN  Outcome: Progressing     Problem: Safety - Adult  Goal: Free from fall injury  1/25/2023 2042 by Selina Deras RN  Outcome: Progressing

## 2023-01-26 NOTE — PROGRESS NOTES
Department of Internal Medicine  General Internal Medicine  Attending Progress Note  Chief Complaint   Patient presents with    Shortness of Breath     Pt c/o shortness of breath that started around 1330 this afternoon. Hx COPD, on 2-3L O2 at home. Pt given 125 mg solumedrol and 1 duoneb treatment by EMS. 18 g LAC. SUBJECTIVE:    Reports that he is better today. Denied fever and chills. No chest pain. No nausea or vomiting. Noted that breathing is gradually improving.     OBJECTIVE      Medications    Current Facility-Administered Medications: methylPREDNISolone sodium (SOLU-MEDROL) injection 40 mg, 40 mg, IntraVENous, Q8H  levoFLOXacin (LEVAQUIN) 500 MG/100ML infusion 500 mg, 500 mg, IntraVENous, Q24H  insulin lispro (HUMALOG) injection vial 0-4 Units, 0-4 Units, SubCUTAneous, TID WC  insulin lispro (HUMALOG) injection vial 0-4 Units, 0-4 Units, SubCUTAneous, Nightly  glucose chewable tablet 16 g, 4 tablet, Oral, PRN  dextrose bolus 10% 125 mL, 125 mL, IntraVENous, PRN **OR** dextrose bolus 10% 250 mL, 250 mL, IntraVENous, PRN  glucagon (rDNA) injection 1 mg, 1 mg, SubCUTAneous, PRN  dextrose 10 % infusion, , IntraVENous, Continuous PRN  gabapentin (NEURONTIN) capsule 600 mg, 600 mg, Oral, Nightly  ipratropium-albuterol (DUONEB) nebulizer solution 3 mL, 1 vial, Inhalation, TID  losartan (COZAAR) tablet 25 mg, 25 mg, Oral, QPM  QUEtiapine (SEROQUEL XR) extended release tablet 200 mg, 200 mg, Oral, Nightly  atorvastatin (LIPITOR) tablet 40 mg, 40 mg, Oral, Daily  sodium chloride flush 0.9 % injection 5-40 mL, 5-40 mL, IntraVENous, 2 times per day  sodium chloride flush 0.9 % injection 5-40 mL, 5-40 mL, IntraVENous, PRN  0.9 % sodium chloride infusion, , IntraVENous, PRN  enoxaparin (LOVENOX) injection 40 mg, 40 mg, SubCUTAneous, Daily  ondansetron (ZOFRAN-ODT) disintegrating tablet 4 mg, 4 mg, Oral, Q8H PRN **OR** ondansetron (ZOFRAN) injection 4 mg, 4 mg, IntraVENous, Q6H PRN  polyethylene glycol (GLYCOLAX) packet 17 g, 17 g, Oral, Daily PRN  acetaminophen (TYLENOL) tablet 650 mg, 650 mg, Oral, Q6H PRN **OR** acetaminophen (TYLENOL) suppository 650 mg, 650 mg, Rectal, Q6H PRN  albuterol (PROVENTIL) nebulizer solution 2.5 mg, 2.5 mg, Nebulization, Q6H PRN **AND** Nebulizer tx intermittent, , , Q6H PRN  arformoterol tartrate (BROVANA) nebulizer solution 15 mcg, 15 mcg, Nebulization, BID **AND** budesonide (PULMICORT) nebulizer suspension 500 mcg, 0.5 mg, Nebulization, BID  Physical    VITALS:  BP (!) 147/76   Pulse 82   Temp 98.3 °F (36.8 °C) (Oral)   Resp 20   Ht 5' 10\" (1.778 m)   Wt 212 lb 8.4 oz (96.4 kg)   SpO2 95%   BMI 30.49 kg/m²   CONSTITUTIONAL:  awake, cooperative, and no apparent distress  EYES:  extra-ocular muscles intact  ENT:  normocepalic, without obvious abnormality  NECK:  supple, symmetrical, trachea midline  LUNGS:  no increased work of breathing, no retractions, and diffuse wheezing  CARDIOVASCULAR:  normal apical pulses and normal S1 and S2  ABDOMEN:  normal bowel sounds, non-distended, and non-tender  MUSCULOSKELETAL:  full range of motion noted  NEUROLOGIC:  Mental Status Exam:  Level of Alertness:   awake  Orientation:   person, place, time  SKIN:  no bruising or bleeding  Data    CBC:   Lab Results   Component Value Date/Time    WBC 7.8 01/24/2023 06:39 AM    RBC 5.00 01/24/2023 06:39 AM    HGB 15.8 01/24/2023 06:39 AM    HCT 45.7 01/24/2023 06:39 AM    MCV 91.4 01/24/2023 06:39 AM    MCH 31.6 01/24/2023 06:39 AM    MCHC 34.6 01/24/2023 06:39 AM    RDW 12.4 01/24/2023 06:39 AM     01/24/2023 06:39 AM    MPV 9.3 01/24/2023 06:39 AM     BMP:    Lab Results   Component Value Date/Time     01/24/2023 06:39 AM    K 4.7 01/24/2023 06:39 AM     01/24/2023 06:39 AM    CO2 20 01/24/2023 06:39 AM    BUN 16 01/24/2023 06:39 AM    LABALBU 4.0 01/24/2023 06:39 AM    CREATININE 1.1 01/24/2023 06:39 AM    CALCIUM 9.1 01/24/2023 06:39 AM    GFRAA >60 08/29/2022 05:54 AM    LABGLOM >60 01/24/2023 06:39 AM    GLUCOSE 232 01/24/2023 06:39 AM       ASSESSMENT AND PLAN      COPD exacerbation: suspected End stage COPD. Breathing treatment. Bipap as needed. Pulm consult. Continue abx to completion  Hypertension Essential/Hyperlipidemia/Acute on chronic respiratory failure with hypoxia: continue current regimen. Discharge planning will be based on patient's Improvement. Continue to monitor.

## 2023-01-26 NOTE — PROGRESS NOTES
Physical Therapy    Physical Therapy Treatment Note/Plan of Care    Room #:  0629/0629-01  Patient Name: Melvin Butler  YOB: 1955  MRN: [id number]    Date of Service: 1/26/2023     Tentative placement recommendation: Subacute Rehab  Equipment recommendation: Patient has needed equipment       Evaluating Physical Therapist: Kristy Birmingham, PT  #[id number]      Specific Provider Orders/Date/Referring Provider :  01/23/23 1930    PT evaluation and treat  Start:  01/23/23 1930,   End:  01/23/23 1930,   ONE TIME,   Standing Count:  1 Occurrences,   Ethan Manzano MD     Admitting Diagnosis:   COPD exacerbation ([facility] [J44.1]      Surgery: none      Patient Active Problem List   Diagnosis    COPD exacerbation ([facility] [address]    Acute on chronic respiratory failure with hypoxia and hypercapnia ([facility] [address]    History of tobacco abuse    Primary hypertension    Depression    Acute on chronic respiratory failure ([facility] [address]    Left cataract        ASSESSMENT of Current Deficits Patient exhibits decreased strength, balance, endurance, and coordination impairing functional mobility, transfers, gait , gait distance, and tolerance to activity are barriers to d/c and require skilled intervention to address concerns listed above to increase safety and independence at discharge. Decreased strength, balance and endurance  increases patient's risk for fall. Patient largely limited due to  difficulty oxygenating. Patient reports being tired from being up in chair earlier in the AM, only wanted to transfer to edge of bed. Patient performed seated exercises and back to supine position. Patient would benefit from further therapy to address above deficits and inc safety due to impairment of endurance. Patient remains at inc risk of falls.       PHYSICAL THERAPY  PLAN OF CARE       Physical therapy plan of care is established based on physician order,  patient diagnosis and clinical assessment    Current Treatment Recommendations:    -Bed Mobility: Lower extremity exercises , Upper extremity exercises , and Trunk control activities   -Sitting Balance: Incorporate reaching activities to activate trunk muscles  and Facilitate postural control in all planes   -Standing Balance: Perform strengthening exercises in standing to promote motor control with or without upper extremity support   -Transfers: Provide instruction on proper hand and foot position for adequate transfer of weight onto lower extremities and use of gait device if needed and Cues for hand placement, technique and safety. Provide stabilization to prevent fall   -Gait: Gait training and Standing activities to improve: base of support, weight shift, weight bearing    -Endurance: Utilize Supervised activities to increase level of endurance to allow for safe functional mobility including transfers and gait   -Stairs: Stair training with instruction on proper technique and hand placement on rail    PT long term treatment goals are located in below grid    Patient and or family understand(s) diagnosis, prognosis, and plan of care. Frequency of treatments: Patient will be seen  daily. Prior Level of Function: Patient ambulated independently    Rehab Potential: good   for baseline    Past medical history:   Past Medical History:   Diagnosis Date    Angina pectoris ([address]     states had neg heart cath    Asthma     Cervical spinal stenosis     COPD (chronic obstructive pulmonary disease) (HCC)     Depression     Hyperlipidemia     Hypertension     Neck pain      Past Surgical History:   Procedure Laterality Date    COLONOSCOPY      HAND SURGERY Left 11/13/2019    LEFT HAND-PALN, DUPUYTREN'S CONTRACTURE, MAJOR RESECTION.  POSSIBLE FULL THICKNESS SKIN GRAFT    INTRACAPSULAR CATARACT EXTRACTION Left 11/15/2022    CATARACT EXTRACTION WITH IOL,COMPLEX VISCOAT, VISION BLUE, POSSIBLE ECCE-LEFT performed by Carolynn Nj MD at [address] [place] [address] removal non malignant lesion    NECK SURGERY      cervical fusion x2    WRIST SURGERY Left 11/13/2019    LEFT HAND DUPUYTRENS CONTRACTURE MAJOR RESECTION,  Z-PLASTY performed by Robin Valles MD at [address]:    Precautions: Up as tolerated, falls, alarm, and O2 , 3 Liters of o2 via nasal cannula, patient only has 27% function of lungs, short of breath with exertion     Social history: Patient lives with significant other in a two story home bedroom and bathroom 2nd floor full flight stairs with Rail  with 3 steps, with rail  to enter Walk in shower        Equipment owned: Destiney Pace, and O2,  3 Liters of o2 via nasal cannula     AM-PAC Basic Mobility        AM-PAC Mobility Inpatient   How much difficulty turning over in bed?: A Little  How much difficulty sitting down on / standing up from a chair with arms?: A Little  How much difficulty moving from lying on back to sitting on side of bed?: A Little  How much help from another person moving to and from a bed to a chair?: A Lot  How much help from another person needed to walk in hospital room?: Total  How much help from another person for climbing 3-5 steps with a railing?: Total  AM-PAC Inpatient Mobility Raw Score : 13  AM-PAC Inpatient T-Scale Score : 36.74  Mobility Inpatient CMS 0-100% Score: 64.91  Mobility Inpatient CMS G-Code Modifier : CL    Nursing cleared patient for PT treatment. Patient on 4L at start of session. OBJECTIVE;   Initial Evaluation  Date: 1/24/2023 Treatment Date:  1/26/2023     Short Term/ Long Term   Goals   Was pt agreeable to Eval/treatment? Yes yes To be met in 5 days   Pain level   0/10    None reported    Bed Mobility    Rolling: Supervision     Supine to sit: Supervision     Sit to supine: Supervision     Scooting: Supervision    Rolling: Supervision    Supine to sit: Supervision    Sit to supine: Supervision    Scooting: Supervision     Rolling: Independent    Supine to sit:  Independent    Sit to supine: Independent    Scooting: Independent     Transfers Sit to stand: Moderate assist of 1  x 3 reps Sit to stand: Supervision  cues for hand placement and safety   Sit to stand: Independent     Ambulation     2 forward/backward steps using  wheeled walker with Moderate assist of 1    significant   shortness of breath with abdomen distention not assessed Patient declined performing     15 feet using  least restrictive device with Independent    Stair negotiation: ascended and descended   Not assessed     not assessed due to patient's overall debility, decreased activity tolerance, balance deficits, safety and fall risk. 10 steps, 1 rail, Supervision       ROM Within functional limits        Strength BUE:  refer to OT eval  RLE:  4-/5  LLE:  4-/5  Increase strength in affected mm groups by 1/3 grade   Balance Sitting EOB:  not assessed    Dynamic Standing:  poor   Sitting EOB: good   Dynamic Standing: not assessed    Sitting EOB:  good    Dynamic Standing: good with least restrictive device      Patient is Alert & Oriented x person, place, time, and situation and follows directions    Sensation:  Patient  denies numbness/tingling   Edema:  yes  abdomen  Endurance: poor      Vitals:  4 liters nasal cannula   Blood Pressure at rest  Blood Pressure during session    Heart Rate at rest  Heart Rate during session   SPO2 at rest %  SPO2 during session 94-95%     Patient education  Patient educated on role of Physical Therapy, risks of immobility, safety and plan of care, energy conservation,  importance of mobility while in hospital , purse lip breathing, safety , and seated exercises      Patient response to education:   Pt verbalized understanding Pt demonstrated skill Pt requires further education in this area   Yes Partial Yes      Treatment:  Patient practiced and was instructed/facilitated in the following treatment: Patient transferred to edge of bed and performed seated exercises.  Patient scooted to head of bed and transferred back to bed. Therapeutic Exercises:  ankle pumps, long arc quad, and seated marching  x 10-15 reps. At end of session, patient in bed with  call light and phone within reach,  all lines and tubes intact, nursing notified. Patient would benefit from continued skilled Physical Therapy to improve functional independence and quality of life.          Patient's/ family goals   home    Time in  941  Time out  950    Total Treatment Time  9 minutes    CPT codes:  Therapeutic activities (20823)   9 minutes  1 unit(s)    Rebekah Galicia, [facility]    #[id number]

## 2023-01-26 NOTE — CARE COORDINATION
1/26/2023 1330 CM Note:  Pt plan is to return home with his fiance as prior. Pt uses a cane and has O2 3 liters and a nebulizer from [employer]. Pt is currently on 4 liters of O2. Pt follows with [facility] to be placed on lung transplant list.  Pt states that he will be officially on the list on 2/20/2023. CM set up pt with pulmonary rehab through [facility]([phone] in Aug of 2021(pt's local pulmonologist is Dr Thong Hernandez). Pt states that his insurance stopped paying for pulmonary rehab. No home going needs identified at this time. Pt's fiance will provide transportation home. CM will follow.  Electronically signed by Shawna Ruby RN on 1/26/2023 at 1:39 PM

## 2023-01-27 LAB
ALBUMIN SERPL-MCNC: 3.6 G/DL (ref 3.5–5.2)
ALP BLD-CCNC: 88 U/L (ref 40–129)
ALT SERPL-CCNC: 13 U/L (ref 0–40)
ANION GAP SERPL CALCULATED.3IONS-SCNC: 9 MMOL/L (ref 7–16)
AST SERPL-CCNC: 11 U/L (ref 0–39)
BASOPHILS ABSOLUTE: 0.01 E9/L (ref 0–0.2)
BASOPHILS RELATIVE PERCENT: 0.1 % (ref 0–2)
BILIRUB SERPL-MCNC: 0.2 MG/DL (ref 0–1.2)
BUN BLDV-MCNC: 20 MG/DL (ref 6–23)
CALCIUM SERPL-MCNC: 8.5 MG/DL (ref 8.6–10.2)
CHLORIDE BLD-SCNC: 106 MMOL/L (ref 98–107)
CO2: 24 MMOL/L (ref 22–29)
CREAT SERPL-MCNC: 1 MG/DL (ref 0.7–1.2)
EOSINOPHILS ABSOLUTE: 0 E9/L (ref 0.05–0.5)
EOSINOPHILS RELATIVE PERCENT: 0 % (ref 0–6)
GFR SERPL CREATININE-BSD FRML MDRD: >60 ML/MIN/1.73
GLUCOSE BLD-MCNC: 184 MG/DL (ref 74–99)
HCT VFR BLD CALC: 45.5 % (ref 37–54)
HEMOGLOBIN: 14.8 G/DL (ref 12.5–16.5)
IMMATURE GRANULOCYTES #: 0.11 E9/L
IMMATURE GRANULOCYTES %: 1 % (ref 0–5)
LYMPHOCYTES ABSOLUTE: 1.27 E9/L (ref 1.5–4)
LYMPHOCYTES RELATIVE PERCENT: 11.6 % (ref 20–42)
MCH RBC QN AUTO: 30.1 PG (ref 26–35)
MCHC RBC AUTO-ENTMCNC: 32.5 % (ref 32–34.5)
MCV RBC AUTO: 92.5 FL (ref 80–99.9)
METER GLUCOSE: 152 MG/DL (ref 74–99)
METER GLUCOSE: 241 MG/DL (ref 74–99)
METER GLUCOSE: 247 MG/DL (ref 74–99)
METER GLUCOSE: 249 MG/DL (ref 74–99)
MONOCYTES ABSOLUTE: 0.66 E9/L (ref 0.1–0.95)
MONOCYTES RELATIVE PERCENT: 6 % (ref 2–12)
NEUTROPHILS ABSOLUTE: 8.9 E9/L (ref 1.8–7.3)
NEUTROPHILS RELATIVE PERCENT: 81.3 % (ref 43–80)
PDW BLD-RTO: 12.1 FL (ref 11.5–15)
PLATELET # BLD: 224 E9/L (ref 130–450)
PMV BLD AUTO: 9.7 FL (ref 7–12)
POTASSIUM SERPL-SCNC: 4.5 MMOL/L (ref 3.5–5)
RBC # BLD: 4.92 E12/L (ref 3.8–5.8)
SODIUM BLD-SCNC: 139 MMOL/L (ref 132–146)
TOTAL PROTEIN: 5.6 G/DL (ref 6.4–8.3)
WBC # BLD: 11 E9/L (ref 4.5–11.5)

## 2023-01-27 PROCEDURE — 2060000000 HC ICU INTERMEDIATE R&B

## 2023-01-27 PROCEDURE — 99232 SBSQ HOSP IP/OBS MODERATE 35: CPT | Performed by: INTERNAL MEDICINE

## 2023-01-27 PROCEDURE — 82962 GLUCOSE BLOOD TEST: CPT

## 2023-01-27 PROCEDURE — 6360000002 HC RX W HCPCS

## 2023-01-27 PROCEDURE — 2580000003 HC RX 258: Performed by: INTERNAL MEDICINE

## 2023-01-27 PROCEDURE — C9113 INJ PANTOPRAZOLE SODIUM, VIA: HCPCS | Performed by: INTERNAL MEDICINE

## 2023-01-27 PROCEDURE — 94667 MNPJ CHEST WALL 1ST: CPT

## 2023-01-27 PROCEDURE — 6360000002 HC RX W HCPCS: Performed by: INTERNAL MEDICINE

## 2023-01-27 PROCEDURE — 94660 CPAP INITIATION&MGMT: CPT

## 2023-01-27 PROCEDURE — 80053 COMPREHEN METABOLIC PANEL: CPT

## 2023-01-27 PROCEDURE — 6370000000 HC RX 637 (ALT 250 FOR IP): Performed by: INTERNAL MEDICINE

## 2023-01-27 PROCEDURE — A4216 STERILE WATER/SALINE, 10 ML: HCPCS | Performed by: INTERNAL MEDICINE

## 2023-01-27 PROCEDURE — 2700000000 HC OXYGEN THERAPY PER DAY

## 2023-01-27 PROCEDURE — 36415 COLL VENOUS BLD VENIPUNCTURE: CPT

## 2023-01-27 PROCEDURE — 85025 COMPLETE CBC W/AUTO DIFF WBC: CPT

## 2023-01-27 PROCEDURE — 94640 AIRWAY INHALATION TREATMENT: CPT

## 2023-01-27 RX ORDER — METHYLPREDNISOLONE SODIUM SUCCINATE 40 MG/ML
40 INJECTION, POWDER, LYOPHILIZED, FOR SOLUTION INTRAMUSCULAR; INTRAVENOUS EVERY 12 HOURS
Status: DISCONTINUED | OUTPATIENT
Start: 2023-01-27 | End: 2023-01-27

## 2023-01-27 RX ORDER — IPRATROPIUM BROMIDE AND ALBUTEROL SULFATE 2.5; .5 MG/3ML; MG/3ML
1 SOLUTION RESPIRATORY (INHALATION) EVERY 4 HOURS
Status: DISCONTINUED | OUTPATIENT
Start: 2023-01-28 | End: 2023-01-31 | Stop reason: HOSPADM

## 2023-01-27 RX ORDER — METHYLPREDNISOLONE SODIUM SUCCINATE 40 MG/ML
40 INJECTION, POWDER, LYOPHILIZED, FOR SOLUTION INTRAMUSCULAR; INTRAVENOUS EVERY 8 HOURS
Status: DISCONTINUED | OUTPATIENT
Start: 2023-01-27 | End: 2023-01-29

## 2023-01-27 RX ADMIN — QUETIAPINE FUMARATE 200 MG: 200 TABLET, EXTENDED RELEASE ORAL at 20:47

## 2023-01-27 RX ADMIN — METHYLPREDNISOLONE SODIUM SUCCINATE 40 MG: 40 INJECTION, POWDER, FOR SOLUTION INTRAMUSCULAR; INTRAVENOUS at 20:46

## 2023-01-27 RX ADMIN — IPRATROPIUM BROMIDE AND ALBUTEROL SULFATE 3 ML: .5; 2.5 SOLUTION RESPIRATORY (INHALATION) at 17:07

## 2023-01-27 RX ADMIN — LOSARTAN POTASSIUM 25 MG: 50 TABLET, FILM COATED ORAL at 17:38

## 2023-01-27 RX ADMIN — INSULIN LISPRO 1 UNITS: 100 INJECTION, SOLUTION INTRAVENOUS; SUBCUTANEOUS at 17:49

## 2023-01-27 RX ADMIN — Medication 40 MG: at 20:46

## 2023-01-27 RX ADMIN — ARFORMOTEROL TARTRATE 15 MCG: 15 SOLUTION RESPIRATORY (INHALATION) at 17:07

## 2023-01-27 RX ADMIN — Medication 10 ML: at 08:16

## 2023-01-27 RX ADMIN — BUDESONIDE 500 MCG: 0.5 SUSPENSION RESPIRATORY (INHALATION) at 17:07

## 2023-01-27 RX ADMIN — IPRATROPIUM BROMIDE AND ALBUTEROL SULFATE 3 ML: .5; 2.5 SOLUTION RESPIRATORY (INHALATION) at 10:34

## 2023-01-27 RX ADMIN — LEVOFLOXACIN 500 MG: 5 INJECTION, SOLUTION INTRAVENOUS at 17:38

## 2023-01-27 RX ADMIN — ARFORMOTEROL TARTRATE 15 MCG: 15 SOLUTION RESPIRATORY (INHALATION) at 06:17

## 2023-01-27 RX ADMIN — GABAPENTIN 600 MG: 300 CAPSULE ORAL at 20:46

## 2023-01-27 RX ADMIN — Medication 10 ML: at 20:47

## 2023-01-27 RX ADMIN — ATORVASTATIN CALCIUM 40 MG: 40 TABLET, FILM COATED ORAL at 08:15

## 2023-01-27 RX ADMIN — METHYLPREDNISOLONE SODIUM SUCCINATE 40 MG: 40 INJECTION, POWDER, FOR SOLUTION INTRAMUSCULAR; INTRAVENOUS at 08:15

## 2023-01-27 RX ADMIN — INSULIN LISPRO 1 UNITS: 100 INJECTION, SOLUTION INTRAVENOUS; SUBCUTANEOUS at 12:22

## 2023-01-27 RX ADMIN — ENOXAPARIN SODIUM 40 MG: 100 INJECTION SUBCUTANEOUS at 20:47

## 2023-01-27 RX ADMIN — IPRATROPIUM BROMIDE AND ALBUTEROL SULFATE 3 ML: .5; 2.5 SOLUTION RESPIRATORY (INHALATION) at 06:16

## 2023-01-27 RX ADMIN — BUDESONIDE 500 MCG: 0.5 SUSPENSION RESPIRATORY (INHALATION) at 06:17

## 2023-01-27 NOTE — PROGRESS NOTES
Pulmonary/Critical Care Progress Note    We are following patient for OPD exacerbation    SUBJECTIVE:  Patient is a 79-year-old male with a history of severe COPD requiring home oxygen. Over the last day reporting increasing shortness of breath requiring increasing oxygen demand. He also complains of intermittent chest pain but is short of breath with a moist productive cough with yellow sputum. January 26, 2023:  Patient seen and examined at the bedside. He has been weaned down to 4 L of oxygen by nasal cannula he denies any shortness of breath or dyspnea he states he is feeling a lot better since when he came in. He still reports an occasional moist productive cough with clear sputum and denies any chest pain with his shortness of breath. MEDICATIONS:   methylPREDNISolone  40 mg IntraVENous Q8H    levofloxacin  500 mg IntraVENous Q24H    insulin lispro  0-4 Units SubCUTAneous TID WC    insulin lispro  0-4 Units SubCUTAneous Nightly    gabapentin  600 mg Oral Nightly    ipratropium-albuterol  1 vial Inhalation TID    losartan  25 mg Oral QPM    QUEtiapine  200 mg Oral Nightly    atorvastatin  40 mg Oral Daily    sodium chloride flush  5-40 mL IntraVENous 2 times per day    enoxaparin  40 mg SubCUTAneous Daily    arformoterol tartrate  15 mcg Nebulization BID    And    budesonide  0.5 mg Nebulization BID      dextrose      sodium chloride       glucose, dextrose bolus **OR** dextrose bolus, glucagon (rDNA), dextrose, sodium chloride flush, sodium chloride, ondansetron **OR** ondansetron, polyethylene glycol, acetaminophen **OR** acetaminophen, albuterol **AND** [CANCELED] Nebulizer tx intermittent      REVIEW OF SYSTEMS:  Constitutional: Denies fever, weight loss, night sweats, and fatigue  Skin: Denies pigmentation, dark lesions, and rashes   HEENT: Denies hearing loss, tinnitus, ear drainage, epistaxis, sore throat, and hoarseness.   Cardiovascular: Denies palpitations, chest pain, and chest pressure. Respiratory: Denies  dyspnea at rest, hemoptysis, apnea, and choking.   Reports moist productive cough  Gastrointestinal: Denies nausea, vomiting, poor appetite, diarrhea, heartburn or reflux  Genitourinary: Denies dysuria, frequency, urgency or hematuria  Musculoskeletal: Denies myalgias, muscle weakness, and bone pain  Neurological: Denies dizziness, vertigo, headache, and focal weakness  Psychological: Denies anxiety and depression  Endocrine: Denies heat intolerance and cold intolerance  Hematopoietic/Lymphatic: Denies bleeding problems and blood transfusions    OBJECTIVE:  Vitals:    01/26/23 2045   BP: (!) 151/72   Pulse: 93   Resp: 20   Temp: 98 °F (36.7 °C)   SpO2: 96%     FiO2 : 35 %  O2 Flow Rate (L/min): 4 L/min  O2 Device: Nasal cannula    PHYSICAL EXAM:  Constitutional: Denies any fever chills or diaphoresis  Skin: No rash or breakdown  HEENT: Normocephalic atraumatic, PERRLA, EOMI, mucous membranes moist  Neck: No JVD or thyroid megaly  Cardiovascular: S1, S2, normal rate and rhythm, no rubs gallops or murmurs  Respiratory: Diminished breath sounds bilaterally, no wheezes crackles or rhonchi  Gastrointestinal: Rounded soft nontender bowel sounds present in all quadrants  Genitourinary: No CVA tenderness  Extremities: No edema peripheral pulses palpable  Neurological: Alert and oriented x3 no focal deficit  Psychological: Alert and pleasant    LABS:  WBC   Date Value Ref Range Status   01/24/2023 7.8 4.5 - 11.5 E9/L Final   01/23/2023 8.3 4.5 - 11.5 E9/L Final   08/29/2022 11.3 4.5 - 11.5 E9/L Final     Hemoglobin   Date Value Ref Range Status   01/24/2023 15.8 12.5 - 16.5 g/dL Final   01/23/2023 16.5 12.5 - 16.5 g/dL Final   08/29/2022 14.0 12.5 - 16.5 g/dL Final     Hematocrit   Date Value Ref Range Status   01/24/2023 45.7 37.0 - 54.0 % Final   01/23/2023 47.0 37.0 - 54.0 % Final   08/29/2022 40.4 37.0 - 54.0 % Final     MCV   Date Value Ref Range Status   01/24/2023 91.4 80.0 - 99.9 fL Final   01/23/2023 91.3 80.0 - 99.9 fL Final   08/29/2022 94.0 80.0 - 99.9 fL Final     Platelets   Date Value Ref Range Status   01/24/2023 221 130 - 450 E9/L Final   01/23/2023 236 130 - 450 E9/L Final   08/29/2022 201 130 - 450 E9/L Final     Sodium   Date Value Ref Range Status   01/24/2023 135 132 - 146 mmol/L Final   01/23/2023 142 132 - 146 mmol/L Final   08/29/2022 140 132 - 146 mmol/L Final     Potassium   Date Value Ref Range Status   01/23/2023 4.2 3.5 - 5.0 mmol/L Final   08/29/2022 4.7 3.5 - 5.0 mmol/L Final   08/28/2022 4.4 3.5 - 5.0 mmol/L Final     Potassium reflex Magnesium   Date Value Ref Range Status   01/24/2023 4.7 3.5 - 5.0 mmol/L Final   08/22/2022 4.5 3.5 - 5.0 mmol/L Final   08/21/2022 4.2 3.5 - 5.0 mmol/L Final     Chloride   Date Value Ref Range Status   01/24/2023 104 98 - 107 mmol/L Final   01/23/2023 106 98 - 107 mmol/L Final   08/29/2022 105 98 - 107 mmol/L Final     CO2   Date Value Ref Range Status   01/24/2023 20 (L) 22 - 29 mmol/L Final   01/23/2023 22 22 - 29 mmol/L Final   08/29/2022 23 22 - 29 mmol/L Final     BUN   Date Value Ref Range Status   01/24/2023 16 6 - 23 mg/dL Final   01/23/2023 12 6 - 23 mg/dL Final   08/29/2022 23 6 - 23 mg/dL Final     Creatinine   Date Value Ref Range Status   01/24/2023 1.1 0.7 - 1.2 mg/dL Final   01/23/2023 1.1 0.7 - 1.2 mg/dL Final   08/29/2022 0.9 0.7 - 1.2 mg/dL Final     Glucose   Date Value Ref Range Status   01/24/2023 232 (H) 74 - 99 mg/dL Final   01/23/2023 104 (H) 74 - 99 mg/dL Final   08/29/2022 183 (H) 74 - 99 mg/dL Final     Calcium   Date Value Ref Range Status   01/24/2023 9.1 8.6 - 10.2 mg/dL Final   01/23/2023 9.3 8.6 - 10.2 mg/dL Final   08/29/2022 8.1 (L) 8.6 - 10.2 mg/dL Final     Total Protein   Date Value Ref Range Status   01/24/2023 6.1 (L) 6.4 - 8.3 g/dL Final   01/23/2023 6.4 6.4 - 8.3 g/dL Final   08/27/2022 5.0 (L) 6.4 - 8.3 g/dL Final     Albumin   Date Value Ref Range Status   01/24/2023 4.0 3.5 - 5.2 g/dL Final 01/23/2023 4.2 3.5 - 5.2 g/dL Final   08/27/2022 3.0 (L) 3.5 - 5.2 g/dL Final     Total Bilirubin   Date Value Ref Range Status   01/24/2023 0.3 0.0 - 1.2 mg/dL Final   01/23/2023 0.3 0.0 - 1.2 mg/dL Final   08/27/2022 0.3 0.0 - 1.2 mg/dL Final     Alkaline Phosphatase   Date Value Ref Range Status   01/24/2023 96 40 - 129 U/L Final   01/23/2023 107 40 - 129 U/L Final   08/27/2022 105 40 - 129 U/L Final     AST   Date Value Ref Range Status   01/24/2023 14 0 - 39 U/L Final   01/23/2023 18 0 - 39 U/L Final   08/27/2022 17 0 - 39 U/L Final     Comment:     Specimen is slightly Hemolyzed. Result may be artificially increased. ALT   Date Value Ref Range Status   01/24/2023 14 0 - 40 U/L Final   01/23/2023 19 0 - 40 U/L Final   08/27/2022 22 0 - 40 U/L Final     Est, Glom Filt Rate   Date Value Ref Range Status   01/24/2023 >60 >=60 mL/min/1.73 Final     Comment:     Pediatric calculator link  [email or web address] [email or web address]/gfr_calculatorped  Effective Oct 3, 2022  These results are not intended for use in patients  <25years of age. eGFR results are calculated without  a race factor using the 2021 CKD-EPI equation. Careful  clinical correlation is recommended, particularly when  comparing to results calculated using previous equations. The CKD-EPI equation is less accurate in patients with  extremes of muscle mass, extra-renal metabolism of  creatinine, excessive creatinine ingestion, or following  therapy that affects renal tubular secretion. 01/23/2023 >60 >=60 mL/min/1.73 Final     Comment:     Pediatric calculator link  [email or web address]. [email or web address]/gfr_calculatorped  Effective Oct 3, 2022  These results are not intended for use in patients  <25years of age. eGFR results are calculated without  a race factor using the 2021 CKD-EPI equation. Careful  clinical correlation is recommended, particularly when  comparing to results calculated using previous equations.   The CKD-EPI equation is less accurate in patients with  extremes of muscle mass, extra-renal metabolism of  creatinine, excessive creatinine ingestion, or following  therapy that affects renal tubular secretion. GFR Non-   Date Value Ref Range Status   08/29/2022 >60 >=60 mL/min/1.73 Final     Comment:     Chronic Kidney Disease: less than 60 ml/min/1.73 sq.m. Kidney Failure: less than 15 ml/min/1.73 sq.m. Results valid for patients 18 years and older. 08/28/2022 >60 >=60 mL/min/1.73 Final     Comment:     Chronic Kidney Disease: less than 60 ml/min/1.73 sq.m. Kidney Failure: less than 15 ml/min/1.73 sq.m. Results valid for patients 18 years and older. 08/27/2022 >60 >=60 mL/min/1.73 Final     Comment:     Chronic Kidney Disease: less than 60 ml/min/1.73 sq.m. Kidney Failure: less than 15 ml/min/1.73 sq.m. Results valid for patients 18 years and older. GFR    Date Value Ref Range Status   08/29/2022 >60  Final   08/28/2022 >60  Final   08/27/2022 >60  Final     Magnesium   Date Value Ref Range Status   08/28/2022 2.6 1.6 - 2.6 mg/dL Final   08/27/2022 2.7 (H) 1.6 - 2.6 mg/dL Final   08/26/2022 2.5 1.6 - 2.6 mg/dL Final     Phosphorus   Date Value Ref Range Status   08/27/2022 3.2 2.5 - 4.5 mg/dL Final   08/26/2022 2.9 2.5 - 4.5 mg/dL Final   08/25/2022 3.3 2.5 - 4.5 mg/dL Final     No results for input(s): PH, PO2, PCO2, HCO3, BE, O2SAT in the last 72 hours. RADIOLOGY:  XR CHEST PORTABLE   Final Result   No acute process. PROBLEM LIST:  Principal Problem:    COPD exacerbation ([address]  Resolved Problems:    * No resolved hospital problems.  *      IMPRESSION:  COPD exacerbation  Acute on chronic hypoxic respiratory failure      PLAN:  Supplemental oxygen to maintain SPO2 greater than 90%  BiPAP as needed for shortness of breath  Continue bronchodilators and ICS  Wean steroids to every 12 hours  Continue Levaquin (last dose January 27, 2023 at 1730)  Encourage ambulation      Electronically signed by OSIRIS Martinez CNP on 1/26/2023 at 11:22 PM

## 2023-01-27 NOTE — PROGRESS NOTES
Pulmonary/Critical Care Progress Note    We are following patient for severe COPD, acute respiratory failure, hypertension, hyperlipidemia    SUBJECTIVE:  Patient says he is feeling better but there is still some significant impairment of airflow with wheezes (see below), we will increase his steroids, increase aerosolized bronchodilators, and bronchopulmonary hygiene in the form of a flutter valve. MEDICATIONS:   methylPREDNISolone  40 mg IntraVENous Q8H    [START ON 1/28/2023] ipratropium-albuterol  1 vial Inhalation Q4H    levofloxacin  500 mg IntraVENous Q24H    insulin lispro  0-4 Units SubCUTAneous TID WC    insulin lispro  0-4 Units SubCUTAneous Nightly    gabapentin  600 mg Oral Nightly    losartan  25 mg Oral QPM    QUEtiapine  200 mg Oral Nightly    atorvastatin  40 mg Oral Daily    sodium chloride flush  5-40 mL IntraVENous 2 times per day    enoxaparin  40 mg SubCUTAneous Daily    arformoterol tartrate  15 mcg Nebulization BID    And    budesonide  0.5 mg Nebulization BID      dextrose      sodium chloride       glucose, dextrose bolus **OR** dextrose bolus, glucagon (rDNA), dextrose, sodium chloride flush, sodium chloride, ondansetron **OR** ondansetron, polyethylene glycol, acetaminophen **OR** acetaminophen, albuterol **AND** [CANCELED] Nebulizer tx intermittent      REVIEW OF SYSTEMS:  Constitutional: Denies fever, weight loss, night sweats, and fatigue  Skin: Denies pigmentation, dark lesions, and rashes   HEENT: Denies hearing loss, tinnitus, ear drainage, epistaxis, sore throat, and hoarseness. Cardiovascular: Denies palpitations, chest pain, and chest pressure. Respiratory: Positive for cough, dyspnea at rest, negative for for hemoptysis, apnea, and choking.   Gastrointestinal: Denies nausea, vomiting, poor appetite, diarrhea, heartburn or reflux  Genitourinary: Denies dysuria, frequency, urgency or hematuria  Musculoskeletal: Denies myalgias, muscle weakness, and bone pain  Neurological: Denies dizziness, vertigo, headache, and focal weakness  Psychological: Denies anxiety and depression  Endocrine: Denies heat intolerance and cold intolerance  Hematopoietic/Lymphatic: Denies bleeding problems and blood transfusions    OBJECTIVE:  Vitals:    01/27/23 1447   BP: (!) 157/80   Pulse: 88   Resp: 19   Temp: 98.1 °F (36.7 °C)   SpO2: 96%     FiO2 : 35 %  O2 Flow Rate (L/min): 4 L/min  O2 Device: Nasal cannula    PHYSICAL EXAM:  Constitutional: No chills, fever, diaphoresis  Skin: No skin rash, no skin breakdown  HEENT: Unremarkable  Neck: No JVD, lymphadenopathy, thyromegaly  Cardiovascular: S1, S2 regular. No S3 murmurs or rubs present  Respiratory: Expiratory wheezes over both posterior lung fields with impaired air exit  Gastrointestinal: Soft, obese, nontender  Genitourinary: No CVA tenderness  Extremities: No clubbing, cyanosis, or edema  Neurological: Awake, alert, oriented x3. No evidence of focal motor or sensory deficits  Psychological: Appropriate affect.     LABS:  WBC   Date Value Ref Range Status   01/27/2023 11.0 4.5 - 11.5 E9/L Final   01/24/2023 7.8 4.5 - 11.5 E9/L Final   01/23/2023 8.3 4.5 - 11.5 E9/L Final     Hemoglobin   Date Value Ref Range Status   01/27/2023 14.8 12.5 - 16.5 g/dL Final   01/24/2023 15.8 12.5 - 16.5 g/dL Final   01/23/2023 16.5 12.5 - 16.5 g/dL Final     Hematocrit   Date Value Ref Range Status   01/27/2023 45.5 37.0 - 54.0 % Final   01/24/2023 45.7 37.0 - 54.0 % Final   01/23/2023 47.0 37.0 - 54.0 % Final     MCV   Date Value Ref Range Status   01/27/2023 92.5 80.0 - 99.9 fL Final   01/24/2023 91.4 80.0 - 99.9 fL Final   01/23/2023 91.3 80.0 - 99.9 fL Final     Platelets   Date Value Ref Range Status   01/27/2023 224 130 - 450 E9/L Final   01/24/2023 221 130 - 450 E9/L Final   01/23/2023 236 130 - 450 E9/L Final     Sodium   Date Value Ref Range Status   01/27/2023 139 132 - 146 mmol/L Final   01/24/2023 135 132 - 146 mmol/L Final   01/23/2023 142 132 - 146 mmol/L Final     Potassium   Date Value Ref Range Status   01/27/2023 4.5 3.5 - 5.0 mmol/L Final   01/23/2023 4.2 3.5 - 5.0 mmol/L Final   08/29/2022 4.7 3.5 - 5.0 mmol/L Final     Potassium reflex Magnesium   Date Value Ref Range Status   01/24/2023 4.7 3.5 - 5.0 mmol/L Final   08/22/2022 4.5 3.5 - 5.0 mmol/L Final   08/21/2022 4.2 3.5 - 5.0 mmol/L Final     Chloride   Date Value Ref Range Status   01/27/2023 106 98 - 107 mmol/L Final   01/24/2023 104 98 - 107 mmol/L Final   01/23/2023 106 98 - 107 mmol/L Final     CO2   Date Value Ref Range Status   01/27/2023 24 22 - 29 mmol/L Final   01/24/2023 20 (L) 22 - 29 mmol/L Final   01/23/2023 22 22 - 29 mmol/L Final     BUN   Date Value Ref Range Status   01/27/2023 20 6 - 23 mg/dL Final   01/24/2023 16 6 - 23 mg/dL Final   01/23/2023 12 6 - 23 mg/dL Final     Creatinine   Date Value Ref Range Status   01/27/2023 1.0 0.7 - 1.2 mg/dL Final   01/24/2023 1.1 0.7 - 1.2 mg/dL Final   01/23/2023 1.1 0.7 - 1.2 mg/dL Final     Glucose   Date Value Ref Range Status   01/27/2023 184 (H) 74 - 99 mg/dL Final   01/24/2023 232 (H) 74 - 99 mg/dL Final   01/23/2023 104 (H) 74 - 99 mg/dL Final     Calcium   Date Value Ref Range Status   01/27/2023 8.5 (L) 8.6 - 10.2 mg/dL Final   01/24/2023 9.1 8.6 - 10.2 mg/dL Final   01/23/2023 9.3 8.6 - 10.2 mg/dL Final     Total Protein   Date Value Ref Range Status   01/27/2023 5.6 (L) 6.4 - 8.3 g/dL Final   01/24/2023 6.1 (L) 6.4 - 8.3 g/dL Final   01/23/2023 6.4 6.4 - 8.3 g/dL Final     Albumin   Date Value Ref Range Status   01/27/2023 3.6 3.5 - 5.2 g/dL Final   01/24/2023 4.0 3.5 - 5.2 g/dL Final   01/23/2023 4.2 3.5 - 5.2 g/dL Final     Total Bilirubin   Date Value Ref Range Status   01/27/2023 0.2 0.0 - 1.2 mg/dL Final   01/24/2023 0.3 0.0 - 1.2 mg/dL Final   01/23/2023 0.3 0.0 - 1.2 mg/dL Final     Alkaline Phosphatase   Date Value Ref Range Status   01/27/2023 88 40 - 129 U/L Final   01/24/2023 96 40 - 129 U/L Final   01/23/2023 107 40 - 129 U/L Final     AST   Date Value Ref Range Status   01/27/2023 11 0 - 39 U/L Final   01/24/2023 14 0 - 39 U/L Final   01/23/2023 18 0 - 39 U/L Final     ALT   Date Value Ref Range Status   01/27/2023 13 0 - 40 U/L Final   01/24/2023 14 0 - 40 U/L Final   01/23/2023 19 0 - 40 U/L Final     Est, Glom Filt Rate   Date Value Ref Range Status   01/27/2023 >60 >=60 mL/min/1.73 Final     Comment:     Pediatric calculator link  [email or web address]  Effective Oct 3, 2022  These results are not intended for use in patients  <25years of age. eGFR results are calculated without  a race factor using the 2021 CKD-EPI equation. Careful  clinical correlation is recommended, particularly when  comparing to results calculated using previous equations. The CKD-EPI equation is less accurate in patients with  extremes of muscle mass, extra-renal metabolism of  creatinine, excessive creatinine ingestion, or following  therapy that affects renal tubular secretion. 01/24/2023 >60 >=60 mL/min/1.73 Final     Comment:     Pediatric calculator link  [email or web address]  Effective Oct 3, 2022  These results are not intended for use in patients  <25years of age. eGFR results are calculated without  a race factor using the 2021 CKD-EPI equation. Careful  clinical correlation is recommended, particularly when  comparing to results calculated using previous equations. The CKD-EPI equation is less accurate in patients with  extremes of muscle mass, extra-renal metabolism of  creatinine, excessive creatinine ingestion, or following  therapy that affects renal tubular secretion. 01/23/2023 >60 >=60 mL/min/1.73 Final     Comment:     Pediatric calculator link  [email or web address]  Effective Oct 3, 2022  These results are not intended for use in patients  <25years of age.   eGFR results are calculated without  a race factor using the 2021 CKD-EPI equation. Careful  clinical correlation is recommended, particularly when  comparing to results calculated using previous equations. The CKD-EPI equation is less accurate in patients with  extremes of muscle mass, extra-renal metabolism of  creatinine, excessive creatinine ingestion, or following  therapy that affects renal tubular secretion. GFR    Date Value Ref Range Status   08/29/2022 >60  Final   08/28/2022 >60  Final   08/27/2022 >60  Final     Magnesium   Date Value Ref Range Status   08/28/2022 2.6 1.6 - 2.6 mg/dL Final   08/27/2022 2.7 (H) 1.6 - 2.6 mg/dL Final   08/26/2022 2.5 1.6 - 2.6 mg/dL Final     Phosphorus   Date Value Ref Range Status   08/27/2022 3.2 2.5 - 4.5 mg/dL Final   08/26/2022 2.9 2.5 - 4.5 mg/dL Final   08/25/2022 3.3 2.5 - 4.5 mg/dL Final     No results for input(s): PH, PO2, PCO2, HCO3, BE, O2SAT in the last 72 hours. RADIOLOGY:  XR CHEST PORTABLE   Final Result   No acute process. PROBLEM LIST:  Principal Problem:    COPD exacerbation ([address]  Resolved Problems:    * No resolved hospital problems.  *      IMPRESSION:  COPD with exacerbation  Acute superimposed on chronic hypoxemic respiratory failure      PLAN:  Add flutter valve  Increased aerosols from 3 times daily to every 4 hours  Increase IV steroids  BiPAP nightly  Add pantoprazole for GI bleed prophylaxis      Electronically signed by Salvador Singh MD on 1/27/2023 at 6:24 PM

## 2023-01-27 NOTE — PROGRESS NOTES
Department of Internal Medicine  General Internal Medicine  Attending Progress Note  Chief Complaint   Patient presents with    Shortness of Breath     Pt c/o shortness of breath that started around 1330 this afternoon. Hx COPD, on 2-3L O2 at home. Pt given 125 mg solumedrol and 1 duoneb treatment by EMS. 18 g LAC. SUBJECTIVE:    Reports feeling better. Denied fever or chills. Noted cough. No diarrhea. He noted feeling tired with exertion. Aware of plans to continue current breathing treatment.      OBJECTIVE      Medications    Current Facility-Administered Medications: methylPREDNISolone sodium (SOLU-MEDROL) injection 40 mg, 40 mg, IntraVENous, Q12H  levoFLOXacin (LEVAQUIN) 500 MG/100ML infusion 500 mg, 500 mg, IntraVENous, Q24H  insulin lispro (HUMALOG) injection vial 0-4 Units, 0-4 Units, SubCUTAneous, TID WC  insulin lispro (HUMALOG) injection vial 0-4 Units, 0-4 Units, SubCUTAneous, Nightly  glucose chewable tablet 16 g, 4 tablet, Oral, PRN  dextrose bolus 10% 125 mL, 125 mL, IntraVENous, PRN **OR** dextrose bolus 10% 250 mL, 250 mL, IntraVENous, PRN  glucagon (rDNA) injection 1 mg, 1 mg, SubCUTAneous, PRN  dextrose 10 % infusion, , IntraVENous, Continuous PRN  gabapentin (NEURONTIN) capsule 600 mg, 600 mg, Oral, Nightly  ipratropium-albuterol (DUONEB) nebulizer solution 3 mL, 1 vial, Inhalation, TID  losartan (COZAAR) tablet 25 mg, 25 mg, Oral, QPM  QUEtiapine (SEROQUEL XR) extended release tablet 200 mg, 200 mg, Oral, Nightly  atorvastatin (LIPITOR) tablet 40 mg, 40 mg, Oral, Daily  sodium chloride flush 0.9 % injection 5-40 mL, 5-40 mL, IntraVENous, 2 times per day  sodium chloride flush 0.9 % injection 5-40 mL, 5-40 mL, IntraVENous, PRN  0.9 % sodium chloride infusion, , IntraVENous, PRN  enoxaparin (LOVENOX) injection 40 mg, 40 mg, SubCUTAneous, Daily  ondansetron (ZOFRAN-ODT) disintegrating tablet 4 mg, 4 mg, Oral, Q8H PRN **OR** ondansetron (ZOFRAN) injection 4 mg, 4 mg, IntraVENous, Q6H PRN  polyethylene glycol (GLYCOLAX) packet 17 g, 17 g, Oral, Daily PRN  acetaminophen (TYLENOL) tablet 650 mg, 650 mg, Oral, Q6H PRN **OR** acetaminophen (TYLENOL) suppository 650 mg, 650 mg, Rectal, Q6H PRN  albuterol (PROVENTIL) nebulizer solution 2.5 mg, 2.5 mg, Nebulization, Q6H PRN **AND** [CANCELED] Nebulizer tx intermittent, , , Q6H PRN  arformoterol tartrate (BROVANA) nebulizer solution 15 mcg, 15 mcg, Nebulization, BID **AND** budesonide (PULMICORT) nebulizer suspension 500 mcg, 0.5 mg, Nebulization, BID  Physical    VITALS:  BP (!) 140/84   Pulse 92   Temp 98.5 °F (36.9 °C) (Oral)   Resp 20   Ht 5' 10\" (1.778 m)   Wt 212 lb 8.4 oz (96.4 kg)   SpO2 99%   BMI 30.49 kg/m²   CONSTITUTIONAL:  awake, cooperative, and no apparent distress  EYES:  extra-ocular muscles intact  ENT:  normocepalic, without obvious abnormality  NECK:  supple, symmetrical, trachea midline  LUNGS:  no increased work of breathing, no retractions, and diminished breath sounds throughout lungs  CARDIOVASCULAR:  normal apical pulses and normal S1 and S2  ABDOMEN:  normal bowel sounds, non-distended, and non-tender  MUSCULOSKELETAL:  full range of motion noted  NEUROLOGIC:  Mental Status Exam:  Level of Alertness:   awake  Orientation:   person, place, time  SKIN:  no bruising or bleeding  Data    CBC:   Lab Results   Component Value Date/Time    WBC 11.0 01/27/2023 05:43 AM    RBC 4.92 01/27/2023 05:43 AM    HGB 14.8 01/27/2023 05:43 AM    HCT 45.5 01/27/2023 05:43 AM    MCV 92.5 01/27/2023 05:43 AM    MCH 30.1 01/27/2023 05:43 AM    MCHC 32.5 01/27/2023 05:43 AM    RDW 12.1 01/27/2023 05:43 AM     01/27/2023 05:43 AM    MPV 9.7 01/27/2023 05:43 AM     BMP:    Lab Results   Component Value Date/Time     01/27/2023 05:43 AM    K 4.5 01/27/2023 05:43 AM    K 4.7 01/24/2023 06:39 AM     01/27/2023 05:43 AM    CO2 24 01/27/2023 05:43 AM    BUN 20 01/27/2023 05:43 AM    LABALBU 3.6 01/27/2023 05:43 AM    CREATININE 1.0 01/27/2023 05:43 AM    CALCIUM 8.5 01/27/2023 05:43 AM    GFRAA >60 08/29/2022 05:54 AM    LABGLOM >60 01/27/2023 05:43 AM    GLUCOSE 184 01/27/2023 05:43 AM       ASSESSMENT AND PLAN      COPD exacerbation: continue Solu-medrol. Incentive Spirometry to be added. Complete antibiotics today. Acute respiratory failure with hypoxia/hypercapnia: appreciate pulm recommendations  Hypertension Essential: better control. Continue home meds  Hyperlipidemia: on statin    Discharge pending improvement. Improvement seems to be slow.

## 2023-01-27 NOTE — CARE COORDINATION
1/27/2023 1025 CM Note:  Pt plan is to return home with his fiance as prior. Pt uses a cane and has O2 3 liters and a nebulizer through [employer]. Pt is currently wearing O2 4 liters. Pt follows with [facility] for a lung transplant and will be on the transplant list officially 2/20/2023. No home going needs identified at this time. Pt's fiance will provide transportation home. CM will follow.  Electronically signed by Nelli Gallardo RN on 1/27/2023 at 10:35 AM

## 2023-01-27 NOTE — PLAN OF CARE
Problem: Safety - Adult  Goal: Free from fall injury  1/27/2023 0305 by Abhijit Gaitan RN  Outcome: Progressing  1/26/2023 1711 by Julieta Montalvo RN  Outcome: Progressing     Problem: Pain  Goal: Verbalizes/displays adequate comfort level or baseline comfort level  Outcome: Progressing

## 2023-01-28 ENCOUNTER — APPOINTMENT (OUTPATIENT)
Dept: GENERAL RADIOLOGY | Age: 68
DRG: 190 | End: 2023-01-28
Payer: MEDICARE

## 2023-01-28 LAB
ALBUMIN SERPL-MCNC: 3.5 G/DL (ref 3.5–5.2)
ALP BLD-CCNC: 81 U/L (ref 40–129)
ALT SERPL-CCNC: 13 U/L (ref 0–40)
ANION GAP SERPL CALCULATED.3IONS-SCNC: 12 MMOL/L (ref 7–16)
AST SERPL-CCNC: 9 U/L (ref 0–39)
BASOPHILS ABSOLUTE: 0.03 E9/L (ref 0–0.2)
BASOPHILS RELATIVE PERCENT: 0.4 % (ref 0–2)
BILIRUB SERPL-MCNC: 0.4 MG/DL (ref 0–1.2)
BUN BLDV-MCNC: 20 MG/DL (ref 6–23)
CALCIUM SERPL-MCNC: 8.4 MG/DL (ref 8.6–10.2)
CHLORIDE BLD-SCNC: 104 MMOL/L (ref 98–107)
CO2: 23 MMOL/L (ref 22–29)
CREAT SERPL-MCNC: 1 MG/DL (ref 0.7–1.2)
EOSINOPHILS ABSOLUTE: 0 E9/L (ref 0.05–0.5)
EOSINOPHILS RELATIVE PERCENT: 0 % (ref 0–6)
GFR SERPL CREATININE-BSD FRML MDRD: >60 ML/MIN/1.73
GLUCOSE BLD-MCNC: 175 MG/DL (ref 74–99)
HCT VFR BLD CALC: 44.9 % (ref 37–54)
HEMOGLOBIN: 15.6 G/DL (ref 12.5–16.5)
IMMATURE GRANULOCYTES #: 0.12 E9/L
IMMATURE GRANULOCYTES %: 1.4 % (ref 0–5)
LYMPHOCYTES ABSOLUTE: 1.37 E9/L (ref 1.5–4)
LYMPHOCYTES RELATIVE PERCENT: 16.1 % (ref 20–42)
MAGNESIUM: 2.6 MG/DL (ref 1.6–2.6)
MCH RBC QN AUTO: 31.4 PG (ref 26–35)
MCHC RBC AUTO-ENTMCNC: 34.7 % (ref 32–34.5)
MCV RBC AUTO: 90.3 FL (ref 80–99.9)
METER GLUCOSE: 156 MG/DL (ref 74–99)
METER GLUCOSE: 220 MG/DL (ref 74–99)
METER GLUCOSE: 253 MG/DL (ref 74–99)
METER GLUCOSE: 266 MG/DL (ref 74–99)
MONOCYTES ABSOLUTE: 0.42 E9/L (ref 0.1–0.95)
MONOCYTES RELATIVE PERCENT: 4.9 % (ref 2–12)
NEUTROPHILS ABSOLUTE: 6.55 E9/L (ref 1.8–7.3)
NEUTROPHILS RELATIVE PERCENT: 77.2 % (ref 43–80)
PDW BLD-RTO: 12.3 FL (ref 11.5–15)
PHOSPHORUS: 3.7 MG/DL (ref 2.5–4.5)
PLATELET # BLD: 218 E9/L (ref 130–450)
PMV BLD AUTO: 9.7 FL (ref 7–12)
POTASSIUM SERPL-SCNC: 4.6 MMOL/L (ref 3.5–5)
RBC # BLD: 4.97 E12/L (ref 3.8–5.8)
SODIUM BLD-SCNC: 139 MMOL/L (ref 132–146)
TOTAL PROTEIN: 5.5 G/DL (ref 6.4–8.3)
WBC # BLD: 8.5 E9/L (ref 4.5–11.5)

## 2023-01-28 PROCEDURE — 71045 X-RAY EXAM CHEST 1 VIEW: CPT

## 2023-01-28 PROCEDURE — 6370000000 HC RX 637 (ALT 250 FOR IP): Performed by: INTERNAL MEDICINE

## 2023-01-28 PROCEDURE — 94660 CPAP INITIATION&MGMT: CPT

## 2023-01-28 PROCEDURE — 2580000003 HC RX 258: Performed by: INTERNAL MEDICINE

## 2023-01-28 PROCEDURE — 99232 SBSQ HOSP IP/OBS MODERATE 35: CPT | Performed by: INTERNAL MEDICINE

## 2023-01-28 PROCEDURE — 94640 AIRWAY INHALATION TREATMENT: CPT

## 2023-01-28 PROCEDURE — 84100 ASSAY OF PHOSPHORUS: CPT

## 2023-01-28 PROCEDURE — 83735 ASSAY OF MAGNESIUM: CPT

## 2023-01-28 PROCEDURE — 6360000002 HC RX W HCPCS: Performed by: INTERNAL MEDICINE

## 2023-01-28 PROCEDURE — 2700000000 HC OXYGEN THERAPY PER DAY

## 2023-01-28 PROCEDURE — 36415 COLL VENOUS BLD VENIPUNCTURE: CPT

## 2023-01-28 PROCEDURE — C9113 INJ PANTOPRAZOLE SODIUM, VIA: HCPCS | Performed by: INTERNAL MEDICINE

## 2023-01-28 PROCEDURE — 82962 GLUCOSE BLOOD TEST: CPT

## 2023-01-28 PROCEDURE — 2060000000 HC ICU INTERMEDIATE R&B

## 2023-01-28 PROCEDURE — 85025 COMPLETE CBC W/AUTO DIFF WBC: CPT

## 2023-01-28 PROCEDURE — 80053 COMPREHEN METABOLIC PANEL: CPT

## 2023-01-28 RX ADMIN — ARFORMOTEROL TARTRATE 15 MCG: 15 SOLUTION RESPIRATORY (INHALATION) at 17:10

## 2023-01-28 RX ADMIN — LOSARTAN POTASSIUM 25 MG: 50 TABLET, FILM COATED ORAL at 17:10

## 2023-01-28 RX ADMIN — QUETIAPINE FUMARATE 200 MG: 200 TABLET, EXTENDED RELEASE ORAL at 21:39

## 2023-01-28 RX ADMIN — BUDESONIDE 500 MCG: 0.5 SUSPENSION RESPIRATORY (INHALATION) at 17:10

## 2023-01-28 RX ADMIN — Medication 40 MG: at 08:50

## 2023-01-28 RX ADMIN — IPRATROPIUM BROMIDE AND ALBUTEROL SULFATE 3 ML: .5; 2.5 SOLUTION RESPIRATORY (INHALATION) at 14:39

## 2023-01-28 RX ADMIN — IPRATROPIUM BROMIDE AND ALBUTEROL SULFATE 3 ML: .5; 2.5 SOLUTION RESPIRATORY (INHALATION) at 06:05

## 2023-01-28 RX ADMIN — Medication 10 ML: at 21:39

## 2023-01-28 RX ADMIN — IPRATROPIUM BROMIDE AND ALBUTEROL SULFATE 3 ML: .5; 2.5 SOLUTION RESPIRATORY (INHALATION) at 20:36

## 2023-01-28 RX ADMIN — METHYLPREDNISOLONE SODIUM SUCCINATE 40 MG: 40 INJECTION, POWDER, FOR SOLUTION INTRAMUSCULAR; INTRAVENOUS at 10:59

## 2023-01-28 RX ADMIN — METHYLPREDNISOLONE SODIUM SUCCINATE 40 MG: 40 INJECTION, POWDER, FOR SOLUTION INTRAMUSCULAR; INTRAVENOUS at 17:09

## 2023-01-28 RX ADMIN — IPRATROPIUM BROMIDE AND ALBUTEROL SULFATE 3 ML: .5; 2.5 SOLUTION RESPIRATORY (INHALATION) at 09:28

## 2023-01-28 RX ADMIN — GABAPENTIN 600 MG: 300 CAPSULE ORAL at 21:38

## 2023-01-28 RX ADMIN — ENOXAPARIN SODIUM 40 MG: 100 INJECTION SUBCUTANEOUS at 21:39

## 2023-01-28 RX ADMIN — ATORVASTATIN CALCIUM 40 MG: 40 TABLET, FILM COATED ORAL at 08:50

## 2023-01-28 RX ADMIN — BUDESONIDE 500 MCG: 0.5 SUSPENSION RESPIRATORY (INHALATION) at 06:05

## 2023-01-28 RX ADMIN — ARFORMOTEROL TARTRATE 15 MCG: 15 SOLUTION RESPIRATORY (INHALATION) at 06:05

## 2023-01-28 RX ADMIN — METHYLPREDNISOLONE SODIUM SUCCINATE 40 MG: 40 INJECTION, POWDER, FOR SOLUTION INTRAMUSCULAR; INTRAVENOUS at 02:55

## 2023-01-28 RX ADMIN — INSULIN LISPRO 2 UNITS: 100 INJECTION, SOLUTION INTRAVENOUS; SUBCUTANEOUS at 12:16

## 2023-01-28 RX ADMIN — IPRATROPIUM BROMIDE AND ALBUTEROL SULFATE 3 ML: .5; 2.5 SOLUTION RESPIRATORY (INHALATION) at 17:10

## 2023-01-28 RX ADMIN — INSULIN LISPRO 2 UNITS: 100 INJECTION, SOLUTION INTRAVENOUS; SUBCUTANEOUS at 17:10

## 2023-01-28 NOTE — PROGRESS NOTES
Department of Internal Medicine  General Internal Medicine  Attending Progress Note  Chief Complaint   Patient presents with    Shortness of Breath     Pt c/o shortness of breath that started around 1330 this afternoon. Hx COPD, on 2-3L O2 at home. Pt given 125 mg solumedrol and 1 duoneb treatment by EMS. 18 g LAC. SUBJECTIVE:    Reports that he is gradually getting better. No chest pain. No nausea or vomiting. Aware of plans to continue IV Solu-medrol. Completed abx.     OBJECTIVE      Medications    Current Facility-Administered Medications: methylPREDNISolone sodium (SOLU-MEDROL) injection 40 mg, 40 mg, IntraVENous, Q8H  ipratropium-albuterol (DUONEB) nebulizer solution 3 mL, 1 vial, Inhalation, Q4H  pantoprazole (PROTONIX) 40 mg in sodium chloride (PF) 0.9 % 10 mL injection, 40 mg, IntraVENous, Daily  insulin lispro (HUMALOG) injection vial 0-4 Units, 0-4 Units, SubCUTAneous, TID WC  insulin lispro (HUMALOG) injection vial 0-4 Units, 0-4 Units, SubCUTAneous, Nightly  glucose chewable tablet 16 g, 4 tablet, Oral, PRN  dextrose bolus 10% 125 mL, 125 mL, IntraVENous, PRN **OR** dextrose bolus 10% 250 mL, 250 mL, IntraVENous, PRN  glucagon (rDNA) injection 1 mg, 1 mg, SubCUTAneous, PRN  dextrose 10 % infusion, , IntraVENous, Continuous PRN  gabapentin (NEURONTIN) capsule 600 mg, 600 mg, Oral, Nightly  losartan (COZAAR) tablet 25 mg, 25 mg, Oral, QPM  QUEtiapine (SEROQUEL XR) extended release tablet 200 mg, 200 mg, Oral, Nightly  atorvastatin (LIPITOR) tablet 40 mg, 40 mg, Oral, Daily  sodium chloride flush 0.9 % injection 5-40 mL, 5-40 mL, IntraVENous, 2 times per day  sodium chloride flush 0.9 % injection 5-40 mL, 5-40 mL, IntraVENous, PRN  0.9 % sodium chloride infusion, , IntraVENous, PRN  enoxaparin (LOVENOX) injection 40 mg, 40 mg, SubCUTAneous, Daily  ondansetron (ZOFRAN-ODT) disintegrating tablet 4 mg, 4 mg, Oral, Q8H PRN **OR** ondansetron (ZOFRAN) injection 4 mg, 4 mg, IntraVENous, Q6H PRN  polyethylene glycol (GLYCOLAX) packet 17 g, 17 g, Oral, Daily PRN  acetaminophen (TYLENOL) tablet 650 mg, 650 mg, Oral, Q6H PRN **OR** acetaminophen (TYLENOL) suppository 650 mg, 650 mg, Rectal, Q6H PRN  arformoterol tartrate (BROVANA) nebulizer solution 15 mcg, 15 mcg, Nebulization, BID **AND** budesonide (PULMICORT) nebulizer suspension 500 mcg, 0.5 mg, Nebulization, BID  Physical    VITALS:  BP (!) 150/85   Pulse (!) 117   Temp 97.6 °F (36.4 °C) (Oral)   Resp 20   Ht 5' 10\" (1.778 m)   Wt 212 lb 8.4 oz (96.4 kg)   SpO2 94%   BMI 30.49 kg/m²   CONSTITUTIONAL:  awake, cooperative, and no apparent distress  EYES:  extra-ocular muscles intact  ENT:  normocepalic, without obvious abnormality  NECK:  supple, symmetrical, trachea midline  LUNGS:  no increased work of breathing, no retractions, and diminished breath sounds throughout lungs  CARDIOVASCULAR:  normal apical pulses and normal S1 and S2  ABDOMEN:  normal bowel sounds, non-distended, and non-tender  MUSCULOSKELETAL:  full range of motion noted  NEUROLOGIC:  Mental Status Exam:  Level of Alertness:   awake  Orientation:   person, place, time  SKIN:  no bruising or bleeding  Data    CBC:   Lab Results   Component Value Date/Time    WBC 8.5 01/28/2023 06:49 AM    RBC 4.97 01/28/2023 06:49 AM    HGB 15.6 01/28/2023 06:49 AM    HCT 44.9 01/28/2023 06:49 AM    MCV 90.3 01/28/2023 06:49 AM    MCH 31.4 01/28/2023 06:49 AM    MCHC 34.7 01/28/2023 06:49 AM    RDW 12.3 01/28/2023 06:49 AM     01/28/2023 06:49 AM    MPV 9.7 01/28/2023 06:49 AM     BMP:    Lab Results   Component Value Date/Time     01/28/2023 06:49 AM    K 4.6 01/28/2023 06:49 AM    K 4.7 01/24/2023 06:39 AM     01/28/2023 06:49 AM    CO2 23 01/28/2023 06:49 AM    BUN 20 01/28/2023 06:49 AM    LABALBU 3.5 01/28/2023 06:49 AM    CREATININE 1.0 01/28/2023 06:49 AM    CALCIUM 8.4 01/28/2023 06:49 AM    GFRAA >60 08/29/2022 05:54 AM    LABGLOM >60 01/28/2023 06:49 AM    GLUCOSE 175 01/28/2023 06:49 AM       ASSESSMENT AND PLAN      COPD exacerbation: end stage COPD. Completed 5 days of levaquin. Continue solu-medrol as ordered. Taper solu-medrol. Continue incentive spirometry. Continue to monitor. Appreciate Pulm recommendation  Hyperglycemia: related to steroid. Continue insulin sliding scale and continue to monitor  Hypertension Essential/hyperlipidemia: continue home medications  Acute Respiratory failure with Hypoxia and Hypercapnia: breathing treatment. Continue supplemental oxygen and bipap as needed.

## 2023-01-29 VITALS
SYSTOLIC BLOOD PRESSURE: 143 MMHG | HEART RATE: 100 BPM | HEIGHT: 70 IN | BODY MASS INDEX: 30.43 KG/M2 | DIASTOLIC BLOOD PRESSURE: 80 MMHG | RESPIRATION RATE: 19 BRPM | TEMPERATURE: 97.8 F | WEIGHT: 212.52 LBS | OXYGEN SATURATION: 96 %

## 2023-01-29 LAB
ALBUMIN SERPL-MCNC: 3.5 G/DL (ref 3.5–5.2)
ALP BLD-CCNC: 78 U/L (ref 40–129)
ALT SERPL-CCNC: 12 U/L (ref 0–40)
ANION GAP SERPL CALCULATED.3IONS-SCNC: 10 MMOL/L (ref 7–16)
AST SERPL-CCNC: 9 U/L (ref 0–39)
BASOPHILS ABSOLUTE: 0.04 E9/L (ref 0–0.2)
BASOPHILS RELATIVE PERCENT: 0.4 % (ref 0–2)
BILIRUB SERPL-MCNC: 0.3 MG/DL (ref 0–1.2)
BUN BLDV-MCNC: 24 MG/DL (ref 6–23)
CALCIUM SERPL-MCNC: 8.3 MG/DL (ref 8.6–10.2)
CHLORIDE BLD-SCNC: 101 MMOL/L (ref 98–107)
CO2: 24 MMOL/L (ref 22–29)
CREAT SERPL-MCNC: 1 MG/DL (ref 0.7–1.2)
EOSINOPHILS ABSOLUTE: 0 E9/L (ref 0.05–0.5)
EOSINOPHILS RELATIVE PERCENT: 0 % (ref 0–6)
GFR SERPL CREATININE-BSD FRML MDRD: >60 ML/MIN/1.73
GLUCOSE BLD-MCNC: 180 MG/DL (ref 74–99)
HCT VFR BLD CALC: 43.1 % (ref 37–54)
HEMOGLOBIN: 14.9 G/DL (ref 12.5–16.5)
IMMATURE GRANULOCYTES #: 0.26 E9/L
IMMATURE GRANULOCYTES %: 2.4 % (ref 0–5)
LYMPHOCYTES ABSOLUTE: 1.52 E9/L (ref 1.5–4)
LYMPHOCYTES RELATIVE PERCENT: 13.9 % (ref 20–42)
MAGNESIUM: 2.6 MG/DL (ref 1.6–2.6)
MCH RBC QN AUTO: 31.2 PG (ref 26–35)
MCHC RBC AUTO-ENTMCNC: 34.6 % (ref 32–34.5)
MCV RBC AUTO: 90.4 FL (ref 80–99.9)
METER GLUCOSE: 176 MG/DL (ref 74–99)
METER GLUCOSE: 227 MG/DL (ref 74–99)
METER GLUCOSE: 297 MG/DL (ref 74–99)
METER GLUCOSE: 315 MG/DL (ref 74–99)
MONOCYTES ABSOLUTE: 0.86 E9/L (ref 0.1–0.95)
MONOCYTES RELATIVE PERCENT: 7.9 % (ref 2–12)
NEUTROPHILS ABSOLUTE: 8.23 E9/L (ref 1.8–7.3)
NEUTROPHILS RELATIVE PERCENT: 75.4 % (ref 43–80)
PDW BLD-RTO: 12.2 FL (ref 11.5–15)
PHOSPHORUS: 3.2 MG/DL (ref 2.5–4.5)
PLATELET # BLD: 216 E9/L (ref 130–450)
PMV BLD AUTO: 9.4 FL (ref 7–12)
POTASSIUM SERPL-SCNC: 4.1 MMOL/L (ref 3.5–5)
RBC # BLD: 4.77 E12/L (ref 3.8–5.8)
SODIUM BLD-SCNC: 135 MMOL/L (ref 132–146)
TOTAL PROTEIN: 5.2 G/DL (ref 6.4–8.3)
WBC # BLD: 10.9 E9/L (ref 4.5–11.5)

## 2023-01-29 PROCEDURE — 2580000003 HC RX 258: Performed by: INTERNAL MEDICINE

## 2023-01-29 PROCEDURE — 6360000002 HC RX W HCPCS: Performed by: INTERNAL MEDICINE

## 2023-01-29 PROCEDURE — 36415 COLL VENOUS BLD VENIPUNCTURE: CPT

## 2023-01-29 PROCEDURE — 99232 SBSQ HOSP IP/OBS MODERATE 35: CPT | Performed by: INTERNAL MEDICINE

## 2023-01-29 PROCEDURE — C9113 INJ PANTOPRAZOLE SODIUM, VIA: HCPCS | Performed by: INTERNAL MEDICINE

## 2023-01-29 PROCEDURE — 94660 CPAP INITIATION&MGMT: CPT

## 2023-01-29 PROCEDURE — 6370000000 HC RX 637 (ALT 250 FOR IP): Performed by: INTERNAL MEDICINE

## 2023-01-29 PROCEDURE — 82962 GLUCOSE BLOOD TEST: CPT

## 2023-01-29 PROCEDURE — 2700000000 HC OXYGEN THERAPY PER DAY

## 2023-01-29 PROCEDURE — 80053 COMPREHEN METABOLIC PANEL: CPT

## 2023-01-29 PROCEDURE — 83735 ASSAY OF MAGNESIUM: CPT

## 2023-01-29 PROCEDURE — 85025 COMPLETE CBC W/AUTO DIFF WBC: CPT

## 2023-01-29 PROCEDURE — 2060000000 HC ICU INTERMEDIATE R&B

## 2023-01-29 PROCEDURE — 94640 AIRWAY INHALATION TREATMENT: CPT

## 2023-01-29 PROCEDURE — 84100 ASSAY OF PHOSPHORUS: CPT

## 2023-01-29 RX ORDER — METHYLPREDNISOLONE SODIUM SUCCINATE 40 MG/ML
20 INJECTION, POWDER, LYOPHILIZED, FOR SOLUTION INTRAMUSCULAR; INTRAVENOUS EVERY 8 HOURS
Status: DISCONTINUED | OUTPATIENT
Start: 2023-01-29 | End: 2023-01-30

## 2023-01-29 RX ADMIN — QUETIAPINE FUMARATE 200 MG: 200 TABLET, EXTENDED RELEASE ORAL at 19:50

## 2023-01-29 RX ADMIN — ENOXAPARIN SODIUM 40 MG: 100 INJECTION SUBCUTANEOUS at 19:48

## 2023-01-29 RX ADMIN — METHYLPREDNISOLONE SODIUM SUCCINATE 40 MG: 40 INJECTION, POWDER, FOR SOLUTION INTRAMUSCULAR; INTRAVENOUS at 11:17

## 2023-01-29 RX ADMIN — IPRATROPIUM BROMIDE AND ALBUTEROL SULFATE 3 ML: .5; 2.5 SOLUTION RESPIRATORY (INHALATION) at 09:41

## 2023-01-29 RX ADMIN — IPRATROPIUM BROMIDE AND ALBUTEROL SULFATE 3 ML: .5; 2.5 SOLUTION RESPIRATORY (INHALATION) at 05:13

## 2023-01-29 RX ADMIN — Medication 10 ML: at 08:45

## 2023-01-29 RX ADMIN — INSULIN LISPRO 3 UNITS: 100 INJECTION, SOLUTION INTRAVENOUS; SUBCUTANEOUS at 16:51

## 2023-01-29 RX ADMIN — AZITHROMYCIN MONOHYDRATE 500 MG: 500 INJECTION, POWDER, LYOPHILIZED, FOR SOLUTION INTRAVENOUS at 15:58

## 2023-01-29 RX ADMIN — ARFORMOTEROL TARTRATE 15 MCG: 15 SOLUTION RESPIRATORY (INHALATION) at 05:13

## 2023-01-29 RX ADMIN — BUDESONIDE 500 MCG: 0.5 SUSPENSION RESPIRATORY (INHALATION) at 17:44

## 2023-01-29 RX ADMIN — IPRATROPIUM BROMIDE AND ALBUTEROL SULFATE 3 ML: .5; 2.5 SOLUTION RESPIRATORY (INHALATION) at 22:06

## 2023-01-29 RX ADMIN — GABAPENTIN 600 MG: 300 CAPSULE ORAL at 19:49

## 2023-01-29 RX ADMIN — INSULIN LISPRO 2 UNITS: 100 INJECTION, SOLUTION INTRAVENOUS; SUBCUTANEOUS at 12:14

## 2023-01-29 RX ADMIN — LOSARTAN POTASSIUM 25 MG: 50 TABLET, FILM COATED ORAL at 16:51

## 2023-01-29 RX ADMIN — Medication 40 MG: at 08:44

## 2023-01-29 RX ADMIN — METHYLPREDNISOLONE SODIUM SUCCINATE 40 MG: 40 INJECTION, POWDER, FOR SOLUTION INTRAMUSCULAR; INTRAVENOUS at 04:52

## 2023-01-29 RX ADMIN — ATORVASTATIN CALCIUM 40 MG: 40 TABLET, FILM COATED ORAL at 08:44

## 2023-01-29 RX ADMIN — Medication 10 ML: at 19:51

## 2023-01-29 RX ADMIN — IPRATROPIUM BROMIDE AND ALBUTEROL SULFATE 3 ML: .5; 2.5 SOLUTION RESPIRATORY (INHALATION) at 17:44

## 2023-01-29 RX ADMIN — IPRATROPIUM BROMIDE AND ALBUTEROL SULFATE 3 ML: .5; 2.5 SOLUTION RESPIRATORY (INHALATION) at 00:36

## 2023-01-29 RX ADMIN — IPRATROPIUM BROMIDE AND ALBUTEROL SULFATE 3 ML: .5; 2.5 SOLUTION RESPIRATORY (INHALATION) at 13:31

## 2023-01-29 RX ADMIN — BUDESONIDE 500 MCG: 0.5 SUSPENSION RESPIRATORY (INHALATION) at 05:13

## 2023-01-29 RX ADMIN — METHYLPREDNISOLONE SODIUM SUCCINATE 20 MG: 40 INJECTION, POWDER, FOR SOLUTION INTRAMUSCULAR; INTRAVENOUS at 19:48

## 2023-01-29 RX ADMIN — ARFORMOTEROL TARTRATE 15 MCG: 15 SOLUTION RESPIRATORY (INHALATION) at 17:44

## 2023-01-29 NOTE — PROGRESS NOTES
Pulmonary/Critical Care Progress Note    We are following patient for severe COPD with exacerbation, acute respiratory failure, hypertension, hyperlipidemia    SUBJECTIVE:  The patient has definitely made improvement since I last saw him 2 days ago. He has less dyspneic but still notes he is not quite ready for discharge. We will reduce steroids but we will add azithromycin because of its anti-infective and anti-inflammatory effect. MEDICATIONS:   methylPREDNISolone  20 mg IntraVENous Q8H    ipratropium-albuterol  1 vial Inhalation Q4H    pantoprazole (PROTONIX) 40 mg injection  40 mg IntraVENous Daily    insulin lispro  0-4 Units SubCUTAneous TID WC    insulin lispro  0-4 Units SubCUTAneous Nightly    gabapentin  600 mg Oral Nightly    losartan  25 mg Oral QPM    QUEtiapine  200 mg Oral Nightly    atorvastatin  40 mg Oral Daily    sodium chloride flush  5-40 mL IntraVENous 2 times per day    enoxaparin  40 mg SubCUTAneous Daily    arformoterol tartrate  15 mcg Nebulization BID    And    budesonide  0.5 mg Nebulization BID      dextrose      sodium chloride       glucose, dextrose bolus **OR** dextrose bolus, glucagon (rDNA), dextrose, sodium chloride flush, sodium chloride, ondansetron **OR** ondansetron, polyethylene glycol, acetaminophen **OR** acetaminophen      REVIEW OF SYSTEMS:  Constitutional: Denies fever, weight loss, night sweats, and fatigue  Skin: Denies pigmentation, dark lesions, and rashes   HEENT: Denies hearing loss, tinnitus, ear drainage, epistaxis, sore throat, and hoarseness. Cardiovascular: Denies palpitations, chest pain, and chest pressure. Respiratory: Denies cough, positive for dyspnea at rest, negative for hemoptysis, apnea, and choking.   Gastrointestinal: Denies nausea, vomiting, poor appetite, diarrhea, heartburn or reflux  Genitourinary: Denies dysuria, frequency, urgency or hematuria  Musculoskeletal: Denies myalgias, muscle weakness, and bone pain  Neurological: Denies dizziness, vertigo, headache, and focal weakness  Psychological: Denies anxiety and depression  Endocrine: Denies heat intolerance and cold intolerance  Hematopoietic/Lymphatic: Denies bleeding problems and blood transfusions    OBJECTIVE:  Vitals:    01/29/23 0845   BP: 123/66   Pulse: 90   Resp: 18   Temp: 98.1 °F (36.7 °C)   SpO2: 95%     FiO2 : 35 %  O2 Flow Rate (L/min): 3 L/min  O2 Device: Nasal cannula    PHYSICAL EXAM:  Constitutional: No fever, chills, diaphoresis  Skin: No skin rash, no skin breakdown  HEENT: Unremarkable.  Mucous membranes are moist  Neck: No JVD, lymphadenopathy, thyromegaly  Cardiovascular: S1, S2 regular.  No S3 or rubs present  Respiratory: Barely any wheezing is present today.  Much improved over the last 2 days    Gastrointestinal:, Obese, nontender  Genitourinary: No CVA tenderness  Extremities: No clubbing, cyanosis, or edema  Neurological: Awake, alert, oriented x3.  No evidence of focal motor or sensory deficits  Psychological: Appropriate affect.  Slightly improved spirits    LABS:  WBC   Date Value Ref Range Status   01/29/2023 10.9 4.5 - 11.5 E9/L Final   01/28/2023 8.5 4.5 - 11.5 E9/L Final   01/27/2023 11.0 4.5 - 11.5 E9/L Final     Hemoglobin   Date Value Ref Range Status   01/29/2023 14.9 12.5 - 16.5 g/dL Final   01/28/2023 15.6 12.5 - 16.5 g/dL Final   01/27/2023 14.8 12.5 - 16.5 g/dL Final     Hematocrit   Date Value Ref Range Status   01/29/2023 43.1 37.0 - 54.0 % Final   01/28/2023 44.9 37.0 - 54.0 % Final   01/27/2023 45.5 37.0 - 54.0 % Final     MCV   Date Value Ref Range Status   01/29/2023 90.4 80.0 - 99.9 fL Final   01/28/2023 90.3 80.0 - 99.9 fL Final   01/27/2023 92.5 80.0 - 99.9 fL Final     Platelets   Date Value Ref Range Status   01/29/2023 216 130 - 450 E9/L Final   01/28/2023 218 130 - 450 E9/L Final   01/27/2023 224 130 - 450 E9/L Final     Sodium   Date Value Ref Range Status   01/29/2023 135 132 - 146 mmol/L Final   01/28/2023 139 132 - 146 mmol/L  Final   01/27/2023 139 132 - 146 mmol/L Final     Potassium   Date Value Ref Range Status   01/29/2023 4.1 3.5 - 5.0 mmol/L Final   01/28/2023 4.6 3.5 - 5.0 mmol/L Final   01/27/2023 4.5 3.5 - 5.0 mmol/L Final     Potassium reflex Magnesium   Date Value Ref Range Status   01/24/2023 4.7 3.5 - 5.0 mmol/L Final   08/22/2022 4.5 3.5 - 5.0 mmol/L Final   08/21/2022 4.2 3.5 - 5.0 mmol/L Final     Chloride   Date Value Ref Range Status   01/29/2023 101 98 - 107 mmol/L Final   01/28/2023 104 98 - 107 mmol/L Final   01/27/2023 106 98 - 107 mmol/L Final     CO2   Date Value Ref Range Status   01/29/2023 24 22 - 29 mmol/L Final   01/28/2023 23 22 - 29 mmol/L Final   01/27/2023 24 22 - 29 mmol/L Final     BUN   Date Value Ref Range Status   01/29/2023 24 (H) 6 - 23 mg/dL Final   01/28/2023 20 6 - 23 mg/dL Final   01/27/2023 20 6 - 23 mg/dL Final     Creatinine   Date Value Ref Range Status   01/29/2023 1.0 0.7 - 1.2 mg/dL Final   01/28/2023 1.0 0.7 - 1.2 mg/dL Final   01/27/2023 1.0 0.7 - 1.2 mg/dL Final     Glucose   Date Value Ref Range Status   01/29/2023 180 (H) 74 - 99 mg/dL Final   01/28/2023 175 (H) 74 - 99 mg/dL Final   01/27/2023 184 (H) 74 - 99 mg/dL Final     Calcium   Date Value Ref Range Status   01/29/2023 8.3 (L) 8.6 - 10.2 mg/dL Final   01/28/2023 8.4 (L) 8.6 - 10.2 mg/dL Final   01/27/2023 8.5 (L) 8.6 - 10.2 mg/dL Final     Total Protein   Date Value Ref Range Status   01/29/2023 5.2 (L) 6.4 - 8.3 g/dL Final   01/28/2023 5.5 (L) 6.4 - 8.3 g/dL Final   01/27/2023 5.6 (L) 6.4 - 8.3 g/dL Final     Albumin   Date Value Ref Range Status   01/29/2023 3.5 3.5 - 5.2 g/dL Final   01/28/2023 3.5 3.5 - 5.2 g/dL Final   01/27/2023 3.6 3.5 - 5.2 g/dL Final     Total Bilirubin   Date Value Ref Range Status   01/29/2023 0.3 0.0 - 1.2 mg/dL Final   01/28/2023 0.4 0.0 - 1.2 mg/dL Final   01/27/2023 0.2 0.0 - 1.2 mg/dL Final     Alkaline Phosphatase   Date Value Ref Range Status   01/29/2023 78 40 - 129 U/L Final   01/28/2023 81 40 - 129 U/L Final   01/27/2023 88 40 - 129 U/L Final     AST   Date Value Ref Range Status   01/29/2023 9 0 - 39 U/L Final   01/28/2023 9 0 - 39 U/L Final   01/27/2023 11 0 - 39 U/L Final     ALT   Date Value Ref Range Status   01/29/2023 12 0 - 40 U/L Final   01/28/2023 13 0 - 40 U/L Final   01/27/2023 13 0 - 40 U/L Final     Est, Glom Filt Rate   Date Value Ref Range Status   01/29/2023 >60 >=60 mL/min/1.73 Final     Comment:     Pediatric calculator link  [email or web address]  Effective Oct 3, 2022  These results are not intended for use in patients  <25years of age. eGFR results are calculated without  a race factor using the 2021 CKD-EPI equation. Careful  clinical correlation is recommended, particularly when  comparing to results calculated using previous equations. The CKD-EPI equation is less accurate in patients with  extremes of muscle mass, extra-renal metabolism of  creatinine, excessive creatinine ingestion, or following  therapy that affects renal tubular secretion. 01/28/2023 >60 >=60 mL/min/1.73 Final     Comment:     Pediatric calculator link  [email or web address]  Effective Oct 3, 2022  These results are not intended for use in patients  <25years of age. eGFR results are calculated without  a race factor using the 2021 CKD-EPI equation. Careful  clinical correlation is recommended, particularly when  comparing to results calculated using previous equations. The CKD-EPI equation is less accurate in patients with  extremes of muscle mass, extra-renal metabolism of  creatinine, excessive creatinine ingestion, or following  therapy that affects renal tubular secretion. 01/27/2023 >60 >=60 mL/min/1.73 Final     Comment:     Pediatric calculator link  [email or web address]  Effective Oct 3, 2022  These results are not intended for use in patients  <25years of age. eGFR results are calculated without  a race factor using the 2021 CKD-EPI equation. Careful  clinical correlation is recommended, particularly when  comparing to results calculated using previous equations. The CKD-EPI equation is less accurate in patients with  extremes of muscle mass, extra-renal metabolism of  creatinine, excessive creatinine ingestion, or following  therapy that affects renal tubular secretion. GFR    Date Value Ref Range Status   08/29/2022 >60  Final   08/28/2022 >60  Final   08/27/2022 >60  Final     Magnesium   Date Value Ref Range Status   01/29/2023 2.6 1.6 - 2.6 mg/dL Final   01/28/2023 2.6 1.6 - 2.6 mg/dL Final   08/28/2022 2.6 1.6 - 2.6 mg/dL Final     Phosphorus   Date Value Ref Range Status   01/29/2023 3.2 2.5 - 4.5 mg/dL Final   01/28/2023 3.7 2.5 - 4.5 mg/dL Final   08/27/2022 3.2 2.5 - 4.5 mg/dL Final     No results for input(s): PH, PO2, PCO2, HCO3, BE, O2SAT in the last 72 hours. RADIOLOGY:  XR CHEST PORTABLE   Final Result   Minimal left lung base atelectasis. The right lung is clear. XR CHEST PORTABLE   Final Result   No acute process. PROBLEM LIST:  Principal Problem:    COPD exacerbation ([address]  Resolved Problems:    * No resolved hospital problems. *      IMPRESSION:  Acute hypoxemic and hypercapnic respiratory failure  COPD exacerbation  Hypertension    PLAN:  Continue bronchopulmonary hygiene with flutter valve  Aerosolized bronchodilators  Wean IV steroids  Inhaled IV steroids  Continue BiPAP nightly  And IV azithromycin  Labs in a.m.       Electronically signed by Vannesa Khan MD on 1/29/2023 at 2:36 PM

## 2023-01-29 NOTE — PLAN OF CARE
Problem: Safety - Adult  Goal: Free from fall injury  1/29/2023 0333 by Carson Renteria RN  Outcome: Progressing  1/29/2023 0332 by Carson Renteria RN  Outcome: Progressing     Problem: Pain  Goal: Verbalizes/displays adequate comfort level or baseline comfort level  1/29/2023 0333 by Carson Renteria RN  Outcome: Progressing  1/29/2023 0332 by Carson Renteria RN  Outcome: Progressing

## 2023-01-29 NOTE — PROGRESS NOTES
Department of Internal Medicine  General Internal Medicine  Attending Progress Note  Chief Complaint   Patient presents with    Shortness of Breath     Pt c/o shortness of breath that started around 1330 this afternoon. Hx COPD, on 2-3L O2 at home. Pt given 125 mg solumedrol and 1 duoneb treatment by EMS. 18 g LAC. SUBJECTIVE:    Reports that his breathing is gradually improving. Denied fever and chills. No chest pain. No nausea or vomiting. No diarrhea.      OBJECTIVE      Medications    Current Facility-Administered Medications: methylPREDNISolone sodium (SOLU-MEDROL) injection 20 mg, 20 mg, IntraVENous, Q8H  azithromycin (ZITHROMAX) 500 mg in sodium chloride 0.9 % 250 mL IVPB ([id number]), 500 mg, IntraVENous, Q24H  ipratropium-albuterol (DUONEB) nebulizer solution 3 mL, 1 vial, Inhalation, Q4H  pantoprazole (PROTONIX) 40 mg in sodium chloride (PF) 0.9 % 10 mL injection, 40 mg, IntraVENous, Daily  insulin lispro (HUMALOG) injection vial 0-4 Units, 0-4 Units, SubCUTAneous, TID WC  insulin lispro (HUMALOG) injection vial 0-4 Units, 0-4 Units, SubCUTAneous, Nightly  glucose chewable tablet 16 g, 4 tablet, Oral, PRN  dextrose bolus 10% 125 mL, 125 mL, IntraVENous, PRN **OR** dextrose bolus 10% 250 mL, 250 mL, IntraVENous, PRN  glucagon (rDNA) injection 1 mg, 1 mg, SubCUTAneous, PRN  dextrose 10 % infusion, , IntraVENous, Continuous PRN  gabapentin (NEURONTIN) capsule 600 mg, 600 mg, Oral, Nightly  losartan (COZAAR) tablet 25 mg, 25 mg, Oral, QPM  QUEtiapine (SEROQUEL XR) extended release tablet 200 mg, 200 mg, Oral, Nightly  atorvastatin (LIPITOR) tablet 40 mg, 40 mg, Oral, Daily  sodium chloride flush 0.9 % injection 5-40 mL, 5-40 mL, IntraVENous, 2 times per day  sodium chloride flush 0.9 % injection 5-40 mL, 5-40 mL, IntraVENous, PRN  0.9 % sodium chloride infusion, , IntraVENous, PRN  enoxaparin (LOVENOX) injection 40 mg, 40 mg, SubCUTAneous, Daily  ondansetron (ZOFRAN-ODT) disintegrating tablet 4 mg, 4 mg, Oral, Q8H PRN **OR** ondansetron (ZOFRAN) injection 4 mg, 4 mg, IntraVENous, Q6H PRN  polyethylene glycol (GLYCOLAX) packet 17 g, 17 g, Oral, Daily PRN  acetaminophen (TYLENOL) tablet 650 mg, 650 mg, Oral, Q6H PRN **OR** acetaminophen (TYLENOL) suppository 650 mg, 650 mg, Rectal, Q6H PRN  arformoterol tartrate (BROVANA) nebulizer solution 15 mcg, 15 mcg, Nebulization, BID **AND** budesonide (PULMICORT) nebulizer suspension 500 mcg, 0.5 mg, Nebulization, BID  Physical    VITALS:  /83   Pulse (!) 105   Temp 98 °F (36.7 °C) (Oral)   Resp 20   Ht 5' 10\" (1.778 m)   Wt 212 lb 8.4 oz (96.4 kg)   SpO2 96%   BMI 30.49 kg/m²   CONSTITUTIONAL:  awake, cooperative, and no apparent distress  EYES:  extra-ocular muscles intact  ENT:  normocepalic, without obvious abnormality  NECK:  supple, symmetrical, trachea midline  LUNGS:  no increased work of breathing, no retractions, and diminished  CARDIOVASCULAR:  normal apical pulses and normal S1 and S2  ABDOMEN:  normal bowel sounds, non-distended, and non-tender  MUSCULOSKELETAL:  full range of motion noted  NEUROLOGIC:  Mental Status Exam:  Level of Alertness:   awake  Orientation:   person, place, time  SKIN:  no bruising or bleeding  Data    CBC:   Lab Results   Component Value Date/Time    WBC 10.9 01/29/2023 06:19 AM    RBC 4.77 01/29/2023 06:19 AM    HGB 14.9 01/29/2023 06:19 AM    HCT 43.1 01/29/2023 06:19 AM    MCV 90.4 01/29/2023 06:19 AM    MCH 31.2 01/29/2023 06:19 AM    MCHC 34.6 01/29/2023 06:19 AM    RDW 12.2 01/29/2023 06:19 AM     01/29/2023 06:19 AM    MPV 9.4 01/29/2023 06:19 AM     BMP:    Lab Results   Component Value Date/Time     01/29/2023 06:19 AM    K 4.1 01/29/2023 06:19 AM    K 4.7 01/24/2023 06:39 AM     01/29/2023 06:19 AM    CO2 24 01/29/2023 06:19 AM    BUN 24 01/29/2023 06:19 AM    LABALBU 3.5 01/29/2023 06:19 AM    CREATININE 1.0 01/29/2023 06:19 AM    CALCIUM 8.3 01/29/2023 06:19 AM    GFRAA >60 08/29/2022 05:54 AM LABGLOM >60 01/29/2023 06:19 AM    GLUCOSE 180 01/29/2023 06:19 AM       ASSESSMENT AND PLAN      Brief Summary of stay:  Patient was admitted with dyspnea. He was found to have copd. He was treated with Solu-medrol and levaquin. He completed 5 days of levaquin. Respiratory panel was negative. He is seen by Pulm and Zithromax was added.      COPD exacerbation (HCC)  Hyperglycemia due to steroid  Hypertension and Hyperlipidemia:  Acute respiratory failure with hypoxia and Hypercapnia:     Plans:  Continue breathing treatment  Wean off oxygen to base line  Abx per pulm recommendation  Monitor for gradual improvement  Patient follows with lung transplant team in [facility]    Discharge when patient  is improved

## 2023-01-30 LAB
ALBUMIN SERPL-MCNC: 3.3 G/DL (ref 3.5–5.2)
ALP BLD-CCNC: 90 U/L (ref 40–129)
ALT SERPL-CCNC: 17 U/L (ref 0–40)
ANION GAP SERPL CALCULATED.3IONS-SCNC: 12 MMOL/L (ref 7–16)
AST SERPL-CCNC: 15 U/L (ref 0–39)
BASOPHILS ABSOLUTE: 0 E9/L (ref 0–0.2)
BASOPHILS RELATIVE PERCENT: 0.7 % (ref 0–2)
BILIRUB SERPL-MCNC: 0.2 MG/DL (ref 0–1.2)
BUN BLDV-MCNC: 24 MG/DL (ref 6–23)
CALCIUM SERPL-MCNC: 8.3 MG/DL (ref 8.6–10.2)
CHLORIDE BLD-SCNC: 103 MMOL/L (ref 98–107)
CO2: 23 MMOL/L (ref 22–29)
CREAT SERPL-MCNC: 0.9 MG/DL (ref 0.7–1.2)
EOSINOPHILS ABSOLUTE: 0 E9/L (ref 0.05–0.5)
EOSINOPHILS RELATIVE PERCENT: 0 % (ref 0–6)
GFR SERPL CREATININE-BSD FRML MDRD: >60 ML/MIN/1.73
GLUCOSE BLD-MCNC: 175 MG/DL (ref 74–99)
HCT VFR BLD CALC: 45.2 % (ref 37–54)
HEMOGLOBIN: 15.1 G/DL (ref 12.5–16.5)
LYMPHOCYTES ABSOLUTE: 0.81 E9/L (ref 1.5–4)
LYMPHOCYTES RELATIVE PERCENT: 6.1 % (ref 20–42)
MAGNESIUM: 2.4 MG/DL (ref 1.6–2.6)
MCH RBC QN AUTO: 30.6 PG (ref 26–35)
MCHC RBC AUTO-ENTMCNC: 33.4 % (ref 32–34.5)
MCV RBC AUTO: 91.7 FL (ref 80–99.9)
METER GLUCOSE: 189 MG/DL (ref 74–99)
METER GLUCOSE: 197 MG/DL (ref 74–99)
METER GLUCOSE: 234 MG/DL (ref 74–99)
METER GLUCOSE: 281 MG/DL (ref 74–99)
MONOCYTES ABSOLUTE: 0.81 E9/L (ref 0.1–0.95)
MONOCYTES RELATIVE PERCENT: 6.1 % (ref 2–12)
MYELOCYTE PERCENT: 0.9 % (ref 0–0)
NEUTROPHILS ABSOLUTE: 11.88 E9/L (ref 1.8–7.3)
NEUTROPHILS RELATIVE PERCENT: 87 % (ref 43–80)
PDW BLD-RTO: 12.3 FL (ref 11.5–15)
PHOSPHORUS: 3 MG/DL (ref 2.5–4.5)
PLATELET # BLD: 226 E9/L (ref 130–450)
PMV BLD AUTO: 9.4 FL (ref 7–12)
POTASSIUM SERPL-SCNC: 4.7 MMOL/L (ref 3.5–5)
RBC # BLD: 4.93 E12/L (ref 3.8–5.8)
SODIUM BLD-SCNC: 138 MMOL/L (ref 132–146)
TOTAL PROTEIN: 5.2 G/DL (ref 6.4–8.3)
WBC # BLD: 13.5 E9/L (ref 4.5–11.5)

## 2023-01-30 PROCEDURE — 6370000000 HC RX 637 (ALT 250 FOR IP): Performed by: INTERNAL MEDICINE

## 2023-01-30 PROCEDURE — 36415 COLL VENOUS BLD VENIPUNCTURE: CPT

## 2023-01-30 PROCEDURE — C9113 INJ PANTOPRAZOLE SODIUM, VIA: HCPCS | Performed by: INTERNAL MEDICINE

## 2023-01-30 PROCEDURE — 85025 COMPLETE CBC W/AUTO DIFF WBC: CPT

## 2023-01-30 PROCEDURE — 80053 COMPREHEN METABOLIC PANEL: CPT

## 2023-01-30 PROCEDURE — 82962 GLUCOSE BLOOD TEST: CPT

## 2023-01-30 PROCEDURE — 2700000000 HC OXYGEN THERAPY PER DAY

## 2023-01-30 PROCEDURE — 2580000003 HC RX 258: Performed by: INTERNAL MEDICINE

## 2023-01-30 PROCEDURE — 94669 MECHANICAL CHEST WALL OSCILL: CPT

## 2023-01-30 PROCEDURE — 94660 CPAP INITIATION&MGMT: CPT

## 2023-01-30 PROCEDURE — 84100 ASSAY OF PHOSPHORUS: CPT

## 2023-01-30 PROCEDURE — 99232 SBSQ HOSP IP/OBS MODERATE 35: CPT | Performed by: INTERNAL MEDICINE

## 2023-01-30 PROCEDURE — A4216 STERILE WATER/SALINE, 10 ML: HCPCS | Performed by: INTERNAL MEDICINE

## 2023-01-30 PROCEDURE — 94640 AIRWAY INHALATION TREATMENT: CPT

## 2023-01-30 PROCEDURE — 6360000002 HC RX W HCPCS: Performed by: INTERNAL MEDICINE

## 2023-01-30 PROCEDURE — 97530 THERAPEUTIC ACTIVITIES: CPT

## 2023-01-30 PROCEDURE — 97535 SELF CARE MNGMENT TRAINING: CPT

## 2023-01-30 PROCEDURE — 83735 ASSAY OF MAGNESIUM: CPT

## 2023-01-30 PROCEDURE — 2060000000 HC ICU INTERMEDIATE R&B

## 2023-01-30 RX ORDER — METHYLPREDNISOLONE SODIUM SUCCINATE 40 MG/ML
20 INJECTION, POWDER, LYOPHILIZED, FOR SOLUTION INTRAMUSCULAR; INTRAVENOUS EVERY 12 HOURS
Status: DISCONTINUED | OUTPATIENT
Start: 2023-01-30 | End: 2023-01-31 | Stop reason: HOSPADM

## 2023-01-30 RX ADMIN — METHYLPREDNISOLONE SODIUM SUCCINATE 20 MG: 40 INJECTION, POWDER, FOR SOLUTION INTRAMUSCULAR; INTRAVENOUS at 21:36

## 2023-01-30 RX ADMIN — METHYLPREDNISOLONE SODIUM SUCCINATE 20 MG: 40 INJECTION, POWDER, FOR SOLUTION INTRAMUSCULAR; INTRAVENOUS at 03:44

## 2023-01-30 RX ADMIN — LOSARTAN POTASSIUM 25 MG: 50 TABLET, FILM COATED ORAL at 17:53

## 2023-01-30 RX ADMIN — IPRATROPIUM BROMIDE AND ALBUTEROL SULFATE 3 ML: .5; 2.5 SOLUTION RESPIRATORY (INHALATION) at 00:51

## 2023-01-30 RX ADMIN — ATORVASTATIN CALCIUM 40 MG: 40 TABLET, FILM COATED ORAL at 08:36

## 2023-01-30 RX ADMIN — IPRATROPIUM BROMIDE AND ALBUTEROL SULFATE 3 ML: .5; 2.5 SOLUTION RESPIRATORY (INHALATION) at 09:56

## 2023-01-30 RX ADMIN — IPRATROPIUM BROMIDE AND ALBUTEROL SULFATE 3 ML: .5; 2.5 SOLUTION RESPIRATORY (INHALATION) at 22:19

## 2023-01-30 RX ADMIN — IPRATROPIUM BROMIDE AND ALBUTEROL SULFATE 3 ML: .5; 2.5 SOLUTION RESPIRATORY (INHALATION) at 18:16

## 2023-01-30 RX ADMIN — INSULIN LISPRO 1 UNITS: 100 INJECTION, SOLUTION INTRAVENOUS; SUBCUTANEOUS at 12:12

## 2023-01-30 RX ADMIN — IPRATROPIUM BROMIDE AND ALBUTEROL SULFATE 3 ML: .5; 2.5 SOLUTION RESPIRATORY (INHALATION) at 06:51

## 2023-01-30 RX ADMIN — ENOXAPARIN SODIUM 40 MG: 100 INJECTION SUBCUTANEOUS at 21:37

## 2023-01-30 RX ADMIN — QUETIAPINE FUMARATE 200 MG: 200 TABLET, EXTENDED RELEASE ORAL at 21:36

## 2023-01-30 RX ADMIN — Medication 10 ML: at 08:37

## 2023-01-30 RX ADMIN — INSULIN LISPRO 2 UNITS: 100 INJECTION, SOLUTION INTRAVENOUS; SUBCUTANEOUS at 17:52

## 2023-01-30 RX ADMIN — ARFORMOTEROL TARTRATE 15 MCG: 15 SOLUTION RESPIRATORY (INHALATION) at 18:16

## 2023-01-30 RX ADMIN — GABAPENTIN 600 MG: 300 CAPSULE ORAL at 21:37

## 2023-01-30 RX ADMIN — METHYLPREDNISOLONE SODIUM SUCCINATE 20 MG: 40 INJECTION, POWDER, FOR SOLUTION INTRAMUSCULAR; INTRAVENOUS at 10:42

## 2023-01-30 RX ADMIN — Medication 40 MG: at 08:36

## 2023-01-30 RX ADMIN — Medication 10 ML: at 21:36

## 2023-01-30 RX ADMIN — AZITHROMYCIN MONOHYDRATE 500 MG: 500 INJECTION, POWDER, LYOPHILIZED, FOR SOLUTION INTRAVENOUS at 15:22

## 2023-01-30 RX ADMIN — BUDESONIDE 500 MCG: 0.5 SUSPENSION RESPIRATORY (INHALATION) at 06:51

## 2023-01-30 RX ADMIN — BUDESONIDE 500 MCG: 0.5 SUSPENSION RESPIRATORY (INHALATION) at 18:16

## 2023-01-30 RX ADMIN — IPRATROPIUM BROMIDE AND ALBUTEROL SULFATE 3 ML: .5; 2.5 SOLUTION RESPIRATORY (INHALATION) at 15:04

## 2023-01-30 RX ADMIN — ARFORMOTEROL TARTRATE 15 MCG: 15 SOLUTION RESPIRATORY (INHALATION) at 06:52

## 2023-01-30 ASSESSMENT — PAIN SCALES - GENERAL: PAINLEVEL_OUTOF10: 0

## 2023-01-30 NOTE — PROGRESS NOTES
Department of Internal Medicine  General Internal Medicine  Attending Progress Note  Chief Complaint   Patient presents with    Shortness of Breath     Pt c/o shortness of breath that started around 1330 this afternoon. Hx COPD, on 2-3L O2 at home. Pt given 125 mg solumedrol and 1 duoneb treatment by EMS. 18 g LAC. SUBJECTIVE:    Agrees breathing is gradually improving. Denied fever and chills. No chest pain. No nausea or vomiting. No diarrhea.      OBJECTIVE      Medications    Current Facility-Administered Medications: methylPREDNISolone sodium (SOLU-MEDROL) injection 20 mg, 20 mg, IntraVENous, Q8H  azithromycin (ZITHROMAX) 500 mg in sodium chloride 0.9 % 250 mL IVPB ([id number]), 500 mg, IntraVENous, Q24H  ipratropium-albuterol (DUONEB) nebulizer solution 3 mL, 1 vial, Inhalation, Q4H  pantoprazole (PROTONIX) 40 mg in sodium chloride (PF) 0.9 % 10 mL injection, 40 mg, IntraVENous, Daily  insulin lispro (HUMALOG) injection vial 0-4 Units, 0-4 Units, SubCUTAneous, TID WC  insulin lispro (HUMALOG) injection vial 0-4 Units, 0-4 Units, SubCUTAneous, Nightly  glucose chewable tablet 16 g, 4 tablet, Oral, PRN  dextrose bolus 10% 125 mL, 125 mL, IntraVENous, PRN **OR** dextrose bolus 10% 250 mL, 250 mL, IntraVENous, PRN  glucagon (rDNA) injection 1 mg, 1 mg, SubCUTAneous, PRN  dextrose 10 % infusion, , IntraVENous, Continuous PRN  gabapentin (NEURONTIN) capsule 600 mg, 600 mg, Oral, Nightly  losartan (COZAAR) tablet 25 mg, 25 mg, Oral, QPM  QUEtiapine (SEROQUEL XR) extended release tablet 200 mg, 200 mg, Oral, Nightly  atorvastatin (LIPITOR) tablet 40 mg, 40 mg, Oral, Daily  sodium chloride flush 0.9 % injection 5-40 mL, 5-40 mL, IntraVENous, 2 times per day  sodium chloride flush 0.9 % injection 5-40 mL, 5-40 mL, IntraVENous, PRN  0.9 % sodium chloride infusion, , IntraVENous, PRN  enoxaparin (LOVENOX) injection 40 mg, 40 mg, SubCUTAneous, Daily  ondansetron (ZOFRAN-ODT) disintegrating tablet 4 mg, 4 mg, Oral, Q8H PRN **OR** ondansetron (ZOFRAN) injection 4 mg, 4 mg, IntraVENous, Q6H PRN  polyethylene glycol (GLYCOLAX) packet 17 g, 17 g, Oral, Daily PRN  acetaminophen (TYLENOL) tablet 650 mg, 650 mg, Oral, Q6H PRN **OR** acetaminophen (TYLENOL) suppository 650 mg, 650 mg, Rectal, Q6H PRN  arformoterol tartrate (BROVANA) nebulizer solution 15 mcg, 15 mcg, Nebulization, BID **AND** budesonide (PULMICORT) nebulizer suspension 500 mcg, 0.5 mg, Nebulization, BID  Physical    VITALS:  BP (!) 130/59   Pulse 88   Temp 97.9 °F (36.6 °C) (Oral)   Resp 24   Ht 5' 10\" (1.778 m)   Wt 212 lb 8.4 oz (96.4 kg)   SpO2 96%   BMI 30.49 kg/m²   CONSTITUTIONAL:  awake, cooperative, and no apparent distress  EYES:  extra-ocular muscles intact  ENT:  normocepalic, without obvious abnormality  NECK:  supple, symmetrical, trachea midline  LUNGS:  no increased work of breathing, no retractions, and diminished  CARDIOVASCULAR:  normal apical pulses and normal S1 and S2  ABDOMEN:  normal bowel sounds, non-distended, and non-tender  MUSCULOSKELETAL:  full range of motion noted  NEUROLOGIC:  Mental Status Exam:  Level of Alertness:   awake  Orientation:   person, place, time  SKIN:  no bruising or bleeding  Data    CBC:   Lab Results   Component Value Date/Time    WBC 13.5 01/30/2023 06:42 AM    RBC 4.93 01/30/2023 06:42 AM    HGB 15.1 01/30/2023 06:42 AM    HCT 45.2 01/30/2023 06:42 AM    MCV 91.7 01/30/2023 06:42 AM    MCH 30.6 01/30/2023 06:42 AM    MCHC 33.4 01/30/2023 06:42 AM    RDW 12.3 01/30/2023 06:42 AM     01/30/2023 06:42 AM    MPV 9.4 01/30/2023 06:42 AM     BMP:    Lab Results   Component Value Date/Time     01/30/2023 06:42 AM    K 4.7 01/30/2023 06:42 AM    K 4.7 01/24/2023 06:39 AM     01/30/2023 06:42 AM    CO2 23 01/30/2023 06:42 AM    BUN 24 01/30/2023 06:42 AM    LABALBU 3.3 01/30/2023 06:42 AM    CREATININE 0.9 01/30/2023 06:42 AM    CALCIUM 8.3 01/30/2023 06:42 AM    GFRAA >60 08/29/2022 05:54 AM    LABGLOM >60 01/30/2023 06:42 AM    GLUCOSE 175 01/30/2023 06:42 AM       ASSESSMENT AND PLAN    Patient was admitted with dyspnea. He was found to have copd. He was treated with Solu-medrol and levaquin. He completed 5 days of levaquin. Respiratory panel was negative. He is seen by Pulm and Zithromax was added. COPD exacerbation causing Acute respiratory failure with hypoxia and Hypercapnia: o2 weaned to 3 liters. Abx per pulmon recommendation. Patient follows with lung transplant team in [facility]  Hyperglycemia due to steroid ssc  No change to outpatient treatment regimen for the existing stable combordities including: Hypertension and Hyperlipidemia:  DVT prophylaxis: Lovenox  Full code. Disposition: needs pt but didn't comply today.   I explained need for that before I determine his ability to go home, probably on 1/31

## 2023-01-30 NOTE — PROGRESS NOTES
Nutrition Assessment     Type and Reason for Visit: RD Nutrition Re-Screen/LOS    Nutrition Recommendations/Plan:   No nutrition intervention at this time. Pt consuming % of most meals. Consult RD if needed, will follow up per policy. Nutrition Assessment:  Pt assessed per LOS protocol. Chart reviewed. Pt currently eating ~% of most meals with no significant nutritional issues noted at this time. Will follow up per policy. Consult RD if needed.          [employer], [address]  Contact: [phone]

## 2023-01-30 NOTE — PROGRESS NOTES
Pulmonary/Critical Care Progress Note    We are following patient for severe COPD with acute exacerbation, acute respiratory failure, hypertension, hyperlipidemia      SUBJECTIVE:  Patient is feeling considerably better and looks better as well. We will reduce IV steroids and continue other medications including azithromycin aerosolized bronchodilators inhaled steroids and others. He has improved markedly and from a pulmonary standpoint, may be discharged tomorrow. MEDICATIONS:   methylPREDNISolone  20 mg IntraVENous Q12H    azithromycin  500 mg IntraVENous Q24H    ipratropium-albuterol  1 vial Inhalation Q4H    pantoprazole (PROTONIX) 40 mg injection  40 mg IntraVENous Daily    insulin lispro  0-4 Units SubCUTAneous TID WC    insulin lispro  0-4 Units SubCUTAneous Nightly    gabapentin  600 mg Oral Nightly    losartan  25 mg Oral QPM    QUEtiapine  200 mg Oral Nightly    atorvastatin  40 mg Oral Daily    sodium chloride flush  5-40 mL IntraVENous 2 times per day    enoxaparin  40 mg SubCUTAneous Daily    arformoterol tartrate  15 mcg Nebulization BID    And    budesonide  0.5 mg Nebulization BID      dextrose      sodium chloride       glucose, dextrose bolus **OR** dextrose bolus, glucagon (rDNA), dextrose, sodium chloride flush, sodium chloride, ondansetron **OR** ondansetron, polyethylene glycol, acetaminophen **OR** acetaminophen      REVIEW OF SYSTEMS:  Constitutional: Denies fever, weight loss, night sweats, and fatigue  Skin: Denies pigmentation, dark lesions, and rashes   HEENT: Denies hearing loss, tinnitus, ear drainage, epistaxis, sore throat, and hoarseness. Cardiovascular: Denies palpitations, chest pain, and chest pressure. Respiratory: Denies cough, dyspnea at rest, hemoptysis, apnea, and choking.   Gastrointestinal: Denies nausea, vomiting, poor appetite, diarrhea, heartburn or reflux  Genitourinary: Denies dysuria, frequency, urgency or hematuria  Musculoskeletal: Denies myalgias, muscle weakness, and bone pain  Neurological: Denies dizziness, vertigo, headache, and focal weakness  Psychological: Denies anxiety and depression  Endocrine: Denies heat intolerance and cold intolerance  Hematopoietic/Lymphatic: Denies bleeding problems and blood transfusions    OBJECTIVE:  Vitals:    01/30/23 0625   BP: (!) 130/59   Pulse: 88   Resp: 24   Temp: 97.9 °F (36.6 °C)   SpO2: 96%     FiO2 : 35 %  O2 Flow Rate (L/min): 3 L/min  O2 Device: Nasal cannula    PHYSICAL EXAM:  Constitutional: No fever, chills, diaphoresis  Skin: Skin rash, no skin breakdown  HEENT: Mucous membranes moist  Neck: No JVD, lymphadenopathy, thyromegaly  Cardiovascular: S1, S2 regular. No S3 or rubs present  Respiratory: Clear to auscultation bilaterally. I do not hear any wheezes, crackles, or rubs  Gastrointestinal: Soft, modestly obese, nontender  Genitourinary: No CVA tenderness  Extremities: No clubbing, cyanosis, or edema  Neurological: Awake, alert, oriented x3. No evidence of focal motor or sensory deficits  Psychological: In good spirits relatively speaking.   Affect appropriate    LABS:  WBC   Date Value Ref Range Status   01/30/2023 13.5 (H) 4.5 - 11.5 E9/L Final   01/29/2023 10.9 4.5 - 11.5 E9/L Final   01/28/2023 8.5 4.5 - 11.5 E9/L Final     Hemoglobin   Date Value Ref Range Status   01/30/2023 15.1 12.5 - 16.5 g/dL Final   01/29/2023 14.9 12.5 - 16.5 g/dL Final   01/28/2023 15.6 12.5 - 16.5 g/dL Final     Hematocrit   Date Value Ref Range Status   01/30/2023 45.2 37.0 - 54.0 % Final   01/29/2023 43.1 37.0 - 54.0 % Final   01/28/2023 44.9 37.0 - 54.0 % Final     MCV   Date Value Ref Range Status   01/30/2023 91.7 80.0 - 99.9 fL Final   01/29/2023 90.4 80.0 - 99.9 fL Final   01/28/2023 90.3 80.0 - 99.9 fL Final     Platelets   Date Value Ref Range Status   01/30/2023 226 130 - 450 E9/L Final   01/29/2023 216 130 - 450 E9/L Final   01/28/2023 218 130 - 450 E9/L Final     Sodium   Date Value Ref Range Status   01/30/2023 138 132 - 146 mmol/L Final   01/29/2023 135 132 - 146 mmol/L Final   01/28/2023 139 132 - 146 mmol/L Final     Potassium   Date Value Ref Range Status   01/30/2023 4.7 3.5 - 5.0 mmol/L Final   01/29/2023 4.1 3.5 - 5.0 mmol/L Final   01/28/2023 4.6 3.5 - 5.0 mmol/L Final     Potassium reflex Magnesium   Date Value Ref Range Status   01/24/2023 4.7 3.5 - 5.0 mmol/L Final   08/22/2022 4.5 3.5 - 5.0 mmol/L Final   08/21/2022 4.2 3.5 - 5.0 mmol/L Final     Chloride   Date Value Ref Range Status   01/30/2023 103 98 - 107 mmol/L Final   01/29/2023 101 98 - 107 mmol/L Final   01/28/2023 104 98 - 107 mmol/L Final     CO2   Date Value Ref Range Status   01/30/2023 23 22 - 29 mmol/L Final   01/29/2023 24 22 - 29 mmol/L Final   01/28/2023 23 22 - 29 mmol/L Final     BUN   Date Value Ref Range Status   01/30/2023 24 (H) 6 - 23 mg/dL Final   01/29/2023 24 (H) 6 - 23 mg/dL Final   01/28/2023 20 6 - 23 mg/dL Final     Creatinine   Date Value Ref Range Status   01/30/2023 0.9 0.7 - 1.2 mg/dL Final   01/29/2023 1.0 0.7 - 1.2 mg/dL Final   01/28/2023 1.0 0.7 - 1.2 mg/dL Final     Glucose   Date Value Ref Range Status   01/30/2023 175 (H) 74 - 99 mg/dL Final   01/29/2023 180 (H) 74 - 99 mg/dL Final   01/28/2023 175 (H) 74 - 99 mg/dL Final     Calcium   Date Value Ref Range Status   01/30/2023 8.3 (L) 8.6 - 10.2 mg/dL Final   01/29/2023 8.3 (L) 8.6 - 10.2 mg/dL Final   01/28/2023 8.4 (L) 8.6 - 10.2 mg/dL Final     Total Protein   Date Value Ref Range Status   01/30/2023 5.2 (L) 6.4 - 8.3 g/dL Final   01/29/2023 5.2 (L) 6.4 - 8.3 g/dL Final   01/28/2023 5.5 (L) 6.4 - 8.3 g/dL Final     Albumin   Date Value Ref Range Status   01/30/2023 3.3 (L) 3.5 - 5.2 g/dL Final   01/29/2023 3.5 3.5 - 5.2 g/dL Final   01/28/2023 3.5 3.5 - 5.2 g/dL Final     Total Bilirubin   Date Value Ref Range Status   01/30/2023 0.2 0.0 - 1.2 mg/dL Final   01/29/2023 0.3 0.0 - 1.2 mg/dL Final   01/28/2023 0.4 0.0 - 1.2 mg/dL Final     Alkaline Phosphatase   Date Value Ref Range Status   01/30/2023 90 40 - 129 U/L Final   01/29/2023 78 40 - 129 U/L Final   01/28/2023 81 40 - 129 U/L Final     AST   Date Value Ref Range Status   01/30/2023 15 0 - 39 U/L Final     Comment:     Specimen is slightly Hemolyzed. Result may be artificially increased. 01/29/2023 9 0 - 39 U/L Final   01/28/2023 9 0 - 39 U/L Final     ALT   Date Value Ref Range Status   01/30/2023 17 0 - 40 U/L Final   01/29/2023 12 0 - 40 U/L Final   01/28/2023 13 0 - 40 U/L Final     Est, Glom Filt Rate   Date Value Ref Range Status   01/30/2023 >60 >=60 mL/min/1.73 Final     Comment:     Pediatric calculator link  [employer].[email or web address]. [email or web address]/gfr_[email or web address]  Effective Oct 3, 2022  These results are not intended for use in patients  <25years of age. eGFR results are calculated without  a race factor using the 2021 CKD-EPI equation. Careful  clinical correlation is recommended, particularly when  comparing to results calculated using previous equations. The CKD-EPI equation is less accurate in patients with  extremes of muscle mass, extra-renal metabolism of  creatinine, excessive creatinine ingestion, or following  therapy that affects renal tubular secretion. 01/29/2023 >60 >=60 mL/min/1.73 Final     Comment:     Pediatric calculator link  [employer].at. [email or web address]/gfr_calculatorped  Effective Oct 3, 2022  These results are not intended for use in patients  <25years of age. eGFR results are calculated without  a race factor using the 2021 CKD-EPI equation. Careful  clinical correlation is recommended, particularly when  comparing to results calculated using previous equations. The CKD-EPI equation is less accurate in patients with  extremes of muscle mass, extra-renal metabolism of  creatinine, excessive creatinine ingestion, or following  therapy that affects renal tubular secretion.      01/28/2023 >60 >=60 mL/min/1.73 Final     Comment:     Pediatric calculator link  [employer].[email or web address]. [email or web address]/gfr_calculatorped  Effective Oct 3, 2022  These results are not intended for use in patients  <25years of age. eGFR results are calculated without  a race factor using the 2021 CKD-EPI equation. Careful  clinical correlation is recommended, particularly when  comparing to results calculated using previous equations. The CKD-EPI equation is less accurate in patients with  extremes of muscle mass, extra-renal metabolism of  creatinine, excessive creatinine ingestion, or following  therapy that affects renal tubular secretion. GFR    Date Value Ref Range Status   08/29/2022 >60  Final   08/28/2022 >60  Final   08/27/2022 >60  Final     Magnesium   Date Value Ref Range Status   01/30/2023 2.4 1.6 - 2.6 mg/dL Final   01/29/2023 2.6 1.6 - 2.6 mg/dL Final   01/28/2023 2.6 1.6 - 2.6 mg/dL Final     Phosphorus   Date Value Ref Range Status   01/30/2023 3.0 2.5 - 4.5 mg/dL Final   01/29/2023 3.2 2.5 - 4.5 mg/dL Final   01/28/2023 3.7 2.5 - 4.5 mg/dL Final     No results for input(s): PH, PO2, PCO2, HCO3, BE, O2SAT in the last 72 hours. RADIOLOGY:  XR CHEST PORTABLE   Final Result   Minimal left lung base atelectasis. The right lung is clear. XR CHEST PORTABLE   Final Result   No acute process. PROBLEM LIST:  Principal Problem:    COPD exacerbation ([address]  Resolved Problems:    * No resolved hospital problems.  *      IMPRESSION:  Acute hypoxemic and hypercapnic respiratory failure, improved  Severe COPD with exacerbation  Hypertension  History of hyperlipidemia    PLAN:  Reduce IV steroids  Continue IV azithromycin until discharge, then p.o. x5 days  May be discharged tomorrow from pulmonary standpoint      Electronically signed by Van Wilcox MD on 1/30/2023 at 4:00 PM

## 2023-01-30 NOTE — PROGRESS NOTES
Physical Therapy        0629/0629-01    Patient unavailable for physical therapy treatment due to patient declining therapy at this time due to fatigue, requests later treatment session. Will check back on patient at later time/date.      Jerome Manriquez, PTA  #[id number]

## 2023-01-30 NOTE — PROGRESS NOTES
Met with patient at bedside. Patient said that he is improved and expected discharge in a couple of days. He farther stated that he had a couple of more tests at the [facility] for a possible lung transplant. We talked some about what that would be like. I informed him that Spiritual [facility] was available as needed. He expressed thanks.

## 2023-01-30 NOTE — CARE COORDINATION
1/30/2023 1320 CM met with pt and jhonny for transition of care needs at d/. Pt plans to return home as prior, he has O2 3 liters and a nebulizer through [employer]. He follows with the [facility] for a lung transplant and has his final testing 2/20/2023 to be reviewed by the board to be on the official list.  Pt is requesting to have pulmonary rehab at d through [facility] as he had it prior in August of 2022. Per rep at [facility] [phone] [phone] they need a demographic sheet and an order faxed, but want the order from the Dr that would follow pt. Pt's pulmonologist is Dr Lexi Adams, JESSICA left a voice message for clinical staff at his office to see if he will give an order for pulmonary rehab. Pt's fiance will provide transportation. CM will follow.   Electronically signed by Noel Taylor RN on 1/30/2023 at 1:37 PM

## 2023-01-30 NOTE — PROGRESS NOTES
OCCUPATIONAL THERAPY BEDSIDE TREATMENT NOTE   [facility]  [facility] [place]. [place]    Date:2023  Patient Name: Lacey Smith  MRN: [id number]  : 1955  Room: [address] [place], [place] [id number]       Evaluating OT: Donovan Booth OTR/L #[id number]      Referring Provider and Specific Provider Orders/Date:      23   OT eval and treat  Start:  23,   End:  23,   ONE TIME,   Standing Count:  1 Occurrences,   Raman Dailey MD       Placement Recommendation:  [address] ; pt declines going to rehab and prefers  to go Home with [facility] and go to Pulmonary rehab; pt states he has assist at home as needed        Diagnosis:   1. COPD exacerbation [facility])         Surgery: None        Pertinent Medical History:       Past Medical History        Past Medical History:   Diagnosis Date    Angina pectoris ([address]       states had neg heart cath    Asthma      Cervical spinal stenosis      COPD (chronic obstructive pulmonary disease) (HCC)      Depression      Hyperlipidemia      Hypertension      Neck pain              Past Surgical History         Past Surgical History:   Procedure Laterality Date    COLONOSCOPY        HAND SURGERY Left 2019     LEFT HAND-PALN, DUPUYTREN'S CONTRACTURE, MAJOR RESECTION.  POSSIBLE FULL THICKNESS SKIN GRAFT    INTRACAPSULAR CATARACT EXTRACTION Left 11/15/2022     CATARACT EXTRACTION WITH IOL,COMPLEX VISCOAT, VISION BLUE, POSSIBLE ECCE-LEFT performed by Sonali Collins MD at [address]         removal non malignant lesion    NECK SURGERY         cervical fusion x2    WRIST SURGERY Left 2019     LEFT HAND DUPUYTRENS CONTRACTURE MAJOR RESECTION,  Z-PLASTY performed by Fer Rock MD at [address] [place] [address] Osteopathy          Precautions:  Fall Risk, up as tolerated, 3L O2 via nasal canula, dyspnea on exertion       Assessment of current deficits    [x] Functional mobility            [x]ADLs           [x] Strength                   []Cognition    [x] Functional transfers          [x] IADLs          [x] Safety Awareness   [x]Endurance    [] Fine Coordination              [x] Balance      [] Vision/perception    []Sensation      []Gross Motor Coordination  [] ROM           [] Delirium                   [] Motor Control      OT PLAN OF CARE   OT POC based on physician orders, patient diagnosis and results of clinical assessment     Frequency/Duration 1-3 days/wk for 2 weeks PRN      Specific OT Treatment Interventions to include:   * Instruction/training on adapted ADL techniques and AE recommendations to increase functional independence within precautions       * Training on energy conservation strategies, correct breathing pattern and techniques to improve independence/tolerance for self-care routine  * Functional transfer/mobility training/DME recommendations for increased independence, safety, and fall prevention  * Patient/Family education to increase follow through with safety techniques and functional independence  * Recommendation of environmental modifications for increased safety with functional transfers/mobility and ADLs  * Therapeutic exercise to improve motor endurance, ROM, and functional strength for ADLs/functional transfers  * Therapeutic activities to facilitate/challenge dynamic balance, stand tolerance for increased safety and independence with ADLs     Recommended Adaptive Equipment: TBD       Home Living: Lives with fiance, single family home, 2 story, 3 steps to enter with rail. 14 steps with rail to 2nd floor where bedroom and full bathroom is located. Half bathroom on 1st floor. Bathroom set-up: Walk-in shower  , standard commode      Equipment owned: [employer], wheeled walker, O2 dependent (3L)      Prior Level of Function: Moderate [place] with ADLs , fijanee completes most IADLs; ambulated independently with cane.       Driving: Yes       Pain Level: pt denied pain              Cognition: A&O: 4/4; Follows 3 step directions              Memory: intact              Sequencing: intact              Problem solving: fair               Judgement/safety: fair      [facility]   AM-PAC Daily Activity Inpatient   How much help for putting on and taking off regular lower body clothing?: A Little  How much help for Bathing?: A Little  How much help for Toileting?: A Little  How much help for putting on and taking off regular upper body clothing?: A Little  How much help for taking care of personal grooming?: A Little  How much help for eating meals?: A Little  AM-PAC Inpatient Daily Activity Raw Score: 18                Functional Assessment:     Initial Eval Status  Date: 1/24/23    Treatment Status  Date:1/30/23 STGs = LTGs  Time frame: 10-14 days   Feeding Supervision     N/T  Independent    Grooming Stand by Assist   Seated with cueing for energy conservation     Pt able to don shampoo cap and wash, towel dry and comb hair at SBA when seated EOB; pt able to comb hair; pt deferred oral care until pt feels more rested ; pt able to wash face with set up assist Moderate [place]    UB Dressing Minimal Assist  For gown mgmt seated in chair     Min A for gown management when seated EOB; pt able to thread B UE's through gowns; assist to tie/untie back of gown; assist for O2 and monitor line management  Moderate [place]    LB Dressing Stand by Assist   Pt donned socks seated at EOB via figure-four technique.       Min A/Mod A  ; pt able to don/doff socks by crossing LE's when seated EOB; pt able to doff pants over B hips with min A for doffing pants over B feet; mod A required  to don pants over B feet and over socks 2* to gripping pants ;pt able to don garment over B hips Moderate [place]    Bathing Moderate Assist     Min A; pt able to wash UB when seated EOB; pt crossed LE's to wash legs when seated; assist to wash back; min A for standing balance as pt washed jacquelin area and buttocks; use of FWW   Moderate [place]    Toileting Moderate Assist     N/T  Moderate [place]    Bed Mobility  Supine to sit: Supervision   Sit to supine:  Not Assessed     Supervision for supine to sit; supervision for scooting; sit to supine at min A to support LE's to supine; assist for positioning HOB to ease breathing; bed alarm on Supine to sit: Independent   Sit to supine: Independent    Functional Transfers Sit to stand: Supervision   Stand to sit: Supervision      Transfer training with verbal cues for hand placement throughout session to improve safety. Min A progressing to supervision for sit to stand transfers from EOB x 2 reps; use of FWW; cuing for hand placement, line management  Independent    Functional Mobility Minimal Assist to improve balance from EOB to/from [facility] and chair, verbal cues for overall safety. Min A with FWW for ~3 ft forward and backward with assist for O2 line management  Moderate [place]    Balance Sitting:     Static: fair plus unsupported     Dynamic: fair plus unsupported   Standing: fair     Sitting:     Static: fair plus unsupported     Dynamic: fair plus unsupported   Standing: fair with min A with FWW  Sitting:     Static: good    Dynamic: good  Standing: good    Activity Tolerance fair  / fair minus; \"mild\" shortness of breath with minimal activity demands. Time needed for therapeutic rest breaks while seated. fair minus; shortness of breath/wheezing with ADL tasks when seated EOB. Time needed for therapeutic rest breaks while seated. Pt's SPO2 sats ranged between 89-95% on 3L O2.   Cuing for deep breathing techniques 2* to pt breathing through mouth    Increase standing tolerance >3 minutes for improved engagement with functional transfers and indep in ADLs      Visual/  Perceptual Glasses: Yes      Reports changes in vision since admission: No       NA       Hand Dominance: Right      Hearing: [facility] , wears hearing aids   Sensation:  No c/o numbness or tingling  Tone: WFL   Edema: None noted     Comments: Patient cleared by nursing staff. Upon arrival pt seated in bed with HOB elevated. Pt agreeable to OT tx session. Pt educated with regards to bed mobility, hand placement, safety awareness, static sitting balance,  standing balance, sit to stand transfer training, functional mobility, ADL retraining,  grooming tasks, UE/LE bathing, LE/UE dressing, pericare and rear hygiene, ECT's. Pt able to tolerate sitting EOB for ADL tasks at this time. SOB noted with exertion. Pt education on ECT's and DME to assist in LE's. Education on deep breathing techniques, as well. At end of session pt seated in bed with HOB elevated  with all lines and tubes intact, call light within reach. Overall, pt demonstrated fair minus independence and safety during completion of ADL/functional transfers/mobility tasks. Pt would benefit from continued skilled OT to increase safety and independence with completion of ADL/IADL tasks for functional independence and quality of life. Pt on 3L O2 during session with pt's SPO2 sats ranging from 89-95%; cuing for deep breathing. Pt required cues and education as noted above for safe facilitation and completion of tasks. Therapist provided skilled monitoring of patient's response during treatment session. Prior to and at the end of session, environmental modifications /O2 line management completed for patients safety and efficiency of treatment session. Overall, patient demonstrates minimal difficulties with completion of BADLs and IADLs. Factors contributing to these difficulties include SOB with activities, decreased endurance, and generalized weakness. As noted above, patient likely to benefit from further OT intervention to increase independence, safety, and overall quality of life.      Treatment:     Bed mobility: Facilitated bed mobility with cues for proper body mechanics and sequencing to prepare for ADL completion. Functional transfers: Facilitated transfers to/from EOB with cues for body alignment, safety and hand placement. ADL completion: Self-care retraining for the above-mentioned ADLs; training on proper hand placement, safety technique, sequencing, and energy conservation techniques. Postural Balance: Sitting/standing balance retraining to improve righting reactions with postural changes during ADLs. Skilled positioning: Proper positioning to improve interaction with environment, overall functioning and to ease breathing    Pt has made fair progress towards set goals    Pt limited 2* to SOB with exertion; pt on 3L O2 with SPO2 sats ranging from 89%-95%. Cuing for deep breathing through nose, as pt tends to breath through mouth; wheezing noted. OT 1-3 days/wk for 2 weeks PRN     Treatment Time also includes thorough review of current medical information, gathering information on past medical history/social history and prior level of function, informal observation of tasks, assessment of data and education on plan of care and goals.     Treatment Time In: 2:20 PM     Treatment Time Out: 2:34 PM            Treatment Charges: Mins Units   ADL/Home Mgt     32312 12 1   Thera Activities     60552 2 0   Ther Ex                 10738       Manual Therapy    91695     Neuro Re-ed         84975     Orthotic manage/training                               35068     Non Billable Time     Total Timed Treatment 14 3855 JANET Blue/L #[id number]

## 2023-01-30 NOTE — PROGRESS NOTES
Physical Therapy    Physical Therapy Treatment Note/Plan of Care    Room #:  0629/0629-01  Patient Name: Cuauhtemoc Carlos  YOB: 1955  MRN: [id number]    Date of Service: 1/30/2023     Tentative placement recommendation:  Pulmonary Rehab  Equipment recommendation: Patient has needed equipment       Evaluating Physical Therapist: Thiago De Luna, PT  #[id number]      Specific Provider Orders/Date/Referring Provider :  01/23/23 1930    PT evaluation and treat  Start:  01/23/23 1930,   End:  01/23/23 1930,   ONE TIME,   Standing Count:  1 Occurrences,   Carleen Steward MD     Admitting Diagnosis:   COPD exacerbation ([facility] [J44.1]      Surgery: none      Patient Active Problem List   Diagnosis    COPD exacerbation ([address]    Acute on chronic respiratory failure with hypoxia and hypercapnia ([address]    History of tobacco abuse    Primary hypertension    Depression    Acute on chronic respiratory failure ([address]    Left cataract        ASSESSMENT of Current Deficits Patient exhibits decreased strength, balance, endurance, and coordination impairing functional mobility, transfers, gait , gait distance, and tolerance to activity are barriers to d/c and require skilled intervention to address concerns listed above to increase safety and independence at discharge. Decreased strength, balance and endurance  increases patient's risk for fall. Patient able to perform gait training with no loss of balance but displays shortness of breath with exertion. Pt on 3L and was 94-95% throughout tx session. Patient would benefit from pulmonary rehab to address above deficits and inc safety due to impairment of endurance.       PHYSICAL THERAPY  PLAN OF CARE       Physical therapy plan of care is established based on physician order,  patient diagnosis and clinical assessment    Current Treatment Recommendations:    -Bed Mobility: Lower extremity exercises , Upper extremity exercises , and Trunk control activities   -Sitting Balance: Incorporate reaching activities to activate trunk muscles  and Facilitate postural control in all planes   -Standing Balance: Perform strengthening exercises in standing to promote motor control with or without upper extremity support   -Transfers: Provide instruction on proper hand and foot position for adequate transfer of weight onto lower extremities and use of gait device if needed and Cues for hand placement, technique and safety. Provide stabilization to prevent fall   -Gait: Gait training and Standing activities to improve: base of support, weight shift, weight bearing    -Endurance: Utilize Supervised activities to increase level of endurance to allow for safe functional mobility including transfers and gait   -Stairs: Stair training with instruction on proper technique and hand placement on rail    PT long term treatment goals are located in below grid    Patient and or family understand(s) diagnosis, prognosis, and plan of care. Frequency of treatments: Patient will be seen  daily. Prior Level of Function: Patient ambulated independently    Rehab Potential: good   for baseline    Past medical history:   Past Medical History:   Diagnosis Date    Angina pectoris ([address]     states had neg heart cath    Asthma     Cervical spinal stenosis     COPD (chronic obstructive pulmonary disease) (HCC)     Depression     Hyperlipidemia     Hypertension     Neck pain      Past Surgical History:   Procedure Laterality Date    COLONOSCOPY      HAND SURGERY Left 11/13/2019    LEFT HAND-PALN, DUPUYTREN'S CONTRACTURE, MAJOR RESECTION.  POSSIBLE FULL THICKNESS SKIN GRAFT    INTRACAPSULAR CATARACT EXTRACTION Left 11/15/2022    CATARACT EXTRACTION WITH IOL,COMPLEX VISCOAT, VISION BLUE, POSSIBLE ECCE-LEFT performed by Omi Aleman MD at [address]      removal non malignant lesion    NECK SURGERY      cervical fusion x2    WRIST SURGERY Left 11/13/2019    LEFT HAND Sujey Bran CONTRACTURE MAJOR RESECTION,  Z-PLASTY performed by Allison Rivas MD at [address]:    Precautions: Up as tolerated, falls, alarm, and O2 , 3 Liters of o2 via nasal cannula, patient only has 27% function of lungs, short of breath with exertion     Social history: Patient lives with significant other in a two story home bedroom and bathroom 2nd floor full flight stairs with Rail  with 3 steps, with rail  to enter Walk in shower        Equipment owned: Smita Heena, and O2,  3 Liters of o2 via nasal cannula     AM-PAC Basic Mobility        AM-[facility] Mobility Inpatient   How much difficulty turning over in bed?: None  How much difficulty sitting down on / standing up from a chair with arms?: A Little  How much difficulty moving from lying on back to sitting on side of bed?: None  How much help from another person moving to and from a bed to a chair?: A Little  How much help from another person needed to walk in hospital room?: A Little  How much help from another person for climbing 3-5 steps with a railing?: A Lot  AM-PAC Inpatient Mobility Raw Score : 19  AM-PAC Inpatient T-Scale Score : 45.44  Mobility Inpatient CMS 0-100% Score: 41.77  Mobility Inpatient CMS G-Code Modifier : CK    Nursing cleared patient for PT treatment. OBJECTIVE;   Initial Evaluation  Date: 1/24/2023 Treatment Date:  1/30/2023     Short Term/ Long Term   Goals   Was pt agreeable to Eval/treatment? Yes yes To be met in 5 days   Pain level   0/10    0/10    Bed Mobility    Rolling: Supervision     Supine to sit: Supervision     Sit to supine: Supervision     Scooting: Supervision    Rolling: Not assessed patient seated edge of bed   Supine to sit: Not assessed patient seated edge of bed   Sit to supine: Not assessed patient seated edge of bed   Scooting: Not assessed patient seated edge of bed    Rolling: Independent    Supine to sit:  Independent    Sit to supine: Independent    Scooting: Independent     Transfers Sit to stand: Moderate assist of 1  x 3 reps Sit to stand: Supervision  cues for hand placement and safety   Sit to stand: Independent     Ambulation     2 forward/backward steps using  wheeled walker with Moderate assist of 1    significant   shortness of breath with abdomen distention 2 x 70 feet using  wheeled walker with Supervision   cues for safety, pacing, and pursed lip breathing        15 feet using  least restrictive device with Independent    Stair negotiation: ascended and descended   Not assessed     not assessed      10 steps, 1 rail, Supervision       ROM Within functional limits        Strength BUE:  refer to OT eval  RLE:  4-/5  LLE:  4-/5  Increase strength in affected mm groups by 1/3 grade   Balance Sitting EOB:  not assessed    Dynamic Standing:  poor   Sitting EOB: good   Dynamic Standing: fair+ wheeled walker    Sitting EOB:  good    Dynamic Standing: good with least restrictive device      Patient is Alert & Oriented x person, place, time, and situation and follows directions    Sensation:  Patient  denies numbness/tingling   Edema:  yes  abdomen  Endurance: poor      Vitals:  3 liters nasal cannula   Blood Pressure at rest  Blood Pressure during session    Heart Rate at rest  Heart Rate during session   SPO2 at rest 95%  SPO2 during session 94-95%     Patient education  Patient educated on role of Physical Therapy, risks of immobility, safety and plan of care, energy conservation,  importance of mobility while in hospital , purse lip breathing, safety , and seated exercises      Patient response to education:   Pt verbalized understanding Pt demonstrated skill Pt requires further education in this area   Yes Partial Yes      Treatment:  Patient practiced and was instructed/facilitated in the following treatment: Patient seated at edge of the bed at start of tx session. Sat edge of bed 5 minutes with Supervision  to increase dynamic sitting balance and activity tolerance.  Pt stood, ambulated in the hallway, and back to the edge of the bed. Therapeutic Exercises:  not performed     At end of session, patient sitting edge of bed with  call light and phone within reach,  all lines and tubes intact, nursing notified. Patient would benefit from continued skilled Physical Therapy to improve functional independence and quality of life. Patient's/ family goals   home    Time in 13:30  Time out 13:40    Total Treatment Time  10 minutes    CPT codes:  Therapeutic activities (21600)   10 minutes  1 unit(s)    Ashlee Holland  [facility]  LIC # [id number]

## 2023-01-31 VITALS
DIASTOLIC BLOOD PRESSURE: 67 MMHG | WEIGHT: 212.52 LBS | BODY MASS INDEX: 30.43 KG/M2 | OXYGEN SATURATION: 94 % | HEIGHT: 70 IN | RESPIRATION RATE: 24 BRPM | TEMPERATURE: 97.4 F | SYSTOLIC BLOOD PRESSURE: 140 MMHG | HEART RATE: 98 BPM

## 2023-01-31 LAB
METER GLUCOSE: 169 MG/DL (ref 74–99)
METER GLUCOSE: 229 MG/DL (ref 74–99)

## 2023-01-31 PROCEDURE — 94660 CPAP INITIATION&MGMT: CPT

## 2023-01-31 PROCEDURE — 2580000003 HC RX 258: Performed by: INTERNAL MEDICINE

## 2023-01-31 PROCEDURE — 94669 MECHANICAL CHEST WALL OSCILL: CPT

## 2023-01-31 PROCEDURE — C9113 INJ PANTOPRAZOLE SODIUM, VIA: HCPCS | Performed by: INTERNAL MEDICINE

## 2023-01-31 PROCEDURE — 2700000000 HC OXYGEN THERAPY PER DAY

## 2023-01-31 PROCEDURE — 6360000002 HC RX W HCPCS: Performed by: INTERNAL MEDICINE

## 2023-01-31 PROCEDURE — 99239 HOSP IP/OBS DSCHRG MGMT >30: CPT | Performed by: INTERNAL MEDICINE

## 2023-01-31 PROCEDURE — 82962 GLUCOSE BLOOD TEST: CPT

## 2023-01-31 PROCEDURE — 6370000000 HC RX 637 (ALT 250 FOR IP): Performed by: INTERNAL MEDICINE

## 2023-01-31 PROCEDURE — A4216 STERILE WATER/SALINE, 10 ML: HCPCS | Performed by: INTERNAL MEDICINE

## 2023-01-31 PROCEDURE — 94640 AIRWAY INHALATION TREATMENT: CPT

## 2023-01-31 RX ORDER — ATORVASTATIN CALCIUM 40 MG/1
40 TABLET, FILM COATED ORAL DAILY
Qty: 30 TABLET | Refills: 0 | Status: SHIPPED | OUTPATIENT
Start: 2023-02-01

## 2023-01-31 RX ORDER — PREDNISONE 20 MG/1
TABLET ORAL
Qty: 8 TABLET | Refills: 0 | Status: SHIPPED | OUTPATIENT
Start: 2023-01-31

## 2023-01-31 RX ORDER — LOSARTAN POTASSIUM 25 MG/1
25 TABLET ORAL EVERY EVENING
Qty: 30 TABLET | Refills: 0 | Status: SHIPPED | OUTPATIENT
Start: 2023-01-31

## 2023-01-31 RX ORDER — GABAPENTIN 300 MG/1
600 CAPSULE ORAL NIGHTLY
Qty: 60 CAPSULE | Refills: 1 | Status: SHIPPED | OUTPATIENT
Start: 2023-01-31 | End: 2023-04-01

## 2023-01-31 RX ADMIN — Medication 40 MG: at 08:18

## 2023-01-31 RX ADMIN — ARFORMOTEROL TARTRATE 15 MCG: 15 SOLUTION RESPIRATORY (INHALATION) at 06:03

## 2023-01-31 RX ADMIN — BUDESONIDE 500 MCG: 0.5 SUSPENSION RESPIRATORY (INHALATION) at 06:04

## 2023-01-31 RX ADMIN — METHYLPREDNISOLONE SODIUM SUCCINATE 20 MG: 40 INJECTION, POWDER, FOR SOLUTION INTRAMUSCULAR; INTRAVENOUS at 11:03

## 2023-01-31 RX ADMIN — INSULIN LISPRO 1 UNITS: 100 INJECTION, SOLUTION INTRAVENOUS; SUBCUTANEOUS at 11:07

## 2023-01-31 RX ADMIN — IPRATROPIUM BROMIDE AND ALBUTEROL SULFATE 3 ML: .5; 2.5 SOLUTION RESPIRATORY (INHALATION) at 09:10

## 2023-01-31 RX ADMIN — Medication 10 ML: at 08:19

## 2023-01-31 RX ADMIN — ATORVASTATIN CALCIUM 40 MG: 40 TABLET, FILM COATED ORAL at 08:19

## 2023-01-31 RX ADMIN — IPRATROPIUM BROMIDE AND ALBUTEROL SULFATE 3 ML: .5; 2.5 SOLUTION RESPIRATORY (INHALATION) at 06:04

## 2023-01-31 NOTE — DISCHARGE SUMMARY
[facility] Physician Discharge Summary       No follow-up provider specified. Activity level: Slowly increase as tolerated    Diet: ADULT DIET; Regular    Labs: None are pending at the discharge    Condition at discharge: Stable    Dispo: Return to home setting     Patient ID:  Jayda Demarco  [id number]  70 y.o.  1955    Admit date: 1/23/2023    Discharge date and time:  1/31/2023  11:21 AM    Admission Diagnoses: Principal Problem:    COPD exacerbation ([address]  Resolved Problems:    * No resolved hospital problems. *      Discharge Diagnoses: Principal Problem:    COPD exacerbation ([address]  Resolved Problems:    * No resolved hospital problems. *      Consults:  IP CONSULT TO PULMONOLOGY    Procedures: None significant except if described in hospital course. Hospital Course:   Patient was admitted with dyspnea. He was found to have copd. He was treated with Solu-medrol and levaquin. He completed 5 days of levaquin. Respiratory panel was negative. He is seen by Pulm and Zithromax was added. COPD exacerbation causing Acute respiratory failure with hypoxia and Hypercapnia: o2 has been weaned to 3 liters which is hi baseline. has been on daily azithro per transplant team in [facility]. Will continue upon discharge. Prednisone taper    Hyperglycemia due to steroid ssc here  No change to outpatient treatment regimen for the existing stable combordities including: Hypertension and Hyperlipidemia:  DVT prophylaxis: Lovenox while here  Full code.   Disposition: pt saw him on 1/30 and ok to go home with plan for pulmonary rehab    Discharge Exam:  Vitals:    01/30/23 0345 01/30/23 0625 01/30/23 2030 01/31/23 0630   BP: 128/66 (!) 130/59 (!) 153/89 (!) 140/67   Pulse: 95 88 96 98   Resp: 18 24 22 24   Temp: 97.6 °F (36.4 °C) 97.9 °F (36.6 °C) 98.2 °F (36.8 °C) 97.4 °F (36.3 °C)   TempSrc: Oral Oral Oral Oral   SpO2: 96% 96% 93% 94%   Weight:       Height:           No acute distress moist membranes   Normocephalic, without obvious abnormality, atraumatic, external ears without lesions,   Neck supple no cervical lymphadenopathy  Heart has a regular rate and rhythm no murmur  Lungs are clear to auscultation bilaterally with equal movements. Abdomen is soft nontender nondistended no rebound or guarding. No  significant peripheral edema good peripheral perfusion. No significant rashes or new skin lesions. No new focality on neuro exam which is overall grossly intact. Affect and mood seem to be appropriate     I/O last 3 completed shifts: In: 910 [P.O.:900; I.V.:10]  Out: 3350 [Urine:3350]  I/O this shift:  In: 600 [P.O.:600]  Out: 300 [Urine:300]      LABS:  Recent Labs     01/29/23  0619 01/30/23  0642    138   K 4.1 4.7    103   CO2 24 23   BUN 24* 24*   CREATININE 1.0 0.9   GLUCOSE 180* 175*   CALCIUM 8.3* 8.3*       Recent Labs     01/29/23  0619 01/30/23  0642   WBC 10.9 13.5*   RBC 4.77 4.93   HGB 14.9 15.1   HCT 43.1 45.2   MCV 90.4 91.7   MCH 31.2 30.6   MCHC 34.6* 33.4   RDW 12.2 12.3    226   MPV 9.4 9.4       No results for input(s): POCGLU in the last 72 hours. Imaging:  XR CHEST PORTABLE    Result Date: 1/23/2023  EXAMINATION: ONE XRAY VIEW OF THE CHEST 1/23/2023 2:39 pm COMPARISON: 08/26/2022 HISTORY: ORDERING SYSTEM PROVIDED HISTORY: SOB TECHNOLOGIST PROVIDED HISTORY: Reason for exam:->SOB FINDINGS: The lungs are without acute focal process. There is no effusion or pneumothorax. The cardiomediastinal silhouette is without acute process. The osseous structures are without acute process. No acute process.          Patient Instructions:   Current Discharge Medication List        CONTINUE these medications which have NOT CHANGED    Details   azithromycin (ZITHROMAX) 250 MG tablet 250 mg daily      losartan (COZAAR) 25 MG tablet Take 50 mg by mouth every evening      QUEtiapine (SEROQUEL XR) 200 MG extended release tablet Take 200 mg by mouth nightly      ipratropium-albuterol (DUONEB) 0.5-2.5 (3) MG/3ML SOLN nebulizer solution Inhale 1 vial into the lungs 3 times daily As needed      albuterol sulfate HFA (PROVENTIL;VENTOLIN;PROAIR) 108 (90 Base) MCG/ACT inhaler Inhale 2 puffs into the lungs every 6 hours as needed for Wheezing      OXYGEN Inhale 3 L into the lungs continuous      Budeson-Glycopyrrol-Formoterol (BREZTRI AEROSPHERE) 160-9-4.8 MCG/ACT AERO Inhale 2 puffs into the lungs in the morning and at bedtime      gabapentin (NEURONTIN) 600 MG tablet Take 600 mg by mouth 2 times daily.           STOP taking these medications       cloNIDine (CATAPRES) 0.1 MG tablet Comments:   Reason for Stopping:         simvastatin (ZOCOR) 80 MG tablet Comments:   Reason for Stopping:                 Note that more than 30 minutes was spent in preparing discharge papers, discussing discharge with patient, medication review, etc.    NOTE: This report was transcribed using voice recognition software. Every effort was made to ensure accuracy; however, inadvertent computerized transcription errors may be present.     Signed:  Electronically signed by YOLANDA BARBER MD on 1/31/2023 at 11:21 AM

## 2023-01-31 NOTE — PLAN OF CARE
Problem: Discharge Planning  Goal: Discharge to home or other facility with appropriate resources  1/30/2023 2252 by Valeria Marques RN  Outcome: Progressing     Problem: Safety - Adult  Goal: Free from fall injury  1/30/2023 2252 by Valeria Marques RN  Outcome: Progressing     Problem: Pain  Goal: Verbalizes/displays adequate comfort level or baseline comfort level  1/30/2023 2252 by Valeria Marques RN  Outcome: Progressing

## 2023-01-31 NOTE — CARE COORDINATION
1/31/2023 1420 JESSICA met with pt and fiance for transition of care needs at d/. Pt plans to return home as prior, he has O2 3 liters and a nebulizer through [employer]. Pt is at his baseline O2 3 liters. He follows with the [facility] for a lung transplant and has his final testing 2/20/2023 to be reviewed by the board to be on the official list.  Pt is requesting to have pulmonary rehab at d/ through [facility] as he had it prior in August of 2022. Per rep at [facility] [phone] [phone] they need a demographic sheet and an order faxed, but want the order from the Dr that would follow pt. Pt's pulmonologist is Dr Seble Orellana, CM spoke to clinical staff today at his office and they are faxing script for pulmonary rehab to [facility] [phone]. Pt is discharging today and fiance will provide transportation home. CM will follow.  Electronically signed by Sapna Fortune RN on 1/31/2023 at 2:27 PM

## 2023-02-01 NOTE — PROGRESS NOTES
CLINICAL PHARMACY NOTE: MEDS TO BEDS    Total # of Prescriptions Filled: 4   The following medications were delivered to the patient:  Losartan 25 mg  Prednisone 20 mg  Gabapentin 300 mg  Atorvastatin 40 mg    Additional Documentation:

## 2023-04-30 ENCOUNTER — HOSPITAL ENCOUNTER (INPATIENT)
Age: 68
LOS: 9 days | Discharge: HOME | DRG: 190 | End: 2023-05-09
Attending: EMERGENCY MEDICINE | Admitting: FAMILY MEDICINE
Payer: MEDICARE

## 2023-04-30 ENCOUNTER — APPOINTMENT (OUTPATIENT)
Dept: GENERAL RADIOLOGY | Age: 68
DRG: 190 | End: 2023-04-30
Payer: MEDICARE

## 2023-04-30 DIAGNOSIS — J18.9 COMMUNITY ACQUIRED PNEUMONIA OF LEFT LOWER LOBE OF LUNG: ICD-10-CM

## 2023-04-30 DIAGNOSIS — J44.1 COPD EXACERBATION (HCC): ICD-10-CM

## 2023-04-30 DIAGNOSIS — J96.01 ACUTE RESPIRATORY FAILURE WITH HYPOXIA (HCC): Primary | ICD-10-CM

## 2023-04-30 PROBLEM — J96.21 ACUTE ON CHRONIC RESPIRATORY FAILURE WITH HYPOXIA (HCC): Status: ACTIVE | Noted: 2023-04-30

## 2023-04-30 PROBLEM — J96.22 ACUTE ON CHRONIC RESPIRATORY FAILURE WITH HYPOXIA AND HYPERCAPNIA (HCC): Status: RESOLVED | Noted: 2021-09-08 | Resolved: 2023-04-30

## 2023-04-30 PROBLEM — J96.21 ACUTE ON CHRONIC RESPIRATORY FAILURE WITH HYPOXIA AND HYPERCAPNIA (HCC): Status: RESOLVED | Noted: 2021-09-08 | Resolved: 2023-04-30

## 2023-04-30 LAB
ANION GAP SERPL CALCULATED.3IONS-SCNC: 12 MMOL/L (ref 7–16)
B.E.: 0 MMOL/L (ref -3–3)
BASOPHILS # BLD: 0.04 E9/L (ref 0–0.2)
BASOPHILS NFR BLD: 0.6 % (ref 0–2)
BNP BLD-MCNC: 104 PG/ML (ref 0–125)
BUN SERPL-MCNC: 15 MG/DL (ref 6–23)
CALCIUM SERPL-MCNC: 8.7 MG/DL (ref 8.6–10.2)
CHLORIDE SERPL-SCNC: 103 MMOL/L (ref 98–107)
CO2 SERPL-SCNC: 24 MMOL/L (ref 22–29)
CREAT SERPL-MCNC: 1 MG/DL (ref 0.7–1.2)
CRITICAL NOTIFICATION: YES
DELIVERY SYSTEMS: ABNORMAL
DEVICE: ABNORMAL
EOSINOPHIL # BLD: 0.17 E9/L (ref 0.05–0.5)
EOSINOPHIL NFR BLD: 2.7 % (ref 0–6)
ERYTHROCYTE [DISTWIDTH] IN BLOOD BY AUTOMATED COUNT: 13.8 FL (ref 11.5–15)
FIO2: 30
GLUCOSE SERPL-MCNC: 189 MG/DL (ref 74–99)
HCO3: 25.4 MMOL/L (ref 22–26)
HCT VFR BLD AUTO: 48.3 % (ref 37–54)
HGB BLD-MCNC: 16.3 G/DL (ref 12.5–16.5)
IMM GRANULOCYTES # BLD: 0.04 E9/L
IMM GRANULOCYTES NFR BLD: 0.6 % (ref 0–5)
INFLUENZA A BY PCR: NOT DETECTED
INFLUENZA B BY PCR: NOT DETECTED
LYMPHOCYTES # BLD: 2.02 E9/L (ref 1.5–4)
LYMPHOCYTES NFR BLD: 31.7 % (ref 20–42)
MCH RBC QN AUTO: 32.1 PG (ref 26–35)
MCHC RBC AUTO-ENTMCNC: 33.7 % (ref 32–34.5)
MCV RBC AUTO: 95.3 FL (ref 80–99.9)
MONOCYTES # BLD: 0.94 E9/L (ref 0.1–0.95)
MONOCYTES NFR BLD: 14.7 % (ref 2–12)
NEUTROPHILS # BLD: 3.17 E9/L (ref 1.8–7.3)
NEUTS SEG NFR BLD: 49.7 % (ref 43–80)
O2 SATURATION: 75.3 % (ref 92–98.5)
OPERATOR ID: 30
PCO2 37: 42.7 MMHG (ref 35–45)
PH 37: 7.38 (ref 7.35–7.45)
PLATELET # BLD AUTO: 205 E9/L (ref 130–450)
PMV BLD AUTO: 9.3 FL (ref 7–12)
PO2 37: 41.1 MMHG (ref 60–80)
POC SOURCE: ABNORMAL
POSITIVE END EXP PRESS: 6 CMH2O
POTASSIUM SERPL-SCNC: 4.7 MMOL/L (ref 3.5–5)
PRESSURE SUPPORT: 6 CMH2O
RBC # BLD AUTO: 5.07 E12/L (ref 3.8–5.8)
SARS-COV-2 RDRP RESP QL NAA+PROBE: NOT DETECTED
SODIUM SERPL-SCNC: 139 MMOL/L (ref 132–146)
TROPONIN, HIGH SENSITIVITY: 15 NG/L (ref 0–11)
WBC # BLD: 6.4 E9/L (ref 4.5–11.5)

## 2023-04-30 PROCEDURE — 94640 AIRWAY INHALATION TREATMENT: CPT

## 2023-04-30 PROCEDURE — 6370000000 HC RX 637 (ALT 250 FOR IP): Performed by: EMERGENCY MEDICINE

## 2023-04-30 PROCEDURE — 6360000002 HC RX W HCPCS: Performed by: FAMILY MEDICINE

## 2023-04-30 PROCEDURE — 94660 CPAP INITIATION&MGMT: CPT

## 2023-04-30 PROCEDURE — 85025 COMPLETE CBC W/AUTO DIFF WBC: CPT

## 2023-04-30 PROCEDURE — 2580000003 HC RX 258: Performed by: EMERGENCY MEDICINE

## 2023-04-30 PROCEDURE — 2060000000 HC ICU INTERMEDIATE R&B

## 2023-04-30 PROCEDURE — 36415 COLL VENOUS BLD VENIPUNCTURE: CPT

## 2023-04-30 PROCEDURE — 87502 INFLUENZA DNA AMP PROBE: CPT

## 2023-04-30 PROCEDURE — 99285 EMERGENCY DEPT VISIT HI MDM: CPT

## 2023-04-30 PROCEDURE — 93005 ELECTROCARDIOGRAM TRACING: CPT | Performed by: EMERGENCY MEDICINE

## 2023-04-30 PROCEDURE — 87040 BLOOD CULTURE FOR BACTERIA: CPT

## 2023-04-30 PROCEDURE — 96374 THER/PROPH/DIAG INJ IV PUSH: CPT

## 2023-04-30 PROCEDURE — 2700000000 HC OXYGEN THERAPY PER DAY

## 2023-04-30 PROCEDURE — 5A09357 ASSISTANCE WITH RESPIRATORY VENTILATION, LESS THAN 24 CONSECUTIVE HOURS, CONTINUOUS POSITIVE AIRWAY PRESSURE: ICD-10-PCS | Performed by: EMERGENCY MEDICINE

## 2023-04-30 PROCEDURE — 80048 BASIC METABOLIC PNL TOTAL CA: CPT

## 2023-04-30 PROCEDURE — 82803 BLOOD GASES ANY COMBINATION: CPT

## 2023-04-30 PROCEDURE — 6360000002 HC RX W HCPCS: Performed by: EMERGENCY MEDICINE

## 2023-04-30 PROCEDURE — 87449 NOS EACH ORGANISM AG IA: CPT

## 2023-04-30 PROCEDURE — 99223 1ST HOSP IP/OBS HIGH 75: CPT | Performed by: FAMILY MEDICINE

## 2023-04-30 PROCEDURE — 6370000000 HC RX 637 (ALT 250 FOR IP): Performed by: FAMILY MEDICINE

## 2023-04-30 PROCEDURE — 84484 ASSAY OF TROPONIN QUANT: CPT

## 2023-04-30 PROCEDURE — 71045 X-RAY EXAM CHEST 1 VIEW: CPT

## 2023-04-30 PROCEDURE — 87635 SARS-COV-2 COVID-19 AMP PRB: CPT

## 2023-04-30 PROCEDURE — 94664 DEMO&/EVAL PT USE INHALER: CPT

## 2023-04-30 PROCEDURE — 83880 ASSAY OF NATRIURETIC PEPTIDE: CPT

## 2023-04-30 PROCEDURE — 2500000003 HC RX 250 WO HCPCS: Performed by: EMERGENCY MEDICINE

## 2023-04-30 RX ORDER — GABAPENTIN 300 MG/1
600 CAPSULE ORAL 2 TIMES DAILY
Status: DISCONTINUED | OUTPATIENT
Start: 2023-04-30 | End: 2023-05-09 | Stop reason: HOSPADM

## 2023-04-30 RX ORDER — GUAIFENESIN/DEXTROMETHORPHAN 100-10MG/5
5 SYRUP ORAL EVERY 4 HOURS PRN
Status: DISCONTINUED | OUTPATIENT
Start: 2023-04-30 | End: 2023-05-09 | Stop reason: HOSPADM

## 2023-04-30 RX ORDER — GABAPENTIN 600 MG/1
600 TABLET ORAL 2 TIMES DAILY
COMMUNITY

## 2023-04-30 RX ORDER — ARFORMOTEROL TARTRATE 15 UG/2ML
15 SOLUTION RESPIRATORY (INHALATION) 2 TIMES DAILY
Status: DISCONTINUED | OUTPATIENT
Start: 2023-04-30 | End: 2023-05-06

## 2023-04-30 RX ORDER — SODIUM CHLORIDE 0.9 % (FLUSH) 0.9 %
5-40 SYRINGE (ML) INJECTION PRN
Status: DISCONTINUED | OUTPATIENT
Start: 2023-04-30 | End: 2023-05-09 | Stop reason: HOSPADM

## 2023-04-30 RX ORDER — QUETIAPINE 200 MG/1
200 TABLET, FILM COATED, EXTENDED RELEASE ORAL NIGHTLY
Status: DISCONTINUED | OUTPATIENT
Start: 2023-04-30 | End: 2023-05-09 | Stop reason: HOSPADM

## 2023-04-30 RX ORDER — CARVEDILOL 3.12 MG/1
3.12 TABLET ORAL 2 TIMES DAILY WITH MEALS
COMMUNITY

## 2023-04-30 RX ORDER — CARVEDILOL 3.12 MG/1
3.12 TABLET ORAL 2 TIMES DAILY WITH MEALS
Status: DISCONTINUED | OUTPATIENT
Start: 2023-04-30 | End: 2023-05-09 | Stop reason: HOSPADM

## 2023-04-30 RX ORDER — IPRATROPIUM BROMIDE AND ALBUTEROL SULFATE 2.5; .5 MG/3ML; MG/3ML
3 SOLUTION RESPIRATORY (INHALATION) ONCE
Status: COMPLETED | OUTPATIENT
Start: 2023-04-30 | End: 2023-04-30

## 2023-04-30 RX ORDER — DOXYCYCLINE HYCLATE 100 MG/1
100 CAPSULE ORAL EVERY 12 HOURS SCHEDULED
Status: COMPLETED | OUTPATIENT
Start: 2023-05-01 | End: 2023-05-05

## 2023-04-30 RX ORDER — IPRATROPIUM BROMIDE AND ALBUTEROL SULFATE 2.5; .5 MG/3ML; MG/3ML
1 SOLUTION RESPIRATORY (INHALATION)
Status: DISCONTINUED | OUTPATIENT
Start: 2023-05-01 | End: 2023-05-01

## 2023-04-30 RX ORDER — SODIUM CHLORIDE 0.9 % (FLUSH) 0.9 %
5-40 SYRINGE (ML) INJECTION EVERY 12 HOURS SCHEDULED
Status: DISCONTINUED | OUTPATIENT
Start: 2023-04-30 | End: 2023-05-09 | Stop reason: HOSPADM

## 2023-04-30 RX ORDER — ACETAMINOPHEN 325 MG/1
650 TABLET ORAL EVERY 6 HOURS PRN
Status: DISCONTINUED | OUTPATIENT
Start: 2023-04-30 | End: 2023-05-09 | Stop reason: HOSPADM

## 2023-04-30 RX ORDER — ENOXAPARIN SODIUM 100 MG/ML
40 INJECTION SUBCUTANEOUS DAILY
Status: DISCONTINUED | OUTPATIENT
Start: 2023-04-30 | End: 2023-05-09 | Stop reason: HOSPADM

## 2023-04-30 RX ORDER — BUDESONIDE 0.5 MG/2ML
0.5 INHALANT ORAL 2 TIMES DAILY
Status: DISCONTINUED | OUTPATIENT
Start: 2023-04-30 | End: 2023-05-09 | Stop reason: HOSPADM

## 2023-04-30 RX ORDER — ATORVASTATIN CALCIUM 40 MG/1
40 TABLET, FILM COATED ORAL NIGHTLY
Status: DISCONTINUED | OUTPATIENT
Start: 2023-05-01 | End: 2023-05-09 | Stop reason: HOSPADM

## 2023-04-30 RX ORDER — SODIUM CHLORIDE 9 MG/ML
INJECTION, SOLUTION INTRAVENOUS PRN
Status: DISCONTINUED | OUTPATIENT
Start: 2023-04-30 | End: 2023-05-09 | Stop reason: HOSPADM

## 2023-04-30 RX ORDER — METHYLPREDNISOLONE SODIUM SUCCINATE 125 MG/2ML
60 INJECTION, POWDER, LYOPHILIZED, FOR SOLUTION INTRAMUSCULAR; INTRAVENOUS ONCE
Status: COMPLETED | OUTPATIENT
Start: 2023-04-30 | End: 2023-04-30

## 2023-04-30 RX ORDER — ACETAMINOPHEN 650 MG/1
650 SUPPOSITORY RECTAL EVERY 6 HOURS PRN
Status: DISCONTINUED | OUTPATIENT
Start: 2023-04-30 | End: 2023-05-09 | Stop reason: HOSPADM

## 2023-04-30 RX ADMIN — ENOXAPARIN SODIUM 40 MG: 100 INJECTION SUBCUTANEOUS at 21:06

## 2023-04-30 RX ADMIN — DOXYCYCLINE 100 MG: 100 INJECTION, POWDER, LYOPHILIZED, FOR SOLUTION INTRAVENOUS at 20:33

## 2023-04-30 RX ADMIN — QUETIAPINE FUMARATE 200 MG: 200 TABLET, EXTENDED RELEASE ORAL at 22:56

## 2023-04-30 RX ADMIN — BUDESONIDE INHALATION 500 MCG: 0.5 SUSPENSION RESPIRATORY (INHALATION) at 22:25

## 2023-04-30 RX ADMIN — CARVEDILOL 3.12 MG: 3.12 TABLET, FILM COATED ORAL at 22:56

## 2023-04-30 RX ADMIN — WATER 1000 MG: 1 INJECTION INTRAMUSCULAR; INTRAVENOUS; SUBCUTANEOUS at 20:24

## 2023-04-30 RX ADMIN — IPRATROPIUM BROMIDE AND ALBUTEROL SULFATE 3 AMPULE: .5; 3 SOLUTION RESPIRATORY (INHALATION) at 17:37

## 2023-04-30 RX ADMIN — GABAPENTIN 600 MG: 300 CAPSULE ORAL at 22:56

## 2023-04-30 RX ADMIN — IPRATROPIUM BROMIDE AND ALBUTEROL SULFATE 3 AMPULE: .5; 2.5 SOLUTION RESPIRATORY (INHALATION) at 20:07

## 2023-04-30 RX ADMIN — ARFORMOTEROL TARTRATE 15 MCG: 15 SOLUTION RESPIRATORY (INHALATION) at 22:25

## 2023-04-30 RX ADMIN — METHYLPREDNISOLONE SODIUM SUCCINATE 60 MG: 125 INJECTION, POWDER, FOR SOLUTION INTRAMUSCULAR; INTRAVENOUS at 17:49

## 2023-04-30 ASSESSMENT — PAIN - FUNCTIONAL ASSESSMENT: PAIN_FUNCTIONAL_ASSESSMENT: NONE - DENIES PAIN

## 2023-05-01 PROBLEM — J96.01 ACUTE RESPIRATORY FAILURE WITH HYPOXIA (HCC): Status: ACTIVE | Noted: 2023-05-01

## 2023-05-01 LAB
ANION GAP SERPL CALCULATED.3IONS-SCNC: 9 MMOL/L (ref 7–16)
BASOPHILS # BLD: 0.01 E9/L (ref 0–0.2)
BASOPHILS NFR BLD: 0.1 % (ref 0–2)
BUN SERPL-MCNC: 14 MG/DL (ref 6–23)
CALCIUM SERPL-MCNC: 8.7 MG/DL (ref 8.6–10.2)
CHLORIDE SERPL-SCNC: 105 MMOL/L (ref 98–107)
CO2 SERPL-SCNC: 23 MMOL/L (ref 22–29)
CREAT SERPL-MCNC: 0.9 MG/DL (ref 0.7–1.2)
EOSINOPHIL # BLD: 0 E9/L (ref 0.05–0.5)
EOSINOPHIL NFR BLD: 0 % (ref 0–6)
ERYTHROCYTE [DISTWIDTH] IN BLOOD BY AUTOMATED COUNT: 13.4 FL (ref 11.5–15)
GLUCOSE SERPL-MCNC: 176 MG/DL (ref 74–99)
HCT VFR BLD AUTO: 42.5 % (ref 37–54)
HGB BLD-MCNC: 14.1 G/DL (ref 12.5–16.5)
IMM GRANULOCYTES # BLD: 0.05 E9/L
IMM GRANULOCYTES NFR BLD: 0.7 % (ref 0–5)
LEGIONELLA AG UR QL: NORMAL
LYMPHOCYTES # BLD: 0.72 E9/L (ref 1.5–4)
LYMPHOCYTES NFR BLD: 9.8 % (ref 20–42)
MCH RBC QN AUTO: 31.3 PG (ref 26–35)
MCHC RBC AUTO-ENTMCNC: 33.2 % (ref 32–34.5)
MCV RBC AUTO: 94.2 FL (ref 80–99.9)
MONOCYTES # BLD: 0.27 E9/L (ref 0.1–0.95)
MONOCYTES NFR BLD: 3.7 % (ref 2–12)
NEUTROPHILS # BLD: 6.31 E9/L (ref 1.8–7.3)
NEUTS SEG NFR BLD: 85.7 % (ref 43–80)
PLATELET # BLD AUTO: 186 E9/L (ref 130–450)
PMV BLD AUTO: 9.5 FL (ref 7–12)
POTASSIUM SERPL-SCNC: 4.7 MMOL/L (ref 3.5–5)
RBC # BLD AUTO: 4.51 E12/L (ref 3.8–5.8)
S PNEUM AG SPEC QL: NORMAL
SODIUM SERPL-SCNC: 137 MMOL/L (ref 132–146)
WBC # BLD: 7.4 E9/L (ref 4.5–11.5)

## 2023-05-01 PROCEDURE — 80048 BASIC METABOLIC PNL TOTAL CA: CPT

## 2023-05-01 PROCEDURE — 6370000000 HC RX 637 (ALT 250 FOR IP): Performed by: INTERNAL MEDICINE

## 2023-05-01 PROCEDURE — 6360000002 HC RX W HCPCS: Performed by: FAMILY MEDICINE

## 2023-05-01 PROCEDURE — 94660 CPAP INITIATION&MGMT: CPT

## 2023-05-01 PROCEDURE — 97535 SELF CARE MNGMENT TRAINING: CPT

## 2023-05-01 PROCEDURE — 6360000002 HC RX W HCPCS: Performed by: INTERNAL MEDICINE

## 2023-05-01 PROCEDURE — 2060000000 HC ICU INTERMEDIATE R&B

## 2023-05-01 PROCEDURE — 2700000000 HC OXYGEN THERAPY PER DAY

## 2023-05-01 PROCEDURE — 6370000000 HC RX 637 (ALT 250 FOR IP): Performed by: FAMILY MEDICINE

## 2023-05-01 PROCEDURE — 36415 COLL VENOUS BLD VENIPUNCTURE: CPT

## 2023-05-01 PROCEDURE — 2580000003 HC RX 258: Performed by: FAMILY MEDICINE

## 2023-05-01 PROCEDURE — 97530 THERAPEUTIC ACTIVITIES: CPT | Performed by: PHYSICAL THERAPIST

## 2023-05-01 PROCEDURE — 94640 AIRWAY INHALATION TREATMENT: CPT

## 2023-05-01 PROCEDURE — 86738 MYCOPLASMA ANTIBODY: CPT

## 2023-05-01 PROCEDURE — 99222 1ST HOSP IP/OBS MODERATE 55: CPT | Performed by: INTERNAL MEDICINE

## 2023-05-01 PROCEDURE — 97165 OT EVAL LOW COMPLEX 30 MIN: CPT

## 2023-05-01 PROCEDURE — 99233 SBSQ HOSP IP/OBS HIGH 50: CPT | Performed by: INTERNAL MEDICINE

## 2023-05-01 PROCEDURE — 85025 COMPLETE CBC W/AUTO DIFF WBC: CPT

## 2023-05-01 PROCEDURE — 97161 PT EVAL LOW COMPLEX 20 MIN: CPT | Performed by: PHYSICAL THERAPIST

## 2023-05-01 RX ORDER — METHYLPREDNISOLONE SODIUM SUCCINATE 40 MG/ML
30 INJECTION, POWDER, LYOPHILIZED, FOR SOLUTION INTRAMUSCULAR; INTRAVENOUS EVERY 12 HOURS
Status: DISCONTINUED | OUTPATIENT
Start: 2023-05-01 | End: 2023-05-03

## 2023-05-01 RX ORDER — METHYLPREDNISOLONE SODIUM SUCCINATE 40 MG/ML
40 INJECTION, POWDER, LYOPHILIZED, FOR SOLUTION INTRAMUSCULAR; INTRAVENOUS DAILY
Status: DISCONTINUED | OUTPATIENT
Start: 2023-05-01 | End: 2023-05-01

## 2023-05-01 RX ORDER — IPRATROPIUM BROMIDE AND ALBUTEROL SULFATE 2.5; .5 MG/3ML; MG/3ML
1 SOLUTION RESPIRATORY (INHALATION) EVERY 4 HOURS
Status: DISCONTINUED | OUTPATIENT
Start: 2023-05-02 | End: 2023-05-09 | Stop reason: HOSPADM

## 2023-05-01 RX ORDER — ROFLUMILAST 500 UG/1
500 TABLET ORAL DAILY
Status: DISCONTINUED | OUTPATIENT
Start: 2023-05-01 | End: 2023-05-06

## 2023-05-01 RX ADMIN — IPRATROPIUM BROMIDE AND ALBUTEROL SULFATE 1 AMPULE: .5; 2.5 SOLUTION RESPIRATORY (INHALATION) at 20:05

## 2023-05-01 RX ADMIN — IPRATROPIUM BROMIDE AND ALBUTEROL SULFATE 1 AMPULE: .5; 2.5 SOLUTION RESPIRATORY (INHALATION) at 10:35

## 2023-05-01 RX ADMIN — ROFLUMILAST 500 MCG: 500 TABLET ORAL at 18:31

## 2023-05-01 RX ADMIN — METHYLPREDNISOLONE SODIUM SUCCINATE 40 MG: 40 INJECTION, POWDER, FOR SOLUTION INTRAMUSCULAR; INTRAVENOUS at 09:24

## 2023-05-01 RX ADMIN — GABAPENTIN 600 MG: 300 CAPSULE ORAL at 21:11

## 2023-05-01 RX ADMIN — ATORVASTATIN CALCIUM 40 MG: 40 TABLET, FILM COATED ORAL at 21:11

## 2023-05-01 RX ADMIN — SODIUM CHLORIDE, PRESERVATIVE FREE 10 ML: 5 INJECTION INTRAVENOUS at 21:12

## 2023-05-01 RX ADMIN — CARVEDILOL 3.12 MG: 3.12 TABLET, FILM COATED ORAL at 09:22

## 2023-05-01 RX ADMIN — BUDESONIDE INHALATION 500 MCG: 0.5 SUSPENSION RESPIRATORY (INHALATION) at 06:35

## 2023-05-01 RX ADMIN — BUDESONIDE INHALATION 500 MCG: 0.5 SUSPENSION RESPIRATORY (INHALATION) at 20:03

## 2023-05-01 RX ADMIN — ENOXAPARIN SODIUM 40 MG: 100 INJECTION SUBCUTANEOUS at 21:11

## 2023-05-01 RX ADMIN — IPRATROPIUM BROMIDE AND ALBUTEROL SULFATE 1 AMPULE: .5; 2.5 SOLUTION RESPIRATORY (INHALATION) at 22:31

## 2023-05-01 RX ADMIN — ARFORMOTEROL TARTRATE 15 MCG: 15 SOLUTION RESPIRATORY (INHALATION) at 20:05

## 2023-05-01 RX ADMIN — DOXYCYCLINE HYCLATE 100 MG: 100 CAPSULE ORAL at 21:11

## 2023-05-01 RX ADMIN — DOXYCYCLINE HYCLATE 100 MG: 100 CAPSULE ORAL at 09:22

## 2023-05-01 RX ADMIN — SODIUM CHLORIDE, PRESERVATIVE FREE 10 ML: 5 INJECTION INTRAVENOUS at 09:24

## 2023-05-01 RX ADMIN — QUETIAPINE FUMARATE 200 MG: 200 TABLET, EXTENDED RELEASE ORAL at 21:11

## 2023-05-01 RX ADMIN — CARVEDILOL 3.12 MG: 3.12 TABLET, FILM COATED ORAL at 18:16

## 2023-05-01 RX ADMIN — ARFORMOTEROL TARTRATE 15 MCG: 15 SOLUTION RESPIRATORY (INHALATION) at 06:35

## 2023-05-01 RX ADMIN — GABAPENTIN 600 MG: 300 CAPSULE ORAL at 09:22

## 2023-05-01 RX ADMIN — IPRATROPIUM BROMIDE AND ALBUTEROL SULFATE 1 AMPULE: .5; 2.5 SOLUTION RESPIRATORY (INHALATION) at 06:35

## 2023-05-01 RX ADMIN — WATER 1000 MG: 1 INJECTION INTRAMUSCULAR; INTRAVENOUS; SUBCUTANEOUS at 21:11

## 2023-05-01 RX ADMIN — METHYLPREDNISOLONE SODIUM SUCCINATE 30 MG: 40 INJECTION, POWDER, FOR SOLUTION INTRAMUSCULAR; INTRAVENOUS at 21:11

## 2023-05-02 LAB
EKG ATRIAL RATE: 100 BPM
EKG P AXIS: 76 DEGREES
EKG P-R INTERVAL: 182 MS
EKG Q-T INTERVAL: 364 MS
EKG QRS DURATION: 102 MS
EKG QTC CALCULATION (BAZETT): 469 MS
EKG R AXIS: 59 DEGREES
EKG T AXIS: 54 DEGREES
EKG VENTRICULAR RATE: 100 BPM

## 2023-05-02 PROCEDURE — 2700000000 HC OXYGEN THERAPY PER DAY

## 2023-05-02 PROCEDURE — 2580000003 HC RX 258: Performed by: FAMILY MEDICINE

## 2023-05-02 PROCEDURE — 6370000000 HC RX 637 (ALT 250 FOR IP): Performed by: INTERNAL MEDICINE

## 2023-05-02 PROCEDURE — 94660 CPAP INITIATION&MGMT: CPT

## 2023-05-02 PROCEDURE — 99232 SBSQ HOSP IP/OBS MODERATE 35: CPT | Performed by: INTERNAL MEDICINE

## 2023-05-02 PROCEDURE — 2060000000 HC ICU INTERMEDIATE R&B

## 2023-05-02 PROCEDURE — 6360000002 HC RX W HCPCS: Performed by: INTERNAL MEDICINE

## 2023-05-02 PROCEDURE — 6370000000 HC RX 637 (ALT 250 FOR IP): Performed by: FAMILY MEDICINE

## 2023-05-02 PROCEDURE — 94640 AIRWAY INHALATION TREATMENT: CPT

## 2023-05-02 PROCEDURE — 97530 THERAPEUTIC ACTIVITIES: CPT | Performed by: PHYSICAL THERAPIST

## 2023-05-02 PROCEDURE — 97110 THERAPEUTIC EXERCISES: CPT | Performed by: PHYSICAL THERAPIST

## 2023-05-02 PROCEDURE — 6360000002 HC RX W HCPCS: Performed by: FAMILY MEDICINE

## 2023-05-02 PROCEDURE — 97535 SELF CARE MNGMENT TRAINING: CPT

## 2023-05-02 PROCEDURE — 93010 ELECTROCARDIOGRAM REPORT: CPT | Performed by: INTERNAL MEDICINE

## 2023-05-02 PROCEDURE — 97530 THERAPEUTIC ACTIVITIES: CPT

## 2023-05-02 RX ADMIN — IPRATROPIUM BROMIDE AND ALBUTEROL SULFATE 1 AMPULE: .5; 2.5 SOLUTION RESPIRATORY (INHALATION) at 22:36

## 2023-05-02 RX ADMIN — IPRATROPIUM BROMIDE AND ALBUTEROL SULFATE 1 AMPULE: .5; 2.5 SOLUTION RESPIRATORY (INHALATION) at 02:04

## 2023-05-02 RX ADMIN — ARFORMOTEROL TARTRATE 15 MCG: 15 SOLUTION RESPIRATORY (INHALATION) at 18:39

## 2023-05-02 RX ADMIN — WATER 1000 MG: 1 INJECTION INTRAMUSCULAR; INTRAVENOUS; SUBCUTANEOUS at 20:37

## 2023-05-02 RX ADMIN — CARVEDILOL 3.12 MG: 3.12 TABLET, FILM COATED ORAL at 15:39

## 2023-05-02 RX ADMIN — ROFLUMILAST 500 MCG: 500 TABLET ORAL at 08:06

## 2023-05-02 RX ADMIN — CARVEDILOL 3.12 MG: 3.12 TABLET, FILM COATED ORAL at 08:05

## 2023-05-02 RX ADMIN — BUDESONIDE INHALATION 500 MCG: 0.5 SUSPENSION RESPIRATORY (INHALATION) at 06:32

## 2023-05-02 RX ADMIN — SODIUM CHLORIDE, PRESERVATIVE FREE 10 ML: 5 INJECTION INTRAVENOUS at 08:06

## 2023-05-02 RX ADMIN — DOXYCYCLINE HYCLATE 100 MG: 100 CAPSULE ORAL at 08:05

## 2023-05-02 RX ADMIN — IPRATROPIUM BROMIDE AND ALBUTEROL SULFATE 1 AMPULE: .5; 2.5 SOLUTION RESPIRATORY (INHALATION) at 06:32

## 2023-05-02 RX ADMIN — METHYLPREDNISOLONE SODIUM SUCCINATE 30 MG: 40 INJECTION, POWDER, FOR SOLUTION INTRAMUSCULAR; INTRAVENOUS at 08:05

## 2023-05-02 RX ADMIN — ENOXAPARIN SODIUM 40 MG: 100 INJECTION SUBCUTANEOUS at 20:37

## 2023-05-02 RX ADMIN — BUDESONIDE INHALATION 500 MCG: 0.5 SUSPENSION RESPIRATORY (INHALATION) at 18:39

## 2023-05-02 RX ADMIN — METHYLPREDNISOLONE SODIUM SUCCINATE 30 MG: 40 INJECTION, POWDER, FOR SOLUTION INTRAMUSCULAR; INTRAVENOUS at 20:37

## 2023-05-02 RX ADMIN — QUETIAPINE FUMARATE 200 MG: 200 TABLET, EXTENDED RELEASE ORAL at 21:41

## 2023-05-02 RX ADMIN — ATORVASTATIN CALCIUM 40 MG: 40 TABLET, FILM COATED ORAL at 20:37

## 2023-05-02 RX ADMIN — IPRATROPIUM BROMIDE AND ALBUTEROL SULFATE 1 AMPULE: .5; 2.5 SOLUTION RESPIRATORY (INHALATION) at 10:05

## 2023-05-02 RX ADMIN — IPRATROPIUM BROMIDE AND ALBUTEROL SULFATE 1 AMPULE: .5; 2.5 SOLUTION RESPIRATORY (INHALATION) at 18:39

## 2023-05-02 RX ADMIN — DOXYCYCLINE HYCLATE 100 MG: 100 CAPSULE ORAL at 20:37

## 2023-05-02 RX ADMIN — GABAPENTIN 600 MG: 300 CAPSULE ORAL at 08:01

## 2023-05-02 RX ADMIN — GABAPENTIN 600 MG: 300 CAPSULE ORAL at 20:37

## 2023-05-02 RX ADMIN — ARFORMOTEROL TARTRATE 15 MCG: 15 SOLUTION RESPIRATORY (INHALATION) at 06:32

## 2023-05-03 LAB
B.E.: -0.7 MMOL/L (ref -3–3)
COHB: 0.3 % (ref 0–1.5)
CRITICAL: ABNORMAL
DATE ANALYZED: ABNORMAL
DATE OF COLLECTION: ABNORMAL
HCO3: 22.5 MMOL/L (ref 22–26)
HHB: 3.7 % (ref 0–5)
LAB: ABNORMAL
Lab: ABNORMAL
METHB: 0.4 % (ref 0–1.5)
MODE: ABNORMAL
O2 CONTENT: 19.4 ML/DL
O2 SATURATION: 96.3 % (ref 92–98.5)
O2HB: 95.6 % (ref 94–97)
OPERATOR ID: 914
PATIENT TEMP: 37 C
PCO2: 33.2 MMHG (ref 35–45)
PH BLOOD GAS: 7.45 (ref 7.35–7.45)
PO2: 82.9 MMHG (ref 75–100)
SOURCE, BLOOD GAS: ABNORMAL
THB: 14.4 G/DL (ref 11.5–16.5)
TIME ANALYZED: 525

## 2023-05-03 PROCEDURE — 82805 BLOOD GASES W/O2 SATURATION: CPT

## 2023-05-03 PROCEDURE — 2700000000 HC OXYGEN THERAPY PER DAY

## 2023-05-03 PROCEDURE — 94660 CPAP INITIATION&MGMT: CPT

## 2023-05-03 PROCEDURE — 6360000002 HC RX W HCPCS: Performed by: FAMILY MEDICINE

## 2023-05-03 PROCEDURE — 6370000000 HC RX 637 (ALT 250 FOR IP): Performed by: FAMILY MEDICINE

## 2023-05-03 PROCEDURE — 2580000003 HC RX 258: Performed by: FAMILY MEDICINE

## 2023-05-03 PROCEDURE — 6370000000 HC RX 637 (ALT 250 FOR IP): Performed by: INTERNAL MEDICINE

## 2023-05-03 PROCEDURE — 94640 AIRWAY INHALATION TREATMENT: CPT

## 2023-05-03 PROCEDURE — 6360000002 HC RX W HCPCS: Performed by: INTERNAL MEDICINE

## 2023-05-03 PROCEDURE — 2060000000 HC ICU INTERMEDIATE R&B

## 2023-05-03 PROCEDURE — 99232 SBSQ HOSP IP/OBS MODERATE 35: CPT | Performed by: INTERNAL MEDICINE

## 2023-05-03 PROCEDURE — 97530 THERAPEUTIC ACTIVITIES: CPT | Performed by: PHYSICAL THERAPIST

## 2023-05-03 RX ORDER — METHYLPREDNISOLONE SODIUM SUCCINATE 40 MG/ML
30 INJECTION, POWDER, LYOPHILIZED, FOR SOLUTION INTRAMUSCULAR; INTRAVENOUS EVERY 8 HOURS
Status: DISCONTINUED | OUTPATIENT
Start: 2023-05-03 | End: 2023-05-05

## 2023-05-03 RX ADMIN — METHYLPREDNISOLONE SODIUM SUCCINATE 30 MG: 40 INJECTION, POWDER, FOR SOLUTION INTRAMUSCULAR; INTRAVENOUS at 09:47

## 2023-05-03 RX ADMIN — CARVEDILOL 3.12 MG: 3.12 TABLET, FILM COATED ORAL at 16:48

## 2023-05-03 RX ADMIN — CARVEDILOL 3.12 MG: 3.12 TABLET, FILM COATED ORAL at 09:50

## 2023-05-03 RX ADMIN — METHYLPREDNISOLONE SODIUM SUCCINATE 30 MG: 40 INJECTION, POWDER, FOR SOLUTION INTRAMUSCULAR; INTRAVENOUS at 19:42

## 2023-05-03 RX ADMIN — IPRATROPIUM BROMIDE AND ALBUTEROL SULFATE 1 AMPULE: .5; 2.5 SOLUTION RESPIRATORY (INHALATION) at 21:41

## 2023-05-03 RX ADMIN — DOXYCYCLINE HYCLATE 100 MG: 100 CAPSULE ORAL at 09:50

## 2023-05-03 RX ADMIN — BUDESONIDE INHALATION 500 MCG: 0.5 SUSPENSION RESPIRATORY (INHALATION) at 18:10

## 2023-05-03 RX ADMIN — IPRATROPIUM BROMIDE AND ALBUTEROL SULFATE 1 AMPULE: .5; 2.5 SOLUTION RESPIRATORY (INHALATION) at 14:47

## 2023-05-03 RX ADMIN — QUETIAPINE FUMARATE 200 MG: 200 TABLET, EXTENDED RELEASE ORAL at 22:24

## 2023-05-03 RX ADMIN — IPRATROPIUM BROMIDE AND ALBUTEROL SULFATE 1 AMPULE: .5; 2.5 SOLUTION RESPIRATORY (INHALATION) at 07:06

## 2023-05-03 RX ADMIN — ARFORMOTEROL TARTRATE 15 MCG: 15 SOLUTION RESPIRATORY (INHALATION) at 18:11

## 2023-05-03 RX ADMIN — GABAPENTIN 600 MG: 300 CAPSULE ORAL at 09:50

## 2023-05-03 RX ADMIN — BUDESONIDE INHALATION 500 MCG: 0.5 SUSPENSION RESPIRATORY (INHALATION) at 07:06

## 2023-05-03 RX ADMIN — GABAPENTIN 600 MG: 300 CAPSULE ORAL at 21:29

## 2023-05-03 RX ADMIN — WATER 1000 MG: 1 INJECTION INTRAMUSCULAR; INTRAVENOUS; SUBCUTANEOUS at 21:29

## 2023-05-03 RX ADMIN — IPRATROPIUM BROMIDE AND ALBUTEROL SULFATE 1 AMPULE: .5; 2.5 SOLUTION RESPIRATORY (INHALATION) at 02:18

## 2023-05-03 RX ADMIN — ARFORMOTEROL TARTRATE 15 MCG: 15 SOLUTION RESPIRATORY (INHALATION) at 07:06

## 2023-05-03 RX ADMIN — DOXYCYCLINE HYCLATE 100 MG: 100 CAPSULE ORAL at 21:29

## 2023-05-03 RX ADMIN — SODIUM CHLORIDE, PRESERVATIVE FREE 10 ML: 5 INJECTION INTRAVENOUS at 22:25

## 2023-05-03 RX ADMIN — IPRATROPIUM BROMIDE AND ALBUTEROL SULFATE 1 AMPULE: .5; 2.5 SOLUTION RESPIRATORY (INHALATION) at 18:10

## 2023-05-03 RX ADMIN — ROFLUMILAST 500 MCG: 500 TABLET ORAL at 09:50

## 2023-05-03 RX ADMIN — ATORVASTATIN CALCIUM 40 MG: 40 TABLET, FILM COATED ORAL at 21:29

## 2023-05-03 RX ADMIN — SODIUM CHLORIDE, PRESERVATIVE FREE 10 ML: 5 INJECTION INTRAVENOUS at 09:46

## 2023-05-03 RX ADMIN — ENOXAPARIN SODIUM 40 MG: 100 INJECTION SUBCUTANEOUS at 19:42

## 2023-05-04 LAB
ALBUMIN SERPL-MCNC: 3.4 G/DL (ref 3.5–5.2)
ALP SERPL-CCNC: 84 U/L (ref 40–129)
ALT SERPL-CCNC: 18 U/L (ref 0–40)
ANION GAP SERPL CALCULATED.3IONS-SCNC: 13 MMOL/L (ref 7–16)
AST SERPL-CCNC: 12 U/L (ref 0–39)
BASOPHILS # BLD: 0.03 E9/L (ref 0–0.2)
BASOPHILS NFR BLD: 0.2 % (ref 0–2)
BILIRUB SERPL-MCNC: 0.3 MG/DL (ref 0–1.2)
BUN SERPL-MCNC: 21 MG/DL (ref 6–23)
CALCIUM SERPL-MCNC: 8.5 MG/DL (ref 8.6–10.2)
CHLORIDE SERPL-SCNC: 106 MMOL/L (ref 98–107)
CO2 SERPL-SCNC: 22 MMOL/L (ref 22–29)
CREAT SERPL-MCNC: 0.9 MG/DL (ref 0.7–1.2)
EOSINOPHIL # BLD: 0 E9/L (ref 0.05–0.5)
EOSINOPHIL NFR BLD: 0 % (ref 0–6)
ERYTHROCYTE [DISTWIDTH] IN BLOOD BY AUTOMATED COUNT: 13.7 FL (ref 11.5–15)
GLUCOSE SERPL-MCNC: 204 MG/DL (ref 74–99)
HBA1C MFR BLD: 6.6 % (ref 4–5.6)
HCT VFR BLD AUTO: 42.1 % (ref 37–54)
HGB BLD-MCNC: 13.8 G/DL (ref 12.5–16.5)
IMM GRANULOCYTES # BLD: 0.24 E9/L
IMM GRANULOCYTES NFR BLD: 1.6 % (ref 0–5)
LYMPHOCYTES # BLD: 1.47 E9/L (ref 1.5–4)
LYMPHOCYTES NFR BLD: 9.9 % (ref 20–42)
MCH RBC QN AUTO: 31.7 PG (ref 26–35)
MCHC RBC AUTO-ENTMCNC: 32.8 % (ref 32–34.5)
MCV RBC AUTO: 96.6 FL (ref 80–99.9)
MONOCYTES # BLD: 0.62 E9/L (ref 0.1–0.95)
MONOCYTES NFR BLD: 4.2 % (ref 2–12)
MYCOPLASMA PNEUMONIAE IGM: NORMAL
NEUTROPHILS # BLD: 12.55 E9/L (ref 1.8–7.3)
NEUTS SEG NFR BLD: 84.1 % (ref 43–80)
PLATELET # BLD AUTO: 226 E9/L (ref 130–450)
PMV BLD AUTO: 9.7 FL (ref 7–12)
POTASSIUM SERPL-SCNC: 4.6 MMOL/L (ref 3.5–5)
PROT SERPL-MCNC: 5.6 G/DL (ref 6.4–8.3)
RBC # BLD AUTO: 4.36 E12/L (ref 3.8–5.8)
SODIUM SERPL-SCNC: 141 MMOL/L (ref 132–146)
WBC # BLD: 14.9 E9/L (ref 4.5–11.5)

## 2023-05-04 PROCEDURE — 6370000000 HC RX 637 (ALT 250 FOR IP): Performed by: INTERNAL MEDICINE

## 2023-05-04 PROCEDURE — 97110 THERAPEUTIC EXERCISES: CPT

## 2023-05-04 PROCEDURE — 99232 SBSQ HOSP IP/OBS MODERATE 35: CPT | Performed by: INTERNAL MEDICINE

## 2023-05-04 PROCEDURE — 6360000002 HC RX W HCPCS: Performed by: INTERNAL MEDICINE

## 2023-05-04 PROCEDURE — 94660 CPAP INITIATION&MGMT: CPT

## 2023-05-04 PROCEDURE — 6360000002 HC RX W HCPCS: Performed by: FAMILY MEDICINE

## 2023-05-04 PROCEDURE — 6370000000 HC RX 637 (ALT 250 FOR IP): Performed by: FAMILY MEDICINE

## 2023-05-04 PROCEDURE — 80053 COMPREHEN METABOLIC PANEL: CPT

## 2023-05-04 PROCEDURE — 94640 AIRWAY INHALATION TREATMENT: CPT

## 2023-05-04 PROCEDURE — 97535 SELF CARE MNGMENT TRAINING: CPT

## 2023-05-04 PROCEDURE — 2700000000 HC OXYGEN THERAPY PER DAY

## 2023-05-04 PROCEDURE — 97530 THERAPEUTIC ACTIVITIES: CPT

## 2023-05-04 PROCEDURE — 2580000003 HC RX 258: Performed by: FAMILY MEDICINE

## 2023-05-04 PROCEDURE — 85025 COMPLETE CBC W/AUTO DIFF WBC: CPT

## 2023-05-04 PROCEDURE — 83036 HEMOGLOBIN GLYCOSYLATED A1C: CPT

## 2023-05-04 PROCEDURE — 36415 COLL VENOUS BLD VENIPUNCTURE: CPT

## 2023-05-04 PROCEDURE — 2060000000 HC ICU INTERMEDIATE R&B

## 2023-05-04 RX ADMIN — CARVEDILOL 3.12 MG: 3.12 TABLET, FILM COATED ORAL at 08:09

## 2023-05-04 RX ADMIN — SODIUM CHLORIDE, PRESERVATIVE FREE 10 ML: 5 INJECTION INTRAVENOUS at 10:46

## 2023-05-04 RX ADMIN — IPRATROPIUM BROMIDE AND ALBUTEROL SULFATE 1 AMPULE: .5; 2.5 SOLUTION RESPIRATORY (INHALATION) at 13:40

## 2023-05-04 RX ADMIN — GABAPENTIN 600 MG: 300 CAPSULE ORAL at 20:55

## 2023-05-04 RX ADMIN — ENOXAPARIN SODIUM 40 MG: 100 INJECTION SUBCUTANEOUS at 20:00

## 2023-05-04 RX ADMIN — QUETIAPINE FUMARATE 200 MG: 200 TABLET, EXTENDED RELEASE ORAL at 21:01

## 2023-05-04 RX ADMIN — GABAPENTIN 600 MG: 300 CAPSULE ORAL at 08:09

## 2023-05-04 RX ADMIN — ROFLUMILAST 500 MCG: 500 TABLET ORAL at 08:08

## 2023-05-04 RX ADMIN — SODIUM CHLORIDE, PRESERVATIVE FREE 10 ML: 5 INJECTION INTRAVENOUS at 20:56

## 2023-05-04 RX ADMIN — DOXYCYCLINE HYCLATE 100 MG: 100 CAPSULE ORAL at 08:09

## 2023-05-04 RX ADMIN — IPRATROPIUM BROMIDE AND ALBUTEROL SULFATE 1 AMPULE: .5; 2.5 SOLUTION RESPIRATORY (INHALATION) at 06:11

## 2023-05-04 RX ADMIN — IPRATROPIUM BROMIDE AND ALBUTEROL SULFATE 1 AMPULE: .5; 2.5 SOLUTION RESPIRATORY (INHALATION) at 09:20

## 2023-05-04 RX ADMIN — METHYLPREDNISOLONE SODIUM SUCCINATE 30 MG: 40 INJECTION, POWDER, FOR SOLUTION INTRAMUSCULAR; INTRAVENOUS at 03:00

## 2023-05-04 RX ADMIN — IPRATROPIUM BROMIDE AND ALBUTEROL SULFATE 1 AMPULE: .5; 2.5 SOLUTION RESPIRATORY (INHALATION) at 18:30

## 2023-05-04 RX ADMIN — WATER 1000 MG: 1 INJECTION INTRAMUSCULAR; INTRAVENOUS; SUBCUTANEOUS at 20:55

## 2023-05-04 RX ADMIN — BUDESONIDE INHALATION 500 MCG: 0.5 SUSPENSION RESPIRATORY (INHALATION) at 06:12

## 2023-05-04 RX ADMIN — CARVEDILOL 3.12 MG: 3.12 TABLET, FILM COATED ORAL at 16:32

## 2023-05-04 RX ADMIN — IPRATROPIUM BROMIDE AND ALBUTEROL SULFATE 1 AMPULE: .5; 2.5 SOLUTION RESPIRATORY (INHALATION) at 21:49

## 2023-05-04 RX ADMIN — ARFORMOTEROL TARTRATE 15 MCG: 15 SOLUTION RESPIRATORY (INHALATION) at 18:30

## 2023-05-04 RX ADMIN — ATORVASTATIN CALCIUM 40 MG: 40 TABLET, FILM COATED ORAL at 20:55

## 2023-05-04 RX ADMIN — DOXYCYCLINE HYCLATE 100 MG: 100 CAPSULE ORAL at 20:55

## 2023-05-04 RX ADMIN — BUDESONIDE INHALATION 500 MCG: 0.5 SUSPENSION RESPIRATORY (INHALATION) at 18:30

## 2023-05-04 RX ADMIN — ARFORMOTEROL TARTRATE 15 MCG: 15 SOLUTION RESPIRATORY (INHALATION) at 06:12

## 2023-05-04 RX ADMIN — METHYLPREDNISOLONE SODIUM SUCCINATE 30 MG: 40 INJECTION, POWDER, FOR SOLUTION INTRAMUSCULAR; INTRAVENOUS at 20:00

## 2023-05-04 RX ADMIN — METHYLPREDNISOLONE SODIUM SUCCINATE 30 MG: 40 INJECTION, POWDER, FOR SOLUTION INTRAMUSCULAR; INTRAVENOUS at 10:46

## 2023-05-05 ENCOUNTER — APPOINTMENT (OUTPATIENT)
Dept: CT IMAGING | Age: 68
DRG: 190 | End: 2023-05-05
Payer: MEDICARE

## 2023-05-05 LAB
HBA1C MFR BLD: 6.7 % (ref 4–5.6)
METER GLUCOSE: 204 MG/DL (ref 74–99)
METER GLUCOSE: 281 MG/DL (ref 74–99)
METER GLUCOSE: 362 MG/DL (ref 74–99)

## 2023-05-05 PROCEDURE — 94640 AIRWAY INHALATION TREATMENT: CPT

## 2023-05-05 PROCEDURE — 2580000003 HC RX 258: Performed by: FAMILY MEDICINE

## 2023-05-05 PROCEDURE — 94660 CPAP INITIATION&MGMT: CPT

## 2023-05-05 PROCEDURE — 6370000000 HC RX 637 (ALT 250 FOR IP): Performed by: INTERNAL MEDICINE

## 2023-05-05 PROCEDURE — 6360000002 HC RX W HCPCS: Performed by: INTERNAL MEDICINE

## 2023-05-05 PROCEDURE — 82962 GLUCOSE BLOOD TEST: CPT

## 2023-05-05 PROCEDURE — 6360000002 HC RX W HCPCS: Performed by: FAMILY MEDICINE

## 2023-05-05 PROCEDURE — 99232 SBSQ HOSP IP/OBS MODERATE 35: CPT | Performed by: INTERNAL MEDICINE

## 2023-05-05 PROCEDURE — 2060000000 HC ICU INTERMEDIATE R&B

## 2023-05-05 PROCEDURE — 6360000004 HC RX CONTRAST MEDICATION: Performed by: RADIOLOGY

## 2023-05-05 PROCEDURE — 6370000000 HC RX 637 (ALT 250 FOR IP): Performed by: FAMILY MEDICINE

## 2023-05-05 PROCEDURE — 36415 COLL VENOUS BLD VENIPUNCTURE: CPT

## 2023-05-05 PROCEDURE — 71260 CT THORAX DX C+: CPT

## 2023-05-05 PROCEDURE — 82785 ASSAY OF IGE: CPT

## 2023-05-05 PROCEDURE — 2700000000 HC OXYGEN THERAPY PER DAY

## 2023-05-05 PROCEDURE — 83036 HEMOGLOBIN GLYCOSYLATED A1C: CPT

## 2023-05-05 RX ORDER — METHYLPREDNISOLONE SODIUM SUCCINATE 40 MG/ML
20 INJECTION, POWDER, LYOPHILIZED, FOR SOLUTION INTRAMUSCULAR; INTRAVENOUS EVERY 12 HOURS
Status: DISCONTINUED | OUTPATIENT
Start: 2023-05-06 | End: 2023-05-08

## 2023-05-05 RX ORDER — INSULIN LISPRO 100 [IU]/ML
0-4 INJECTION, SOLUTION INTRAVENOUS; SUBCUTANEOUS NIGHTLY
Status: DISCONTINUED | OUTPATIENT
Start: 2023-05-05 | End: 2023-05-09 | Stop reason: HOSPADM

## 2023-05-05 RX ORDER — INSULIN LISPRO 100 [IU]/ML
0-4 INJECTION, SOLUTION INTRAVENOUS; SUBCUTANEOUS
Status: DISCONTINUED | OUTPATIENT
Start: 2023-05-05 | End: 2023-05-09 | Stop reason: HOSPADM

## 2023-05-05 RX ORDER — DEXTROSE MONOHYDRATE 100 MG/ML
INJECTION, SOLUTION INTRAVENOUS CONTINUOUS PRN
Status: DISCONTINUED | OUTPATIENT
Start: 2023-05-05 | End: 2023-05-09 | Stop reason: HOSPADM

## 2023-05-05 RX ADMIN — ATORVASTATIN CALCIUM 40 MG: 40 TABLET, FILM COATED ORAL at 20:26

## 2023-05-05 RX ADMIN — BUDESONIDE INHALATION 500 MCG: 0.5 SUSPENSION RESPIRATORY (INHALATION) at 06:36

## 2023-05-05 RX ADMIN — CARVEDILOL 3.12 MG: 3.12 TABLET, FILM COATED ORAL at 08:05

## 2023-05-05 RX ADMIN — METHYLPREDNISOLONE SODIUM SUCCINATE 30 MG: 40 INJECTION, POWDER, FOR SOLUTION INTRAMUSCULAR; INTRAVENOUS at 10:29

## 2023-05-05 RX ADMIN — CARVEDILOL 3.12 MG: 3.12 TABLET, FILM COATED ORAL at 16:19

## 2023-05-05 RX ADMIN — ROFLUMILAST 500 MCG: 500 TABLET ORAL at 08:04

## 2023-05-05 RX ADMIN — GABAPENTIN 600 MG: 300 CAPSULE ORAL at 08:05

## 2023-05-05 RX ADMIN — ENOXAPARIN SODIUM 40 MG: 100 INJECTION SUBCUTANEOUS at 20:26

## 2023-05-05 RX ADMIN — METHYLPREDNISOLONE SODIUM SUCCINATE 30 MG: 40 INJECTION, POWDER, FOR SOLUTION INTRAMUSCULAR; INTRAVENOUS at 03:17

## 2023-05-05 RX ADMIN — SODIUM CHLORIDE, PRESERVATIVE FREE 10 ML: 5 INJECTION INTRAVENOUS at 20:39

## 2023-05-05 RX ADMIN — IPRATROPIUM BROMIDE AND ALBUTEROL SULFATE 1 AMPULE: .5; 2.5 SOLUTION RESPIRATORY (INHALATION) at 10:01

## 2023-05-05 RX ADMIN — DOXYCYCLINE HYCLATE 100 MG: 100 CAPSULE ORAL at 20:26

## 2023-05-05 RX ADMIN — SODIUM CHLORIDE, PRESERVATIVE FREE 10 ML: 5 INJECTION INTRAVENOUS at 10:29

## 2023-05-05 RX ADMIN — IPRATROPIUM BROMIDE AND ALBUTEROL SULFATE 1 AMPULE: .5; 2.5 SOLUTION RESPIRATORY (INHALATION) at 17:39

## 2023-05-05 RX ADMIN — IPRATROPIUM BROMIDE AND ALBUTEROL SULFATE 1 AMPULE: .5; 2.5 SOLUTION RESPIRATORY (INHALATION) at 13:26

## 2023-05-05 RX ADMIN — QUETIAPINE FUMARATE 200 MG: 200 TABLET, EXTENDED RELEASE ORAL at 20:26

## 2023-05-05 RX ADMIN — IPRATROPIUM BROMIDE AND ALBUTEROL SULFATE 1 AMPULE: .5; 2.5 SOLUTION RESPIRATORY (INHALATION) at 06:36

## 2023-05-05 RX ADMIN — INSULIN LISPRO 1 UNITS: 100 INJECTION, SOLUTION INTRAVENOUS; SUBCUTANEOUS at 16:21

## 2023-05-05 RX ADMIN — BUDESONIDE INHALATION 500 MCG: 0.5 SUSPENSION RESPIRATORY (INHALATION) at 17:39

## 2023-05-05 RX ADMIN — DOXYCYCLINE HYCLATE 100 MG: 100 CAPSULE ORAL at 08:05

## 2023-05-05 RX ADMIN — GABAPENTIN 600 MG: 300 CAPSULE ORAL at 20:26

## 2023-05-05 RX ADMIN — IPRATROPIUM BROMIDE AND ALBUTEROL SULFATE 1 AMPULE: .5; 2.5 SOLUTION RESPIRATORY (INHALATION) at 21:48

## 2023-05-05 RX ADMIN — ARFORMOTEROL TARTRATE 15 MCG: 15 SOLUTION RESPIRATORY (INHALATION) at 17:39

## 2023-05-05 RX ADMIN — ARFORMOTEROL TARTRATE 15 MCG: 15 SOLUTION RESPIRATORY (INHALATION) at 06:36

## 2023-05-05 RX ADMIN — INSULIN LISPRO 4 UNITS: 100 INJECTION, SOLUTION INTRAVENOUS; SUBCUTANEOUS at 11:18

## 2023-05-05 RX ADMIN — WATER 1000 MG: 1 INJECTION INTRAMUSCULAR; INTRAVENOUS; SUBCUTANEOUS at 20:26

## 2023-05-05 RX ADMIN — IOPAMIDOL 80 ML: 755 INJECTION, SOLUTION INTRAVENOUS at 07:19

## 2023-05-06 LAB
ANION GAP SERPL CALCULATED.3IONS-SCNC: 11 MMOL/L (ref 7–16)
BACTERIA BLD CULT ORG #2: NORMAL
BACTERIA BLD CULT: NORMAL
BASOPHILS # BLD: 0 E9/L (ref 0–0.2)
BASOPHILS NFR BLD: 0.6 % (ref 0–2)
BUN SERPL-MCNC: 24 MG/DL (ref 6–23)
CALCIUM SERPL-MCNC: 8.4 MG/DL (ref 8.6–10.2)
CHLORIDE SERPL-SCNC: 105 MMOL/L (ref 98–107)
CO2 SERPL-SCNC: 23 MMOL/L (ref 22–29)
CREAT SERPL-MCNC: 0.9 MG/DL (ref 0.7–1.2)
EOSINOPHIL # BLD: 0 E9/L (ref 0.05–0.5)
EOSINOPHIL NFR BLD: 0.1 % (ref 0–6)
ERYTHROCYTE [DISTWIDTH] IN BLOOD BY AUTOMATED COUNT: 13.7 FL (ref 11.5–15)
GLUCOSE SERPL-MCNC: 210 MG/DL (ref 74–99)
HBA1C MFR BLD: 6.8 % (ref 4–5.6)
HCT VFR BLD AUTO: 42.3 % (ref 37–54)
HGB BLD-MCNC: 13.9 G/DL (ref 12.5–16.5)
LYMPHOCYTES # BLD: 0.58 E9/L (ref 1.5–4)
LYMPHOCYTES NFR BLD: 3.5 % (ref 20–42)
MCH RBC QN AUTO: 31.4 PG (ref 26–35)
MCHC RBC AUTO-ENTMCNC: 32.9 % (ref 32–34.5)
MCV RBC AUTO: 95.5 FL (ref 80–99.9)
METER GLUCOSE: 190 MG/DL (ref 74–99)
METER GLUCOSE: 233 MG/DL (ref 74–99)
METER GLUCOSE: 256 MG/DL (ref 74–99)
METER GLUCOSE: 257 MG/DL (ref 74–99)
MONOCYTES # BLD: 1.01 E9/L (ref 0.1–0.95)
MONOCYTES NFR BLD: 6.9 % (ref 2–12)
MYELOCYTES NFR BLD MANUAL: 0.9 % (ref 0–0)
NEUTROPHILS # BLD: 12.96 E9/L (ref 1.8–7.3)
NEUTS SEG NFR BLD: 88.7 % (ref 43–80)
PLATELET # BLD AUTO: 228 E9/L (ref 130–450)
PMV BLD AUTO: 9.6 FL (ref 7–12)
POTASSIUM SERPL-SCNC: 4.6 MMOL/L (ref 3.5–5)
RBC # BLD AUTO: 4.43 E12/L (ref 3.8–5.8)
SODIUM SERPL-SCNC: 139 MMOL/L (ref 132–146)
WBC # BLD: 14.4 E9/L (ref 4.5–11.5)

## 2023-05-06 PROCEDURE — 36415 COLL VENOUS BLD VENIPUNCTURE: CPT

## 2023-05-06 PROCEDURE — 80048 BASIC METABOLIC PNL TOTAL CA: CPT

## 2023-05-06 PROCEDURE — 2700000000 HC OXYGEN THERAPY PER DAY

## 2023-05-06 PROCEDURE — 94640 AIRWAY INHALATION TREATMENT: CPT

## 2023-05-06 PROCEDURE — 6370000000 HC RX 637 (ALT 250 FOR IP): Performed by: INTERNAL MEDICINE

## 2023-05-06 PROCEDURE — 94667 MNPJ CHEST WALL 1ST: CPT

## 2023-05-06 PROCEDURE — 82962 GLUCOSE BLOOD TEST: CPT

## 2023-05-06 PROCEDURE — 6370000000 HC RX 637 (ALT 250 FOR IP): Performed by: FAMILY MEDICINE

## 2023-05-06 PROCEDURE — 2060000000 HC ICU INTERMEDIATE R&B

## 2023-05-06 PROCEDURE — 6360000002 HC RX W HCPCS: Performed by: FAMILY MEDICINE

## 2023-05-06 PROCEDURE — 6360000002 HC RX W HCPCS: Performed by: NURSE PRACTITIONER

## 2023-05-06 PROCEDURE — 6360000002 HC RX W HCPCS: Performed by: INTERNAL MEDICINE

## 2023-05-06 PROCEDURE — 83036 HEMOGLOBIN GLYCOSYLATED A1C: CPT

## 2023-05-06 PROCEDURE — 85025 COMPLETE CBC W/AUTO DIFF WBC: CPT

## 2023-05-06 PROCEDURE — 99233 SBSQ HOSP IP/OBS HIGH 50: CPT | Performed by: INTERNAL MEDICINE

## 2023-05-06 PROCEDURE — 99232 SBSQ HOSP IP/OBS MODERATE 35: CPT | Performed by: INTERNAL MEDICINE

## 2023-05-06 PROCEDURE — 94660 CPAP INITIATION&MGMT: CPT

## 2023-05-06 PROCEDURE — 2580000003 HC RX 258: Performed by: FAMILY MEDICINE

## 2023-05-06 RX ORDER — DEXTROSE MONOHYDRATE 100 MG/ML
INJECTION, SOLUTION INTRAVENOUS CONTINUOUS PRN
Status: DISCONTINUED | OUTPATIENT
Start: 2023-05-06 | End: 2023-05-09 | Stop reason: HOSPADM

## 2023-05-06 RX ORDER — ACETYLCYSTEINE 100 MG/ML
4 SOLUTION ORAL; RESPIRATORY (INHALATION) EVERY 4 HOURS
Status: DISCONTINUED | OUTPATIENT
Start: 2023-05-06 | End: 2023-05-08

## 2023-05-06 RX ADMIN — ATORVASTATIN CALCIUM 40 MG: 40 TABLET, FILM COATED ORAL at 20:04

## 2023-05-06 RX ADMIN — SODIUM CHLORIDE, PRESERVATIVE FREE 10 ML: 5 INJECTION INTRAVENOUS at 08:43

## 2023-05-06 RX ADMIN — METHYLPREDNISOLONE SODIUM SUCCINATE 20 MG: 40 INJECTION, POWDER, FOR SOLUTION INTRAMUSCULAR; INTRAVENOUS at 23:32

## 2023-05-06 RX ADMIN — IPRATROPIUM BROMIDE AND ALBUTEROL SULFATE 1 AMPULE: .5; 2.5 SOLUTION RESPIRATORY (INHALATION) at 06:26

## 2023-05-06 RX ADMIN — INSULIN LISPRO 2 UNITS: 100 INJECTION, SOLUTION INTRAVENOUS; SUBCUTANEOUS at 08:41

## 2023-05-06 RX ADMIN — SODIUM CHLORIDE, PRESERVATIVE FREE 10 ML: 5 INJECTION INTRAVENOUS at 20:06

## 2023-05-06 RX ADMIN — ACETYLCYSTEINE 400 MG: 100 INHALANT RESPIRATORY (INHALATION) at 22:21

## 2023-05-06 RX ADMIN — CARVEDILOL 3.12 MG: 3.12 TABLET, FILM COATED ORAL at 08:39

## 2023-05-06 RX ADMIN — IPRATROPIUM BROMIDE AND ALBUTEROL SULFATE 1 AMPULE: .5; 2.5 SOLUTION RESPIRATORY (INHALATION) at 17:20

## 2023-05-06 RX ADMIN — ENOXAPARIN SODIUM 40 MG: 100 INJECTION SUBCUTANEOUS at 20:04

## 2023-05-06 RX ADMIN — METHYLPREDNISOLONE SODIUM SUCCINATE 20 MG: 40 INJECTION, POWDER, FOR SOLUTION INTRAMUSCULAR; INTRAVENOUS at 00:25

## 2023-05-06 RX ADMIN — INSULIN LISPRO 2 UNITS: 100 INJECTION, SOLUTION INTRAVENOUS; SUBCUTANEOUS at 12:12

## 2023-05-06 RX ADMIN — IPRATROPIUM BROMIDE AND ALBUTEROL SULFATE 1 AMPULE: .5; 2.5 SOLUTION RESPIRATORY (INHALATION) at 14:12

## 2023-05-06 RX ADMIN — QUETIAPINE FUMARATE 200 MG: 200 TABLET, EXTENDED RELEASE ORAL at 20:06

## 2023-05-06 RX ADMIN — ARFORMOTEROL TARTRATE 15 MCG: 15 SOLUTION RESPIRATORY (INHALATION) at 06:26

## 2023-05-06 RX ADMIN — IPRATROPIUM BROMIDE AND ALBUTEROL SULFATE 1 AMPULE: .5; 2.5 SOLUTION RESPIRATORY (INHALATION) at 22:21

## 2023-05-06 RX ADMIN — METHYLPREDNISOLONE SODIUM SUCCINATE 20 MG: 40 INJECTION, POWDER, FOR SOLUTION INTRAMUSCULAR; INTRAVENOUS at 12:11

## 2023-05-06 RX ADMIN — GABAPENTIN 600 MG: 300 CAPSULE ORAL at 08:39

## 2023-05-06 RX ADMIN — IPRATROPIUM BROMIDE AND ALBUTEROL SULFATE 1 AMPULE: .5; 2.5 SOLUTION RESPIRATORY (INHALATION) at 10:09

## 2023-05-06 RX ADMIN — GABAPENTIN 600 MG: 300 CAPSULE ORAL at 20:04

## 2023-05-06 RX ADMIN — ACETYLCYSTEINE 400 MG: 100 INHALANT RESPIRATORY (INHALATION) at 17:20

## 2023-05-06 RX ADMIN — BUDESONIDE INHALATION 500 MCG: 0.5 SUSPENSION RESPIRATORY (INHALATION) at 06:26

## 2023-05-06 RX ADMIN — CARVEDILOL 3.12 MG: 3.12 TABLET, FILM COATED ORAL at 17:37

## 2023-05-06 RX ADMIN — BUDESONIDE INHALATION 500 MCG: 0.5 SUSPENSION RESPIRATORY (INHALATION) at 17:20

## 2023-05-06 RX ADMIN — ROFLUMILAST 500 MCG: 500 TABLET ORAL at 08:39

## 2023-05-07 LAB
METER GLUCOSE: 185 MG/DL (ref 74–99)
METER GLUCOSE: 204 MG/DL (ref 74–99)
METER GLUCOSE: 232 MG/DL (ref 74–99)
METER GLUCOSE: 282 MG/DL (ref 74–99)

## 2023-05-07 PROCEDURE — 94640 AIRWAY INHALATION TREATMENT: CPT

## 2023-05-07 PROCEDURE — 2580000003 HC RX 258: Performed by: FAMILY MEDICINE

## 2023-05-07 PROCEDURE — 2060000000 HC ICU INTERMEDIATE R&B

## 2023-05-07 PROCEDURE — 82962 GLUCOSE BLOOD TEST: CPT

## 2023-05-07 PROCEDURE — 99232 SBSQ HOSP IP/OBS MODERATE 35: CPT | Performed by: INTERNAL MEDICINE

## 2023-05-07 PROCEDURE — 6360000002 HC RX W HCPCS: Performed by: FAMILY MEDICINE

## 2023-05-07 PROCEDURE — 2700000000 HC OXYGEN THERAPY PER DAY

## 2023-05-07 PROCEDURE — 6370000000 HC RX 637 (ALT 250 FOR IP): Performed by: INTERNAL MEDICINE

## 2023-05-07 PROCEDURE — 94668 MNPJ CHEST WALL SBSQ: CPT

## 2023-05-07 PROCEDURE — 94667 MNPJ CHEST WALL 1ST: CPT

## 2023-05-07 PROCEDURE — 94660 CPAP INITIATION&MGMT: CPT

## 2023-05-07 PROCEDURE — 6370000000 HC RX 637 (ALT 250 FOR IP): Performed by: FAMILY MEDICINE

## 2023-05-07 PROCEDURE — 6360000002 HC RX W HCPCS: Performed by: NURSE PRACTITIONER

## 2023-05-07 PROCEDURE — 99233 SBSQ HOSP IP/OBS HIGH 50: CPT | Performed by: INTERNAL MEDICINE

## 2023-05-07 RX ADMIN — IPRATROPIUM BROMIDE AND ALBUTEROL SULFATE 1 AMPULE: .5; 2.5 SOLUTION RESPIRATORY (INHALATION) at 09:45

## 2023-05-07 RX ADMIN — GABAPENTIN 600 MG: 300 CAPSULE ORAL at 20:30

## 2023-05-07 RX ADMIN — GABAPENTIN 600 MG: 300 CAPSULE ORAL at 08:30

## 2023-05-07 RX ADMIN — INSULIN LISPRO 1 UNITS: 100 INJECTION, SOLUTION INTRAVENOUS; SUBCUTANEOUS at 17:28

## 2023-05-07 RX ADMIN — ACETYLCYSTEINE 400 MG: 100 INHALANT RESPIRATORY (INHALATION) at 09:45

## 2023-05-07 RX ADMIN — ENOXAPARIN SODIUM 40 MG: 100 INJECTION SUBCUTANEOUS at 20:30

## 2023-05-07 RX ADMIN — INSULIN LISPRO 1 UNITS: 100 INJECTION, SOLUTION INTRAVENOUS; SUBCUTANEOUS at 11:56

## 2023-05-07 RX ADMIN — IPRATROPIUM BROMIDE AND ALBUTEROL SULFATE 1 AMPULE: .5; 2.5 SOLUTION RESPIRATORY (INHALATION) at 06:09

## 2023-05-07 RX ADMIN — ACETYLCYSTEINE 400 MG: 100 INHALANT RESPIRATORY (INHALATION) at 21:24

## 2023-05-07 RX ADMIN — BUDESONIDE INHALATION 500 MCG: 0.5 SUSPENSION RESPIRATORY (INHALATION) at 17:03

## 2023-05-07 RX ADMIN — METHYLPREDNISOLONE SODIUM SUCCINATE 20 MG: 40 INJECTION, POWDER, FOR SOLUTION INTRAMUSCULAR; INTRAVENOUS at 11:56

## 2023-05-07 RX ADMIN — ACETYLCYSTEINE 400 MG: 100 INHALANT RESPIRATORY (INHALATION) at 02:09

## 2023-05-07 RX ADMIN — ATORVASTATIN CALCIUM 40 MG: 40 TABLET, FILM COATED ORAL at 20:30

## 2023-05-07 RX ADMIN — IPRATROPIUM BROMIDE AND ALBUTEROL SULFATE 1 AMPULE: .5; 2.5 SOLUTION RESPIRATORY (INHALATION) at 02:08

## 2023-05-07 RX ADMIN — ACETYLCYSTEINE 400 MG: 100 INHALANT RESPIRATORY (INHALATION) at 06:09

## 2023-05-07 RX ADMIN — CARVEDILOL 3.12 MG: 3.12 TABLET, FILM COATED ORAL at 17:28

## 2023-05-07 RX ADMIN — IPRATROPIUM BROMIDE AND ALBUTEROL SULFATE 1 AMPULE: .5; 2.5 SOLUTION RESPIRATORY (INHALATION) at 21:25

## 2023-05-07 RX ADMIN — SODIUM CHLORIDE, PRESERVATIVE FREE 10 ML: 5 INJECTION INTRAVENOUS at 08:31

## 2023-05-07 RX ADMIN — QUETIAPINE FUMARATE 200 MG: 200 TABLET, EXTENDED RELEASE ORAL at 20:30

## 2023-05-07 RX ADMIN — IPRATROPIUM BROMIDE AND ALBUTEROL SULFATE 1 AMPULE: .5; 2.5 SOLUTION RESPIRATORY (INHALATION) at 13:08

## 2023-05-07 RX ADMIN — ACETYLCYSTEINE 400 MG: 100 INHALANT RESPIRATORY (INHALATION) at 17:03

## 2023-05-07 RX ADMIN — BUDESONIDE INHALATION 500 MCG: 0.5 SUSPENSION RESPIRATORY (INHALATION) at 06:09

## 2023-05-07 RX ADMIN — SODIUM CHLORIDE, PRESERVATIVE FREE 10 ML: 5 INJECTION INTRAVENOUS at 20:30

## 2023-05-07 RX ADMIN — CARVEDILOL 3.12 MG: 3.12 TABLET, FILM COATED ORAL at 08:30

## 2023-05-07 RX ADMIN — IPRATROPIUM BROMIDE AND ALBUTEROL SULFATE 1 AMPULE: .5; 2.5 SOLUTION RESPIRATORY (INHALATION) at 17:03

## 2023-05-07 RX ADMIN — ACETYLCYSTEINE 400 MG: 100 INHALANT RESPIRATORY (INHALATION) at 13:08

## 2023-05-08 LAB
ALBUMIN SERPL-MCNC: 3.5 G/DL (ref 3.5–5.2)
ALP SERPL-CCNC: 90 U/L (ref 40–129)
ALT SERPL-CCNC: 30 U/L (ref 0–40)
ANION GAP SERPL CALCULATED.3IONS-SCNC: 11 MMOL/L (ref 7–16)
ANISOCYTOSIS: ABNORMAL
AST SERPL-CCNC: 17 U/L (ref 0–39)
BASOPHILS # BLD: 0 E9/L (ref 0–0.2)
BASOPHILS NFR BLD: 0.5 % (ref 0–2)
BILIRUB SERPL-MCNC: 0.5 MG/DL (ref 0–1.2)
BUN SERPL-MCNC: 28 MG/DL (ref 6–23)
BURR CELLS: ABNORMAL
CALCIUM SERPL-MCNC: 8.5 MG/DL (ref 8.6–10.2)
CHLORIDE SERPL-SCNC: 103 MMOL/L (ref 98–107)
CO2 SERPL-SCNC: 23 MMOL/L (ref 22–29)
CREAT SERPL-MCNC: 0.8 MG/DL (ref 0.7–1.2)
EOSINOPHIL # BLD: 0 E9/L (ref 0.05–0.5)
EOSINOPHIL NFR BLD: 0 % (ref 0–6)
ERYTHROCYTE [DISTWIDTH] IN BLOOD BY AUTOMATED COUNT: 13.8 FL (ref 11.5–15)
GLUCOSE SERPL-MCNC: 225 MG/DL (ref 74–99)
HCT VFR BLD AUTO: 44 % (ref 37–54)
HGB BLD-MCNC: 14.5 G/DL (ref 12.5–16.5)
LYMPHOCYTES # BLD: 1.15 E9/L (ref 1.5–4)
LYMPHOCYTES NFR BLD: 9.7 % (ref 20–42)
MCH RBC QN AUTO: 31.5 PG (ref 26–35)
MCHC RBC AUTO-ENTMCNC: 33 % (ref 32–34.5)
MCV RBC AUTO: 95.4 FL (ref 80–99.9)
METAMYELOCYTES NFR BLD MANUAL: 2.6 % (ref 0–1)
METER GLUCOSE: 202 MG/DL (ref 74–99)
METER GLUCOSE: 203 MG/DL (ref 74–99)
METER GLUCOSE: 263 MG/DL (ref 74–99)
METER GLUCOSE: 283 MG/DL (ref 74–99)
MONOCYTES # BLD: 0.8 E9/L (ref 0.1–0.95)
MONOCYTES NFR BLD: 7 % (ref 2–12)
NEUTROPHILS # BLD: 9.55 E9/L (ref 1.8–7.3)
NEUTS SEG NFR BLD: 80.7 % (ref 43–80)
OVALOCYTES: ABNORMAL
PLATELET # BLD AUTO: 205 E9/L (ref 130–450)
PMV BLD AUTO: 9.3 FL (ref 7–12)
POIKILOCYTES: ABNORMAL
POTASSIUM SERPL-SCNC: 4.9 MMOL/L (ref 3.5–5)
PROT SERPL-MCNC: 5.3 G/DL (ref 6.4–8.3)
RBC # BLD AUTO: 4.61 E12/L (ref 3.8–5.8)
SCHISTOCYTES: ABNORMAL
SODIUM SERPL-SCNC: 137 MMOL/L (ref 132–146)
TARGET CELLS: ABNORMAL
TEAR DROP CELLS: ABNORMAL
WBC # BLD: 11.5 E9/L (ref 4.5–11.5)

## 2023-05-08 PROCEDURE — 6370000000 HC RX 637 (ALT 250 FOR IP): Performed by: INTERNAL MEDICINE

## 2023-05-08 PROCEDURE — 83036 HEMOGLOBIN GLYCOSYLATED A1C: CPT

## 2023-05-08 PROCEDURE — 6370000000 HC RX 637 (ALT 250 FOR IP): Performed by: FAMILY MEDICINE

## 2023-05-08 PROCEDURE — 99232 SBSQ HOSP IP/OBS MODERATE 35: CPT | Performed by: INTERNAL MEDICINE

## 2023-05-08 PROCEDURE — 99233 SBSQ HOSP IP/OBS HIGH 50: CPT | Performed by: INTERNAL MEDICINE

## 2023-05-08 PROCEDURE — 36415 COLL VENOUS BLD VENIPUNCTURE: CPT

## 2023-05-08 PROCEDURE — 80053 COMPREHEN METABOLIC PANEL: CPT

## 2023-05-08 PROCEDURE — 2060000000 HC ICU INTERMEDIATE R&B

## 2023-05-08 PROCEDURE — 6360000002 HC RX W HCPCS: Performed by: NURSE PRACTITIONER

## 2023-05-08 PROCEDURE — 82962 GLUCOSE BLOOD TEST: CPT

## 2023-05-08 PROCEDURE — 2700000000 HC OXYGEN THERAPY PER DAY

## 2023-05-08 PROCEDURE — 2580000003 HC RX 258: Performed by: FAMILY MEDICINE

## 2023-05-08 PROCEDURE — 94660 CPAP INITIATION&MGMT: CPT

## 2023-05-08 PROCEDURE — 94640 AIRWAY INHALATION TREATMENT: CPT

## 2023-05-08 PROCEDURE — 97530 THERAPEUTIC ACTIVITIES: CPT | Performed by: PHYSICAL THERAPIST

## 2023-05-08 PROCEDURE — 85025 COMPLETE CBC W/AUTO DIFF WBC: CPT

## 2023-05-08 PROCEDURE — 6360000002 HC RX W HCPCS: Performed by: FAMILY MEDICINE

## 2023-05-08 RX ORDER — INSULIN GLARGINE 100 [IU]/ML
0.25 INJECTION, SOLUTION SUBCUTANEOUS NIGHTLY
Status: DISCONTINUED | OUTPATIENT
Start: 2023-05-08 | End: 2023-05-09 | Stop reason: HOSPADM

## 2023-05-08 RX ORDER — DEXTROSE MONOHYDRATE 100 MG/ML
INJECTION, SOLUTION INTRAVENOUS CONTINUOUS PRN
Status: DISCONTINUED | OUTPATIENT
Start: 2023-05-08 | End: 2023-05-09 | Stop reason: HOSPADM

## 2023-05-08 RX ORDER — PREDNISONE 20 MG/1
40 TABLET ORAL DAILY
Status: DISCONTINUED | OUTPATIENT
Start: 2023-05-08 | End: 2023-05-09 | Stop reason: HOSPADM

## 2023-05-08 RX ADMIN — GABAPENTIN 600 MG: 300 CAPSULE ORAL at 20:38

## 2023-05-08 RX ADMIN — ATORVASTATIN CALCIUM 40 MG: 40 TABLET, FILM COATED ORAL at 20:38

## 2023-05-08 RX ADMIN — INSULIN LISPRO 1 UNITS: 100 INJECTION, SOLUTION INTRAVENOUS; SUBCUTANEOUS at 08:35

## 2023-05-08 RX ADMIN — INSULIN LISPRO 2 UNITS: 100 INJECTION, SOLUTION INTRAVENOUS; SUBCUTANEOUS at 17:38

## 2023-05-08 RX ADMIN — PREDNISONE 40 MG: 20 TABLET ORAL at 17:58

## 2023-05-08 RX ADMIN — METHYLPREDNISOLONE SODIUM SUCCINATE 20 MG: 40 INJECTION, POWDER, FOR SOLUTION INTRAMUSCULAR; INTRAVENOUS at 12:01

## 2023-05-08 RX ADMIN — GABAPENTIN 600 MG: 300 CAPSULE ORAL at 08:35

## 2023-05-08 RX ADMIN — ENOXAPARIN SODIUM 40 MG: 100 INJECTION SUBCUTANEOUS at 20:37

## 2023-05-08 RX ADMIN — QUETIAPINE FUMARATE 200 MG: 200 TABLET, EXTENDED RELEASE ORAL at 20:38

## 2023-05-08 RX ADMIN — IPRATROPIUM BROMIDE AND ALBUTEROL SULFATE 1 AMPULE: .5; 2.5 SOLUTION RESPIRATORY (INHALATION) at 21:39

## 2023-05-08 RX ADMIN — CARVEDILOL 3.12 MG: 3.12 TABLET, FILM COATED ORAL at 17:38

## 2023-05-08 RX ADMIN — BUDESONIDE INHALATION 500 MCG: 0.5 SUSPENSION RESPIRATORY (INHALATION) at 06:20

## 2023-05-08 RX ADMIN — BUDESONIDE INHALATION 500 MCG: 0.5 SUSPENSION RESPIRATORY (INHALATION) at 18:34

## 2023-05-08 RX ADMIN — SODIUM CHLORIDE, PRESERVATIVE FREE 10 ML: 5 INJECTION INTRAVENOUS at 08:36

## 2023-05-08 RX ADMIN — CARVEDILOL 3.12 MG: 3.12 TABLET, FILM COATED ORAL at 08:35

## 2023-05-08 RX ADMIN — IPRATROPIUM BROMIDE AND ALBUTEROL SULFATE 1 AMPULE: .5; 2.5 SOLUTION RESPIRATORY (INHALATION) at 06:20

## 2023-05-08 RX ADMIN — IPRATROPIUM BROMIDE AND ALBUTEROL SULFATE 1 AMPULE: .5; 2.5 SOLUTION RESPIRATORY (INHALATION) at 18:34

## 2023-05-08 RX ADMIN — IPRATROPIUM BROMIDE AND ALBUTEROL SULFATE 1 AMPULE: .5; 2.5 SOLUTION RESPIRATORY (INHALATION) at 15:12

## 2023-05-08 RX ADMIN — INSULIN LISPRO 1 UNITS: 100 INJECTION, SOLUTION INTRAVENOUS; SUBCUTANEOUS at 12:02

## 2023-05-08 RX ADMIN — SODIUM CHLORIDE, PRESERVATIVE FREE 10 ML: 5 INJECTION INTRAVENOUS at 20:41

## 2023-05-08 RX ADMIN — ACETYLCYSTEINE 400 MG: 100 INHALANT RESPIRATORY (INHALATION) at 15:13

## 2023-05-08 RX ADMIN — METHYLPREDNISOLONE SODIUM SUCCINATE 20 MG: 40 INJECTION, POWDER, FOR SOLUTION INTRAMUSCULAR; INTRAVENOUS at 00:21

## 2023-05-08 RX ADMIN — ACETYLCYSTEINE 400 MG: 100 INHALANT RESPIRATORY (INHALATION) at 06:20

## 2023-05-08 NOTE — CARE COORDINATION
SOCIAL WORK / DISCHARGE PLANNING:   Sw spoke with pt at bedside today. He is feeling fatigued but dc plan remains to return home with jhonny. He is on his baseline O2 4L, which he has at home from [facility]. Saintclair Boros has been discontinued. Pt currently on chest vest and Mucomyst. Pt states he is still able to get up and around. Denies any dc needs at this time. [facility] will provide home going transport.            Electronically signed by TAWANA Mac on 5/8/2023 at 12:20 PM

## 2023-05-08 NOTE — DISCHARGE INSTRUCTIONS
Follow up with  you primary care physician during the next 7 days after discharge  Measure blood pressure at home a few times a day write down the reading the date and the time and bring this log in with you to see your primary care physician.   Take your new diabetic pill only for 6 days, then ask your primary care physician for further instructions

## 2023-05-09 VITALS
HEIGHT: 70 IN | HEART RATE: 85 BPM | RESPIRATION RATE: 20 BRPM | BODY MASS INDEX: 30.92 KG/M2 | WEIGHT: 216 LBS | OXYGEN SATURATION: 96 % | DIASTOLIC BLOOD PRESSURE: 78 MMHG | SYSTOLIC BLOOD PRESSURE: 137 MMHG | TEMPERATURE: 97.2 F

## 2023-05-09 LAB
HBA1C MFR BLD: 6.8 % (ref 4–5.6)
IGE SERPL-ACNC: 51 KU/L
METER GLUCOSE: 204 MG/DL (ref 74–99)
METER GLUCOSE: 208 MG/DL (ref 74–99)

## 2023-05-09 PROCEDURE — 82962 GLUCOSE BLOOD TEST: CPT

## 2023-05-09 PROCEDURE — 94640 AIRWAY INHALATION TREATMENT: CPT

## 2023-05-09 PROCEDURE — 94660 CPAP INITIATION&MGMT: CPT

## 2023-05-09 PROCEDURE — 6370000000 HC RX 637 (ALT 250 FOR IP): Performed by: INTERNAL MEDICINE

## 2023-05-09 PROCEDURE — 99233 SBSQ HOSP IP/OBS HIGH 50: CPT | Performed by: INTERNAL MEDICINE

## 2023-05-09 PROCEDURE — 2580000003 HC RX 258: Performed by: FAMILY MEDICINE

## 2023-05-09 PROCEDURE — 99239 HOSP IP/OBS DSCHRG MGMT >30: CPT | Performed by: INTERNAL MEDICINE

## 2023-05-09 PROCEDURE — 2700000000 HC OXYGEN THERAPY PER DAY

## 2023-05-09 PROCEDURE — 6370000000 HC RX 637 (ALT 250 FOR IP): Performed by: FAMILY MEDICINE

## 2023-05-09 RX ORDER — GUAIFENESIN 600 MG/1
600 TABLET, EXTENDED RELEASE ORAL 2 TIMES DAILY
Qty: 30 TABLET | Refills: 3 | Status: SHIPPED | OUTPATIENT
Start: 2023-05-09 | End: 2023-05-24

## 2023-05-09 RX ORDER — PREDNISONE 10 MG/1
TABLET ORAL
Qty: 30 TABLET | Refills: 0 | Status: SHIPPED | OUTPATIENT
Start: 2023-05-09

## 2023-05-09 RX ORDER — GLIMEPIRIDE 1 MG/1
1 TABLET ORAL
Qty: 90 TABLET | Refills: 0 | Status: SHIPPED | OUTPATIENT
Start: 2023-05-09 | End: 2023-05-15

## 2023-05-09 RX ORDER — FLUTICASONE FUROATE, UMECLIDINIUM BROMIDE AND VILANTEROL TRIFENATATE 100; 62.5; 25 UG/1; UG/1; UG/1
1 POWDER RESPIRATORY (INHALATION) DAILY
Qty: 1 EACH | Refills: 3 | Status: SHIPPED | OUTPATIENT
Start: 2023-05-09

## 2023-05-09 RX ORDER — BUDESONIDE 0.5 MG/2ML
0.5 INHALANT ORAL 2 TIMES DAILY
Qty: 60 EACH | Refills: 3 | Status: SHIPPED | OUTPATIENT
Start: 2023-05-09

## 2023-05-09 RX ADMIN — CARVEDILOL 3.12 MG: 3.12 TABLET, FILM COATED ORAL at 08:18

## 2023-05-09 RX ADMIN — IPRATROPIUM BROMIDE AND ALBUTEROL SULFATE 1 AMPULE: .5; 2.5 SOLUTION RESPIRATORY (INHALATION) at 10:05

## 2023-05-09 RX ADMIN — SODIUM CHLORIDE, PRESERVATIVE FREE 10 ML: 5 INJECTION INTRAVENOUS at 08:22

## 2023-05-09 RX ADMIN — INSULIN LISPRO 1 UNITS: 100 INJECTION, SOLUTION INTRAVENOUS; SUBCUTANEOUS at 08:18

## 2023-05-09 RX ADMIN — BUDESONIDE INHALATION 500 MCG: 0.5 SUSPENSION RESPIRATORY (INHALATION) at 06:20

## 2023-05-09 RX ADMIN — IPRATROPIUM BROMIDE AND ALBUTEROL SULFATE 1 AMPULE: .5; 2.5 SOLUTION RESPIRATORY (INHALATION) at 06:20

## 2023-05-09 RX ADMIN — PREDNISONE 40 MG: 20 TABLET ORAL at 08:18

## 2023-05-09 RX ADMIN — GABAPENTIN 600 MG: 300 CAPSULE ORAL at 08:18

## 2023-05-09 RX ADMIN — INSULIN LISPRO 1 UNITS: 100 INJECTION, SOLUTION INTRAVENOUS; SUBCUTANEOUS at 11:31

## 2023-05-09 NOTE — DISCHARGE SUMMARY
[facility] Physician Discharge Summary       Brooke Correia MD  [address] [place] [address] [address]  [phone]    Follow up        Activity level: Slowly increase as tolerated    Diet: ADULT DIET; Regular    Labs: None are pending at the discharge    Condition at discharge: Stable    Dispo: Return to home setting     Patient ID:  Meredith Polo  [id number]  70 y.o.  1955    Admit date: 4/30/2023    Discharge date and time:  5/9/2023  12:16 PM    Admission Diagnoses: Principal Problem:    Acute on chronic respiratory failure with hypoxia ([address]  Active Problems:    Primary hypertension    COPD exacerbation ([address]    History of tobacco abuse    Acute respiratory failure with hypoxia ([address]  Resolved Problems:    * No resolved hospital problems. *      Discharge Diagnoses: Principal Problem:    Acute on chronic respiratory failure with hypoxia (HCC)  Active Problems:    Primary hypertension    COPD exacerbation (HCC)    History of tobacco abuse    Acute respiratory failure with hypoxia (HCC)  Resolved Problems:    * No resolved hospital problems. *      Consults:  IP CONSULT TO PULMONOLOGY    Procedures: None significant except if described in hospital course. Hospital Course:   Mr. Arti Monroy was admitted with dyspnea. This is due to COPD exacerbation. He was treated with zithromax and ceftriaxone. He has completed both abx during this hospital stay. He is on transplant list at [facility] and will follow up with them at discharge. CT of the lung was negative for pna or other acute process. He is currently on steroid and breathing treatment,  He has elevated BGL. Overall A1c is 6.8. sliding scale was ordered. 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 Acute on chronic respiratory failure with hypoxia due to COPD exacerbation on 4 liters O2 now and at home. Pulmon on board.   Solu-Medrol 20 twice daily changed to prednisone 40 daily

## 2023-05-09 NOTE — CARE COORDINATION
SOCIAL WORK / DISCHARGE PLANNING:   Pt is prepared to discharge home today with hina. He denies need for HHC. He has home O2 from [employer], remains at baseline 4L. Pt has been cleared for dc from pulmonary. Hina will provide home going transport. He continues to try to work through process to be placed on lung transplant list at [facility].              Electronically signed by TAWANA Santillan on 5/9/2023 at 12:17 PM

## 2023-05-10 NOTE — PROGRESS NOTES
CLINICAL PHARMACY NOTE: MEDS TO BEDS    Total # of Prescriptions Filled: 3   The following medications were delivered to the patient:  Trelegy 100-62.5  Prednisone 10 mg  Glimepiride 1 mg    Additional Documentation:
Department of Internal Medicine  General Internal Medicine  Attending Progress Note    Chief Complaint   Patient presents with    Shortness of Breath     Wears 4lpm n/c at home sudden onset of shortness of breath , inhalers were not helpful     SUBJECTIVE:    Less sob, more energy    OBJECTIVE      Medications    Current Facility-Administered Medications: acetylcysteine (MUCOMYST) 10 % solution 400 mg, 4 mL, Inhalation, Q4H  glucose chewable tablet 16 g, 4 tablet, Oral, PRN  dextrose bolus 10% 125 mL, 125 mL, IntraVENous, PRN **OR** dextrose bolus 10% 250 mL, 250 mL, IntraVENous, PRN  glucagon (rDNA) injection 1 mg, 1 mg, SubCUTAneous, PRN  dextrose 10 % infusion, , IntraVENous, Continuous PRN  insulin lispro (HUMALOG) injection vial 0-4 Units, 0-4 Units, SubCUTAneous, TID WC  insulin lispro (HUMALOG) injection vial 0-4 Units, 0-4 Units, SubCUTAneous, Nightly  glucose chewable tablet 16 g, 4 tablet, Oral, PRN  dextrose bolus 10% 125 mL, 125 mL, IntraVENous, PRN **OR** dextrose bolus 10% 250 mL, 250 mL, IntraVENous, PRN  glucagon (rDNA) injection 1 mg, 1 mg, SubCUTAneous, PRN  dextrose 10 % infusion, , IntraVENous, Continuous PRN  methylPREDNISolone sodium succ (SOLU-MEDROL) injection 20 mg, 20 mg, IntraVENous, Q12H  ipratropium-albuterol (DUONEB) nebulizer solution 1 ampule, 1 ampule, Inhalation, Q4H  sodium chloride flush 0.9 % injection 5-40 mL, 5-40 mL, IntraVENous, 2 times per day  sodium chloride flush 0.9 % injection 5-40 mL, 5-40 mL, IntraVENous, PRN  0.9 % sodium chloride infusion, , IntraVENous, PRN  enoxaparin (LOVENOX) injection 40 mg, 40 mg, SubCUTAneous, Daily  acetaminophen (TYLENOL) tablet 650 mg, 650 mg, Oral, Q6H PRN **OR** acetaminophen (TYLENOL) suppository 650 mg, 650 mg, Rectal, Q6H PRN  guaiFENesin-dextromethorphan (ROBITUSSIN DM) 100-10 MG/5ML syrup 5 mL, 5 mL, Oral, Q4H PRN  budesonide (PULMICORT) nebulizer suspension 500 mcg, 0.5 mg, Nebulization, BID  atorvastatin (LIPITOR) tablet 40 mg, 40
Met with the patient at the bedside. The patient felt that things were progressing well. He stated that there was nothing needed from [facility]. This  informed him that [facility] was available if needed.
OT SESSION ATTEMPT     Date:2023  Patient Name: Nell Leos  MRN: [id number]  : 1955  Room: [address] [place] [address]     OCCUPATIONAL THERAPY TREATMENT NOTE    [address]      Attempted OT session this date:    [] unavailable due to other medical staff currently with pt   [] on hold per nursing staff   [] on hold per nursing staff secondary to lab / radiology results    [x] declined treatment  this date due to pt preparing to discharged. Benefits of participation in therapy reviewed with pt.    [] off unit   [] Other:     Continue with current OT [facility] at a later date/time.       Zaid GONZALES/AZAM [id number]
file   Intimate Partner Violence: Not on file   Housing Stability: Not on file       Current Medications:     Current Facility-Administered Medications:     predniSONE (DELTASONE) tablet 40 mg, 40 mg, Oral, Daily, Ibis Rodriguez MD    glucose chewable tablet 16 g, 4 tablet, Oral, PRN, Ibis Rodriguez MD    dextrose bolus 10% 125 mL, 125 mL, IntraVENous, PRN **OR** dextrose bolus 10% 250 mL, 250 mL, IntraVENous, PRN, Ibis Rodriguez MD    glucagon (rDNA) injection 1 mg, 1 mg, SubCUTAneous, PRN, Ibis Rodriguez MD    dextrose 10 % infusion, , IntraVENous, Continuous PRN, Ibis Rodriguez MD    insulin glargine (LANTUS) injection vial 25 Units, 0.25 Units/kg, SubCUTAneous, Nightly, Ibis Rodriguez MD    acetylcysteine (MUCOMYST) 10 % solution 400 mg, 4 mL, Inhalation, Q4H, Alessandra Villarreal MD, 400 mg at 05/08/23 1513    glucose chewable tablet 16 g, 4 tablet, Oral, PRN, Alessandra Villarreal MD    dextrose bolus 10% 125 mL, 125 mL, IntraVENous, PRN **OR** dextrose bolus 10% 250 mL, 250 mL, IntraVENous, PRN, Alessandra Villarreal MD    glucagon (rDNA) injection 1 mg, 1 mg, SubCUTAneous, PRN, Alessandra Villarreal MD    dextrose 10 % infusion, , IntraVENous, Continuous PRN, Alessandra Villarreal MD    insulin lispro (HUMALOG) injection vial 0-4 Units, 0-4 Units, SubCUTAneous, TID WC, Alyssia Phillips MD, 1 Units at 05/08/23 1202    insulin lispro (HUMALOG) injection vial 0-4 Units, 0-4 Units, SubCUTAneous, Nightly, Seymour Paiz MD    glucose chewable tablet 16 g, 4 tablet, Oral, PRN, Jonni Castleman Ofungwu, MD    dextrose bolus 10% 125 mL, 125 mL, IntraVENous, PRN **OR** dextrose bolus 10% 250 mL, 250 mL, IntraVENous, PRN, Jonni Castleman Ofungwu, MD    glucagon (rDNA) injection 1 mg, 1 mg, SubCUTAneous, PRN, Jonni Castleman Ofungwu, MD    dextrose 10 % infusion, , IntraVENous, Continuous PRN, Alyssia Phillips MD    ipratropium-albuterol (DUONEB) nebulizer solution 1 ampule, 1 ampule,
or family understand(s) diagnosis, prognosis, and plan of care. Frequency of treatments: Patient will be seen  daily. Prior Level of Function: Patient ambulated  with [facility]    Rehab Potential: fair + for baseline    Past medical history:   Past Medical History:   Diagnosis Date    Angina pectoris ([address]     states had neg heart cath    Asthma     Cervical spinal stenosis     COPD (chronic obstructive pulmonary disease) (HCC)     Depression     Hyperlipidemia     Hypertension     Neck pain      Past Surgical History:   Procedure Laterality Date    COLONOSCOPY      HAND SURGERY Left 11/13/2019    LEFT HAND-PALN, DUPUYTREN'S CONTRACTURE, MAJOR RESECTION. POSSIBLE FULL THICKNESS SKIN GRAFT    INTRACAPSULAR CATARACT EXTRACTION Left 11/15/2022    CATARACT EXTRACTION WITH IOL,COMPLEX VISCOAT, VISION BLUE, POSSIBLE ECCE-LEFT performed by Lia Davis MD at [facility]      removal non malignant lesion    NECK SURGERY      cervical fusion x2    WRIST SURGERY Left 11/13/2019    LEFT HAND DUPUYTRENS CONTRACTURE MAJOR RESECTION,  Z-PLASTY performed by Storm Kelley MD at [address]:    Precautions:  Up with assistance, falls, O2, and PNA, SOB      Social history: Patient lives with fiance  in a two story home bedroom and bathroom 2nd floor full flight stairs with Rail  with 3 steps, 1 rail  to enter home with Tub shower       Equipment owned: Dann Manning and [address] [facility]   How much help is needed turning from your back to your side while in a flat bed without using bedrails?: A Little  How much help is needed moving from lying on your back to sitting on the side of a flat bed without using bedrails?: A Little  How much help is needed moving to and from a bed to a chair?: A Little  How much help is needed standing up from a chair using your arms?: A Little  How much help is needed walking in hospital
MD Chencho    glucose chewable tablet 16 g, 4 tablet, Oral, PRN, Francisco Paiz MD    dextrose bolus 10% 125 mL, 125 mL, IntraVENous, PRN **OR** dextrose bolus 10% 250 mL, 250 mL, IntraVENous, PRN, Francisco Paiz MD    glucagon (rDNA) injection 1 mg, 1 mg, SubCUTAneous, PRN, Trista Campoverde MD    dextrose 10 % infusion, , IntraVENous, Continuous PRN, Trista Campoverde MD    ipratropium-albuterol (DUONEB) nebulizer solution 1 ampule, 1 ampule, Inhalation, Q4H, Blade Penny MD, 1 ampule at 05/09/23 1005    sodium chloride flush 0.9 % injection 5-40 mL, 5-40 mL, IntraVENous, 2 times per day, Bettyjo Ashley, DO, 10 mL at 05/09/23 9082    sodium chloride flush 0.9 % injection 5-40 mL, 5-40 mL, IntraVENous, PRN, Betmayelin Ashley, DO    0.9 % sodium chloride infusion, , IntraVENous, PRN, Bettyjo Ashley, DO    enoxaparin (LOVENOX) injection 40 mg, 40 mg, SubCUTAneous, Daily, Bettynoemy Ashley, DO, 40 mg at 05/08/23 2037    acetaminophen (TYLENOL) tablet 650 mg, 650 mg, Oral, Q6H PRN **OR** acetaminophen (TYLENOL) suppository 650 mg, 650 mg, Rectal, Q6H PRN, Bettyjo Ashley, DO    guaiFENesin-dextromethorphan (ROBITUSSIN DM) 100-10 MG/5ML syrup 5 mL, 5 mL, Oral, Q4H PRN, Bettyjo Ashley, DO    budesonide (PULMICORT) nebulizer suspension 500 mcg, 0.5 mg, Nebulization, BID, Bettynoemy Ashley, DO, 500 mcg at 05/09/23 9397    atorvastatin (LIPITOR) tablet 40 mg, 40 mg, Oral, Nightly, Bettynoemy Ashley, DO, 40 mg at 05/08/23 2038    carvedilol (COREG) tablet 3.125 mg, 3.125 mg, Oral, BID WC, Bettynoemy Ashley, DO, 3.125 mg at 05/09/23 0818    QUEtiapine (SEROQUEL XR) extended release tablet 200 mg, 200 mg, Oral, Nightly, Jo-Ann Ramirez DO, 200 mg at 05/08/23 2038    gabapentin (NEURONTIN) capsule 600 mg, 600 mg, Oral, BID, Mariano Love DO, 600 mg at 05/09/23 2566    Current Outpatient Medications:     budesonide (PULMICORT) 0.5 MG/2ML nebulizer suspension, Take 2 mLs by nebulization in the morning and

## 2023-05-16 ENCOUNTER — APPOINTMENT (OUTPATIENT)
Dept: GENERAL RADIOLOGY | Age: 68
End: 2023-05-16
Payer: MEDICARE

## 2023-05-16 ENCOUNTER — HOSPITAL ENCOUNTER (INPATIENT)
Age: 68
LOS: 10 days | Discharge: NURSING, REHAB OR LONG-TERM CARE | End: 2023-05-26
Attending: EMERGENCY MEDICINE | Admitting: INTERNAL MEDICINE
Payer: MEDICARE

## 2023-05-16 ENCOUNTER — TELEPHONE (OUTPATIENT)
Dept: OTHER | Facility: CLINIC | Age: 68
End: 2023-05-16

## 2023-05-16 DIAGNOSIS — J44.1 COPD EXACERBATION (HCC): ICD-10-CM

## 2023-05-16 DIAGNOSIS — J96.01 ACUTE RESPIRATORY FAILURE WITH HYPOXIA (HCC): Primary | ICD-10-CM

## 2023-05-16 PROBLEM — J96.20 ACUTE ON CHRONIC RESPIRATORY FAILURE (HCC): Status: ACTIVE | Noted: 2023-05-16

## 2023-05-16 LAB
AADO2: 88.1 MMHG
ALBUMIN SERPL-MCNC: 3.7 G/DL (ref 3.5–5.2)
ALP SERPL-CCNC: 95 U/L (ref 40–129)
ALT SERPL-CCNC: 26 U/L (ref 0–40)
ANION GAP SERPL CALCULATED.3IONS-SCNC: 14 MMOL/L (ref 7–16)
AST SERPL-CCNC: 17 U/L (ref 0–39)
B.E.: 0.2 MMOL/L (ref -3–3)
BASOPHILS # BLD: 0.01 E9/L (ref 0–0.2)
BASOPHILS NFR BLD: 0.1 % (ref 0–2)
BILIRUB SERPL-MCNC: 0.4 MG/DL (ref 0–1.2)
BNP BLD-MCNC: 219 PG/ML (ref 0–125)
BUN SERPL-MCNC: 21 MG/DL (ref 6–23)
CALCIUM SERPL-MCNC: 9.1 MG/DL (ref 8.6–10.2)
CHLORIDE SERPL-SCNC: 102 MMOL/L (ref 98–107)
CO2 SERPL-SCNC: 21 MMOL/L (ref 22–29)
COHB: 0.1 % (ref 0–1.5)
CREAT SERPL-MCNC: 1.3 MG/DL (ref 0.7–1.2)
CRITICAL: ABNORMAL
DATE ANALYZED: ABNORMAL
DATE OF COLLECTION: ABNORMAL
EKG ATRIAL RATE: 111 BPM
EKG P AXIS: 62 DEGREES
EKG P-R INTERVAL: 170 MS
EKG Q-T INTERVAL: 330 MS
EKG QRS DURATION: 96 MS
EKG QTC CALCULATION (BAZETT): 448 MS
EKG R AXIS: 69 DEGREES
EKG T AXIS: 51 DEGREES
EKG VENTRICULAR RATE: 111 BPM
EOSINOPHIL # BLD: 0.01 E9/L (ref 0.05–0.5)
EOSINOPHIL NFR BLD: 0.1 % (ref 0–6)
ERYTHROCYTE [DISTWIDTH] IN BLOOD BY AUTOMATED COUNT: 13.6 FL (ref 11.5–15)
FIO2: 40 %
GLUCOSE SERPL-MCNC: 224 MG/DL (ref 74–99)
HBA1C MFR BLD: 7.1 % (ref 4–5.6)
HCO3: 23.6 MMOL/L (ref 22–26)
HCT VFR BLD AUTO: 44.9 % (ref 37–54)
HGB BLD-MCNC: 14.5 G/DL (ref 12.5–16.5)
HHB: 1.4 % (ref 0–5)
IMM GRANULOCYTES # BLD: 0.09 E9/L
IMM GRANULOCYTES NFR BLD: 1 % (ref 0–5)
INFLUENZA A BY PCR: NOT DETECTED
INFLUENZA B BY PCR: NOT DETECTED
LAB: ABNORMAL
LACTATE BLDV-SCNC: 2.7 MMOL/L (ref 0.5–1.9)
LACTATE BLDV-SCNC: 2.8 MMOL/L (ref 0.5–1.9)
LYMPHOCYTES # BLD: 0.66 E9/L (ref 1.5–4)
LYMPHOCYTES NFR BLD: 7.4 % (ref 20–42)
Lab: ABNORMAL
MCH RBC QN AUTO: 31.7 PG (ref 26–35)
MCHC RBC AUTO-ENTMCNC: 32.3 % (ref 32–34.5)
MCV RBC AUTO: 98.2 FL (ref 80–99.9)
METER GLUCOSE: 233 MG/DL (ref 74–99)
METHB: 0.4 % (ref 0–1.5)
MODE: ABNORMAL
MONOCYTES # BLD: 0.99 E9/L (ref 0.1–0.95)
MONOCYTES NFR BLD: 11.1 % (ref 2–12)
NEUTROPHILS # BLD: 7.18 E9/L (ref 1.8–7.3)
NEUTS SEG NFR BLD: 80.3 % (ref 43–80)
O2 CONTENT: 21.3 ML/DL
O2 SATURATION: 98.6 % (ref 92–98.5)
O2HB: 98.1 % (ref 94–97)
OPERATOR ID: 8214
PATIENT TEMP: 37 C
PCO2: 34.6 MMHG (ref 35–45)
PFO2: 3.68 MMHG/%
PH BLOOD GAS: 7.45 (ref 7.35–7.45)
PIP: 14 CMH2O
PLATELET # BLD AUTO: 184 E9/L (ref 130–450)
PMV BLD AUTO: 9.2 FL (ref 7–12)
PO2: 147.3 MMHG (ref 75–100)
POTASSIUM SERPL-SCNC: 4.2 MMOL/L (ref 3.5–5)
PROT SERPL-MCNC: 6 G/DL (ref 6.4–8.3)
PS: 6 CMH20
RBC # BLD AUTO: 4.57 E12/L (ref 3.8–5.8)
RI(T): 0.6
SARS-COV-2 RDRP RESP QL NAA+PROBE: NOT DETECTED
SODIUM SERPL-SCNC: 137 MMOL/L (ref 132–146)
SOURCE, BLOOD GAS: ABNORMAL
THB: 15.3 G/DL (ref 11.5–16.5)
TIME ANALYZED: 955
TROPONIN, HIGH SENSITIVITY: 10 NG/L (ref 0–11)
WBC # BLD: 8.9 E9/L (ref 4.5–11.5)

## 2023-05-16 PROCEDURE — 99223 1ST HOSP IP/OBS HIGH 75: CPT | Performed by: INTERNAL MEDICINE

## 2023-05-16 PROCEDURE — 6370000000 HC RX 637 (ALT 250 FOR IP): Performed by: INTERNAL MEDICINE

## 2023-05-16 PROCEDURE — 94664 DEMO&/EVAL PT USE INHALER: CPT

## 2023-05-16 PROCEDURE — 71045 X-RAY EXAM CHEST 1 VIEW: CPT

## 2023-05-16 PROCEDURE — 94660 CPAP INITIATION&MGMT: CPT

## 2023-05-16 PROCEDURE — 93005 ELECTROCARDIOGRAM TRACING: CPT | Performed by: EMERGENCY MEDICINE

## 2023-05-16 PROCEDURE — 6360000002 HC RX W HCPCS: Performed by: INTERNAL MEDICINE

## 2023-05-16 PROCEDURE — 83880 ASSAY OF NATRIURETIC PEPTIDE: CPT

## 2023-05-16 PROCEDURE — 87502 INFLUENZA DNA AMP PROBE: CPT

## 2023-05-16 PROCEDURE — 2700000000 HC OXYGEN THERAPY PER DAY

## 2023-05-16 PROCEDURE — 85025 COMPLETE CBC W/AUTO DIFF WBC: CPT

## 2023-05-16 PROCEDURE — 5A09357 ASSISTANCE WITH RESPIRATORY VENTILATION, LESS THAN 24 CONSECUTIVE HOURS, CONTINUOUS POSITIVE AIRWAY PRESSURE: ICD-10-PCS | Performed by: NURSE PRACTITIONER

## 2023-05-16 PROCEDURE — 99285 EMERGENCY DEPT VISIT HI MDM: CPT

## 2023-05-16 PROCEDURE — 6370000000 HC RX 637 (ALT 250 FOR IP): Performed by: EMERGENCY MEDICINE

## 2023-05-16 PROCEDURE — 93010 ELECTROCARDIOGRAM REPORT: CPT | Performed by: INTERNAL MEDICINE

## 2023-05-16 PROCEDURE — 83605 ASSAY OF LACTIC ACID: CPT

## 2023-05-16 PROCEDURE — 87040 BLOOD CULTURE FOR BACTERIA: CPT

## 2023-05-16 PROCEDURE — 83036 HEMOGLOBIN GLYCOSYLATED A1C: CPT

## 2023-05-16 PROCEDURE — 82805 BLOOD GASES W/O2 SATURATION: CPT

## 2023-05-16 PROCEDURE — 2580000003 HC RX 258: Performed by: INTERNAL MEDICINE

## 2023-05-16 PROCEDURE — 1200000000 HC SEMI PRIVATE

## 2023-05-16 PROCEDURE — 36415 COLL VENOUS BLD VENIPUNCTURE: CPT

## 2023-05-16 PROCEDURE — 36600 WITHDRAWAL OF ARTERIAL BLOOD: CPT

## 2023-05-16 PROCEDURE — 94640 AIRWAY INHALATION TREATMENT: CPT

## 2023-05-16 PROCEDURE — 82962 GLUCOSE BLOOD TEST: CPT

## 2023-05-16 PROCEDURE — 87635 SARS-COV-2 COVID-19 AMP PRB: CPT

## 2023-05-16 PROCEDURE — 84484 ASSAY OF TROPONIN QUANT: CPT

## 2023-05-16 PROCEDURE — 80053 COMPREHEN METABOLIC PANEL: CPT

## 2023-05-16 RX ORDER — ALBUTEROL SULFATE 2.5 MG/3ML
2.5 SOLUTION RESPIRATORY (INHALATION) EVERY 6 HOURS PRN
Status: DISCONTINUED | OUTPATIENT
Start: 2023-05-16 | End: 2023-05-16

## 2023-05-16 RX ORDER — IPRATROPIUM BROMIDE AND ALBUTEROL SULFATE 2.5; .5 MG/3ML; MG/3ML
1 SOLUTION RESPIRATORY (INHALATION) EVERY 6 HOURS PRN
Status: DISCONTINUED | OUTPATIENT
Start: 2023-05-16 | End: 2023-05-26 | Stop reason: HOSPADM

## 2023-05-16 RX ORDER — DEXTROSE MONOHYDRATE 100 MG/ML
INJECTION, SOLUTION INTRAVENOUS CONTINUOUS PRN
Status: DISCONTINUED | OUTPATIENT
Start: 2023-05-16 | End: 2023-05-26 | Stop reason: HOSPADM

## 2023-05-16 RX ORDER — SODIUM CHLORIDE 9 MG/ML
INJECTION, SOLUTION INTRAVENOUS CONTINUOUS
Status: DISCONTINUED | OUTPATIENT
Start: 2023-05-16 | End: 2023-05-17

## 2023-05-16 RX ORDER — METHYLPREDNISOLONE SODIUM SUCCINATE 40 MG/ML
40 INJECTION, POWDER, LYOPHILIZED, FOR SOLUTION INTRAMUSCULAR; INTRAVENOUS EVERY 8 HOURS
Status: DISCONTINUED | OUTPATIENT
Start: 2023-05-16 | End: 2023-05-22

## 2023-05-16 RX ORDER — CARVEDILOL 3.12 MG/1
3.12 TABLET ORAL 2 TIMES DAILY WITH MEALS
Status: DISCONTINUED | OUTPATIENT
Start: 2023-05-16 | End: 2023-05-25

## 2023-05-16 RX ORDER — ARFORMOTEROL TARTRATE 15 UG/2ML
15 SOLUTION RESPIRATORY (INHALATION) 2 TIMES DAILY
Status: DISCONTINUED | OUTPATIENT
Start: 2023-05-16 | End: 2023-05-16

## 2023-05-16 RX ORDER — ACETAMINOPHEN 325 MG/1
650 TABLET ORAL EVERY 6 HOURS PRN
Status: DISCONTINUED | OUTPATIENT
Start: 2023-05-16 | End: 2023-05-26 | Stop reason: HOSPADM

## 2023-05-16 RX ORDER — ONDANSETRON 2 MG/ML
4 INJECTION INTRAMUSCULAR; INTRAVENOUS EVERY 6 HOURS PRN
Status: DISCONTINUED | OUTPATIENT
Start: 2023-05-16 | End: 2023-05-26 | Stop reason: HOSPADM

## 2023-05-16 RX ORDER — ACETAMINOPHEN 650 MG/1
650 SUPPOSITORY RECTAL EVERY 6 HOURS PRN
Status: DISCONTINUED | OUTPATIENT
Start: 2023-05-16 | End: 2023-05-26 | Stop reason: HOSPADM

## 2023-05-16 RX ORDER — GUAIFENESIN 600 MG/1
600 TABLET, EXTENDED RELEASE ORAL 2 TIMES DAILY
Status: DISCONTINUED | OUTPATIENT
Start: 2023-05-16 | End: 2023-05-26 | Stop reason: HOSPADM

## 2023-05-16 RX ORDER — ALBUTEROL SULFATE 90 UG/1
2 AEROSOL, METERED RESPIRATORY (INHALATION) EVERY 6 HOURS PRN
Status: DISCONTINUED | OUTPATIENT
Start: 2023-05-16 | End: 2023-05-16 | Stop reason: CLARIF

## 2023-05-16 RX ORDER — POLYETHYLENE GLYCOL 3350 17 G/17G
17 POWDER, FOR SOLUTION ORAL DAILY PRN
Status: DISCONTINUED | OUTPATIENT
Start: 2023-05-16 | End: 2023-05-26 | Stop reason: HOSPADM

## 2023-05-16 RX ORDER — IPRATROPIUM BROMIDE AND ALBUTEROL SULFATE 2.5; .5 MG/3ML; MG/3ML
3 SOLUTION RESPIRATORY (INHALATION) ONCE
Status: COMPLETED | OUTPATIENT
Start: 2023-05-16 | End: 2023-05-16

## 2023-05-16 RX ORDER — SODIUM CHLORIDE 0.9 % (FLUSH) 0.9 %
5-40 SYRINGE (ML) INJECTION PRN
Status: DISCONTINUED | OUTPATIENT
Start: 2023-05-16 | End: 2023-05-26 | Stop reason: HOSPADM

## 2023-05-16 RX ORDER — ATORVASTATIN CALCIUM 40 MG/1
40 TABLET, FILM COATED ORAL DAILY
Status: DISCONTINUED | OUTPATIENT
Start: 2023-05-17 | End: 2023-05-26 | Stop reason: HOSPADM

## 2023-05-16 RX ORDER — LEVOFLOXACIN 5 MG/ML
500 INJECTION, SOLUTION INTRAVENOUS EVERY 24 HOURS
Status: COMPLETED | OUTPATIENT
Start: 2023-05-16 | End: 2023-05-20

## 2023-05-16 RX ORDER — BUDESONIDE 0.25 MG/2ML
0.5 INHALANT ORAL 2 TIMES DAILY
Status: DISCONTINUED | OUTPATIENT
Start: 2023-05-16 | End: 2023-05-26 | Stop reason: HOSPADM

## 2023-05-16 RX ORDER — QUETIAPINE 200 MG/1
200 TABLET, FILM COATED, EXTENDED RELEASE ORAL NIGHTLY
Status: DISCONTINUED | OUTPATIENT
Start: 2023-05-16 | End: 2023-05-26 | Stop reason: HOSPADM

## 2023-05-16 RX ORDER — ONDANSETRON 4 MG/1
4 TABLET, ORALLY DISINTEGRATING ORAL EVERY 8 HOURS PRN
Status: DISCONTINUED | OUTPATIENT
Start: 2023-05-16 | End: 2023-05-26 | Stop reason: HOSPADM

## 2023-05-16 RX ORDER — GLIPIZIDE 5 MG/1
2.5 TABLET ORAL
Status: DISCONTINUED | OUTPATIENT
Start: 2023-05-17 | End: 2023-05-19

## 2023-05-16 RX ORDER — SODIUM CHLORIDE 9 MG/ML
INJECTION, SOLUTION INTRAVENOUS PRN
Status: DISCONTINUED | OUTPATIENT
Start: 2023-05-16 | End: 2023-05-26 | Stop reason: HOSPADM

## 2023-05-16 RX ORDER — ENOXAPARIN SODIUM 100 MG/ML
40 INJECTION SUBCUTANEOUS DAILY
Status: DISCONTINUED | OUTPATIENT
Start: 2023-05-16 | End: 2023-05-26 | Stop reason: HOSPADM

## 2023-05-16 RX ORDER — BUDESONIDE 0.5 MG/2ML
0.5 INHALANT ORAL 2 TIMES DAILY
Status: DISCONTINUED | OUTPATIENT
Start: 2023-05-16 | End: 2023-05-16 | Stop reason: SDUPTHER

## 2023-05-16 RX ORDER — SODIUM CHLORIDE 0.9 % (FLUSH) 0.9 %
5-40 SYRINGE (ML) INJECTION EVERY 12 HOURS SCHEDULED
Status: DISCONTINUED | OUTPATIENT
Start: 2023-05-16 | End: 2023-05-26 | Stop reason: HOSPADM

## 2023-05-16 RX ORDER — BUDESONIDE 0.25 MG/2ML
0.25 INHALANT ORAL 2 TIMES DAILY
Status: DISCONTINUED | OUTPATIENT
Start: 2023-05-16 | End: 2023-05-16

## 2023-05-16 RX ORDER — ARFORMOTEROL TARTRATE 15 UG/2ML
15 SOLUTION RESPIRATORY (INHALATION) 2 TIMES DAILY
Status: DISCONTINUED | OUTPATIENT
Start: 2023-05-16 | End: 2023-05-17

## 2023-05-16 RX ADMIN — BUDESONIDE 500 MCG: 0.25 INHALANT RESPIRATORY (INHALATION) at 18:07

## 2023-05-16 RX ADMIN — LEVOFLOXACIN 500 MG: 5 INJECTION, SOLUTION INTRAVENOUS at 16:45

## 2023-05-16 RX ADMIN — ENOXAPARIN SODIUM 40 MG: 100 INJECTION SUBCUTANEOUS at 16:45

## 2023-05-16 RX ADMIN — METHYLPREDNISOLONE SODIUM SUCCINATE 40 MG: 40 INJECTION, POWDER, FOR SOLUTION INTRAMUSCULAR; INTRAVENOUS at 16:46

## 2023-05-16 RX ADMIN — ARFORMOTEROL TARTRATE 15 MCG: 15 SOLUTION RESPIRATORY (INHALATION) at 18:07

## 2023-05-16 RX ADMIN — QUETIAPINE FUMARATE 200 MG: 200 TABLET, EXTENDED RELEASE ORAL at 21:52

## 2023-05-16 RX ADMIN — SODIUM CHLORIDE: 9 INJECTION, SOLUTION INTRAVENOUS at 15:59

## 2023-05-16 RX ADMIN — IPRATROPIUM BROMIDE AND ALBUTEROL SULFATE 3 AMPULE: .5; 3 SOLUTION RESPIRATORY (INHALATION) at 10:03

## 2023-05-16 RX ADMIN — IPRATROPIUM BROMIDE AND ALBUTEROL SULFATE 3 ML: .5; 2.5 SOLUTION RESPIRATORY (INHALATION) at 22:05

## 2023-05-16 RX ADMIN — CARVEDILOL 3.12 MG: 3.12 TABLET, FILM COATED ORAL at 16:46

## 2023-05-16 RX ADMIN — IPRATROPIUM BROMIDE AND ALBUTEROL SULFATE 3 ML: .5; 2.5 SOLUTION RESPIRATORY (INHALATION) at 18:13

## 2023-05-16 RX ADMIN — GUAIFENESIN 600 MG: 600 TABLET, EXTENDED RELEASE ORAL at 21:51

## 2023-05-16 ASSESSMENT — PAIN SCALES - GENERAL: PAINLEVEL_OUTOF10: 0

## 2023-05-16 NOTE — TELEPHONE ENCOUNTER
Writer contacted ED provider to inform of 30 day readmission risk. ED provider informed writer of readmission.       Call Back: If you need to call back to inform of disposition you can contact me at [phone]

## 2023-05-17 LAB
ALBUMIN SERPL-MCNC: 3.3 G/DL (ref 3.5–5.2)
ALP SERPL-CCNC: 69 U/L (ref 40–129)
ALT SERPL-CCNC: 19 U/L (ref 0–40)
ANION GAP SERPL CALCULATED.3IONS-SCNC: 10 MMOL/L (ref 7–16)
AST SERPL-CCNC: 12 U/L (ref 0–39)
B PARAP IS1001 DNA NPH QL NAA+NON-PROBE: NOT DETECTED
B PERT.PT PRMT NPH QL NAA+NON-PROBE: NOT DETECTED
BASOPHILS # BLD: 0.01 E9/L (ref 0–0.2)
BASOPHILS NFR BLD: 0.2 % (ref 0–2)
BILIRUB SERPL-MCNC: 0.3 MG/DL (ref 0–1.2)
BUN SERPL-MCNC: 20 MG/DL (ref 6–23)
C PNEUM DNA NPH QL NAA+NON-PROBE: NOT DETECTED
CALCIUM SERPL-MCNC: 8.6 MG/DL (ref 8.6–10.2)
CHLORIDE SERPL-SCNC: 107 MMOL/L (ref 98–107)
CO2 SERPL-SCNC: 24 MMOL/L (ref 22–29)
CREAT SERPL-MCNC: 0.9 MG/DL (ref 0.7–1.2)
EOSINOPHIL # BLD: 0 E9/L (ref 0.05–0.5)
EOSINOPHIL NFR BLD: 0 % (ref 0–6)
ERYTHROCYTE [DISTWIDTH] IN BLOOD BY AUTOMATED COUNT: 13.3 FL (ref 11.5–15)
FLUAV RNA NPH QL NAA+NON-PROBE: NOT DETECTED
FLUBV RNA NPH QL NAA+NON-PROBE: NOT DETECTED
GLUCOSE SERPL-MCNC: 174 MG/DL (ref 74–99)
HADV DNA NPH QL NAA+NON-PROBE: NOT DETECTED
HBA1C MFR BLD: 6.9 % (ref 4–5.6)
HCOV 229E RNA NPH QL NAA+NON-PROBE: NOT DETECTED
HCOV HKU1 RNA NPH QL NAA+NON-PROBE: NOT DETECTED
HCOV NL63 RNA NPH QL NAA+NON-PROBE: NOT DETECTED
HCOV OC43 RNA NPH QL NAA+NON-PROBE: NOT DETECTED
HCT VFR BLD AUTO: 40.1 % (ref 37–54)
HGB BLD-MCNC: 13 G/DL (ref 12.5–16.5)
HMPV RNA NPH QL NAA+NON-PROBE: NOT DETECTED
HPIV1 RNA NPH QL NAA+NON-PROBE: NOT DETECTED
HPIV2 RNA NPH QL NAA+NON-PROBE: NOT DETECTED
HPIV3 RNA NPH QL NAA+NON-PROBE: DETECTED
HPIV4 RNA NPH QL NAA+NON-PROBE: NOT DETECTED
IMM GRANULOCYTES # BLD: 0.05 E9/L
IMM GRANULOCYTES NFR BLD: 0.8 % (ref 0–5)
LYMPHOCYTES # BLD: 0.99 E9/L (ref 1.5–4)
LYMPHOCYTES NFR BLD: 15 % (ref 20–42)
M PNEUMO DNA NPH QL NAA+NON-PROBE: NOT DETECTED
MCH RBC QN AUTO: 32 PG (ref 26–35)
MCHC RBC AUTO-ENTMCNC: 32.4 % (ref 32–34.5)
MCV RBC AUTO: 98.8 FL (ref 80–99.9)
METER GLUCOSE: 166 MG/DL (ref 74–99)
METER GLUCOSE: 178 MG/DL (ref 74–99)
METER GLUCOSE: 211 MG/DL (ref 74–99)
MONOCYTES # BLD: 0.6 E9/L (ref 0.1–0.95)
MONOCYTES NFR BLD: 9.1 % (ref 2–12)
NEUTROPHILS # BLD: 4.94 E9/L (ref 1.8–7.3)
NEUTS SEG NFR BLD: 74.9 % (ref 43–80)
PLATELET # BLD AUTO: 185 E9/L (ref 130–450)
PMV BLD AUTO: 9.2 FL (ref 7–12)
POTASSIUM SERPL-SCNC: 3.9 MMOL/L (ref 3.5–5)
PROT SERPL-MCNC: 5.2 G/DL (ref 6.4–8.3)
RBC # BLD AUTO: 4.06 E12/L (ref 3.8–5.8)
RSV RNA NPH QL NAA+NON-PROBE: NOT DETECTED
RV+EV RNA NPH QL NAA+NON-PROBE: NOT DETECTED
SARS-COV-2 RNA NPH QL NAA+NON-PROBE: NOT DETECTED
SODIUM SERPL-SCNC: 141 MMOL/L (ref 132–146)
WBC # BLD: 6.6 E9/L (ref 4.5–11.5)

## 2023-05-17 PROCEDURE — 36415 COLL VENOUS BLD VENIPUNCTURE: CPT

## 2023-05-17 PROCEDURE — 80053 COMPREHEN METABOLIC PANEL: CPT

## 2023-05-17 PROCEDURE — 99232 SBSQ HOSP IP/OBS MODERATE 35: CPT | Performed by: INTERNAL MEDICINE

## 2023-05-17 PROCEDURE — 6360000002 HC RX W HCPCS: Performed by: INTERNAL MEDICINE

## 2023-05-17 PROCEDURE — 85025 COMPLETE CBC W/AUTO DIFF WBC: CPT

## 2023-05-17 PROCEDURE — 97165 OT EVAL LOW COMPLEX 30 MIN: CPT

## 2023-05-17 PROCEDURE — 2580000003 HC RX 258: Performed by: INTERNAL MEDICINE

## 2023-05-17 PROCEDURE — 0202U NFCT DS 22 TRGT SARS-COV-2: CPT

## 2023-05-17 PROCEDURE — 97161 PT EVAL LOW COMPLEX 20 MIN: CPT

## 2023-05-17 PROCEDURE — 94640 AIRWAY INHALATION TREATMENT: CPT

## 2023-05-17 PROCEDURE — 1200000000 HC SEMI PRIVATE

## 2023-05-17 PROCEDURE — 94660 CPAP INITIATION&MGMT: CPT

## 2023-05-17 PROCEDURE — 97530 THERAPEUTIC ACTIVITIES: CPT

## 2023-05-17 PROCEDURE — 6370000000 HC RX 637 (ALT 250 FOR IP): Performed by: INTERNAL MEDICINE

## 2023-05-17 PROCEDURE — 99233 SBSQ HOSP IP/OBS HIGH 50: CPT | Performed by: INTERNAL MEDICINE

## 2023-05-17 PROCEDURE — 83036 HEMOGLOBIN GLYCOSYLATED A1C: CPT

## 2023-05-17 PROCEDURE — 2700000000 HC OXYGEN THERAPY PER DAY

## 2023-05-17 PROCEDURE — 82962 GLUCOSE BLOOD TEST: CPT

## 2023-05-17 RX ORDER — ACETYLCYSTEINE 100 MG/ML
4 SOLUTION ORAL; RESPIRATORY (INHALATION) EVERY 4 HOURS
Status: DISCONTINUED | OUTPATIENT
Start: 2023-05-17 | End: 2023-05-26

## 2023-05-17 RX ORDER — IPRATROPIUM BROMIDE AND ALBUTEROL SULFATE 2.5; .5 MG/3ML; MG/3ML
1 SOLUTION RESPIRATORY (INHALATION)
Status: DISCONTINUED | OUTPATIENT
Start: 2023-05-17 | End: 2023-05-25

## 2023-05-17 RX ORDER — INSULIN LISPRO 100 [IU]/ML
0-4 INJECTION, SOLUTION INTRAVENOUS; SUBCUTANEOUS
Status: DISCONTINUED | OUTPATIENT
Start: 2023-05-17 | End: 2023-05-26 | Stop reason: HOSPADM

## 2023-05-17 RX ORDER — DEXTROSE MONOHYDRATE 100 MG/ML
INJECTION, SOLUTION INTRAVENOUS CONTINUOUS PRN
Status: DISCONTINUED | OUTPATIENT
Start: 2023-05-17 | End: 2023-05-26 | Stop reason: HOSPADM

## 2023-05-17 RX ORDER — INSULIN LISPRO 100 [IU]/ML
0-4 INJECTION, SOLUTION INTRAVENOUS; SUBCUTANEOUS NIGHTLY
Status: DISCONTINUED | OUTPATIENT
Start: 2023-05-17 | End: 2023-05-26 | Stop reason: HOSPADM

## 2023-05-17 RX ADMIN — METHYLPREDNISOLONE SODIUM SUCCINATE 40 MG: 40 INJECTION, POWDER, FOR SOLUTION INTRAMUSCULAR; INTRAVENOUS at 00:54

## 2023-05-17 RX ADMIN — CARVEDILOL 3.12 MG: 3.12 TABLET, FILM COATED ORAL at 17:32

## 2023-05-17 RX ADMIN — BUDESONIDE 500 MCG: 0.25 INHALANT RESPIRATORY (INHALATION) at 18:20

## 2023-05-17 RX ADMIN — IPRATROPIUM BROMIDE AND ALBUTEROL SULFATE 1 AMPULE: .5; 2.5 SOLUTION RESPIRATORY (INHALATION) at 18:19

## 2023-05-17 RX ADMIN — GUAIFENESIN 600 MG: 600 TABLET, EXTENDED RELEASE ORAL at 21:03

## 2023-05-17 RX ADMIN — IPRATROPIUM BROMIDE AND ALBUTEROL SULFATE 1 AMPULE: .5; 2.5 SOLUTION RESPIRATORY (INHALATION) at 22:23

## 2023-05-17 RX ADMIN — METHYLPREDNISOLONE SODIUM SUCCINATE 40 MG: 40 INJECTION, POWDER, FOR SOLUTION INTRAMUSCULAR; INTRAVENOUS at 17:32

## 2023-05-17 RX ADMIN — ACETYLCYSTEINE 400 MG: 100 INHALANT RESPIRATORY (INHALATION) at 10:11

## 2023-05-17 RX ADMIN — ENOXAPARIN SODIUM 40 MG: 100 INJECTION SUBCUTANEOUS at 08:13

## 2023-05-17 RX ADMIN — ACETYLCYSTEINE 400 MG: 100 INHALANT RESPIRATORY (INHALATION) at 22:23

## 2023-05-17 RX ADMIN — ARFORMOTEROL TARTRATE 15 MCG: 15 SOLUTION RESPIRATORY (INHALATION) at 06:24

## 2023-05-17 RX ADMIN — SODIUM CHLORIDE, PRESERVATIVE FREE 10 ML: 5 INJECTION INTRAVENOUS at 21:03

## 2023-05-17 RX ADMIN — QUETIAPINE FUMARATE 200 MG: 200 TABLET, EXTENDED RELEASE ORAL at 21:03

## 2023-05-17 RX ADMIN — METHYLPREDNISOLONE SODIUM SUCCINATE 40 MG: 40 INJECTION, POWDER, FOR SOLUTION INTRAMUSCULAR; INTRAVENOUS at 08:13

## 2023-05-17 RX ADMIN — BUDESONIDE 500 MCG: 0.25 INHALANT RESPIRATORY (INHALATION) at 06:24

## 2023-05-17 RX ADMIN — IPRATROPIUM BROMIDE AND ALBUTEROL SULFATE 1 AMPULE: .5; 2.5 SOLUTION RESPIRATORY (INHALATION) at 14:24

## 2023-05-17 RX ADMIN — GLIPIZIDE 2.5 MG: 5 TABLET ORAL at 05:25

## 2023-05-17 RX ADMIN — IPRATROPIUM BROMIDE AND ALBUTEROL SULFATE 1 AMPULE: .5; 2.5 SOLUTION RESPIRATORY (INHALATION) at 10:11

## 2023-05-17 RX ADMIN — IPRATROPIUM BROMIDE 0.5 MG: 0.5 SOLUTION RESPIRATORY (INHALATION) at 06:24

## 2023-05-17 RX ADMIN — GUAIFENESIN 600 MG: 600 TABLET, EXTENDED RELEASE ORAL at 08:12

## 2023-05-17 RX ADMIN — ATORVASTATIN CALCIUM 40 MG: 40 TABLET, FILM COATED ORAL at 08:12

## 2023-05-17 RX ADMIN — CARVEDILOL 3.12 MG: 3.12 TABLET, FILM COATED ORAL at 08:12

## 2023-05-17 RX ADMIN — SODIUM CHLORIDE: 9 INJECTION, SOLUTION INTRAVENOUS at 05:24

## 2023-05-17 RX ADMIN — LEVOFLOXACIN 500 MG: 5 INJECTION, SOLUTION INTRAVENOUS at 17:32

## 2023-05-17 RX ADMIN — ACETYLCYSTEINE 400 MG: 100 INHALANT RESPIRATORY (INHALATION) at 18:19

## 2023-05-18 LAB
METER GLUCOSE: 176 MG/DL (ref 74–99)
METER GLUCOSE: 201 MG/DL (ref 74–99)
METER GLUCOSE: 212 MG/DL (ref 74–99)
METER GLUCOSE: 315 MG/DL (ref 74–99)

## 2023-05-18 PROCEDURE — 6360000002 HC RX W HCPCS: Performed by: INTERNAL MEDICINE

## 2023-05-18 PROCEDURE — 99232 SBSQ HOSP IP/OBS MODERATE 35: CPT | Performed by: INTERNAL MEDICINE

## 2023-05-18 PROCEDURE — 6370000000 HC RX 637 (ALT 250 FOR IP): Performed by: INTERNAL MEDICINE

## 2023-05-18 PROCEDURE — 94660 CPAP INITIATION&MGMT: CPT

## 2023-05-18 PROCEDURE — 94640 AIRWAY INHALATION TREATMENT: CPT

## 2023-05-18 PROCEDURE — 99233 SBSQ HOSP IP/OBS HIGH 50: CPT | Performed by: INTERNAL MEDICINE

## 2023-05-18 PROCEDURE — 2580000003 HC RX 258: Performed by: INTERNAL MEDICINE

## 2023-05-18 PROCEDURE — 1200000000 HC SEMI PRIVATE

## 2023-05-18 PROCEDURE — 2700000000 HC OXYGEN THERAPY PER DAY

## 2023-05-18 PROCEDURE — 82962 GLUCOSE BLOOD TEST: CPT

## 2023-05-18 RX ADMIN — IPRATROPIUM BROMIDE AND ALBUTEROL SULFATE 1 AMPULE: .5; 2.5 SOLUTION RESPIRATORY (INHALATION) at 17:27

## 2023-05-18 RX ADMIN — IPRATROPIUM BROMIDE AND ALBUTEROL SULFATE 1 AMPULE: .5; 2.5 SOLUTION RESPIRATORY (INHALATION) at 06:08

## 2023-05-18 RX ADMIN — METHYLPREDNISOLONE SODIUM SUCCINATE 40 MG: 40 INJECTION, POWDER, FOR SOLUTION INTRAMUSCULAR; INTRAVENOUS at 00:39

## 2023-05-18 RX ADMIN — LEVOFLOXACIN 500 MG: 5 INJECTION, SOLUTION INTRAVENOUS at 17:42

## 2023-05-18 RX ADMIN — IPRATROPIUM BROMIDE AND ALBUTEROL SULFATE 1 AMPULE: .5; 2.5 SOLUTION RESPIRATORY (INHALATION) at 09:39

## 2023-05-18 RX ADMIN — ACETYLCYSTEINE 400 MG: 100 INHALANT RESPIRATORY (INHALATION) at 06:08

## 2023-05-18 RX ADMIN — IPRATROPIUM BROMIDE AND ALBUTEROL SULFATE 3 ML: .5; 2.5 SOLUTION RESPIRATORY (INHALATION) at 02:10

## 2023-05-18 RX ADMIN — ACETYLCYSTEINE 400 MG: 100 INHALANT RESPIRATORY (INHALATION) at 09:39

## 2023-05-18 RX ADMIN — ACETYLCYSTEINE 400 MG: 100 INHALANT RESPIRATORY (INHALATION) at 14:12

## 2023-05-18 RX ADMIN — SODIUM CHLORIDE, PRESERVATIVE FREE 10 ML: 5 INJECTION INTRAVENOUS at 08:39

## 2023-05-18 RX ADMIN — ACETYLCYSTEINE 400 MG: 100 INHALANT RESPIRATORY (INHALATION) at 02:10

## 2023-05-18 RX ADMIN — CARVEDILOL 3.12 MG: 3.12 TABLET, FILM COATED ORAL at 08:39

## 2023-05-18 RX ADMIN — GUAIFENESIN 600 MG: 600 TABLET, EXTENDED RELEASE ORAL at 08:39

## 2023-05-18 RX ADMIN — IPRATROPIUM BROMIDE AND ALBUTEROL SULFATE 1 AMPULE: .5; 2.5 SOLUTION RESPIRATORY (INHALATION) at 14:12

## 2023-05-18 RX ADMIN — BUDESONIDE 500 MCG: 0.25 INHALANT RESPIRATORY (INHALATION) at 17:27

## 2023-05-18 RX ADMIN — INSULIN LISPRO 1 UNITS: 100 INJECTION, SOLUTION INTRAVENOUS; SUBCUTANEOUS at 17:40

## 2023-05-18 RX ADMIN — INSULIN LISPRO 3 UNITS: 100 INJECTION, SOLUTION INTRAVENOUS; SUBCUTANEOUS at 12:35

## 2023-05-18 RX ADMIN — ATORVASTATIN CALCIUM 40 MG: 40 TABLET, FILM COATED ORAL at 08:39

## 2023-05-18 RX ADMIN — CARVEDILOL 3.12 MG: 3.12 TABLET, FILM COATED ORAL at 17:39

## 2023-05-18 RX ADMIN — ENOXAPARIN SODIUM 40 MG: 100 INJECTION SUBCUTANEOUS at 08:39

## 2023-05-18 RX ADMIN — QUETIAPINE FUMARATE 200 MG: 200 TABLET, EXTENDED RELEASE ORAL at 21:09

## 2023-05-18 RX ADMIN — GUAIFENESIN 600 MG: 600 TABLET, EXTENDED RELEASE ORAL at 21:09

## 2023-05-18 RX ADMIN — SODIUM CHLORIDE, PRESERVATIVE FREE 10 ML: 5 INJECTION INTRAVENOUS at 21:09

## 2023-05-18 RX ADMIN — METHYLPREDNISOLONE SODIUM SUCCINATE 40 MG: 40 INJECTION, POWDER, FOR SOLUTION INTRAMUSCULAR; INTRAVENOUS at 17:40

## 2023-05-18 RX ADMIN — METHYLPREDNISOLONE SODIUM SUCCINATE 40 MG: 40 INJECTION, POWDER, FOR SOLUTION INTRAMUSCULAR; INTRAVENOUS at 08:39

## 2023-05-18 RX ADMIN — ACETYLCYSTEINE 400 MG: 100 INHALANT RESPIRATORY (INHALATION) at 17:27

## 2023-05-18 RX ADMIN — ACETYLCYSTEINE 400 MG: 100 INHALANT RESPIRATORY (INHALATION) at 21:29

## 2023-05-18 RX ADMIN — GLIPIZIDE 2.5 MG: 5 TABLET ORAL at 05:28

## 2023-05-18 RX ADMIN — IPRATROPIUM BROMIDE AND ALBUTEROL SULFATE 1 AMPULE: .5; 2.5 SOLUTION RESPIRATORY (INHALATION) at 21:29

## 2023-05-18 RX ADMIN — BUDESONIDE 500 MCG: 0.25 INHALANT RESPIRATORY (INHALATION) at 06:10

## 2023-05-19 LAB
METER GLUCOSE: 187 MG/DL (ref 74–99)
METER GLUCOSE: 215 MG/DL (ref 74–99)
METER GLUCOSE: 238 MG/DL (ref 74–99)
METER GLUCOSE: 347 MG/DL (ref 74–99)

## 2023-05-19 PROCEDURE — 6370000000 HC RX 637 (ALT 250 FOR IP): Performed by: INTERNAL MEDICINE

## 2023-05-19 PROCEDURE — 6360000002 HC RX W HCPCS: Performed by: INTERNAL MEDICINE

## 2023-05-19 PROCEDURE — 94640 AIRWAY INHALATION TREATMENT: CPT

## 2023-05-19 PROCEDURE — 2580000003 HC RX 258: Performed by: INTERNAL MEDICINE

## 2023-05-19 PROCEDURE — 2700000000 HC OXYGEN THERAPY PER DAY

## 2023-05-19 PROCEDURE — 94761 N-INVAS EAR/PLS OXIMETRY MLT: CPT

## 2023-05-19 PROCEDURE — 99233 SBSQ HOSP IP/OBS HIGH 50: CPT | Performed by: INTERNAL MEDICINE

## 2023-05-19 PROCEDURE — 94660 CPAP INITIATION&MGMT: CPT

## 2023-05-19 PROCEDURE — 82962 GLUCOSE BLOOD TEST: CPT

## 2023-05-19 PROCEDURE — 1200000000 HC SEMI PRIVATE

## 2023-05-19 RX ORDER — GLIPIZIDE 5 MG/1
2.5 TABLET ORAL ONCE
Status: COMPLETED | OUTPATIENT
Start: 2023-05-19 | End: 2023-05-19

## 2023-05-19 RX ORDER — GLIPIZIDE 5 MG/1
5 TABLET ORAL
Status: DISCONTINUED | OUTPATIENT
Start: 2023-05-20 | End: 2023-05-23

## 2023-05-19 RX ADMIN — IPRATROPIUM BROMIDE AND ALBUTEROL SULFATE 1 AMPULE: .5; 2.5 SOLUTION RESPIRATORY (INHALATION) at 09:52

## 2023-05-19 RX ADMIN — IPRATROPIUM BROMIDE AND ALBUTEROL SULFATE 3 ML: .5; 2.5 SOLUTION RESPIRATORY (INHALATION) at 02:28

## 2023-05-19 RX ADMIN — QUETIAPINE FUMARATE 200 MG: 200 TABLET, EXTENDED RELEASE ORAL at 22:00

## 2023-05-19 RX ADMIN — IPRATROPIUM BROMIDE AND ALBUTEROL SULFATE 1 AMPULE: .5; 2.5 SOLUTION RESPIRATORY (INHALATION) at 18:18

## 2023-05-19 RX ADMIN — BUDESONIDE 500 MCG: 0.25 INHALANT RESPIRATORY (INHALATION) at 06:21

## 2023-05-19 RX ADMIN — CARVEDILOL 3.12 MG: 3.12 TABLET, FILM COATED ORAL at 17:50

## 2023-05-19 RX ADMIN — IPRATROPIUM BROMIDE AND ALBUTEROL SULFATE 1 AMPULE: .5; 2.5 SOLUTION RESPIRATORY (INHALATION) at 13:13

## 2023-05-19 RX ADMIN — ACETYLCYSTEINE 400 MG: 100 INHALANT RESPIRATORY (INHALATION) at 13:13

## 2023-05-19 RX ADMIN — SODIUM CHLORIDE, PRESERVATIVE FREE 10 ML: 5 INJECTION INTRAVENOUS at 17:52

## 2023-05-19 RX ADMIN — LEVOFLOXACIN 500 MG: 5 INJECTION, SOLUTION INTRAVENOUS at 17:50

## 2023-05-19 RX ADMIN — BUDESONIDE 500 MCG: 0.25 INHALANT RESPIRATORY (INHALATION) at 18:18

## 2023-05-19 RX ADMIN — METHYLPREDNISOLONE SODIUM SUCCINATE 40 MG: 40 INJECTION, POWDER, FOR SOLUTION INTRAMUSCULAR; INTRAVENOUS at 17:50

## 2023-05-19 RX ADMIN — IPRATROPIUM BROMIDE AND ALBUTEROL SULFATE 1 AMPULE: .5; 2.5 SOLUTION RESPIRATORY (INHALATION) at 06:20

## 2023-05-19 RX ADMIN — INSULIN LISPRO 3 UNITS: 100 INJECTION, SOLUTION INTRAVENOUS; SUBCUTANEOUS at 13:09

## 2023-05-19 RX ADMIN — ACETYLCYSTEINE 400 MG: 100 INHALANT RESPIRATORY (INHALATION) at 09:55

## 2023-05-19 RX ADMIN — CARVEDILOL 3.12 MG: 3.12 TABLET, FILM COATED ORAL at 11:41

## 2023-05-19 RX ADMIN — ENOXAPARIN SODIUM 40 MG: 100 INJECTION SUBCUTANEOUS at 08:46

## 2023-05-19 RX ADMIN — SODIUM CHLORIDE, PRESERVATIVE FREE 10 ML: 5 INJECTION INTRAVENOUS at 22:01

## 2023-05-19 RX ADMIN — ACETYLCYSTEINE 400 MG: 100 INHALANT RESPIRATORY (INHALATION) at 02:28

## 2023-05-19 RX ADMIN — ATORVASTATIN CALCIUM 40 MG: 40 TABLET, FILM COATED ORAL at 11:42

## 2023-05-19 RX ADMIN — INSULIN LISPRO 1 UNITS: 100 INJECTION, SOLUTION INTRAVENOUS; SUBCUTANEOUS at 08:46

## 2023-05-19 RX ADMIN — ACETYLCYSTEINE 400 MG: 100 INHALANT RESPIRATORY (INHALATION) at 18:18

## 2023-05-19 RX ADMIN — GUAIFENESIN 600 MG: 600 TABLET, EXTENDED RELEASE ORAL at 11:41

## 2023-05-19 RX ADMIN — METHYLPREDNISOLONE SODIUM SUCCINATE 40 MG: 40 INJECTION, POWDER, FOR SOLUTION INTRAMUSCULAR; INTRAVENOUS at 00:07

## 2023-05-19 RX ADMIN — ACETYLCYSTEINE 400 MG: 100 INHALANT RESPIRATORY (INHALATION) at 06:20

## 2023-05-19 RX ADMIN — INSULIN LISPRO 1 UNITS: 100 INJECTION, SOLUTION INTRAVENOUS; SUBCUTANEOUS at 17:49

## 2023-05-19 RX ADMIN — METHYLPREDNISOLONE SODIUM SUCCINATE 40 MG: 40 INJECTION, POWDER, FOR SOLUTION INTRAMUSCULAR; INTRAVENOUS at 08:46

## 2023-05-19 RX ADMIN — GLIPIZIDE 2.5 MG: 5 TABLET ORAL at 11:41

## 2023-05-19 RX ADMIN — GUAIFENESIN 600 MG: 600 TABLET, EXTENDED RELEASE ORAL at 22:00

## 2023-05-20 LAB
EKG ATRIAL RATE: 94 BPM
EKG P AXIS: 85 DEGREES
EKG P-R INTERVAL: 162 MS
EKG Q-T INTERVAL: 364 MS
EKG QRS DURATION: 102 MS
EKG QTC CALCULATION (BAZETT): 455 MS
EKG R AXIS: 71 DEGREES
EKG T AXIS: 63 DEGREES
EKG VENTRICULAR RATE: 94 BPM
METER GLUCOSE: 155 MG/DL (ref 74–99)
METER GLUCOSE: 224 MG/DL (ref 74–99)
METER GLUCOSE: 258 MG/DL (ref 74–99)
METER GLUCOSE: 276 MG/DL (ref 74–99)
TROPONIN, HIGH SENSITIVITY: 10 NG/L (ref 0–11)
TROPONIN, HIGH SENSITIVITY: 9 NG/L (ref 0–11)

## 2023-05-20 PROCEDURE — 6360000002 HC RX W HCPCS: Performed by: INTERNAL MEDICINE

## 2023-05-20 PROCEDURE — 1200000000 HC SEMI PRIVATE

## 2023-05-20 PROCEDURE — 6370000000 HC RX 637 (ALT 250 FOR IP): Performed by: FAMILY MEDICINE

## 2023-05-20 PROCEDURE — 94660 CPAP INITIATION&MGMT: CPT

## 2023-05-20 PROCEDURE — 93005 ELECTROCARDIOGRAM TRACING: CPT | Performed by: INTERNAL MEDICINE

## 2023-05-20 PROCEDURE — 6370000000 HC RX 637 (ALT 250 FOR IP): Performed by: INTERNAL MEDICINE

## 2023-05-20 PROCEDURE — 94640 AIRWAY INHALATION TREATMENT: CPT

## 2023-05-20 PROCEDURE — 84484 ASSAY OF TROPONIN QUANT: CPT

## 2023-05-20 PROCEDURE — 2580000003 HC RX 258: Performed by: INTERNAL MEDICINE

## 2023-05-20 PROCEDURE — 93010 ELECTROCARDIOGRAM REPORT: CPT | Performed by: INTERNAL MEDICINE

## 2023-05-20 PROCEDURE — 2700000000 HC OXYGEN THERAPY PER DAY

## 2023-05-20 PROCEDURE — 82962 GLUCOSE BLOOD TEST: CPT

## 2023-05-20 PROCEDURE — 99232 SBSQ HOSP IP/OBS MODERATE 35: CPT | Performed by: INTERNAL MEDICINE

## 2023-05-20 PROCEDURE — 36415 COLL VENOUS BLD VENIPUNCTURE: CPT

## 2023-05-20 PROCEDURE — 99233 SBSQ HOSP IP/OBS HIGH 50: CPT | Performed by: INTERNAL MEDICINE

## 2023-05-20 RX ORDER — MECOBALAMIN 5000 MCG
10 TABLET,DISINTEGRATING ORAL NIGHTLY
Status: DISCONTINUED | OUTPATIENT
Start: 2023-05-20 | End: 2023-05-26 | Stop reason: HOSPADM

## 2023-05-20 RX ORDER — TRAZODONE HYDROCHLORIDE 50 MG/1
50 TABLET ORAL NIGHTLY
Status: DISCONTINUED | OUTPATIENT
Start: 2023-05-20 | End: 2023-05-26 | Stop reason: HOSPADM

## 2023-05-20 RX ORDER — HYDROXYZINE PAMOATE 25 MG/1
50 CAPSULE ORAL 3 TIMES DAILY
Status: DISCONTINUED | OUTPATIENT
Start: 2023-05-20 | End: 2023-05-26

## 2023-05-20 RX ADMIN — CARVEDILOL 3.12 MG: 3.12 TABLET, FILM COATED ORAL at 17:34

## 2023-05-20 RX ADMIN — IPRATROPIUM BROMIDE AND ALBUTEROL SULFATE 1 AMPULE: .5; 2.5 SOLUTION RESPIRATORY (INHALATION) at 09:38

## 2023-05-20 RX ADMIN — METHYLPREDNISOLONE SODIUM SUCCINATE 40 MG: 40 INJECTION, POWDER, FOR SOLUTION INTRAMUSCULAR; INTRAVENOUS at 00:05

## 2023-05-20 RX ADMIN — SODIUM CHLORIDE, PRESERVATIVE FREE 10 ML: 5 INJECTION INTRAVENOUS at 09:16

## 2023-05-20 RX ADMIN — IPRATROPIUM BROMIDE AND ALBUTEROL SULFATE 1 AMPULE: .5; 2.5 SOLUTION RESPIRATORY (INHALATION) at 14:28

## 2023-05-20 RX ADMIN — HYDROXYZINE PAMOATE 50 MG: 25 CAPSULE ORAL at 20:39

## 2023-05-20 RX ADMIN — ATORVASTATIN CALCIUM 40 MG: 40 TABLET, FILM COATED ORAL at 09:15

## 2023-05-20 RX ADMIN — QUETIAPINE FUMARATE 200 MG: 200 TABLET, EXTENDED RELEASE ORAL at 20:38

## 2023-05-20 RX ADMIN — IPRATROPIUM BROMIDE AND ALBUTEROL SULFATE 1 AMPULE: .5; 2.5 SOLUTION RESPIRATORY (INHALATION) at 18:23

## 2023-05-20 RX ADMIN — TRAZODONE HYDROCHLORIDE 50 MG: 50 TABLET ORAL at 20:39

## 2023-05-20 RX ADMIN — ACETYLCYSTEINE 400 MG: 100 INHALANT RESPIRATORY (INHALATION) at 22:32

## 2023-05-20 RX ADMIN — CARVEDILOL 3.12 MG: 3.12 TABLET, FILM COATED ORAL at 09:15

## 2023-05-20 RX ADMIN — SODIUM CHLORIDE, PRESERVATIVE FREE 10 ML: 5 INJECTION INTRAVENOUS at 20:39

## 2023-05-20 RX ADMIN — IPRATROPIUM BROMIDE AND ALBUTEROL SULFATE 1 AMPULE: .5; 2.5 SOLUTION RESPIRATORY (INHALATION) at 22:32

## 2023-05-20 RX ADMIN — ACETYLCYSTEINE 400 MG: 100 INHALANT RESPIRATORY (INHALATION) at 18:23

## 2023-05-20 RX ADMIN — ENOXAPARIN SODIUM 40 MG: 100 INJECTION SUBCUTANEOUS at 09:14

## 2023-05-20 RX ADMIN — GUAIFENESIN 600 MG: 600 TABLET, EXTENDED RELEASE ORAL at 20:39

## 2023-05-20 RX ADMIN — METHYLPREDNISOLONE SODIUM SUCCINATE 40 MG: 40 INJECTION, POWDER, FOR SOLUTION INTRAMUSCULAR; INTRAVENOUS at 09:15

## 2023-05-20 RX ADMIN — LEVOFLOXACIN 500 MG: 5 INJECTION, SOLUTION INTRAVENOUS at 17:35

## 2023-05-20 RX ADMIN — BUDESONIDE 500 MCG: 0.25 INHALANT RESPIRATORY (INHALATION) at 18:23

## 2023-05-20 RX ADMIN — Medication 10 MG: at 20:38

## 2023-05-20 RX ADMIN — BUDESONIDE 500 MCG: 0.25 INHALANT RESPIRATORY (INHALATION) at 06:09

## 2023-05-20 RX ADMIN — INSULIN LISPRO 2 UNITS: 100 INJECTION, SOLUTION INTRAVENOUS; SUBCUTANEOUS at 13:25

## 2023-05-20 RX ADMIN — ACETYLCYSTEINE 400 MG: 100 INHALANT RESPIRATORY (INHALATION) at 06:09

## 2023-05-20 RX ADMIN — INSULIN LISPRO 2 UNITS: 100 INJECTION, SOLUTION INTRAVENOUS; SUBCUTANEOUS at 09:22

## 2023-05-20 RX ADMIN — METHYLPREDNISOLONE SODIUM SUCCINATE 40 MG: 40 INJECTION, POWDER, FOR SOLUTION INTRAMUSCULAR; INTRAVENOUS at 17:35

## 2023-05-20 RX ADMIN — GLIPIZIDE 5 MG: 5 TABLET ORAL at 09:15

## 2023-05-20 RX ADMIN — GUAIFENESIN 600 MG: 600 TABLET, EXTENDED RELEASE ORAL at 09:15

## 2023-05-20 RX ADMIN — ACETYLCYSTEINE 400 MG: 100 INHALANT RESPIRATORY (INHALATION) at 14:28

## 2023-05-20 RX ADMIN — ACETYLCYSTEINE 400 MG: 100 INHALANT RESPIRATORY (INHALATION) at 09:38

## 2023-05-20 RX ADMIN — IPRATROPIUM BROMIDE AND ALBUTEROL SULFATE 1 AMPULE: .5; 2.5 SOLUTION RESPIRATORY (INHALATION) at 06:09

## 2023-05-20 ASSESSMENT — PAIN DESCRIPTION - FREQUENCY: FREQUENCY: INTERMITTENT

## 2023-05-20 ASSESSMENT — PAIN SCALES - GENERAL
PAINLEVEL_OUTOF10: 0
PAINLEVEL_OUTOF10: 6
PAINLEVEL_OUTOF10: 3
PAINLEVEL_OUTOF10: 0

## 2023-05-20 ASSESSMENT — PAIN DESCRIPTION - ORIENTATION: ORIENTATION: MID

## 2023-05-20 ASSESSMENT — PAIN DESCRIPTION - DESCRIPTORS: DESCRIPTORS: SQUEEZING;TIGHTNESS

## 2023-05-20 ASSESSMENT — PAIN DESCRIPTION - LOCATION: LOCATION: CHEST

## 2023-05-20 ASSESSMENT — PAIN DESCRIPTION - PAIN TYPE: TYPE: ACUTE PAIN

## 2023-05-20 ASSESSMENT — PAIN DESCRIPTION - ONSET: ONSET: SUDDEN

## 2023-05-21 ENCOUNTER — APPOINTMENT (OUTPATIENT)
Dept: GENERAL RADIOLOGY | Age: 68
End: 2023-05-21
Payer: MEDICARE

## 2023-05-21 LAB
BACTERIA BLD CULT ORG #2: NORMAL
BACTERIA BLD CULT: NORMAL
METER GLUCOSE: 162 MG/DL (ref 74–99)
METER GLUCOSE: 213 MG/DL (ref 74–99)
METER GLUCOSE: 299 MG/DL (ref 74–99)
METER GLUCOSE: 336 MG/DL (ref 74–99)
TROPONIN, HIGH SENSITIVITY: 9 NG/L (ref 0–11)

## 2023-05-21 PROCEDURE — 6370000000 HC RX 637 (ALT 250 FOR IP): Performed by: INTERNAL MEDICINE

## 2023-05-21 PROCEDURE — 71045 X-RAY EXAM CHEST 1 VIEW: CPT

## 2023-05-21 PROCEDURE — 6360000002 HC RX W HCPCS: Performed by: INTERNAL MEDICINE

## 2023-05-21 PROCEDURE — 6370000000 HC RX 637 (ALT 250 FOR IP): Performed by: FAMILY MEDICINE

## 2023-05-21 PROCEDURE — 84484 ASSAY OF TROPONIN QUANT: CPT

## 2023-05-21 PROCEDURE — 36415 COLL VENOUS BLD VENIPUNCTURE: CPT

## 2023-05-21 PROCEDURE — 1200000000 HC SEMI PRIVATE

## 2023-05-21 PROCEDURE — 82962 GLUCOSE BLOOD TEST: CPT

## 2023-05-21 PROCEDURE — 2580000003 HC RX 258: Performed by: INTERNAL MEDICINE

## 2023-05-21 PROCEDURE — 94660 CPAP INITIATION&MGMT: CPT

## 2023-05-21 PROCEDURE — 2700000000 HC OXYGEN THERAPY PER DAY

## 2023-05-21 PROCEDURE — 94640 AIRWAY INHALATION TREATMENT: CPT

## 2023-05-21 PROCEDURE — 99232 SBSQ HOSP IP/OBS MODERATE 35: CPT | Performed by: INTERNAL MEDICINE

## 2023-05-21 PROCEDURE — 99233 SBSQ HOSP IP/OBS HIGH 50: CPT | Performed by: INTERNAL MEDICINE

## 2023-05-21 RX ADMIN — INSULIN LISPRO 3 UNITS: 100 INJECTION, SOLUTION INTRAVENOUS; SUBCUTANEOUS at 18:47

## 2023-05-21 RX ADMIN — METHYLPREDNISOLONE SODIUM SUCCINATE 40 MG: 40 INJECTION, POWDER, FOR SOLUTION INTRAMUSCULAR; INTRAVENOUS at 10:08

## 2023-05-21 RX ADMIN — SODIUM CHLORIDE, PRESERVATIVE FREE 10 ML: 5 INJECTION INTRAVENOUS at 21:52

## 2023-05-21 RX ADMIN — ACETYLCYSTEINE 400 MG: 100 INHALANT RESPIRATORY (INHALATION) at 18:40

## 2023-05-21 RX ADMIN — ACETYLCYSTEINE 400 MG: 100 INHALANT RESPIRATORY (INHALATION) at 06:04

## 2023-05-21 RX ADMIN — HYDROXYZINE PAMOATE 50 MG: 25 CAPSULE ORAL at 21:50

## 2023-05-21 RX ADMIN — QUETIAPINE FUMARATE 200 MG: 200 TABLET, EXTENDED RELEASE ORAL at 21:50

## 2023-05-21 RX ADMIN — ATORVASTATIN CALCIUM 40 MG: 40 TABLET, FILM COATED ORAL at 10:08

## 2023-05-21 RX ADMIN — METHYLPREDNISOLONE SODIUM SUCCINATE 40 MG: 40 INJECTION, POWDER, FOR SOLUTION INTRAMUSCULAR; INTRAVENOUS at 16:00

## 2023-05-21 RX ADMIN — IPRATROPIUM BROMIDE AND ALBUTEROL SULFATE 1 AMPULE: .5; 2.5 SOLUTION RESPIRATORY (INHALATION) at 06:05

## 2023-05-21 RX ADMIN — GLIPIZIDE 5 MG: 5 TABLET ORAL at 06:12

## 2023-05-21 RX ADMIN — HYDROXYZINE PAMOATE 50 MG: 25 CAPSULE ORAL at 16:00

## 2023-05-21 RX ADMIN — BUDESONIDE 500 MCG: 0.25 INHALANT RESPIRATORY (INHALATION) at 06:05

## 2023-05-21 RX ADMIN — INSULIN LISPRO 1 UNITS: 100 INJECTION, SOLUTION INTRAVENOUS; SUBCUTANEOUS at 12:51

## 2023-05-21 RX ADMIN — IPRATROPIUM BROMIDE AND ALBUTEROL SULFATE 3 ML: .5; 2.5 SOLUTION RESPIRATORY (INHALATION) at 01:51

## 2023-05-21 RX ADMIN — ACETYLCYSTEINE 400 MG: 100 INHALANT RESPIRATORY (INHALATION) at 09:19

## 2023-05-21 RX ADMIN — IPRATROPIUM BROMIDE AND ALBUTEROL SULFATE 1 AMPULE: .5; 2.5 SOLUTION RESPIRATORY (INHALATION) at 09:19

## 2023-05-21 RX ADMIN — HYDROXYZINE PAMOATE 50 MG: 25 CAPSULE ORAL at 10:08

## 2023-05-21 RX ADMIN — SODIUM CHLORIDE, PRESERVATIVE FREE 10 ML: 5 INJECTION INTRAVENOUS at 10:08

## 2023-05-21 RX ADMIN — ENOXAPARIN SODIUM 40 MG: 100 INJECTION SUBCUTANEOUS at 10:08

## 2023-05-21 RX ADMIN — CARVEDILOL 3.12 MG: 3.12 TABLET, FILM COATED ORAL at 18:50

## 2023-05-21 RX ADMIN — IPRATROPIUM BROMIDE AND ALBUTEROL SULFATE 1 AMPULE: .5; 2.5 SOLUTION RESPIRATORY (INHALATION) at 14:15

## 2023-05-21 RX ADMIN — CARVEDILOL 3.12 MG: 3.12 TABLET, FILM COATED ORAL at 10:08

## 2023-05-21 RX ADMIN — GUAIFENESIN 600 MG: 600 TABLET, EXTENDED RELEASE ORAL at 10:08

## 2023-05-21 RX ADMIN — METHYLPREDNISOLONE SODIUM SUCCINATE 40 MG: 40 INJECTION, POWDER, FOR SOLUTION INTRAMUSCULAR; INTRAVENOUS at 00:23

## 2023-05-21 RX ADMIN — GUAIFENESIN 600 MG: 600 TABLET, EXTENDED RELEASE ORAL at 21:50

## 2023-05-21 RX ADMIN — IPRATROPIUM BROMIDE AND ALBUTEROL SULFATE 1 AMPULE: .5; 2.5 SOLUTION RESPIRATORY (INHALATION) at 21:22

## 2023-05-21 RX ADMIN — TRAZODONE HYDROCHLORIDE 50 MG: 50 TABLET ORAL at 21:50

## 2023-05-21 RX ADMIN — BUDESONIDE 500 MCG: 0.25 INHALANT RESPIRATORY (INHALATION) at 18:40

## 2023-05-21 RX ADMIN — ACETYLCYSTEINE 400 MG: 100 INHALANT RESPIRATORY (INHALATION) at 21:22

## 2023-05-21 RX ADMIN — ACETYLCYSTEINE 400 MG: 100 INHALANT RESPIRATORY (INHALATION) at 01:51

## 2023-05-21 RX ADMIN — IPRATROPIUM BROMIDE AND ALBUTEROL SULFATE 1 AMPULE: .5; 2.5 SOLUTION RESPIRATORY (INHALATION) at 18:40

## 2023-05-21 RX ADMIN — ACETYLCYSTEINE 400 MG: 100 INHALANT RESPIRATORY (INHALATION) at 14:15

## 2023-05-21 RX ADMIN — Medication 10 MG: at 21:50

## 2023-05-21 ASSESSMENT — PAIN SCALES - GENERAL
PAINLEVEL_OUTOF10: 0

## 2023-05-22 LAB
METER GLUCOSE: 165 MG/DL (ref 74–99)
METER GLUCOSE: 237 MG/DL (ref 74–99)
METER GLUCOSE: 294 MG/DL (ref 74–99)
METER GLUCOSE: 303 MG/DL (ref 74–99)

## 2023-05-22 PROCEDURE — 6370000000 HC RX 637 (ALT 250 FOR IP): Performed by: FAMILY MEDICINE

## 2023-05-22 PROCEDURE — 6370000000 HC RX 637 (ALT 250 FOR IP): Performed by: INTERNAL MEDICINE

## 2023-05-22 PROCEDURE — 2700000000 HC OXYGEN THERAPY PER DAY

## 2023-05-22 PROCEDURE — 6360000002 HC RX W HCPCS: Performed by: NURSE PRACTITIONER

## 2023-05-22 PROCEDURE — 6360000002 HC RX W HCPCS: Performed by: INTERNAL MEDICINE

## 2023-05-22 PROCEDURE — 94660 CPAP INITIATION&MGMT: CPT

## 2023-05-22 PROCEDURE — 94664 DEMO&/EVAL PT USE INHALER: CPT

## 2023-05-22 PROCEDURE — 94669 MECHANICAL CHEST WALL OSCILL: CPT

## 2023-05-22 PROCEDURE — 82962 GLUCOSE BLOOD TEST: CPT

## 2023-05-22 PROCEDURE — 1200000000 HC SEMI PRIVATE

## 2023-05-22 PROCEDURE — 99232 SBSQ HOSP IP/OBS MODERATE 35: CPT | Performed by: INTERNAL MEDICINE

## 2023-05-22 PROCEDURE — 99231 SBSQ HOSP IP/OBS SF/LOW 25: CPT | Performed by: NURSE PRACTITIONER

## 2023-05-22 PROCEDURE — 94667 MNPJ CHEST WALL 1ST: CPT

## 2023-05-22 PROCEDURE — 2580000003 HC RX 258: Performed by: INTERNAL MEDICINE

## 2023-05-22 PROCEDURE — 94640 AIRWAY INHALATION TREATMENT: CPT

## 2023-05-22 PROCEDURE — 94761 N-INVAS EAR/PLS OXIMETRY MLT: CPT

## 2023-05-22 RX ORDER — ARFORMOTEROL TARTRATE 15 UG/2ML
15 SOLUTION RESPIRATORY (INHALATION) 2 TIMES DAILY
Status: DISCONTINUED | OUTPATIENT
Start: 2023-05-22 | End: 2023-05-25

## 2023-05-22 RX ORDER — METHYLPREDNISOLONE SODIUM SUCCINATE 40 MG/ML
40 INJECTION, POWDER, LYOPHILIZED, FOR SOLUTION INTRAMUSCULAR; INTRAVENOUS EVERY 12 HOURS
Status: DISCONTINUED | OUTPATIENT
Start: 2023-05-22 | End: 2023-05-24

## 2023-05-22 RX ADMIN — IPRATROPIUM BROMIDE AND ALBUTEROL SULFATE 1 AMPULE: .5; 2.5 SOLUTION RESPIRATORY (INHALATION) at 09:35

## 2023-05-22 RX ADMIN — ARFORMOTEROL TARTRATE 15 MCG: 15 SOLUTION RESPIRATORY (INHALATION) at 12:58

## 2023-05-22 RX ADMIN — ACETYLCYSTEINE 400 MG: 100 INHALANT RESPIRATORY (INHALATION) at 02:05

## 2023-05-22 RX ADMIN — METHYLPREDNISOLONE SODIUM SUCCINATE 40 MG: 40 INJECTION, POWDER, FOR SOLUTION INTRAMUSCULAR; INTRAVENOUS at 08:58

## 2023-05-22 RX ADMIN — HYDROXYZINE PAMOATE 50 MG: 25 CAPSULE ORAL at 08:58

## 2023-05-22 RX ADMIN — SODIUM CHLORIDE, PRESERVATIVE FREE 10 ML: 5 INJECTION INTRAVENOUS at 21:45

## 2023-05-22 RX ADMIN — GLIPIZIDE 5 MG: 5 TABLET ORAL at 05:58

## 2023-05-22 RX ADMIN — ATORVASTATIN CALCIUM 40 MG: 40 TABLET, FILM COATED ORAL at 08:57

## 2023-05-22 RX ADMIN — HYDROXYZINE PAMOATE 50 MG: 25 CAPSULE ORAL at 21:36

## 2023-05-22 RX ADMIN — ACETYLCYSTEINE 400 MG: 100 INHALANT RESPIRATORY (INHALATION) at 18:43

## 2023-05-22 RX ADMIN — HYDROXYZINE PAMOATE 50 MG: 25 CAPSULE ORAL at 12:53

## 2023-05-22 RX ADMIN — ENOXAPARIN SODIUM 40 MG: 100 INJECTION SUBCUTANEOUS at 08:58

## 2023-05-22 RX ADMIN — QUETIAPINE FUMARATE 200 MG: 200 TABLET, EXTENDED RELEASE ORAL at 21:45

## 2023-05-22 RX ADMIN — IPRATROPIUM BROMIDE AND ALBUTEROL SULFATE 1 AMPULE: .5; 2.5 SOLUTION RESPIRATORY (INHALATION) at 18:42

## 2023-05-22 RX ADMIN — GUAIFENESIN 600 MG: 600 TABLET, EXTENDED RELEASE ORAL at 08:58

## 2023-05-22 RX ADMIN — INSULIN LISPRO 3 UNITS: 100 INJECTION, SOLUTION INTRAVENOUS; SUBCUTANEOUS at 17:51

## 2023-05-22 RX ADMIN — ACETYLCYSTEINE 400 MG: 100 INHALANT RESPIRATORY (INHALATION) at 09:36

## 2023-05-22 RX ADMIN — ACETYLCYSTEINE 400 MG: 100 INHALANT RESPIRATORY (INHALATION) at 22:50

## 2023-05-22 RX ADMIN — IPRATROPIUM BROMIDE AND ALBUTEROL SULFATE 3 ML: .5; 2.5 SOLUTION RESPIRATORY (INHALATION) at 02:05

## 2023-05-22 RX ADMIN — INSULIN LISPRO 2 UNITS: 100 INJECTION, SOLUTION INTRAVENOUS; SUBCUTANEOUS at 12:23

## 2023-05-22 RX ADMIN — ACETYLCYSTEINE 400 MG: 100 INHALANT RESPIRATORY (INHALATION) at 12:58

## 2023-05-22 RX ADMIN — IPRATROPIUM BROMIDE AND ALBUTEROL SULFATE 1 AMPULE: .5; 2.5 SOLUTION RESPIRATORY (INHALATION) at 12:58

## 2023-05-22 RX ADMIN — IPRATROPIUM BROMIDE AND ALBUTEROL SULFATE 1 AMPULE: .5; 2.5 SOLUTION RESPIRATORY (INHALATION) at 06:32

## 2023-05-22 RX ADMIN — IPRATROPIUM BROMIDE AND ALBUTEROL SULFATE 3 ML: .5; 2.5 SOLUTION RESPIRATORY (INHALATION) at 22:51

## 2023-05-22 RX ADMIN — GUAIFENESIN 600 MG: 600 TABLET, EXTENDED RELEASE ORAL at 21:36

## 2023-05-22 RX ADMIN — INSULIN LISPRO 1 UNITS: 100 INJECTION, SOLUTION INTRAVENOUS; SUBCUTANEOUS at 08:58

## 2023-05-22 RX ADMIN — BUDESONIDE 500 MCG: 0.25 INHALANT RESPIRATORY (INHALATION) at 06:33

## 2023-05-22 RX ADMIN — SODIUM CHLORIDE, PRESERVATIVE FREE 10 ML: 5 INJECTION INTRAVENOUS at 08:58

## 2023-05-22 RX ADMIN — ACETYLCYSTEINE 400 MG: 100 INHALANT RESPIRATORY (INHALATION) at 06:34

## 2023-05-22 RX ADMIN — BUDESONIDE 500 MCG: 0.25 INHALANT RESPIRATORY (INHALATION) at 18:46

## 2023-05-22 RX ADMIN — TRAZODONE HYDROCHLORIDE 50 MG: 50 TABLET ORAL at 21:36

## 2023-05-22 RX ADMIN — CARVEDILOL 3.12 MG: 3.12 TABLET, FILM COATED ORAL at 17:51

## 2023-05-22 RX ADMIN — CARVEDILOL 3.12 MG: 3.12 TABLET, FILM COATED ORAL at 08:57

## 2023-05-22 RX ADMIN — METHYLPREDNISOLONE SODIUM SUCCINATE 40 MG: 40 INJECTION, POWDER, FOR SOLUTION INTRAMUSCULAR; INTRAVENOUS at 01:03

## 2023-05-22 RX ADMIN — METHYLPREDNISOLONE SODIUM SUCCINATE 40 MG: 40 INJECTION, POWDER, FOR SOLUTION INTRAMUSCULAR; INTRAVENOUS at 21:35

## 2023-05-22 RX ADMIN — Medication 10 MG: at 21:36

## 2023-05-23 LAB
METER GLUCOSE: 148 MG/DL (ref 74–99)
METER GLUCOSE: 226 MG/DL (ref 74–99)
METER GLUCOSE: 245 MG/DL (ref 74–99)
METER GLUCOSE: 277 MG/DL (ref 74–99)

## 2023-05-23 PROCEDURE — 2580000003 HC RX 258: Performed by: INTERNAL MEDICINE

## 2023-05-23 PROCEDURE — 6360000002 HC RX W HCPCS: Performed by: NURSE PRACTITIONER

## 2023-05-23 PROCEDURE — 6370000000 HC RX 637 (ALT 250 FOR IP): Performed by: INTERNAL MEDICINE

## 2023-05-23 PROCEDURE — 6360000002 HC RX W HCPCS: Performed by: INTERNAL MEDICINE

## 2023-05-23 PROCEDURE — 82962 GLUCOSE BLOOD TEST: CPT

## 2023-05-23 PROCEDURE — 94660 CPAP INITIATION&MGMT: CPT

## 2023-05-23 PROCEDURE — 99231 SBSQ HOSP IP/OBS SF/LOW 25: CPT | Performed by: NURSE PRACTITIONER

## 2023-05-23 PROCEDURE — 94640 AIRWAY INHALATION TREATMENT: CPT

## 2023-05-23 PROCEDURE — 1200000000 HC SEMI PRIVATE

## 2023-05-23 PROCEDURE — 99232 SBSQ HOSP IP/OBS MODERATE 35: CPT | Performed by: INTERNAL MEDICINE

## 2023-05-23 PROCEDURE — 6370000000 HC RX 637 (ALT 250 FOR IP): Performed by: FAMILY MEDICINE

## 2023-05-23 PROCEDURE — 2700000000 HC OXYGEN THERAPY PER DAY

## 2023-05-23 PROCEDURE — 94669 MECHANICAL CHEST WALL OSCILL: CPT

## 2023-05-23 RX ORDER — INSULIN GLARGINE 100 [IU]/ML
15 INJECTION, SOLUTION SUBCUTANEOUS NIGHTLY
Status: DISCONTINUED | OUTPATIENT
Start: 2023-05-23 | End: 2023-05-26 | Stop reason: HOSPADM

## 2023-05-23 RX ADMIN — HYDROXYZINE PAMOATE 50 MG: 25 CAPSULE ORAL at 08:56

## 2023-05-23 RX ADMIN — ACETYLCYSTEINE 400 MG: 100 INHALANT RESPIRATORY (INHALATION) at 12:47

## 2023-05-23 RX ADMIN — TRAZODONE HYDROCHLORIDE 50 MG: 50 TABLET ORAL at 21:22

## 2023-05-23 RX ADMIN — ENOXAPARIN SODIUM 40 MG: 100 INJECTION SUBCUTANEOUS at 08:56

## 2023-05-23 RX ADMIN — SODIUM CHLORIDE, PRESERVATIVE FREE 10 ML: 5 INJECTION INTRAVENOUS at 08:59

## 2023-05-23 RX ADMIN — INSULIN GLARGINE 15 UNITS: 100 INJECTION, SOLUTION SUBCUTANEOUS at 21:22

## 2023-05-23 RX ADMIN — CARVEDILOL 3.12 MG: 3.12 TABLET, FILM COATED ORAL at 08:56

## 2023-05-23 RX ADMIN — Medication 10 MG: at 21:22

## 2023-05-23 RX ADMIN — ARFORMOTEROL TARTRATE 15 MCG: 15 SOLUTION RESPIRATORY (INHALATION) at 05:48

## 2023-05-23 RX ADMIN — ARFORMOTEROL TARTRATE 15 MCG: 15 SOLUTION RESPIRATORY (INHALATION) at 16:06

## 2023-05-23 RX ADMIN — GUAIFENESIN 600 MG: 600 TABLET, EXTENDED RELEASE ORAL at 21:22

## 2023-05-23 RX ADMIN — BUDESONIDE 500 MCG: 0.25 INHALANT RESPIRATORY (INHALATION) at 05:48

## 2023-05-23 RX ADMIN — IPRATROPIUM BROMIDE AND ALBUTEROL SULFATE 1 AMPULE: .5; 2.5 SOLUTION RESPIRATORY (INHALATION) at 12:46

## 2023-05-23 RX ADMIN — INSULIN LISPRO 2 UNITS: 100 INJECTION, SOLUTION INTRAVENOUS; SUBCUTANEOUS at 08:58

## 2023-05-23 RX ADMIN — ACETYLCYSTEINE 400 MG: 100 INHALANT RESPIRATORY (INHALATION) at 22:52

## 2023-05-23 RX ADMIN — INSULIN LISPRO 1 UNITS: 100 INJECTION, SOLUTION INTRAVENOUS; SUBCUTANEOUS at 17:22

## 2023-05-23 RX ADMIN — IPRATROPIUM BROMIDE AND ALBUTEROL SULFATE 1 AMPULE: .5; 2.5 SOLUTION RESPIRATORY (INHALATION) at 05:47

## 2023-05-23 RX ADMIN — GLIPIZIDE 5 MG: 5 TABLET ORAL at 06:30

## 2023-05-23 RX ADMIN — SODIUM CHLORIDE, PRESERVATIVE FREE 10 ML: 5 INJECTION INTRAVENOUS at 21:22

## 2023-05-23 RX ADMIN — METHYLPREDNISOLONE SODIUM SUCCINATE 40 MG: 40 INJECTION, POWDER, FOR SOLUTION INTRAMUSCULAR; INTRAVENOUS at 21:22

## 2023-05-23 RX ADMIN — ACETYLCYSTEINE 400 MG: 100 INHALANT RESPIRATORY (INHALATION) at 09:13

## 2023-05-23 RX ADMIN — QUETIAPINE FUMARATE 200 MG: 200 TABLET, EXTENDED RELEASE ORAL at 21:22

## 2023-05-23 RX ADMIN — GUAIFENESIN 600 MG: 600 TABLET, EXTENDED RELEASE ORAL at 08:56

## 2023-05-23 RX ADMIN — IPRATROPIUM BROMIDE AND ALBUTEROL SULFATE 1 AMPULE: .5; 2.5 SOLUTION RESPIRATORY (INHALATION) at 09:13

## 2023-05-23 RX ADMIN — ACETYLCYSTEINE 400 MG: 100 INHALANT RESPIRATORY (INHALATION) at 16:07

## 2023-05-23 RX ADMIN — HYDROXYZINE PAMOATE 50 MG: 25 CAPSULE ORAL at 21:22

## 2023-05-23 RX ADMIN — METHYLPREDNISOLONE SODIUM SUCCINATE 40 MG: 40 INJECTION, POWDER, FOR SOLUTION INTRAMUSCULAR; INTRAVENOUS at 08:56

## 2023-05-23 RX ADMIN — BUDESONIDE 500 MCG: 0.25 INHALANT RESPIRATORY (INHALATION) at 16:07

## 2023-05-23 RX ADMIN — HYDROXYZINE PAMOATE 50 MG: 25 CAPSULE ORAL at 14:26

## 2023-05-23 RX ADMIN — IPRATROPIUM BROMIDE AND ALBUTEROL SULFATE 1 AMPULE: .5; 2.5 SOLUTION RESPIRATORY (INHALATION) at 16:07

## 2023-05-23 RX ADMIN — ACETYLCYSTEINE 400 MG: 100 INHALANT RESPIRATORY (INHALATION) at 05:47

## 2023-05-23 RX ADMIN — ATORVASTATIN CALCIUM 40 MG: 40 TABLET, FILM COATED ORAL at 08:56

## 2023-05-23 RX ADMIN — IPRATROPIUM BROMIDE AND ALBUTEROL SULFATE 3 ML: .5; 2.5 SOLUTION RESPIRATORY (INHALATION) at 22:52

## 2023-05-23 RX ADMIN — CARVEDILOL 3.12 MG: 3.12 TABLET, FILM COATED ORAL at 17:21

## 2023-05-23 NOTE — PLAN OF CARE
Problem: Discharge Planning  Goal: Discharge to home or other facility with appropriate resources  5/23/2023 0506 by Leopold Antes, RN  Outcome: Progressing  5/22/2023 1627 by Kim Carpenter RN  Outcome: Progressing     Problem: Safety - Adult  Goal: Free from fall injury  5/23/2023 0506 by Leopold Antes, RN  Outcome: Progressing  5/22/2023 1627 by Kim Carpenter RN  Outcome: Progressing     Problem: ABCDS Injury Assessment  Goal: Absence of physical injury  5/23/2023 0506 by Leopold Antes, RN  Outcome: Progressing  5/22/2023 1627 by Kim Carpenter RN  Outcome: Progressing     Problem: Pain  Goal: Verbalizes/displays adequate comfort level or baseline comfort level  5/23/2023 0506 by Leopold Antes, RN  Outcome: Progressing  5/22/2023 1627 by Kim Carpenter RN  Outcome: Progressing

## 2023-05-23 NOTE — CARE COORDINATION
5/23/23 1357 CM note: parainfluenza (+) 5/17/23, covid (-) 5/17/23. 3L nc vs bipap FIO2 40%. IV solumedrol. [place]. Met with pt and his S.O Nancy to discuss discharge planning. Discharge plan is home and pt adamantly declines both SNF and HHC. Pt has an appt with [facility] on 5/30/23 to see if he is appropriate for them to present him as a candidate for a lung transplant; however pulm has explained to pt that he is going to need to reschedule that appt for when he is stronger. Pt will need f/u outpt appt w/ his primary pulm group, [facility]. Pt does pulmonary rehab QOD at home per recommendation from [facility]. Pt has also done pulmonary rehab at [address] [place] [address]. Pt resides with his S.O. His DME includes 3L nc (concentrator and battery operated oxygo) and nebulizer through [facility] along with cane and ww. NO BIPAP. Pts S.O will provide transportation at discharge. CM will follow.  Electronically signed by Denise Campuzano RN on 5/23/2023 at 2:05 PM

## 2023-05-23 NOTE — PLAN OF CARE
Problem: Discharge Planning  Goal: Discharge to home or other facility with appropriate resources  Recent Flowsheet Documentation  Taken 5/23/2023 0730 by Oralia Ontiveros RN  Discharge to home or other facility with appropriate resources: Identify barriers to discharge with patient and caregiver  5/23/2023 0506 by Faith Hallman RN  Outcome: Progressing     Problem: Safety - Adult  Goal: Free from fall injury  5/23/2023 0506 by Faith Hallman RN  Outcome: Progressing     Problem: ABCDS Injury Assessment  Goal: Absence of physical injury  5/23/2023 0506 by Faith Hallman RN  Outcome: Progressing     Problem: Pain  Goal: Verbalizes/displays adequate comfort level or baseline comfort level  Recent Flowsheet Documentation  Taken 5/23/2023 1215 by Oralia Ontiveros RN  Verbalizes/displays adequate comfort level or baseline comfort level: Encourage patient to monitor pain and request assistance  Taken 5/23/2023 0730 by Oralia Ontiveros RN  Verbalizes/displays adequate comfort level or baseline comfort level: Encourage patient to monitor pain and request assistance  5/23/2023 0506 by Faith Hallman RN  Outcome: Progressing

## 2023-05-24 LAB
METER GLUCOSE: 203 MG/DL (ref 74–99)
METER GLUCOSE: 216 MG/DL (ref 74–99)
METER GLUCOSE: 251 MG/DL (ref 74–99)

## 2023-05-24 PROCEDURE — 6360000002 HC RX W HCPCS: Performed by: INTERNAL MEDICINE

## 2023-05-24 PROCEDURE — 97530 THERAPEUTIC ACTIVITIES: CPT

## 2023-05-24 PROCEDURE — 97110 THERAPEUTIC EXERCISES: CPT

## 2023-05-24 PROCEDURE — 99231 SBSQ HOSP IP/OBS SF/LOW 25: CPT | Performed by: NURSE PRACTITIONER

## 2023-05-24 PROCEDURE — 99232 SBSQ HOSP IP/OBS MODERATE 35: CPT | Performed by: INTERNAL MEDICINE

## 2023-05-24 PROCEDURE — 2580000003 HC RX 258: Performed by: INTERNAL MEDICINE

## 2023-05-24 PROCEDURE — 6370000000 HC RX 637 (ALT 250 FOR IP): Performed by: INTERNAL MEDICINE

## 2023-05-24 PROCEDURE — 6360000002 HC RX W HCPCS: Performed by: NURSE PRACTITIONER

## 2023-05-24 PROCEDURE — 82962 GLUCOSE BLOOD TEST: CPT

## 2023-05-24 PROCEDURE — 99231 SBSQ HOSP IP/OBS SF/LOW 25: CPT | Performed by: INTERNAL MEDICINE

## 2023-05-24 PROCEDURE — 1200000000 HC SEMI PRIVATE

## 2023-05-24 PROCEDURE — 6370000000 HC RX 637 (ALT 250 FOR IP): Performed by: FAMILY MEDICINE

## 2023-05-24 PROCEDURE — 94660 CPAP INITIATION&MGMT: CPT

## 2023-05-24 PROCEDURE — 94640 AIRWAY INHALATION TREATMENT: CPT

## 2023-05-24 PROCEDURE — 2700000000 HC OXYGEN THERAPY PER DAY

## 2023-05-24 RX ORDER — PREDNISONE 20 MG/1
20 TABLET ORAL DAILY
Status: DISCONTINUED | OUTPATIENT
Start: 2023-05-25 | End: 2023-05-26

## 2023-05-24 RX ADMIN — ACETYLCYSTEINE 400 MG: 100 INHALANT RESPIRATORY (INHALATION) at 14:51

## 2023-05-24 RX ADMIN — BUDESONIDE 500 MCG: 0.25 INHALANT RESPIRATORY (INHALATION) at 06:37

## 2023-05-24 RX ADMIN — ACETYLCYSTEINE 400 MG: 100 INHALANT RESPIRATORY (INHALATION) at 06:37

## 2023-05-24 RX ADMIN — ARFORMOTEROL TARTRATE 15 MCG: 15 SOLUTION RESPIRATORY (INHALATION) at 06:37

## 2023-05-24 RX ADMIN — IPRATROPIUM BROMIDE AND ALBUTEROL SULFATE 1 AMPULE: .5; 2.5 SOLUTION RESPIRATORY (INHALATION) at 18:00

## 2023-05-24 RX ADMIN — INSULIN LISPRO 2 UNITS: 100 INJECTION, SOLUTION INTRAVENOUS; SUBCUTANEOUS at 09:23

## 2023-05-24 RX ADMIN — SODIUM CHLORIDE, PRESERVATIVE FREE 10 ML: 5 INJECTION INTRAVENOUS at 09:27

## 2023-05-24 RX ADMIN — ATORVASTATIN CALCIUM 40 MG: 40 TABLET, FILM COATED ORAL at 09:21

## 2023-05-24 RX ADMIN — GUAIFENESIN 600 MG: 600 TABLET, EXTENDED RELEASE ORAL at 09:21

## 2023-05-24 RX ADMIN — CARVEDILOL 3.12 MG: 3.12 TABLET, FILM COATED ORAL at 17:52

## 2023-05-24 RX ADMIN — Medication 10 MG: at 20:36

## 2023-05-24 RX ADMIN — TRAZODONE HYDROCHLORIDE 50 MG: 50 TABLET ORAL at 20:36

## 2023-05-24 RX ADMIN — BUDESONIDE 500 MCG: 0.25 INHALANT RESPIRATORY (INHALATION) at 18:00

## 2023-05-24 RX ADMIN — ARFORMOTEROL TARTRATE 15 MCG: 15 SOLUTION RESPIRATORY (INHALATION) at 17:59

## 2023-05-24 RX ADMIN — INSULIN GLARGINE 15 UNITS: 100 INJECTION, SOLUTION SUBCUTANEOUS at 20:37

## 2023-05-24 RX ADMIN — ACETYLCYSTEINE 400 MG: 100 INHALANT RESPIRATORY (INHALATION) at 22:00

## 2023-05-24 RX ADMIN — INSULIN LISPRO 1 UNITS: 100 INJECTION, SOLUTION INTRAVENOUS; SUBCUTANEOUS at 12:43

## 2023-05-24 RX ADMIN — INSULIN LISPRO 1 UNITS: 100 INJECTION, SOLUTION INTRAVENOUS; SUBCUTANEOUS at 17:53

## 2023-05-24 RX ADMIN — METHYLPREDNISOLONE SODIUM SUCCINATE 40 MG: 40 INJECTION, POWDER, FOR SOLUTION INTRAMUSCULAR; INTRAVENOUS at 09:21

## 2023-05-24 RX ADMIN — QUETIAPINE FUMARATE 200 MG: 200 TABLET, EXTENDED RELEASE ORAL at 20:36

## 2023-05-24 RX ADMIN — CARVEDILOL 3.12 MG: 3.12 TABLET, FILM COATED ORAL at 09:21

## 2023-05-24 RX ADMIN — HYDROXYZINE PAMOATE 50 MG: 25 CAPSULE ORAL at 20:36

## 2023-05-24 RX ADMIN — IPRATROPIUM BROMIDE AND ALBUTEROL SULFATE 1 AMPULE: .5; 2.5 SOLUTION RESPIRATORY (INHALATION) at 06:37

## 2023-05-24 RX ADMIN — IPRATROPIUM BROMIDE AND ALBUTEROL SULFATE 1 AMPULE: .5; 2.5 SOLUTION RESPIRATORY (INHALATION) at 09:42

## 2023-05-24 RX ADMIN — HYDROXYZINE PAMOATE 50 MG: 25 CAPSULE ORAL at 09:21

## 2023-05-24 RX ADMIN — HYDROXYZINE PAMOATE 50 MG: 25 CAPSULE ORAL at 13:35

## 2023-05-24 RX ADMIN — ACETYLCYSTEINE 400 MG: 100 INHALANT RESPIRATORY (INHALATION) at 09:42

## 2023-05-24 RX ADMIN — ENOXAPARIN SODIUM 40 MG: 100 INJECTION SUBCUTANEOUS at 09:21

## 2023-05-24 RX ADMIN — ACETYLCYSTEINE 400 MG: 100 INHALANT RESPIRATORY (INHALATION) at 18:00

## 2023-05-24 RX ADMIN — GUAIFENESIN 600 MG: 600 TABLET, EXTENDED RELEASE ORAL at 20:36

## 2023-05-24 RX ADMIN — IPRATROPIUM BROMIDE AND ALBUTEROL SULFATE 1 AMPULE: .5; 2.5 SOLUTION RESPIRATORY (INHALATION) at 14:51

## 2023-05-24 RX ADMIN — IPRATROPIUM BROMIDE AND ALBUTEROL SULFATE 1 AMPULE: .5; 2.5 SOLUTION RESPIRATORY (INHALATION) at 21:59

## 2023-05-24 NOTE — PLAN OF CARE
Problem: Discharge Planning  Goal: Discharge to home or other facility with appropriate resources  Recent Flowsheet Documentation  Taken 5/24/2023 0700 by Ciaran Romero RN  Discharge to home or other facility with appropriate resources: Identify barriers to discharge with patient and caregiver  5/24/2023 0125 by Thong Webber RN  Outcome: Progressing     Problem: Safety - Adult  Goal: Free from fall injury  5/24/2023 0125 by Thong Webber RN  Outcome: Progressing     Problem: ABCDS Injury Assessment  Goal: Absence of physical injury  5/24/2023 0125 by Thong Webber RN  Outcome: Progressing     Problem: Pain  Goal: Verbalizes/displays adequate comfort level or baseline comfort level  Recent Flowsheet Documentation  Taken 5/24/2023 1200 by Ciaran Romero RN  Verbalizes/displays adequate comfort level or baseline comfort level: Encourage patient to monitor pain and request assistance  Taken 5/24/2023 0700 by Ciaran Romero RN  Verbalizes/displays adequate comfort level or baseline comfort level: Encourage patient to monitor pain and request assistance  5/24/2023 0125 by Thong Webber RN  Outcome: Progressing  Flowsheets  Taken 5/23/2023 1615 by Ciaran Romero RN  Verbalizes/displays adequate comfort level or baseline comfort level: Encourage patient to monitor pain and request assistance  Taken 5/23/2023 1215 by Ciaran Romero RN  Verbalizes/displays adequate comfort level or baseline comfort level: Encourage patient to monitor pain and request assistance     Problem: Skin/Tissue Integrity  Goal: Absence of new skin breakdown  Description: 1. Monitor for areas of redness and/or skin breakdown  2. Assess vascular access sites hourly  3. Every 4-6 hours minimum:  Change oxygen saturation probe site  4. Every 4-6 hours:  If on nasal continuous positive airway pressure, respiratory therapy assess nares and determine need for appliance change or resting period.   5/24/2023 0125 by Thong Webber RN  Outcome:

## 2023-05-24 NOTE — PLAN OF CARE
Problem: Discharge Planning  Goal: Discharge to home or other facility with appropriate resources  Outcome: Progressing     Problem: Safety - Adult  Goal: Free from fall injury  Outcome: Progressing     Problem: ABCDS Injury Assessment  Goal: Absence of physical injury  Outcome: Progressing     Problem: Pain  Goal: Verbalizes/displays adequate comfort level or baseline comfort level  Outcome: Progressing  Flowsheets  Taken 5/23/2023 1615 by Verito Guthrie RN  Verbalizes/displays adequate comfort level or baseline comfort level: Encourage patient to monitor pain and request assistance  Taken 5/23/2023 1215 by Verito Guthrie RN  Verbalizes/displays adequate comfort level or baseline comfort level: Encourage patient to monitor pain and request assistance     Problem: Skin/Tissue Integrity  Goal: Absence of new skin breakdown  Description: 1. Monitor for areas of redness and/or skin breakdown  2. Assess vascular access sites hourly  3. Every 4-6 hours minimum:  Change oxygen saturation probe site  4. Every 4-6 hours:  If on nasal continuous positive airway pressure, respiratory therapy assess nares and determine need for appliance change or resting period.   Outcome: Progressing

## 2023-05-25 PROBLEM — R00.0 SINUS TACHYCARDIA: Status: ACTIVE | Noted: 2023-05-25

## 2023-05-25 LAB
METER GLUCOSE: 140 MG/DL (ref 74–99)
METER GLUCOSE: 193 MG/DL (ref 74–99)
METER GLUCOSE: 301 MG/DL (ref 74–99)
METER GLUCOSE: 61 MG/DL (ref 74–99)

## 2023-05-25 PROCEDURE — 94640 AIRWAY INHALATION TREATMENT: CPT

## 2023-05-25 PROCEDURE — 1200000000 HC SEMI PRIVATE

## 2023-05-25 PROCEDURE — 94669 MECHANICAL CHEST WALL OSCILL: CPT

## 2023-05-25 PROCEDURE — 6370000000 HC RX 637 (ALT 250 FOR IP): Performed by: INTERNAL MEDICINE

## 2023-05-25 PROCEDURE — 97530 THERAPEUTIC ACTIVITIES: CPT

## 2023-05-25 PROCEDURE — 6370000000 HC RX 637 (ALT 250 FOR IP): Performed by: FAMILY MEDICINE

## 2023-05-25 PROCEDURE — 94660 CPAP INITIATION&MGMT: CPT

## 2023-05-25 PROCEDURE — 2580000003 HC RX 258: Performed by: INTERNAL MEDICINE

## 2023-05-25 PROCEDURE — 6360000002 HC RX W HCPCS: Performed by: INTERNAL MEDICINE

## 2023-05-25 PROCEDURE — 2700000000 HC OXYGEN THERAPY PER DAY

## 2023-05-25 PROCEDURE — 6370000000 HC RX 637 (ALT 250 FOR IP): Performed by: NURSE PRACTITIONER

## 2023-05-25 PROCEDURE — 99231 SBSQ HOSP IP/OBS SF/LOW 25: CPT | Performed by: INTERNAL MEDICINE

## 2023-05-25 PROCEDURE — 99232 SBSQ HOSP IP/OBS MODERATE 35: CPT | Performed by: FAMILY MEDICINE

## 2023-05-25 PROCEDURE — 6360000002 HC RX W HCPCS: Performed by: NURSE PRACTITIONER

## 2023-05-25 PROCEDURE — 82962 GLUCOSE BLOOD TEST: CPT

## 2023-05-25 RX ORDER — IPRATROPIUM BROMIDE AND ALBUTEROL SULFATE 2.5; .5 MG/3ML; MG/3ML
1 SOLUTION RESPIRATORY (INHALATION) 2 TIMES DAILY
Status: DISCONTINUED | OUTPATIENT
Start: 2023-05-26 | End: 2023-05-26 | Stop reason: HOSPADM

## 2023-05-25 RX ORDER — AZITHROMYCIN 250 MG/1
250 TABLET, FILM COATED ORAL DAILY
Status: DISCONTINUED | OUTPATIENT
Start: 2023-05-25 | End: 2023-05-26

## 2023-05-25 RX ORDER — CARVEDILOL 6.25 MG/1
6.25 TABLET ORAL 2 TIMES DAILY WITH MEALS
Status: DISCONTINUED | OUTPATIENT
Start: 2023-05-26 | End: 2023-05-26 | Stop reason: HOSPADM

## 2023-05-25 RX ADMIN — IPRATROPIUM BROMIDE AND ALBUTEROL SULFATE 1 AMPULE: .5; 2.5 SOLUTION RESPIRATORY (INHALATION) at 06:48

## 2023-05-25 RX ADMIN — GUAIFENESIN 600 MG: 600 TABLET, EXTENDED RELEASE ORAL at 10:07

## 2023-05-25 RX ADMIN — ENOXAPARIN SODIUM 40 MG: 100 INJECTION SUBCUTANEOUS at 10:06

## 2023-05-25 RX ADMIN — IPRATROPIUM BROMIDE AND ALBUTEROL SULFATE 1 AMPULE: .5; 2.5 SOLUTION RESPIRATORY (INHALATION) at 13:54

## 2023-05-25 RX ADMIN — SODIUM CHLORIDE, PRESERVATIVE FREE 10 ML: 5 INJECTION INTRAVENOUS at 10:10

## 2023-05-25 RX ADMIN — ARFORMOTEROL TARTRATE 15 MCG: 15 SOLUTION RESPIRATORY (INHALATION) at 17:01

## 2023-05-25 RX ADMIN — SODIUM CHLORIDE, PRESERVATIVE FREE 10 ML: 5 INJECTION INTRAVENOUS at 21:23

## 2023-05-25 RX ADMIN — ACETYLCYSTEINE 400 MG: 100 INHALANT RESPIRATORY (INHALATION) at 06:49

## 2023-05-25 RX ADMIN — ACETYLCYSTEINE 400 MG: 100 INHALANT RESPIRATORY (INHALATION) at 17:00

## 2023-05-25 RX ADMIN — BUDESONIDE 500 MCG: 0.25 INHALANT RESPIRATORY (INHALATION) at 06:48

## 2023-05-25 RX ADMIN — Medication 10 MG: at 21:24

## 2023-05-25 RX ADMIN — HYDROXYZINE PAMOATE 50 MG: 25 CAPSULE ORAL at 10:07

## 2023-05-25 RX ADMIN — TRAZODONE HYDROCHLORIDE 50 MG: 50 TABLET ORAL at 21:24

## 2023-05-25 RX ADMIN — CARVEDILOL 3.12 MG: 3.12 TABLET, FILM COATED ORAL at 16:50

## 2023-05-25 RX ADMIN — IPRATROPIUM BROMIDE AND ALBUTEROL SULFATE 3 ML: .5; 2.5 SOLUTION RESPIRATORY (INHALATION) at 21:49

## 2023-05-25 RX ADMIN — QUETIAPINE FUMARATE 200 MG: 200 TABLET, EXTENDED RELEASE ORAL at 23:12

## 2023-05-25 RX ADMIN — AZITHROMYCIN MONOHYDRATE 250 MG: 250 TABLET ORAL at 13:09

## 2023-05-25 RX ADMIN — IPRATROPIUM BROMIDE AND ALBUTEROL SULFATE 1 AMPULE: .5; 2.5 SOLUTION RESPIRATORY (INHALATION) at 09:48

## 2023-05-25 RX ADMIN — ACETYLCYSTEINE 400 MG: 100 INHALANT RESPIRATORY (INHALATION) at 13:54

## 2023-05-25 RX ADMIN — ARFORMOTEROL TARTRATE 15 MCG: 15 SOLUTION RESPIRATORY (INHALATION) at 06:49

## 2023-05-25 RX ADMIN — HYDROXYZINE PAMOATE 50 MG: 25 CAPSULE ORAL at 13:09

## 2023-05-25 RX ADMIN — PREDNISONE 20 MG: 20 TABLET ORAL at 10:06

## 2023-05-25 RX ADMIN — ACETYLCYSTEINE 400 MG: 100 INHALANT RESPIRATORY (INHALATION) at 21:49

## 2023-05-25 RX ADMIN — IPRATROPIUM BROMIDE AND ALBUTEROL SULFATE 1 AMPULE: .5; 2.5 SOLUTION RESPIRATORY (INHALATION) at 17:00

## 2023-05-25 RX ADMIN — INSULIN LISPRO 3 UNITS: 100 INJECTION, SOLUTION INTRAVENOUS; SUBCUTANEOUS at 16:56

## 2023-05-25 RX ADMIN — ATORVASTATIN CALCIUM 40 MG: 40 TABLET, FILM COATED ORAL at 10:07

## 2023-05-25 RX ADMIN — BUDESONIDE 500 MCG: 0.25 INHALANT RESPIRATORY (INHALATION) at 17:00

## 2023-05-25 RX ADMIN — HYDROXYZINE PAMOATE 50 MG: 25 CAPSULE ORAL at 21:24

## 2023-05-25 RX ADMIN — INSULIN GLARGINE 15 UNITS: 100 INJECTION, SOLUTION SUBCUTANEOUS at 21:25

## 2023-05-25 RX ADMIN — ACETYLCYSTEINE 400 MG: 100 INHALANT RESPIRATORY (INHALATION) at 09:48

## 2023-05-25 RX ADMIN — CARVEDILOL 3.12 MG: 3.12 TABLET, FILM COATED ORAL at 10:07

## 2023-05-25 RX ADMIN — GUAIFENESIN 600 MG: 600 TABLET, EXTENDED RELEASE ORAL at 21:24

## 2023-05-25 NOTE — CARE COORDINATION
5/25/23 1638 CM note: parainfluenza (+) 5/17/23, covid (-) 5/17/23. 3L nc vs bipap FIO2 40%. Po prednisone. [place]. Met with patient at the bedside to discuss therapies recommendation for rehab. Pt is agreeable to SNF so CM provided him with SNF list to review. Requested pt choice for 3 SNF and we will f/u with him in the morning for his choices. WILL NEED PRECERT. Pt lives with his S.O Jami Brumfield. His DME includes 3L nc (concentrator and battery operated oxygo) and nebulizer through [facility] along with cane and ww. NO BIPAP. Pt has an appt with [facility] on 5/30/23 to see if he is appropriate candidate for a lung transplant; however pulm has explained to pt that he is going to need to reschedule that appt for when he is stronger. Pt will need f/u outpt appt w/ his primary pulm group, [facility]. CM/SW will follow.  Electronically signed by Megha Beck RN on 5/25/2023 at 4:43 PM

## 2023-05-25 NOTE — PLAN OF CARE
Problem: Safety - Adult  Goal: Free from fall injury  Flowsheets (Taken 5/25/2023 1040)  Free From Fall Injury:   Instruct family/caregiver on patient safety   Based on caregiver fall risk screen, instruct family/caregiver to ask for assistance with transferring infant if caregiver noted to have fall risk factors

## 2023-05-26 ENCOUNTER — APPOINTMENT (OUTPATIENT)
Dept: GENERAL RADIOLOGY | Age: 68
End: 2023-05-26
Payer: MEDICARE

## 2023-05-26 VITALS
DIASTOLIC BLOOD PRESSURE: 72 MMHG | HEIGHT: 70 IN | SYSTOLIC BLOOD PRESSURE: 138 MMHG | WEIGHT: 207.8 LBS | BODY MASS INDEX: 29.75 KG/M2 | OXYGEN SATURATION: 96 % | TEMPERATURE: 97.5 F | HEART RATE: 108 BPM | RESPIRATION RATE: 20 BRPM

## 2023-05-26 PROBLEM — J96.11 CHRONIC RESPIRATORY FAILURE WITH HYPOXIA (HCC): Status: ACTIVE | Noted: 2023-05-26

## 2023-05-26 PROBLEM — B34.8 INFECTION DUE TO PARAINFLUENZA VIRUS 3: Status: ACTIVE | Noted: 2023-05-26

## 2023-05-26 LAB
ALBUMIN SERPL-MCNC: 3.2 G/DL (ref 3.5–5.2)
ALP SERPL-CCNC: 85 U/L (ref 40–129)
ALT SERPL-CCNC: 32 U/L (ref 0–40)
ANION GAP SERPL CALCULATED.3IONS-SCNC: 10 MMOL/L (ref 7–16)
AST SERPL-CCNC: 17 U/L (ref 0–39)
BASOPHILS # BLD: 0.07 E9/L (ref 0–0.2)
BASOPHILS NFR BLD: 0.5 % (ref 0–2)
BILIRUB SERPL-MCNC: 1 MG/DL (ref 0–1.2)
BUN SERPL-MCNC: 25 MG/DL (ref 6–23)
CALCIUM SERPL-MCNC: 9.1 MG/DL (ref 8.6–10.2)
CHLORIDE SERPL-SCNC: 101 MMOL/L (ref 98–107)
CO2 SERPL-SCNC: 26 MMOL/L (ref 22–29)
CREAT SERPL-MCNC: 1 MG/DL (ref 0.7–1.2)
EOSINOPHIL # BLD: 0.07 E9/L (ref 0.05–0.5)
EOSINOPHIL NFR BLD: 0.5 % (ref 0–6)
ERYTHROCYTE [DISTWIDTH] IN BLOOD BY AUTOMATED COUNT: 13.2 FL (ref 11.5–15)
GLUCOSE SERPL-MCNC: 149 MG/DL (ref 74–99)
HCT VFR BLD AUTO: 50.5 % (ref 37–54)
HGB BLD-MCNC: 15.9 G/DL (ref 12.5–16.5)
IMM GRANULOCYTES # BLD: 0.4 E9/L
IMM GRANULOCYTES NFR BLD: 2.8 % (ref 0–5)
LV EF: 72 %
LVEF MODALITY: NORMAL
LYMPHOCYTES # BLD: 1.3 E9/L (ref 1.5–4)
LYMPHOCYTES NFR BLD: 9 % (ref 20–42)
MAGNESIUM SERPL-MCNC: 2.3 MG/DL (ref 1.6–2.6)
MCH RBC QN AUTO: 31.5 PG (ref 26–35)
MCHC RBC AUTO-ENTMCNC: 31.5 % (ref 32–34.5)
MCV RBC AUTO: 100 FL (ref 80–99.9)
METER GLUCOSE: 114 MG/DL (ref 74–99)
METER GLUCOSE: 205 MG/DL (ref 74–99)
METER GLUCOSE: 76 MG/DL (ref 74–99)
MONOCYTES # BLD: 1.46 E9/L (ref 0.1–0.95)
MONOCYTES NFR BLD: 10.1 % (ref 2–12)
NEUTROPHILS # BLD: 11.13 E9/L (ref 1.8–7.3)
NEUTS SEG NFR BLD: 77.1 % (ref 43–80)
PHOSPHATE SERPL-MCNC: 4 MG/DL (ref 2.5–4.5)
PLATELET # BLD AUTO: 132 E9/L (ref 130–450)
PMV BLD AUTO: 10.3 FL (ref 7–12)
POTASSIUM SERPL-SCNC: 4.4 MMOL/L (ref 3.5–5)
PROT SERPL-MCNC: 5.4 G/DL (ref 6.4–8.3)
RBC # BLD AUTO: 5.05 E12/L (ref 3.8–5.8)
SODIUM SERPL-SCNC: 137 MMOL/L (ref 132–146)
WBC # BLD: 14.4 E9/L (ref 4.5–11.5)

## 2023-05-26 PROCEDURE — 82962 GLUCOSE BLOOD TEST: CPT

## 2023-05-26 PROCEDURE — 6360000004 HC RX CONTRAST MEDICATION: Performed by: INTERNAL MEDICINE

## 2023-05-26 PROCEDURE — 94669 MECHANICAL CHEST WALL OSCILL: CPT

## 2023-05-26 PROCEDURE — 6370000000 HC RX 637 (ALT 250 FOR IP): Performed by: INTERNAL MEDICINE

## 2023-05-26 PROCEDURE — 99239 HOSP IP/OBS DSCHRG MGMT >30: CPT | Performed by: FAMILY MEDICINE

## 2023-05-26 PROCEDURE — 2580000003 HC RX 258: Performed by: INTERNAL MEDICINE

## 2023-05-26 PROCEDURE — 6370000000 HC RX 637 (ALT 250 FOR IP): Performed by: FAMILY MEDICINE

## 2023-05-26 PROCEDURE — 6360000002 HC RX W HCPCS: Performed by: INTERNAL MEDICINE

## 2023-05-26 PROCEDURE — 36415 COLL VENOUS BLD VENIPUNCTURE: CPT

## 2023-05-26 PROCEDURE — 2700000000 HC OXYGEN THERAPY PER DAY

## 2023-05-26 PROCEDURE — 83735 ASSAY OF MAGNESIUM: CPT

## 2023-05-26 PROCEDURE — 99233 SBSQ HOSP IP/OBS HIGH 50: CPT | Performed by: INTERNAL MEDICINE

## 2023-05-26 PROCEDURE — 94660 CPAP INITIATION&MGMT: CPT

## 2023-05-26 PROCEDURE — 80053 COMPREHEN METABOLIC PANEL: CPT

## 2023-05-26 PROCEDURE — 97530 THERAPEUTIC ACTIVITIES: CPT

## 2023-05-26 PROCEDURE — 94640 AIRWAY INHALATION TREATMENT: CPT

## 2023-05-26 PROCEDURE — C8929 TTE W OR WO FOL WCON,DOPPLER: HCPCS

## 2023-05-26 PROCEDURE — 84100 ASSAY OF PHOSPHORUS: CPT

## 2023-05-26 PROCEDURE — 85025 COMPLETE CBC W/AUTO DIFF WBC: CPT

## 2023-05-26 PROCEDURE — 71045 X-RAY EXAM CHEST 1 VIEW: CPT

## 2023-05-26 PROCEDURE — 6370000000 HC RX 637 (ALT 250 FOR IP): Performed by: NURSE PRACTITIONER

## 2023-05-26 RX ORDER — HYDROXYZINE PAMOATE 25 MG/1
50 CAPSULE ORAL 3 TIMES DAILY PRN
Status: DISCONTINUED | OUTPATIENT
Start: 2023-05-26 | End: 2023-05-26 | Stop reason: HOSPADM

## 2023-05-26 RX ORDER — GUAIFENESIN 600 MG/1
600 TABLET, EXTENDED RELEASE ORAL 2 TIMES DAILY
Qty: 30 TABLET | Refills: 3 | DISCHARGE
Start: 2023-05-26 | End: 2023-06-10

## 2023-05-26 RX ORDER — INSULIN LISPRO 100 [IU]/ML
0-4 INJECTION, SOLUTION INTRAVENOUS; SUBCUTANEOUS NIGHTLY
DISCHARGE
Start: 2023-05-26

## 2023-05-26 RX ORDER — AZITHROMYCIN 250 MG/1
250 TABLET, FILM COATED ORAL
Status: DISCONTINUED | OUTPATIENT
Start: 2023-05-29 | End: 2023-05-26 | Stop reason: HOSPADM

## 2023-05-26 RX ORDER — TRAZODONE HYDROCHLORIDE 50 MG/1
50 TABLET ORAL NIGHTLY
DISCHARGE
Start: 2023-05-26

## 2023-05-26 RX ORDER — CARVEDILOL 12.5 MG/1
12.5 TABLET ORAL 2 TIMES DAILY WITH MEALS
DISCHARGE
Start: 2023-05-26

## 2023-05-26 RX ORDER — POLYETHYLENE GLYCOL 3350 17 G/17G
17 POWDER, FOR SOLUTION ORAL DAILY PRN
Qty: 527 G | Refills: 1 | DISCHARGE
Start: 2023-05-26 | End: 2023-06-25

## 2023-05-26 RX ORDER — INSULIN GLARGINE 100 [IU]/ML
15 INJECTION, SOLUTION SUBCUTANEOUS NIGHTLY
Qty: 10 ML | Refills: 3 | DISCHARGE
Start: 2023-05-26

## 2023-05-26 RX ORDER — INSULIN LISPRO 100 [IU]/ML
0-4 INJECTION, SOLUTION INTRAVENOUS; SUBCUTANEOUS
DISCHARGE
Start: 2023-05-26

## 2023-05-26 RX ORDER — AZITHROMYCIN 250 MG/1
250 TABLET, FILM COATED ORAL DAILY
DISCHARGE
Start: 2023-05-26

## 2023-05-26 RX ORDER — MECOBALAMIN 5000 MCG
10 TABLET,DISINTEGRATING ORAL NIGHTLY
DISCHARGE
Start: 2023-05-26

## 2023-05-26 RX ORDER — HYDROXYZINE PAMOATE 50 MG/1
50 CAPSULE ORAL 3 TIMES DAILY PRN
Qty: 15 CAPSULE | Refills: 0 | Status: SHIPPED | OUTPATIENT
Start: 2023-05-26 | End: 2023-06-09

## 2023-05-26 RX ORDER — PREDNISONE 20 MG/1
40 TABLET ORAL DAILY
Status: DISCONTINUED | OUTPATIENT
Start: 2023-05-27 | End: 2023-05-26 | Stop reason: HOSPADM

## 2023-05-26 RX ADMIN — BUDESONIDE 500 MCG: 0.25 INHALANT RESPIRATORY (INHALATION) at 06:23

## 2023-05-26 RX ADMIN — ATORVASTATIN CALCIUM 40 MG: 40 TABLET, FILM COATED ORAL at 09:41

## 2023-05-26 RX ADMIN — CARVEDILOL 6.25 MG: 6.25 TABLET, FILM COATED ORAL at 09:40

## 2023-05-26 RX ADMIN — PERFLUTREN 1.5 ML: 6.52 INJECTION, SUSPENSION INTRAVENOUS at 11:07

## 2023-05-26 RX ADMIN — IPRATROPIUM BROMIDE AND ALBUTEROL SULFATE 3 ML: .5; 2.5 SOLUTION RESPIRATORY (INHALATION) at 09:49

## 2023-05-26 RX ADMIN — ACETYLCYSTEINE 400 MG: 100 INHALANT RESPIRATORY (INHALATION) at 06:24

## 2023-05-26 RX ADMIN — IPRATROPIUM BROMIDE AND ALBUTEROL SULFATE 3 ML: .5; 2.5 SOLUTION RESPIRATORY (INHALATION) at 01:46

## 2023-05-26 RX ADMIN — GUAIFENESIN 600 MG: 600 TABLET, EXTENDED RELEASE ORAL at 09:40

## 2023-05-26 RX ADMIN — PREDNISONE 20 MG: 20 TABLET ORAL at 09:46

## 2023-05-26 RX ADMIN — CARVEDILOL 6.25 MG: 6.25 TABLET, FILM COATED ORAL at 16:50

## 2023-05-26 RX ADMIN — INSULIN LISPRO 1 UNITS: 100 INJECTION, SOLUTION INTRAVENOUS; SUBCUTANEOUS at 16:51

## 2023-05-26 RX ADMIN — IPRATROPIUM BROMIDE AND ALBUTEROL SULFATE 1 AMPULE: 2.5; .5 SOLUTION RESPIRATORY (INHALATION) at 06:24

## 2023-05-26 RX ADMIN — ACETYLCYSTEINE 400 MG: 100 INHALANT RESPIRATORY (INHALATION) at 01:47

## 2023-05-26 RX ADMIN — SODIUM CHLORIDE, PRESERVATIVE FREE 10 ML: 5 INJECTION INTRAVENOUS at 09:45

## 2023-05-26 RX ADMIN — AZITHROMYCIN MONOHYDRATE 250 MG: 250 TABLET ORAL at 09:41

## 2023-05-26 RX ADMIN — ENOXAPARIN SODIUM 40 MG: 100 INJECTION SUBCUTANEOUS at 09:40

## 2023-05-26 RX ADMIN — ACETYLCYSTEINE 400 MG: 100 INHALANT RESPIRATORY (INHALATION) at 09:49

## 2023-05-26 RX ADMIN — HYDROXYZINE PAMOATE 50 MG: 25 CAPSULE ORAL at 09:40

## 2023-05-26 NOTE — PROGRESS NOTES
2 more attempts made to call nurse to nurse report. No answer.
[place]  Department of Internal Medicine  Division of Pulmonary, Critical Care and Sleep Medicine  Consult Note      SUBJECTIVE: Mr. Krissy Olvera was seen and examined at the bedside. He continues to complain of shortness of breath, remains on his baseline O2 3L nc but does not feel he is back to baseline. Refusing to work with physical therapy. Discussed option of HHC at discharge which he had adamantly rejected. Lives at home with fiance. Remains with expiratory wheezing on auscultation. Assessment and Plan:    Acute on chronic hypoxic and hypercapnic respiratory failure, 2/2 severe COPD exacerbated by acute parainfluenza virus. Acute exacerbation of COPD, 2/2 parainfluenza virus  Severe COPD, last PFT 4/19/2023  + Parainfluenza virus   Former nicotine abuse, quit 8/2/2018  DERECK--resolved  Hypertension  Dyslipidemia  Type 2 DM    Plan of care:    Continue solumedrol 40 mg IV Q 12 hrs, will likely transition to oral tomorrow  Monitor blood sugars closely while on steroids  Continue Pulmicort, Brovana and duoneb  Continue mucomyst and Mucinex  S/P Levofloxacin for acute exacerbation of COPD  Optimized bronchopulmonary hygiene, incentive spirometer and flutter valve  Supplemental oxygen to keep sat 88 to 94%--at baseline 3L  Respiratory viral panel was positive for parainfluenza 3. Incentive spirometer and flutter valve--encouraged use  DVT prophylaxis-Lovenox  Vest therapy every 4 hours. Has appointment scheduled at [facility] for evaluation for lung transplant, will need rescheduled. Last seen by pulmonary there was April 19, 2023  Will need outpatient follow up with his primary pulmonology group, [facility]    History of Present Illness:     Patient is a 56-year-old man with above mentioned medical problems who is undergoing lung transplant evaluation at [facility].   He was recently admitted for acute exacerbation of COPD and was admitted between April
[place]  Department of Internal Medicine  Division of Pulmonary, Critical Care and Sleep Medicine  Consult Note      SUBJECTIVE: Mr. Na Mccarthy was seen and examined at the bedside. Remains on baseline 3L. No significant overnight events. No new complaints. Refused chest vest therapy this morning. Encouraged to work with PT today. 5/25/2023  COPD patient readmitted after recent admission within few days due to Parainfluenza   Virus 3 5/17. Stable and at base line & Ok to discharge in 1-2 days  Will need OP pulmonary rehab after 4-6 wks  Will need Pulmonary F/U    Assessment and Plan:    Chronic hypoxic respiratory failure, 2/2 severe COPD e  Acute exacerbation of COPD, 2/2 parainfluenza virus  Severe COPD, last PFT 4/19/2023  + Parainfluenza virus   Former nicotine abuse, quit 8/2/2018  DERECK--resolved  Hypertension  Dyslipidemia  Type 2 DM    Plan of care:    Transition to oral prednisone 20 mg po daily  Monitor blood sugars closely while on steroids  Continue Pulmicort, Brovana and duoneb  Continue mucomyst and Mucinex  S/P Levofloxacin for acute exacerbation of COPD  Optimized bronchopulmonary hygiene, incentive spirometer and flutter valve  Supplemental oxygen to keep sat 88 to 94%--at baseline 3L  Respiratory viral panel was positive for parainfluenza 3. Incentive spirometer and flutter valve--encouraged use  DVT prophylaxis-Lovenox  Vest therapy every 4 hours. Has appointment scheduled at [facility] for evaluation for lung transplant, will need rescheduled. Last seen by pulmonary there was April 19, 2023  Will need outpatient follow up with his primary pulmonology group, [facility]  Discharge planning, disposition home vs [facility]    History of Present Illness:     Patient is a 54-year-old man with above mentioned medical problems who is undergoing lung transplant evaluation at [facility].   He was recently admitted for acute exacerbation of COPD and was
Asked patient if we could do the chest vest therapy. Patient adamantly refused saying he doesn't do that and he will not do that. Refusal for chest vest noted.
Attempted to call report for the third time to [phone] to Pending sale to [facility]
Attempted twice to call nurse to nurse report to [facility].
Attempted tx with pt, however pt refused at this time 2* to c/o difficulty breathing after eating breakfast. Pt required increased time to encourage pt 2* to [facility] and therapist writing for communication. Will attempt tx with pt at later time/date. Matty Dumont, [address]
Attempted tx with pt, when pt sleeping and was startled and placed hearing aid in; Pt however  refused 2* to \"not feeling well\" and states \"I cannot do anything today\". Pt provided with max encouragement. Will attempt tx with pt at later time/date.  Georgiana Love, [address]
Comprehensive Nutrition Assessment    Type and Reason for Visit:  Initial (LOS)    Nutrition Recommendations/Plan:   Will continue to monitor. Nutrition Assessment:    Pt adm d/t SOB w/ resp failure 2/2 COPD exacerbation d/t parainfluenza. Note DERECK-resolved. Hx DM. Pt w/ adquate PO - will continue to monitor. Nutrition Related Findings:    A&Ox4, +bs, round/soft abd, -11L I/Os   Wound Type: None       Current Nutrition Intake & Therapies:    Average Meal Intake: %  Average Supplements Intake: None Ordered  ADULT DIET; Regular; 4 carb choices (60 gm/meal)    Anthropometric Measures:  Height: 5' 10\" (177.8 cm)  Ideal Body Weight (IBW): 166 lbs (75 kg)    Admission Body Weight: 207 lb 12.8 oz (94.3 kg) (5/16 bed)  Current Body Weight: 207 lb 12.8 oz (94.3 kg) (5/16 bed),   IBW. Current BMI (kg/m2): 29.8  Usual Body Weight: 212 lb 8 oz (96.4 kg) (1/2023 actual x 4 months)  % Weight Change (Calculated): -2.2  Weight Adjustment For: No Adjustment  BMI Categories: Overweight (BMI 25.0-29. 9)    Nutrition Diagnosis:   No nutrition diagnosis at this time     Nutrition Interventions:   Food and/or Nutrient Delivery: Continue Current Diet  Nutrition Education/Counseling: No recommendation at this time  Coordination of Nutrition Care: Continue to monitor while inpatient    Goals:  Goals: PO intake 75% or greater    Nutrition Monitoring and Evaluation:   Behavioral-Environmental Outcomes: None Identified  Food/Nutrient Intake Outcomes: Food and Nutrient Intake  Physical Signs/Symptoms Outcomes: Biochemical Data, Nutrition Focused Physical Findings, Skin, Weight, Chewing or Swallowing, GI Status, Fluid Status or Edema    Discharge Planning:    No discharge needs at this time     Tsering Tamayo, MS, RD, LD  Contact: [phone]
Department of Internal Medicine  General Internal Medicine  Attending Progress Note  Chief Complaint   Patient presents with    Shortness of Breath     D/c from here recently for pna, increased sob since then, 125 solumedrol and 1 duoneb given      SUBJECTIVE:    dyspnea not worse. But not at baseline or close. He feels uncomfortable with sob. No nausea or vomiting. Aware of plans to continue current medication while awaiting for gradual improvement.     OBJECTIVE      Medications    Current Facility-Administered Medications: arformoterol tartrate (BROVANA) nebulizer solution 15 mcg, 15 mcg, Nebulization, BID  methylPREDNISolone sodium succ (SOLU-MEDROL) injection 40 mg, 40 mg, IntraVENous, Q12H  traZODone (DESYREL) tablet 50 mg, 50 mg, Oral, Nightly  hydrOXYzine pamoate (VISTARIL) capsule 50 mg, 50 mg, Oral, TID  melatonin disintegrating tablet 10 mg, 10 mg, Oral, Nightly  glipiZIDE (GLUCOTROL) tablet 5 mg, 5 mg, Oral, QAM AC  ipratropium 0.5 mg-albuterol 2.5 mg (DUONEB) nebulizer solution 1 ampule, 1 ampule, Inhalation, Q4H WA  glucose chewable tablet 16 g, 4 tablet, Oral, PRN  dextrose bolus 10% 125 mL, 125 mL, IntraVENous, PRN **OR** dextrose bolus 10% 250 mL, 250 mL, IntraVENous, PRN  glucagon (rDNA) injection 1 mg, 1 mg, SubCUTAneous, PRN  dextrose 10 % infusion, , IntraVENous, Continuous PRN  acetylcysteine (MUCOMYST) 10 % solution 400 mg, 4 mL, Inhalation, Q4H  insulin lispro (HUMALOG) injection vial 0-4 Units, 0-4 Units, SubCUTAneous, TID WC  insulin lispro (HUMALOG) injection vial 0-4 Units, 0-4 Units, SubCUTAneous, Nightly  atorvastatin (LIPITOR) tablet 40 mg, 40 mg, Oral, Daily  carvedilol (COREG) tablet 3.125 mg, 3.125 mg, Oral, BID WC  guaiFENesin (MUCINEX) extended release tablet 600 mg, 600 mg, Oral, BID  ipratropium-albuterol (DUONEB) nebulizer solution 3 mL, 1 vial, Inhalation, Q6H PRN  QUEtiapine (SEROQUEL XR) extended release tablet 200 mg, 200 mg, Oral, Nightly  sodium chloride flush 0.9 %
Department of Internal Medicine  General Internal Medicine  Attending Progress Note  Chief Complaint   Patient presents with    Shortness of Breath     D/c from here recently for pna, increased sob since then, 125 solumedrol and 1 duoneb given      SUBJECTIVE:    dyspnea not worse. No nausea or vomiting. Aware of plans to continue current medication while awaiting for gradual improvement.     OBJECTIVE      Medications    Current Facility-Administered Medications: arformoterol tartrate (BROVANA) nebulizer solution 15 mcg, 15 mcg, Nebulization, BID  methylPREDNISolone sodium succ (SOLU-MEDROL) injection 40 mg, 40 mg, IntraVENous, Q12H  traZODone (DESYREL) tablet 50 mg, 50 mg, Oral, Nightly  hydrOXYzine pamoate (VISTARIL) capsule 50 mg, 50 mg, Oral, TID  melatonin disintegrating tablet 10 mg, 10 mg, Oral, Nightly  glipiZIDE (GLUCOTROL) tablet 5 mg, 5 mg, Oral, QAM AC  ipratropium 0.5 mg-albuterol 2.5 mg (DUONEB) nebulizer solution 1 ampule, 1 ampule, Inhalation, Q4H WA  glucose chewable tablet 16 g, 4 tablet, Oral, PRN  dextrose bolus 10% 125 mL, 125 mL, IntraVENous, PRN **OR** dextrose bolus 10% 250 mL, 250 mL, IntraVENous, PRN  glucagon (rDNA) injection 1 mg, 1 mg, SubCUTAneous, PRN  dextrose 10 % infusion, , IntraVENous, Continuous PRN  acetylcysteine (MUCOMYST) 10 % solution 400 mg, 4 mL, Inhalation, Q4H  insulin lispro (HUMALOG) injection vial 0-4 Units, 0-4 Units, SubCUTAneous, TID WC  insulin lispro (HUMALOG) injection vial 0-4 Units, 0-4 Units, SubCUTAneous, Nightly  atorvastatin (LIPITOR) tablet 40 mg, 40 mg, Oral, Daily  carvedilol (COREG) tablet 3.125 mg, 3.125 mg, Oral, BID WC  guaiFENesin (MUCINEX) extended release tablet 600 mg, 600 mg, Oral, BID  ipratropium-albuterol (DUONEB) nebulizer solution 3 mL, 1 vial, Inhalation, Q6H PRN  QUEtiapine (SEROQUEL XR) extended release tablet 200 mg, 200 mg, Oral, Nightly  sodium chloride flush 0.9 % injection 5-40 mL, 5-40 mL, IntraVENous, 2 times per day  sodium
Department of Internal Medicine  General Internal Medicine  Attending Progress Note  Chief Complaint   Patient presents with    Shortness of Breath     D/c from here recently for pna, increased sob since then, 125 solumedrol and 1 duoneb given      SUBJECTIVE:    dyspnea not worse. agrees to work with pt ot today  OBJECTIVE      Medications    Current Facility-Administered Medications: insulin glargine (LANTUS) injection vial 15 Units, 15 Units, SubCUTAneous, Nightly  arformoterol tartrate (BROVANA) nebulizer solution 15 mcg, 15 mcg, Nebulization, BID  methylPREDNISolone sodium succ (SOLU-MEDROL) injection 40 mg, 40 mg, IntraVENous, Q12H  traZODone (DESYREL) tablet 50 mg, 50 mg, Oral, Nightly  hydrOXYzine pamoate (VISTARIL) capsule 50 mg, 50 mg, Oral, TID  melatonin disintegrating tablet 10 mg, 10 mg, Oral, Nightly  ipratropium 0.5 mg-albuterol 2.5 mg (DUONEB) nebulizer solution 1 ampule, 1 ampule, Inhalation, Q4H WA  glucose chewable tablet 16 g, 4 tablet, Oral, PRN  dextrose bolus 10% 125 mL, 125 mL, IntraVENous, PRN **OR** dextrose bolus 10% 250 mL, 250 mL, IntraVENous, PRN  glucagon (rDNA) injection 1 mg, 1 mg, SubCUTAneous, PRN  dextrose 10 % infusion, , IntraVENous, Continuous PRN  acetylcysteine (MUCOMYST) 10 % solution 400 mg, 4 mL, Inhalation, Q4H  insulin lispro (HUMALOG) injection vial 0-4 Units, 0-4 Units, SubCUTAneous, TID WC  insulin lispro (HUMALOG) injection vial 0-4 Units, 0-4 Units, SubCUTAneous, Nightly  atorvastatin (LIPITOR) tablet 40 mg, 40 mg, Oral, Daily  carvedilol (COREG) tablet 3.125 mg, 3.125 mg, Oral, BID WC  guaiFENesin (MUCINEX) extended release tablet 600 mg, 600 mg, Oral, BID  ipratropium-albuterol (DUONEB) nebulizer solution 3 mL, 1 vial, Inhalation, Q6H PRN  QUEtiapine (SEROQUEL XR) extended release tablet 200 mg, 200 mg, Oral, Nightly  sodium chloride flush 0.9 % injection 5-40 mL, 5-40 mL, IntraVENous, 2 times per day  sodium chloride flush 0.9 % injection 5-40 mL, 5-40 mL,
Physical Therapy    Room #:   0521/0521-01    Date: 2023       Patient Name: Nash Agosto  : 1955      MRN: [id number]     Patient unavailable for physical therapy treatment due to refusal.  States \"I'm not doing anything if I can't breathe\" therapist educated importance of mobility in the hospital setting. Spo2 at 96% on 3L NC. Will attempt PT treatment at a later time/ or date. Thank you.       Linsey Atkins, PTA
Physical Therapy    Room #:   0521/0521-01    Date: 2023       Patient Name: Theodore Farnsworth  : 1955      MRN: [id number]     Patient unavailable for physical therapy treatment due to adamant refusal.  States \"I'll do therapy when I'm ready, I'm not mentally prepared, and I can't breathe\" Therapist educated importance of mobility in the hospital setting, patient also states he needs to do a \"6 minute walk test for a transplant\", therapist asked patient how is he going to prepare for that if he isn't getting up with therapy at the hospital. States he \"will use the bike at home\", therapist educated on endurance and stamina while in the hospital to help get prepared for the testing. Patient still refused. Will attempt PT treatment at a later time/ or date. Thank you.       Sunday Aldrich, PTA
Physical Therapy Treatment Note/Plan of Care    Room #:  0521/0521-01  Patient Name: Radha Schmidt  YOB: 1955  MRN: [id number]    Date of Service: 5/25/2023     Tentative placement recommendation: Subacute Rehab  Equipment recommendation: To be determined      Evaluating Physical Therapist: Christiano Rousseau #[id number]      Specific Provider Orders/Date/Referring Provider :   05/16/23 1600    PT evaluation and treat  Start:  05/16/23 1600,   End:  05/16/23 1600,   ONE TIME,   Standing Count:  1 Occurrences,   Osbaldo Paiz MD     Admitting Diagnosis:   COPD exacerbation ([address] [J44.1]  Acute respiratory failure with hypoxia ([address] [J96.01]  Acute on chronic respiratory failure ([address] [J96.20]      Surgery: none      Patient Active Problem List   Diagnosis    COPD exacerbation ([address]    History of tobacco abuse    Primary hypertension    Depression    Left cataract    Acute on chronic respiratory failure with hypoxia (HCC)    Acute respiratory failure with hypoxia (HCC)    Acute on chronic respiratory failure (HCC)        ASSESSMENT of Current Deficits Patient exhibits decreased strength, balance, and endurance impairing functional mobility, transfers, gait , gait distance, tolerance to activity, and participation. Patient with shortness of breath with spo2 above 90% however HR increased into the 140s. Patient very fatigued after gait, therapist educated patient to go to bedside commode however patient kept ambulating to restroom; patient unaware of deficits. Pt fatigues very quickly with minimal exertion.       PHYSICAL THERAPY  PLAN OF CARE       Physical therapy plan of care is established based on physician order,  patient diagnosis and clinical assessment    Current Treatment Recommendations:    -Bed Mobility: Lower extremity exercises   -Sitting Balance: Incorporate reaching activities to activate trunk muscles   -Standing Balance: Perform strengthening exercises in standing to promote
Physical Therapy Treatment Note/Plan of Care    Room #:  0521/0521-01  Patient Name: Sue Mauro  YOB: 1955  MRN: [id number]    Date of Service: 5/24/2023     Tentative placement recommendation: Subacute unless patient meets goals then Home Health Physical Therapy  Equipment recommendation: To be determined      Evaluating Physical Therapist: Christiano Raymundo #[id number]      Specific Provider Orders/Date/Referring Provider :   05/16/23 1600    PT evaluation and treat  Start:  05/16/23 1600,   End:  05/16/23 1600,   ONE TIME,   Standing Count:  1 Occurrences,   Michelle Paiz MD     Admitting Diagnosis:   COPD exacerbation ([address] [J44.1]  Acute respiratory failure with hypoxia ([address] [J96.01]  Acute on chronic respiratory failure ([address] [J96.20]      Surgery: none      Patient Active Problem List   Diagnosis    COPD exacerbation ([address]    History of tobacco abuse    Primary hypertension    Depression    Left cataract    Acute on chronic respiratory failure with hypoxia (HCC)    Acute respiratory failure with hypoxia (HCC)    Acute on chronic respiratory failure (HCC)        ASSESSMENT of Current Deficits Patient exhibits decreased strength, balance, and endurance impairing functional mobility, transfers, gait , gait distance, tolerance to activity, and participation. Pt with supervision for bed mobility and sit to stand transfers but unable to take any steps due to shortness of breath and impaired strength. Pt needing rest periods throughout tx session due to shortness of breath. Pt on 3L and his SPO2 was 95-96% throughout tx session. Pt fatigues very quickly with minimal exertion.       PHYSICAL THERAPY  PLAN OF CARE       Physical therapy plan of care is established based on physician order,  patient diagnosis and clinical assessment    Current Treatment Recommendations:    -Bed Mobility: Lower extremity exercises   -Sitting Balance: Incorporate reaching activities to activate trunk
[place]  Department of Internal Medicine  Division of Pulmonary, Critical Care and Sleep Medicine  Consult Note      SUBJECTIVE: Mr. Cee Bowling was seen and examined at the bedside. He continues to complain of shortness of breath, remains on his baseline O2 3L nc. Assessment and Plan:    Acute on chronic hypoxic and hypercapnic respiratory failure, 2/2 severe COPD exacerbated by acute parainfluenza virus. Acute exacerbation of COPD, 2/2 parainfluenza virus  Severe COPD, last PFT 4/19/2023  + Parainfluenza virus   Former nicotine abuse, quit 8/2/2018  DERECK--resolved  Hypertension  Dyslipidemia  Type 2 DM    Plan of care:    Decrease solumedrol to 40 mg IV Q 12 hrs  Monitor blood sugars closely while on steroids  Continue Pulmicort, Brovana and duoneb  Continue mucomyst and Mucinex  S/P Levofloxacin for acute exacerbation of COPD  Optimized bronchopulmonary hygiene, incentive spirometer and flutter valve  Supplemental oxygen to keep sat 88 to 94%  Respiratory viral panel was positive for parainfluenza 3. Incentive spirometer  DVT prophylaxis-Lovenox  Vest therapy every 4 hours. Has appointment scheduled at [facility] for evaluation for lung transplant, will need rescheduled. Last seen by pulmonary there was April 19, 2023    History of Present Illness:     Patient is a 27-year-old man with above mentioned medical problems who is undergoing lung transplant evaluation at [facility]. He was recently admitted for acute exacerbation of COPD and was admitted between April 30, 2023 to May 10, 2023. He reported worsening shortness of breath for 2 days associated with productive cough of yellow sputum. Patient presented to the emergency room and was on to have excessive wheezing. He was initially placed on BiPAP therapy and later on switched to low-flow nasal cannula. He endorses dyspnea with exertion and conversation.     Daily progress:    May 17, 2023: Patient continues to
[place]  Department of Internal Medicine  Division of Pulmonary, Critical Care and Sleep Medicine  Consult Note      SUBJECTIVE: Mr. Lashaun Curry was seen and examined at the bedside. Remains on baseline 3L. No significant overnight events. No new complaints. Encourage chest vest therapy      Assessment and Plan:    Chronic hypoxic respiratory failure, 2/2 severe COPD e  Acute exacerbation of COPD, 2/2 parainfluenza virus  Severe COPD, last PFT 4/19/2023  + Parainfluenza virus   Former nicotine abuse, quit 8/2/2018  DERECK--resolved  Hypertension  Dyslipidemia  Type 2 DM    Plan of care:    Obtain CMP, CBC, mag, phos and CXR  Increase prednisone to 40 mg po daily  Azithromycin MWF  Monitor blood sugars closely while on steroids  Continue Pulmicort and duoneb  Continue mucomyst and Mucinex  S/P Levofloxacin for acute exacerbation of COPD  Optimized bronchopulmonary hygiene, incentive spirometer and flutter valve  Supplemental oxygen to keep sat 88 to 94%--at baseline 3L  Respiratory viral panel was positive for parainfluenza 3. DVT prophylaxis-Lovenox  Vest therapy every 4 hours. Has appointment scheduled at [facility] for evaluation for lung transplant, will need rescheduled. Last seen by pulmonary there was April 19, 2023  Will need outpatient follow up with his primary pulmonology group, [facility]  Discharge planning, [facility]    History of Present Illness:     Patient is a 78-year-old man with above mentioned medical problems who is undergoing lung transplant evaluation at [facility]. He was recently admitted for acute exacerbation of COPD and was admitted between April 30, 2023 to May 10, 2023. He reported worsening shortness of breath for 2 days associated with productive cough of yellow sputum. Patient presented to the emergency room and was on to have excessive wheezing.   He was initially placed on BiPAP therapy and later on switched to low-flow nasal
Walked with patient only about 20 steps in his room, because pt claims he feels too weak. . Pulse ox remained at 96% on patients baseline of 3l nasal cannula. Explained in length to pt the importance of activity, working with PT/OT, doing his breathing exercises.
08:10 AM    RBC 4.06 05/17/2023 08:10 AM    HGB 13.0 05/17/2023 08:10 AM    HCT 40.1 05/17/2023 08:10 AM    MCV 98.8 05/17/2023 08:10 AM    MCH 32.0 05/17/2023 08:10 AM    MCHC 32.4 05/17/2023 08:10 AM    RDW 13.3 05/17/2023 08:10 AM     05/17/2023 08:10 AM    MPV 9.2 05/17/2023 08:10 AM     BMP:    Lab Results   Component Value Date/Time     05/17/2023 08:10 AM    K 3.9 05/17/2023 08:10 AM     05/17/2023 08:10 AM    CO2 24 05/17/2023 08:10 AM    BUN 20 05/17/2023 08:10 AM    LABALBU 3.3 05/17/2023 08:10 AM    CREATININE 0.9 05/17/2023 08:10 AM    CALCIUM 8.6 05/17/2023 08:10 AM    GFRAA >60 08/29/2022 05:54 AM    LABGLOM >60 05/17/2023 08:10 AM    GLUCOSE 174 05/17/2023 08:10 AM       ASSESSMENT AND PLAN    Patient was here from April 30 until May 9 and then discharged after treated for COPD exacerbation. He returned on May 16 with shortness of breath was slowly worsening for 2 days. So he returned and was diagnosed for another COPD exacerbation due to parainfluenza 3 virus    Acute on chronic respiratory failure with hypoxia: resolved -- continue current breathing treatment. Duoneb every 12 hrs, stop Brovana (likely causing sinus tachycardia), cont Pulmicort respules. Intensive RT. Continue O2 -- no need to wean, at baseline O2. COPD Exacerbation due to parainfluenza 3 virus: resolved -- continue breathing treatment. Pulmonary adjusted steroid. Follow up with [facility] transplant center when improved (he is already seeing them, I reviewed his note where he saw the transplant doc back in April). S/P Levofloxacin. Continue IS, flutter valve, and chest vest per pulmonary. Exhibiting objective signs of stability so plan for discharge on 5/25 unless pulmonary objects,  IF [facility] accepts placement  Hypertension Essential: continue current management  Dm type 2 due to Steroid: glipizide up to 5 mg but since still spiking 265 so stop and switch to lantus 15 units hs.  [facility] continue to
ADL/functional transfers/mobility tasks. Pt would benefit from continued skilled OT to increase safety and independence with completion of ADL/IADL tasks for functional independence and quality of life. Pt required cues and education as noted above for safe facilitation and completion of tasks. Therapist provided skilled monitoring of patient's response during treatment session. Prior to and at the end of session, environmental modifications /O2 line management completed for patients safety and efficiency of treatment session. Overall, patient demonstrates moderate difficulties with completion of BADLs and IADLs. Factors contributing to these difficulties include SOB with minimal activities, decreased endurance, and generalized weakness. As noted above, patient likely to benefit from further OT intervention to increase independence, safety, and overall quality of life. Treatment:     Bed mobility: Facilitated bed mobility with cues for proper body mechanics and sequencing to prepare for ADL completion. Functional transfers: Facilitated transfers from EOB to transport cart with cues for body alignment, safety and hand placement. Assist for walker navigation and O2 line management  ADL completion: Self-care retraining for the above-mentioned ADLs; training on proper hand placement, safety technique, sequencing, and energy conservation techniques. Donning socks  Postural Balance: Sitting/standing balance retraining to improve righting reactions with postural changes during ADLs.   Skilled positioning: Proper positioning to improve interaction with environment, overall functioning and to ease breathing with head of cart elevated     Pt has made fair minus progress towards set goals    OT 1-3 days/wk for 2 weeks PRN     Treatment Time also includes thorough review of current medical information, gathering information on past medical history/social history and prior level of function, informal observation of
balance and activity tolerance. Patient performed seated exercise, x 3 scoots to [facility] and assisted to supine. Patient unable to tolerate increased activity this date. Therapeutic Exercises:  ankle pumps, heel raises, long arc quad, and seated marching x 10 reps. At end of session, patient in bed with     call light and phone within reach,  all lines and tubes intact, nursing notified. Patient would benefit from continued skilled Physical Therapy to improve functional independence and quality of life.          Patient's/ family goals   home    Time in 1142  Time out 1151    Total Treatment Time  9 minutes        CPT codes:    Therapeutic activities (75896)   9 minutes  1 unit(s)    Naomi Spears PTA  LIC # [id number]
Normal S1 , S2, regular rate and rhythm, no murmur, rub or gallop  Abdomen: Normal sounds present, soft, lax with no tenderness, no hepatosplenomegaly, and no masses  Extremities: No edema. Pulses are equally present. Skin: intact, no rashes   Neurologic: Alert and oriented x 3, No focal deficit     Investigations:  Labs, radiological imaging and cardiac work up were reviewed    CHEST X-RAY May 21, 2023:    FINDINGS:  Adequate and symmetric aeration of the lungs. There are no formed  consolidations, pleural effusions, or pneumothoraces. Trachea and central  mainstem bronchi appear clear. Atherosclerotic disease in the aortic arch. The cardiomediastinal silhouette and pulmonary vascularity appear within  normal limits. Osseous and thoracic soft tissue structures demonstrate no  acute findings. Partially imaged postsurgical changes in the cervical spine. IMPRESSION:  Atherosclerotic disease. No additional evidence of active cardiopulmonary  pathology.           Case and plan discussed with Dr. Alejandra Bran  Electronically signed by OSIRIS Perez CNP on 5/24/2023 at 12:23 PM

## 2023-05-26 NOTE — DISCHARGE INSTR - COC
Indicated Last Indicated By Review Planned Expiration Resolved Resolved By    Parainfluenza 05/17/23 05/17/23 05/17/23 Respiratory Panel, Molecular, with COVID-19 (Restricted: peds pts or suitable admitted adults) 05/24/23 05/27/23      Resolved    COVID-19 (Rule Out) 05/17/23 05/17/23 05/17/23 Respiratory Panel, Molecular, with COVID-19 (Restricted: peds pts or suitable admitted adults) (Ordered)   05/17/23 Rule-Out Test Resulted    COVID-19 (Rule Out) 05/16/23 05/16/23 05/16/23 COVID-19, Rapid (Ordered)   05/16/23 Rule-Out Test Resulted    COVID-19 (Rule Out) 04/30/23 04/30/23 04/30/23 COVID-19, Rapid (Ordered)   04/30/23 Rule-Out Test Resulted    COVID-19 (Rule Out) 01/23/23 01/23/23 01/23/23 Respiratory Panel, Molecular, with COVID-19 (Restricted: peds pts or suitable admitted adults) (Ordered)   01/23/23 Rule-Out Test Resulted    COVID-19 (Rule Out) 01/23/23 01/23/23 01/23/23 COVID-19, Rapid (Ordered)   01/23/23 Rule-Out Test Resulted    COVID-19 (Rule Out) 08/22/22 08/22/22 08/22/22 Respiratory Panel, Molecular, with COVID-19 (Restricted: peds pts or suitable admitted adults) (Ordered)   08/22/22 Rule-Out Test Resulted    COVID-19 (Rule Out) 08/21/22 08/21/22 08/21/22 COVID-19, Rapid (Ordered)   08/21/22 Rule-Out Test Resulted    COVID-19 (Rule Out) 09/08/21 09/08/21 09/08/21 Respiratory Panel, Molecular, with COVID-19 (Restricted: peds pts or suitable admitted adults) (Ordered)   09/08/21 Rule-Out Test Resulted    COVID-19 (Rule Out) 09/07/21 09/07/21 09/07/21 COVID-19, Rapid (Ordered)   09/07/21 Rule-Out Test Resulted            Nurse Assessment:  Last Vital Signs: BP (!) 145/69   Pulse (!) 115   Temp 97.5 °F (36.4 °C) (Oral)   Resp 22   Ht 5' 10\" (1.778 m)   Wt 207 lb 12.8 oz (94.3 kg)   SpO2 97%   BMI 29.82 kg/m²     Last documented pain score (0-10 scale): Pain Level: 0  Last Weight:   Wt Readings from Last 1 Encounters:   05/16/23 207 lb 12.8 oz (94.3 kg)     Mental Status:  {IP PT MENTAL

## 2023-05-26 NOTE — DISCHARGE INSTR - DIET

## 2023-05-26 NOTE — CARE COORDINATION
5/26/2023 pt is close to discharge- pts s/o requests he be transferred to [facility]. He has an appointment on 5/30/23 for [facility], and transplant review. CM placed call to Dr. Stephanie Mcfarlane to speak to family. Pt declining any SNF at this time. He will need to make an outpt appointment to have his ears cleaned out. CM following. Electronically signed by BRANNON Mcdaniels on 5/26/2023 at 1:11 PM    Pt  has an appointment on 5/3023 at [facility]- he agrees for SNF at discharge no need for transfer to [facility] at this time. [facility] reviewing her Celia M to review. ASA coverage- fast precert, needs signed Nae Shepherd at discharge. Amb[facility]lette to transport versus family. Cm following. Electronically signed by BRANNON Mcdaniels on 5/26/2023 at 2:16 PM      [facility] Thayer--unable to accept- per [facility]-Ellie Navarro [facility] [address], [address]  [phone]  Fax: [phone]        HENS completed.  Electronically signed by Kendall Castro RN on 5/26/2023 at 4:21 PM

## 2023-05-26 NOTE — PLAN OF CARE
Problem: Discharge Planning  Goal: Discharge to home or other facility with appropriate resources  Recent Flowsheet Documentation  Taken 5/25/2023 2115 by Josiane Starkey RN  Discharge to home or other facility with appropriate resources: Identify barriers to discharge with patient and caregiver

## 2023-05-26 NOTE — CARE COORDINATION
5/26/2023 PCU, Isolation:  parainfluenza (+) 5/17/23, covid (-) 5/17/23. 3L nc vs bipap FIO2 40%. Po prednisone. Pt is agreeable to SNF- CM provided him with SNF list to review. Requested pt choice for 3 SNF and we will f/u with him in the morning for his choices. WILL NEED PRECERT, signed JCARLOS and HENS at discharge. CM waiting to hear from pt and family. Pt requested Audiology to review his single hearing aid- [place]. Left VM message at [phone] ext for someone to see pt and evaluate his hearing aides. Pt states he will speak to [facility] when she arrives about SNF choices. CM attempted to call her by phone and left  I needed to speak to her when she arrives. CM following.   Electronically signed by Pily Ansari RN-BC on 5/26/2023 at 9:42 AM

## 2023-06-01 NOTE — DISCHARGE SUMMARY
05/26/2023 01:10 PM     Phosphorus:    Lab Results   Component Value Date/Time    PHOS 4.0 05/26/2023 01:10 PM       Imaging:  XR CHEST PORTABLE  Result Date: 5/16/2023  EXAMINATION: ONE XRAY VIEW OF THE CHEST 5/16/2023 9:17 am COMPARISON: 30 April 2023 HISTORY: ORDERING SYSTEM PROVIDED HISTORY: sob TECHNOLOGIST PROVIDED HISTORY: Reason for exam:->sob FINDINGS: The lungs are without acute focal process. There is no effusion or pneumothorax. The cardiomediastinal silhouette is without acute process. The osseous structures are without acute process. No acute process. Patient Instructions:   Discharge Medication List as of 5/26/2023  5:48 PM        START taking these medications    Details   hydrOXYzine pamoate (VISTARIL) 50 MG capsule Take 1 capsule by mouth 3 times daily as needed for Itching or Anxiety (or sleep), Disp-15 capsule, R-0Normal      Budeson-Glycopyrrol-Formoterol 160-9-4.8 MCG/ACT AERO Inhale 2 puffs into the lungs in the morning and at bedtimeDC to [facility]      traZODone (DESYREL) 50 MG tablet Take 1 tablet by mouth nightlyDC to [facility]      insulin glargine (LANTUS) 100 UNIT/ML injection vial Inject 15 Units into the skin nightly, Disp-10 mL, R-3DC to [facility]      !! insulin lispro (HUMALOG) 100 UNIT/ML SOLN injection vial Inject 0-4 Units into the skin 3 times daily (with meals)DC to [facility]      !! insulin lispro (HUMALOG) 100 UNIT/ML SOLN injection vial Inject 0-4 Units into the skin nightlyDC to [facility]      polyethylene glycol (GLYCOLAX) 17 g packet Take 17 g by mouth daily as needed for Constipation, Disp-527 g, R-1DC to SNF      azithromycin (ZITHROMAX) 250 MG tablet Take 1 tablet by mouth daily This is a permanent/chronic med per the [facility] pulmonary team -- do not discontinueDC to [facility]      melatonin 5 MG TBDP disintegrating tablet Take 2 tablets by mouth nightlyDC to [facility]       ! ! - Potential duplicate medications found. Please discuss with provider.         CONTINUE these medications which

## 2025-03-14 ENCOUNTER — HOSPITAL ENCOUNTER (INPATIENT)
Age: 70
LOS: 6 days | Discharge: HOME | DRG: 193 | End: 2025-03-20
Attending: EMERGENCY MEDICINE | Admitting: FAMILY MEDICINE
Payer: MEDICARE

## 2025-03-14 ENCOUNTER — APPOINTMENT (OUTPATIENT)
Dept: GENERAL RADIOLOGY | Age: 70
DRG: 193 | End: 2025-03-14
Payer: MEDICARE

## 2025-03-14 DIAGNOSIS — R07.81 PLEURITIC CHEST PAIN: ICD-10-CM

## 2025-03-14 DIAGNOSIS — J44.1 ACUTE EXACERBATION OF CHRONIC OBSTRUCTIVE PULMONARY DISEASE (COPD) (HCC): Primary | ICD-10-CM

## 2025-03-14 PROBLEM — J96.11 CHRONIC RESPIRATORY FAILURE WITH HYPOXIA: Status: RESOLVED | Noted: 2023-05-26 | Resolved: 2025-03-14

## 2025-03-14 PROBLEM — B34.8 INFECTION DUE TO PARAINFLUENZA VIRUS 3: Status: RESOLVED | Noted: 2023-05-26 | Resolved: 2025-03-14

## 2025-03-14 PROBLEM — J18.9 COMMUNITY ACQUIRED PNEUMONIA, UNSPECIFIED LATERALITY: Status: ACTIVE | Noted: 2025-03-14

## 2025-03-14 PROBLEM — H26.9 LEFT CATARACT: Status: RESOLVED | Noted: 2022-11-15 | Resolved: 2025-03-14

## 2025-03-14 PROBLEM — J96.21 ACUTE ON CHRONIC RESPIRATORY FAILURE WITH HYPOXIA: Status: RESOLVED | Noted: 2023-04-30 | Resolved: 2025-03-14

## 2025-03-14 PROBLEM — J96.20 ACUTE ON CHRONIC RESPIRATORY FAILURE: Status: RESOLVED | Noted: 2023-05-16 | Resolved: 2025-03-14

## 2025-03-14 LAB
ALBUMIN SERPL-MCNC: 4.1 G/DL (ref 3.5–5.2)
ALLEN TEST: POSITIVE
ALLEN TEST: POSITIVE
ALP SERPL-CCNC: 122 U/L (ref 40–129)
ALT SERPL-CCNC: 19 U/L (ref 0–40)
ANION GAP SERPL CALCULATED.3IONS-SCNC: 11 MMOL/L (ref 7–16)
AST SERPL-CCNC: 23 U/L (ref 0–39)
BASOPHILS # BLD: 0.08 K/UL (ref 0–0.2)
BASOPHILS NFR BLD: 1 % (ref 0–2)
BILIRUB SERPL-MCNC: 0.3 MG/DL (ref 0–1.2)
BUN SERPL-MCNC: 14 MG/DL (ref 6–23)
CALCIUM SERPL-MCNC: 8.9 MG/DL (ref 8.6–10.2)
CHLORIDE SERPL-SCNC: 104 MMOL/L (ref 98–107)
CO2 SERPL-SCNC: 24 MMOL/L (ref 22–29)
CREAT SERPL-MCNC: 1.3 MG/DL (ref 0.7–1.2)
D-DIMER QUANTITATIVE: <200 NG/ML DDU (ref 0–230)
EOSINOPHIL # BLD: 0.2 K/UL (ref 0.05–0.5)
EOSINOPHILS RELATIVE PERCENT: 2 % (ref 0–6)
ERYTHROCYTE [DISTWIDTH] IN BLOOD BY AUTOMATED COUNT: 12.7 % (ref 11.5–15)
FIO2: 100
FIO2: 70
FLUAV RNA RESP QL NAA+PROBE: NOT DETECTED
FLUBV RNA RESP QL NAA+PROBE: NOT DETECTED
GFR, ESTIMATED: 58 ML/MIN/1.73M2
GLUCOSE SERPL-MCNC: 174 MG/DL (ref 74–99)
HCT VFR BLD AUTO: 45.7 % (ref 37–54)
HGB BLD-MCNC: 15.9 G/DL (ref 12.5–16.5)
IMM GRANULOCYTES # BLD AUTO: 0.03 K/UL (ref 0–0.58)
IMM GRANULOCYTES NFR BLD: 0 % (ref 0–5)
LYMPHOCYTES NFR BLD: 3.3 K/UL (ref 1.5–4)
LYMPHOCYTES RELATIVE PERCENT: 40 % (ref 20–42)
MCH RBC QN AUTO: 31.5 PG (ref 26–35)
MCHC RBC AUTO-ENTMCNC: 34.8 G/DL (ref 32–34.5)
MCV RBC AUTO: 90.7 FL (ref 80–99.9)
MODE: ABNORMAL
MONOCYTES NFR BLD: 1.12 K/UL (ref 0.1–0.95)
MONOCYTES NFR BLD: 14 % (ref 2–12)
NEUTROPHILS NFR BLD: 42 % (ref 43–80)
NEUTS SEG NFR BLD: 3.44 K/UL (ref 1.8–7.3)
O2 DELIVERY DEVICE: ABNORMAL
O2 DELIVERY DEVICE: ABNORMAL
PATIENT TEMP: 37
PATIENT TEMP: 37
PEEP: 6
PEEP: 8
PLATELET # BLD AUTO: 307 K/UL (ref 130–450)
PMV BLD AUTO: 9.3 FL (ref 7–12)
POC HCO3: 22.8 MMOL/L (ref 22–26)
POC HCO3: 25.7 MMOL/L (ref 22–26)
POC O2 SATURATION: 99.8 % (ref 92–98.5)
POC O2 SATURATION: 99.9 % (ref 92–98.5)
POC PCO2 TEMP: 28.5 MM HG
POC PCO2 TEMP: 41 MM HG
POC PCO2: 28.5 MM HG (ref 35–45)
POC PCO2: 41 MM HG (ref 35–45)
POC PH TEMP: 7.41
POC PH TEMP: 7.51
POC PH: 7.41 (ref 7.35–7.45)
POC PH: 7.51 (ref 7.35–7.45)
POC PO2 TEMP: 185.5 MM HG
POC PO2 TEMP: 291.7 MM HG
POC PO2: 185.5 MM HG (ref 60–80)
POC PO2: 291.7 MM HG (ref 60–80)
POSITIVE BASE EXCESS, ART: 0.9 MMOL/L (ref 0–3)
POSITIVE BASE EXCESS, ART: 1.1 MMOL/L (ref 0–3)
POTASSIUM SERPL-SCNC: 4.3 MMOL/L (ref 3.5–5)
PRESSURE SUPPORT: 12
PRESSURE SUPPORT: 16
PROT SERPL-MCNC: 6.6 G/DL (ref 6.4–8.3)
RBC # BLD AUTO: 5.04 M/UL (ref 3.8–5.8)
SAMPLE SITE: ABNORMAL
SAMPLE SITE: ABNORMAL
SARS-COV-2 RNA RESP QL NAA+PROBE: NOT DETECTED
SET RATE, POC: 18
SODIUM SERPL-SCNC: 139 MMOL/L (ref 132–146)
SOURCE: NORMAL
SPECIMEN DESCRIPTION: NORMAL
TROPONIN I SERPL HS-MCNC: 14 NG/L (ref 0–11)
TROPONIN I SERPL HS-MCNC: 9 NG/L (ref 0–11)
WBC OTHER # BLD: 8.2 K/UL (ref 4.5–11.5)

## 2025-03-14 PROCEDURE — 85379 FIBRIN DEGRADATION QUANT: CPT

## 2025-03-14 PROCEDURE — 5A09357 ASSISTANCE WITH RESPIRATORY VENTILATION, LESS THAN 24 CONSECUTIVE HOURS, CONTINUOUS POSITIVE AIRWAY PRESSURE: ICD-10-PCS | Performed by: INTERNAL MEDICINE

## 2025-03-14 PROCEDURE — 6360000002 HC RX W HCPCS

## 2025-03-14 PROCEDURE — 0202U NFCT DS 22 TRGT SARS-COV-2: CPT

## 2025-03-14 PROCEDURE — 84484 ASSAY OF TROPONIN QUANT: CPT

## 2025-03-14 PROCEDURE — 96366 THER/PROPH/DIAG IV INF ADDON: CPT

## 2025-03-14 PROCEDURE — 6360000002 HC RX W HCPCS: Performed by: FAMILY MEDICINE

## 2025-03-14 PROCEDURE — 82803 BLOOD GASES ANY COMBINATION: CPT

## 2025-03-14 PROCEDURE — 2500000003 HC RX 250 WO HCPCS: Performed by: FAMILY MEDICINE

## 2025-03-14 PROCEDURE — 94640 AIRWAY INHALATION TREATMENT: CPT

## 2025-03-14 PROCEDURE — 93005 ELECTROCARDIOGRAM TRACING: CPT

## 2025-03-14 PROCEDURE — 96375 TX/PRO/DX INJ NEW DRUG ADDON: CPT

## 2025-03-14 PROCEDURE — 99285 EMERGENCY DEPT VISIT HI MDM: CPT

## 2025-03-14 PROCEDURE — 6370000000 HC RX 637 (ALT 250 FOR IP): Performed by: FAMILY MEDICINE

## 2025-03-14 PROCEDURE — 80053 COMPREHEN METABOLIC PANEL: CPT

## 2025-03-14 PROCEDURE — 96365 THER/PROPH/DIAG IV INF INIT: CPT

## 2025-03-14 PROCEDURE — 2500000003 HC RX 250 WO HCPCS

## 2025-03-14 PROCEDURE — 85025 COMPLETE CBC W/AUTO DIFF WBC: CPT

## 2025-03-14 PROCEDURE — 86738 MYCOPLASMA ANTIBODY: CPT

## 2025-03-14 PROCEDURE — 6370000000 HC RX 637 (ALT 250 FOR IP)

## 2025-03-14 PROCEDURE — 1200000000 HC SEMI PRIVATE

## 2025-03-14 PROCEDURE — 71045 X-RAY EXAM CHEST 1 VIEW: CPT

## 2025-03-14 PROCEDURE — 2580000003 HC RX 258

## 2025-03-14 PROCEDURE — 87636 SARSCOV2 & INF A&B AMP PRB: CPT

## 2025-03-14 PROCEDURE — 99223 1ST HOSP IP/OBS HIGH 75: CPT | Performed by: FAMILY MEDICINE

## 2025-03-14 RX ORDER — 0.9 % SODIUM CHLORIDE 0.9 %
1000 INTRAVENOUS SOLUTION INTRAVENOUS ONCE
Status: COMPLETED | OUTPATIENT
Start: 2025-03-14 | End: 2025-03-14

## 2025-03-14 RX ORDER — IPRATROPIUM BROMIDE AND ALBUTEROL SULFATE 2.5; .5 MG/3ML; MG/3ML
1 SOLUTION RESPIRATORY (INHALATION) EVERY 4 HOURS PRN
Status: DISCONTINUED | OUTPATIENT
Start: 2025-03-14 | End: 2025-03-20 | Stop reason: HOSPADM

## 2025-03-14 RX ORDER — ACETAMINOPHEN 650 MG/1
650 SUPPOSITORY RECTAL EVERY 6 HOURS PRN
Status: DISCONTINUED | OUTPATIENT
Start: 2025-03-14 | End: 2025-03-20 | Stop reason: HOSPADM

## 2025-03-14 RX ORDER — AZITHROMYCIN 250 MG/1
500 TABLET, FILM COATED ORAL EVERY 24 HOURS
Status: COMPLETED | OUTPATIENT
Start: 2025-03-14 | End: 2025-03-16

## 2025-03-14 RX ORDER — SODIUM CHLORIDE 0.9 % (FLUSH) 0.9 %
5-40 SYRINGE (ML) INJECTION EVERY 12 HOURS SCHEDULED
Status: DISCONTINUED | OUTPATIENT
Start: 2025-03-14 | End: 2025-03-20 | Stop reason: HOSPADM

## 2025-03-14 RX ORDER — ENOXAPARIN SODIUM 100 MG/ML
40 INJECTION SUBCUTANEOUS DAILY
Status: DISCONTINUED | OUTPATIENT
Start: 2025-03-15 | End: 2025-03-20 | Stop reason: HOSPADM

## 2025-03-14 RX ORDER — SODIUM CHLORIDE 0.9 % (FLUSH) 0.9 %
5-40 SYRINGE (ML) INJECTION PRN
Status: DISCONTINUED | OUTPATIENT
Start: 2025-03-14 | End: 2025-03-20 | Stop reason: HOSPADM

## 2025-03-14 RX ORDER — MAGNESIUM SULFATE IN WATER 40 MG/ML
2000 INJECTION, SOLUTION INTRAVENOUS ONCE
Status: COMPLETED | OUTPATIENT
Start: 2025-03-14 | End: 2025-03-14

## 2025-03-14 RX ORDER — ACETAMINOPHEN 325 MG/1
650 TABLET ORAL EVERY 6 HOURS PRN
Status: DISCONTINUED | OUTPATIENT
Start: 2025-03-14 | End: 2025-03-20 | Stop reason: HOSPADM

## 2025-03-14 RX ORDER — GUAIFENESIN/DEXTROMETHORPHAN 100-10MG/5
5 SYRUP ORAL EVERY 4 HOURS PRN
Status: DISCONTINUED | OUTPATIENT
Start: 2025-03-14 | End: 2025-03-20 | Stop reason: HOSPADM

## 2025-03-14 RX ORDER — IPRATROPIUM BROMIDE AND ALBUTEROL SULFATE 2.5; .5 MG/3ML; MG/3ML
3 SOLUTION RESPIRATORY (INHALATION) CONTINUOUS PRN
Status: DISCONTINUED | OUTPATIENT
Start: 2025-03-14 | End: 2025-03-14 | Stop reason: SDUPTHER

## 2025-03-14 RX ADMIN — WATER 125 MG: 1 INJECTION INTRAMUSCULAR; INTRAVENOUS; SUBCUTANEOUS at 18:52

## 2025-03-14 RX ADMIN — MAGNESIUM SULFATE IN WATER 2000 MG: 40 INJECTION, SOLUTION INTRAVENOUS at 18:57

## 2025-03-14 RX ADMIN — WATER 1000 MG: 1 INJECTION INTRAMUSCULAR; INTRAVENOUS; SUBCUTANEOUS at 22:00

## 2025-03-14 RX ADMIN — SODIUM CHLORIDE 1000 ML: 0.9 INJECTION, SOLUTION INTRAVENOUS at 20:48

## 2025-03-14 RX ADMIN — IPRATROPIUM BROMIDE AND ALBUTEROL SULFATE 3 DOSE: .5; 3 SOLUTION RESPIRATORY (INHALATION) at 18:46

## 2025-03-14 RX ADMIN — AZITHROMYCIN DIHYDRATE 500 MG: 250 TABLET ORAL at 22:00

## 2025-03-14 RX ADMIN — Medication 10 ML: at 22:01

## 2025-03-14 ASSESSMENT — LIFESTYLE VARIABLES
HOW MANY STANDARD DRINKS CONTAINING ALCOHOL DO YOU HAVE ON A TYPICAL DAY: PATIENT DOES NOT DRINK
HOW OFTEN DO YOU HAVE A DRINK CONTAINING ALCOHOL: NEVER

## 2025-03-15 LAB
BILIRUB UR QL STRIP: NEGATIVE
CLARITY UR: CLEAR
COLOR UR: YELLOW
EKG ATRIAL RATE: 113 BPM
EKG P AXIS: 80 DEGREES
EKG P-R INTERVAL: 172 MS
EKG Q-T INTERVAL: 338 MS
EKG QRS DURATION: 126 MS
EKG QTC CALCULATION (BAZETT): 463 MS
EKG R AXIS: 80 DEGREES
EKG T AXIS: 76 DEGREES
EKG VENTRICULAR RATE: 113 BPM
GLUCOSE BLD-MCNC: 156 MG/DL (ref 74–99)
GLUCOSE BLD-MCNC: 164 MG/DL (ref 74–99)
GLUCOSE BLD-MCNC: 169 MG/DL (ref 74–99)
GLUCOSE BLD-MCNC: 179 MG/DL (ref 74–99)
GLUCOSE BLD-MCNC: 191 MG/DL (ref 74–99)
GLUCOSE UR STRIP-MCNC: 250 MG/DL
HGB UR QL STRIP.AUTO: NEGATIVE
KETONES UR STRIP-MCNC: 15 MG/DL
LEUKOCYTE ESTERASE UR QL STRIP: NEGATIVE
NITRITE UR QL STRIP: NEGATIVE
PH UR STRIP: 6 [PH] (ref 5–8)
PROT UR STRIP-MCNC: NEGATIVE MG/DL
RBC #/AREA URNS HPF: ABNORMAL /HPF
SP GR UR STRIP: 1.02 (ref 1–1.03)
UROBILINOGEN UR STRIP-ACNC: 0.2 EU/DL (ref 0–1)
WBC #/AREA URNS HPF: ABNORMAL /HPF

## 2025-03-15 PROCEDURE — 6370000000 HC RX 637 (ALT 250 FOR IP): Performed by: FAMILY MEDICINE

## 2025-03-15 PROCEDURE — 94761 N-INVAS EAR/PLS OXIMETRY MLT: CPT

## 2025-03-15 PROCEDURE — 6360000002 HC RX W HCPCS: Performed by: FAMILY MEDICINE

## 2025-03-15 PROCEDURE — 93010 ELECTROCARDIOGRAM REPORT: CPT | Performed by: INTERNAL MEDICINE

## 2025-03-15 PROCEDURE — 6360000002 HC RX W HCPCS: Performed by: INTERNAL MEDICINE

## 2025-03-15 PROCEDURE — 87449 NOS EACH ORGANISM AG IA: CPT

## 2025-03-15 PROCEDURE — 82962 GLUCOSE BLOOD TEST: CPT

## 2025-03-15 PROCEDURE — 1200000000 HC SEMI PRIVATE

## 2025-03-15 PROCEDURE — 2500000003 HC RX 250 WO HCPCS: Performed by: FAMILY MEDICINE

## 2025-03-15 PROCEDURE — 2700000000 HC OXYGEN THERAPY PER DAY

## 2025-03-15 PROCEDURE — 87899 AGENT NOS ASSAY W/OPTIC: CPT

## 2025-03-15 PROCEDURE — 94640 AIRWAY INHALATION TREATMENT: CPT

## 2025-03-15 PROCEDURE — 99232 SBSQ HOSP IP/OBS MODERATE 35: CPT | Performed by: INTERNAL MEDICINE

## 2025-03-15 PROCEDURE — 81001 URINALYSIS AUTO W/SCOPE: CPT

## 2025-03-15 RX ORDER — BUDESONIDE 0.5 MG/2ML
0.5 INHALANT ORAL
Status: DISCONTINUED | OUTPATIENT
Start: 2025-03-15 | End: 2025-03-20 | Stop reason: HOSPADM

## 2025-03-15 RX ORDER — CARVEDILOL 6.25 MG/1
12.5 TABLET ORAL 2 TIMES DAILY WITH MEALS
Status: DISCONTINUED | OUTPATIENT
Start: 2025-03-15 | End: 2025-03-20 | Stop reason: HOSPADM

## 2025-03-15 RX ORDER — TRAZODONE HYDROCHLORIDE 50 MG/1
50 TABLET ORAL NIGHTLY
Status: DISCONTINUED | OUTPATIENT
Start: 2025-03-15 | End: 2025-03-20 | Stop reason: HOSPADM

## 2025-03-15 RX ORDER — DEXTROSE MONOHYDRATE 100 MG/ML
INJECTION, SOLUTION INTRAVENOUS CONTINUOUS PRN
Status: DISCONTINUED | OUTPATIENT
Start: 2025-03-15 | End: 2025-03-20 | Stop reason: HOSPADM

## 2025-03-15 RX ORDER — ARFORMOTEROL TARTRATE 15 UG/2ML
15 SOLUTION RESPIRATORY (INHALATION)
Status: DISCONTINUED | OUTPATIENT
Start: 2025-03-15 | End: 2025-03-15

## 2025-03-15 RX ORDER — INSULIN GLARGINE 100 [IU]/ML
15 INJECTION, SOLUTION SUBCUTANEOUS NIGHTLY
Status: DISCONTINUED | OUTPATIENT
Start: 2025-03-15 | End: 2025-03-20 | Stop reason: HOSPADM

## 2025-03-15 RX ORDER — GLUCAGON 1 MG/ML
1 KIT INJECTION PRN
Status: DISCONTINUED | OUTPATIENT
Start: 2025-03-15 | End: 2025-03-20 | Stop reason: HOSPADM

## 2025-03-15 RX ORDER — DEXTROSE MONOHYDRATE 100 MG/ML
INJECTION, SOLUTION INTRAVENOUS CONTINUOUS PRN
Status: DISCONTINUED | OUTPATIENT
Start: 2025-03-15 | End: 2025-03-15 | Stop reason: SDUPTHER

## 2025-03-15 RX ORDER — QUETIAPINE 200 MG/1
200 TABLET, FILM COATED, EXTENDED RELEASE ORAL NIGHTLY
Status: DISCONTINUED | OUTPATIENT
Start: 2025-03-15 | End: 2025-03-20 | Stop reason: HOSPADM

## 2025-03-15 RX ORDER — BUDESONIDE 0.5 MG/2ML
0.5 INHALANT ORAL
Status: DISCONTINUED | OUTPATIENT
Start: 2025-03-15 | End: 2025-03-15

## 2025-03-15 RX ORDER — INSULIN LISPRO 100 [IU]/ML
0-4 INJECTION, SOLUTION INTRAVENOUS; SUBCUTANEOUS
Status: DISCONTINUED | OUTPATIENT
Start: 2025-03-15 | End: 2025-03-20 | Stop reason: HOSPADM

## 2025-03-15 RX ORDER — ATORVASTATIN CALCIUM 40 MG/1
40 TABLET, FILM COATED ORAL NIGHTLY
Status: DISCONTINUED | OUTPATIENT
Start: 2025-03-15 | End: 2025-03-20 | Stop reason: HOSPADM

## 2025-03-15 RX ORDER — MECOBALAMIN 5000 MCG
10 TABLET,DISINTEGRATING ORAL NIGHTLY
Status: DISCONTINUED | OUTPATIENT
Start: 2025-03-15 | End: 2025-03-20 | Stop reason: HOSPADM

## 2025-03-15 RX ORDER — ARFORMOTEROL TARTRATE 15 UG/2ML
15 SOLUTION RESPIRATORY (INHALATION)
Status: DISCONTINUED | OUTPATIENT
Start: 2025-03-15 | End: 2025-03-20 | Stop reason: HOSPADM

## 2025-03-15 RX ORDER — PREDNISONE 20 MG/1
40 TABLET ORAL DAILY
Status: COMPLETED | OUTPATIENT
Start: 2025-03-15 | End: 2025-03-20

## 2025-03-15 RX ORDER — GABAPENTIN 600 MG/1
600 TABLET ORAL NIGHTLY
COMMUNITY

## 2025-03-15 RX ORDER — GLUCAGON 1 MG/ML
1 KIT INJECTION PRN
Status: DISCONTINUED | OUTPATIENT
Start: 2025-03-15 | End: 2025-03-15 | Stop reason: SDUPTHER

## 2025-03-15 RX ADMIN — Medication 10 ML: at 09:24

## 2025-03-15 RX ADMIN — WATER 1000 MG: 1 INJECTION INTRAMUSCULAR; INTRAVENOUS; SUBCUTANEOUS at 20:55

## 2025-03-15 RX ADMIN — IPRATROPIUM BROMIDE AND ALBUTEROL SULFATE 1 DOSE: .5; 2.5 SOLUTION RESPIRATORY (INHALATION) at 04:19

## 2025-03-15 RX ADMIN — BUDESONIDE 500 MCG: 0.5 SUSPENSION RESPIRATORY (INHALATION) at 04:18

## 2025-03-15 RX ADMIN — ARFORMOTEROL TARTRATE 15 MCG: 15 SOLUTION RESPIRATORY (INHALATION) at 04:18

## 2025-03-15 RX ADMIN — Medication 10 MG: at 20:52

## 2025-03-15 RX ADMIN — ATORVASTATIN CALCIUM 40 MG: 40 TABLET, FILM COATED ORAL at 20:55

## 2025-03-15 RX ADMIN — BUDESONIDE 500 MCG: 0.5 INHALANT RESPIRATORY (INHALATION) at 18:46

## 2025-03-15 RX ADMIN — TRAZODONE HYDROCHLORIDE 50 MG: 50 TABLET ORAL at 02:39

## 2025-03-15 RX ADMIN — QUETIAPINE FUMARATE 200 MG: 200 TABLET, EXTENDED RELEASE ORAL at 21:03

## 2025-03-15 RX ADMIN — INSULIN GLARGINE 15 UNITS: 100 INJECTION, SOLUTION SUBCUTANEOUS at 02:42

## 2025-03-15 RX ADMIN — Medication 10 ML: at 20:56

## 2025-03-15 RX ADMIN — INSULIN LISPRO 1 UNITS: 100 INJECTION, SOLUTION INTRAVENOUS; SUBCUTANEOUS at 03:02

## 2025-03-15 RX ADMIN — QUETIAPINE FUMARATE 200 MG: 200 TABLET, EXTENDED RELEASE ORAL at 02:57

## 2025-03-15 RX ADMIN — INSULIN GLARGINE 15 UNITS: 100 INJECTION, SOLUTION SUBCUTANEOUS at 21:00

## 2025-03-15 RX ADMIN — PREDNISONE 40 MG: 20 TABLET ORAL at 09:23

## 2025-03-15 RX ADMIN — CARVEDILOL 12.5 MG: 6.25 TABLET, FILM COATED ORAL at 09:24

## 2025-03-15 RX ADMIN — Medication 10 MG: at 02:39

## 2025-03-15 RX ADMIN — IPRATROPIUM BROMIDE 0.5 MG: 0.5 SOLUTION RESPIRATORY (INHALATION) at 18:47

## 2025-03-15 RX ADMIN — AZITHROMYCIN DIHYDRATE 500 MG: 250 TABLET ORAL at 20:54

## 2025-03-15 RX ADMIN — CARVEDILOL 12.5 MG: 6.25 TABLET, FILM COATED ORAL at 16:39

## 2025-03-15 RX ADMIN — IPRATROPIUM BROMIDE AND ALBUTEROL SULFATE 1 DOSE: .5; 2.5 SOLUTION RESPIRATORY (INHALATION) at 09:46

## 2025-03-15 RX ADMIN — ARFORMOTEROL TARTRATE 15 MCG: 15 SOLUTION RESPIRATORY (INHALATION) at 18:47

## 2025-03-15 RX ADMIN — TRAZODONE HYDROCHLORIDE 50 MG: 50 TABLET ORAL at 20:55

## 2025-03-15 RX ADMIN — IPRATROPIUM BROMIDE AND ALBUTEROL SULFATE 1 DOSE: .5; 2.5 SOLUTION RESPIRATORY (INHALATION) at 13:10

## 2025-03-15 RX ADMIN — ENOXAPARIN SODIUM 40 MG: 100 INJECTION SUBCUTANEOUS at 09:23

## 2025-03-15 NOTE — ED PROVIDER NOTES
Department of Emergency Medicine     Written by: Teddy Rider MD  Patient Name: Castro Daniels  Visit Date: 3/14/2025  6:35 PM  MRN: [id number]                   : 1955    ------------------------- CC-------------------------  Chief Complaint   Patient presents with    Respiratory Distress     Pt wife was being taken in by squad . Squad saw  and sent for second to bring him in he is on pap     ------------------------- HPI -------------------------    Castro is a 70 y.o. year old male who presents from home with history of COPD currently on 3 L of oxygen at baseline.  Reports that for the past 3 to 4 days, has been shortness of breath and productive cough of yellowish sputum.  Reports chills but no fevers.    As per EMS, EMS was called for patient's wife and EMS outpatient and he appeared in distress and EMS called a second squad and had patient placed on BiPAP.    There are no family/friends at bedside. The history is provided by patient, who is felt to be a good historian.    Nursing notes were all reviewed and agreed with or any disagreements were addressed in the HPI.    REVIEW OF SYSTEMS:    Review of Systems:   Please see HPI above. All bolded are positive. All un-bolded are negative.  Constitutional Symptoms: fever, chills, fatigue, generalized weakness, diaphoresis, increase in thirst, loss of appetite  Eyes: vision change   Ears, Nose, Mouth, Throat: hearing loss, nasal congestion, sores in the mouth  Cardiovascular: chest pain, chest heaviness, palpitations  Respiratory: shortness of breath, wheezing, coughing  Gastrointestinal: abdominal pain, nausea, vomiting, diarrhea, constipation, melena, hematochezia, hematemesis  Genitourinary: dysuria, hematuria, increased frequency  Musculoskeletal: lower/upper extremity edema, myalgias, arthralgias, back pain, neck pain  Integumentary: rashes, itching   Neurological: headache, lightheadedness, dizziness, confusion, syncope, numbness, tingling,

## 2025-03-15 NOTE — H&P
BUN 14   CREATININE 1.3*   GLUCOSE 174*   CALCIUM 8.9       Recent Labs     03/14/25  1839   WBC 8.2   RBC 5.04   HGB 15.9   HCT 45.7   MCV 90.7   MCH 31.5   MCHC 34.8*   RDW 12.7      MPV 9.3       No results for input(s): \"POCGLU\" in the last 72 hours.      Radiology: XR CHEST PORTABLE  Result Date: 3/14/2025  EXAMINATION: ONE XRAY VIEW OF THE CHEST 3/14/2025 6:55 pm COMPARISON: May 26, 2023 HISTORY: ORDERING SYSTEM PROVIDED HISTORY: SOB TECHNOLOGIST PROVIDED HISTORY: Reason for exam:->SOB FINDINGS: Hazy opacities are present in the lower lung fields more significant on the right.  The heart is normal size.  No pneumothorax.     Hazy opacities are present in the lower lung fields which may indicate subsegmental atelectasis, pneumonia, or pleural effusions.  Short-term follow-up may be helpful for further evaluation.       EKG: sinus tachycardia, RBBB    ASSESSMENT:      Principal Problem:    Community acquired pneumonia, unspecified laterality  Active Problems:    Primary hypertension    COPD exacerbation (HCC)    History of tobacco abuse    Acute respiratory failure with hypoxia (HCC)  Resolved Problems:    * No resolved hospital problems. *      PLAN:    Community acquired pneumonia  Severe COPD with acute exacerbation  Acute on chronic hypoxic respiratory failure  - rocephin/zithromax  - pneumonia antigens  - duonebs  - brovana/pulmicort    Type 2 DM  - continue home lantus  - SSI  - hypoglycemia protocol    HTN  - continue home BP med with hold parameters    HLD  - continue home statin    Code Status: Full  DVT prophylaxis: Lovenox    NOTE: This report was transcribed using voice recognition software. Every effort was made to ensure accuracy; however, inadvertent computerized transcription errors may be present.     Electronically signed by Mariana Love DO on 3/14/2025 at 9:18 PM

## 2025-03-16 LAB
B PARAP IS1001 DNA NPH QL NAA+NON-PROBE: NOT DETECTED
B PERT DNA SPEC QL NAA+PROBE: NOT DETECTED
C PNEUM DNA NPH QL NAA+NON-PROBE: NOT DETECTED
FLUAV RNA NPH QL NAA+NON-PROBE: NOT DETECTED
FLUBV RNA NPH QL NAA+NON-PROBE: NOT DETECTED
GLUCOSE BLD-MCNC: 111 MG/DL (ref 74–99)
GLUCOSE BLD-MCNC: 143 MG/DL (ref 74–99)
GLUCOSE BLD-MCNC: 155 MG/DL (ref 74–99)
GLUCOSE BLD-MCNC: 173 MG/DL (ref 74–99)
HADV DNA NPH QL NAA+NON-PROBE: NOT DETECTED
HCOV 229E RNA NPH QL NAA+NON-PROBE: NOT DETECTED
HCOV HKU1 RNA NPH QL NAA+NON-PROBE: NOT DETECTED
HCOV NL63 RNA NPH QL NAA+NON-PROBE: NOT DETECTED
HCOV OC43 RNA NPH QL NAA+NON-PROBE: NOT DETECTED
HMPV RNA NPH QL NAA+NON-PROBE: NOT DETECTED
HPIV1 RNA NPH QL NAA+NON-PROBE: NOT DETECTED
HPIV2 RNA NPH QL NAA+NON-PROBE: NOT DETECTED
HPIV3 RNA NPH QL NAA+NON-PROBE: NOT DETECTED
HPIV4 RNA NPH QL NAA+NON-PROBE: NOT DETECTED
L PNEUMO1 AG UR QL IA.RAPID: NEGATIVE
M PNEUMO DNA NPH QL NAA+NON-PROBE: NOT DETECTED
RSV RNA NPH QL NAA+NON-PROBE: NOT DETECTED
RV+EV RNA NPH QL NAA+NON-PROBE: NOT DETECTED
S PNEUM AG SPEC QL: NEGATIVE
SARS-COV-2 RNA NPH QL NAA+NON-PROBE: NOT DETECTED
SPECIMEN DESCRIPTION: NORMAL
SPECIMEN SOURCE: NORMAL

## 2025-03-16 PROCEDURE — 94660 CPAP INITIATION&MGMT: CPT

## 2025-03-16 PROCEDURE — 2500000003 HC RX 250 WO HCPCS: Performed by: FAMILY MEDICINE

## 2025-03-16 PROCEDURE — 6360000002 HC RX W HCPCS: Performed by: INTERNAL MEDICINE

## 2025-03-16 PROCEDURE — 6370000000 HC RX 637 (ALT 250 FOR IP): Performed by: FAMILY MEDICINE

## 2025-03-16 PROCEDURE — 82962 GLUCOSE BLOOD TEST: CPT

## 2025-03-16 PROCEDURE — 6360000002 HC RX W HCPCS: Performed by: FAMILY MEDICINE

## 2025-03-16 PROCEDURE — 2700000000 HC OXYGEN THERAPY PER DAY

## 2025-03-16 PROCEDURE — 99232 SBSQ HOSP IP/OBS MODERATE 35: CPT | Performed by: INTERNAL MEDICINE

## 2025-03-16 PROCEDURE — 94640 AIRWAY INHALATION TREATMENT: CPT

## 2025-03-16 PROCEDURE — 1200000000 HC SEMI PRIVATE

## 2025-03-16 PROCEDURE — 94761 N-INVAS EAR/PLS OXIMETRY MLT: CPT

## 2025-03-16 RX ADMIN — IPRATROPIUM BROMIDE AND ALBUTEROL SULFATE 1 DOSE: .5; 2.5 SOLUTION RESPIRATORY (INHALATION) at 10:32

## 2025-03-16 RX ADMIN — ARFORMOTEROL TARTRATE 15 MCG: 15 SOLUTION RESPIRATORY (INHALATION) at 06:23

## 2025-03-16 RX ADMIN — BUDESONIDE 500 MCG: 0.5 INHALANT RESPIRATORY (INHALATION) at 06:23

## 2025-03-16 RX ADMIN — ENOXAPARIN SODIUM 40 MG: 100 INJECTION SUBCUTANEOUS at 08:25

## 2025-03-16 RX ADMIN — AZITHROMYCIN DIHYDRATE 500 MG: 250 TABLET ORAL at 21:14

## 2025-03-16 RX ADMIN — PREDNISONE 40 MG: 20 TABLET ORAL at 08:25

## 2025-03-16 RX ADMIN — TRAZODONE HYDROCHLORIDE 50 MG: 50 TABLET ORAL at 21:14

## 2025-03-16 RX ADMIN — ARFORMOTEROL TARTRATE 15 MCG: 15 SOLUTION RESPIRATORY (INHALATION) at 18:27

## 2025-03-16 RX ADMIN — BUDESONIDE 500 MCG: 0.5 INHALANT RESPIRATORY (INHALATION) at 18:27

## 2025-03-16 RX ADMIN — Medication 10 ML: at 08:26

## 2025-03-16 RX ADMIN — Medication 10 ML: at 21:16

## 2025-03-16 RX ADMIN — IPRATROPIUM BROMIDE AND ALBUTEROL SULFATE 1 DOSE: .5; 2.5 SOLUTION RESPIRATORY (INHALATION) at 06:23

## 2025-03-16 RX ADMIN — IPRATROPIUM BROMIDE AND ALBUTEROL SULFATE 1 DOSE: .5; 2.5 SOLUTION RESPIRATORY (INHALATION) at 15:05

## 2025-03-16 RX ADMIN — IPRATROPIUM BROMIDE 0.5 MG: 0.5 SOLUTION RESPIRATORY (INHALATION) at 18:27

## 2025-03-16 RX ADMIN — CARVEDILOL 12.5 MG: 6.25 TABLET, FILM COATED ORAL at 08:25

## 2025-03-16 RX ADMIN — INSULIN GLARGINE 15 UNITS: 100 INJECTION, SOLUTION SUBCUTANEOUS at 21:23

## 2025-03-16 RX ADMIN — CARVEDILOL 12.5 MG: 6.25 TABLET, FILM COATED ORAL at 17:02

## 2025-03-16 RX ADMIN — WATER 1000 MG: 1 INJECTION INTRAMUSCULAR; INTRAVENOUS; SUBCUTANEOUS at 21:16

## 2025-03-16 RX ADMIN — QUETIAPINE FUMARATE 200 MG: 200 TABLET, EXTENDED RELEASE ORAL at 21:14

## 2025-03-16 RX ADMIN — ATORVASTATIN CALCIUM 40 MG: 40 TABLET, FILM COATED ORAL at 21:13

## 2025-03-16 RX ADMIN — Medication 10 MG: at 21:13

## 2025-03-17 LAB
GLUCOSE BLD-MCNC: 148 MG/DL (ref 74–99)
GLUCOSE BLD-MCNC: 185 MG/DL (ref 74–99)
GLUCOSE BLD-MCNC: 251 MG/DL (ref 74–99)
GLUCOSE BLD-MCNC: 84 MG/DL (ref 74–99)
MYCOPLASMA AB,IGM: NORMAL

## 2025-03-17 PROCEDURE — 6360000002 HC RX W HCPCS: Performed by: FAMILY MEDICINE

## 2025-03-17 PROCEDURE — 6370000000 HC RX 637 (ALT 250 FOR IP): Performed by: FAMILY MEDICINE

## 2025-03-17 PROCEDURE — 94660 CPAP INITIATION&MGMT: CPT

## 2025-03-17 PROCEDURE — 2700000000 HC OXYGEN THERAPY PER DAY

## 2025-03-17 PROCEDURE — 2500000003 HC RX 250 WO HCPCS: Performed by: FAMILY MEDICINE

## 2025-03-17 PROCEDURE — 82962 GLUCOSE BLOOD TEST: CPT

## 2025-03-17 PROCEDURE — 6360000002 HC RX W HCPCS: Performed by: INTERNAL MEDICINE

## 2025-03-17 PROCEDURE — 6370000000 HC RX 637 (ALT 250 FOR IP): Performed by: INTERNAL MEDICINE

## 2025-03-17 PROCEDURE — 1200000000 HC SEMI PRIVATE

## 2025-03-17 PROCEDURE — 94640 AIRWAY INHALATION TREATMENT: CPT

## 2025-03-17 RX ORDER — HYDROCODONE BITARTRATE AND ACETAMINOPHEN 7.5; 325 MG/1; MG/1
1 TABLET ORAL EVERY 4 HOURS PRN
Refills: 0 | Status: DISCONTINUED | OUTPATIENT
Start: 2025-03-17 | End: 2025-03-20 | Stop reason: HOSPADM

## 2025-03-17 RX ADMIN — WATER 1000 MG: 1 INJECTION INTRAMUSCULAR; INTRAVENOUS; SUBCUTANEOUS at 20:23

## 2025-03-17 RX ADMIN — ARFORMOTEROL TARTRATE 15 MCG: 15 SOLUTION RESPIRATORY (INHALATION) at 04:54

## 2025-03-17 RX ADMIN — ENOXAPARIN SODIUM 40 MG: 100 INJECTION SUBCUTANEOUS at 08:52

## 2025-03-17 RX ADMIN — HYDROCODONE BITARTRATE AND ACETAMINOPHEN 1 TABLET: 7.5; 325 TABLET ORAL at 18:11

## 2025-03-17 RX ADMIN — CARVEDILOL 12.5 MG: 6.25 TABLET, FILM COATED ORAL at 08:48

## 2025-03-17 RX ADMIN — IPRATROPIUM BROMIDE 0.5 MG: 0.5 SOLUTION RESPIRATORY (INHALATION) at 09:14

## 2025-03-17 RX ADMIN — TRAZODONE HYDROCHLORIDE 50 MG: 50 TABLET ORAL at 20:24

## 2025-03-17 RX ADMIN — IPRATROPIUM BROMIDE 0.5 MG: 0.5 SOLUTION RESPIRATORY (INHALATION) at 04:54

## 2025-03-17 RX ADMIN — ACETAMINOPHEN 650 MG: 325 TABLET ORAL at 16:38

## 2025-03-17 RX ADMIN — CARVEDILOL 12.5 MG: 6.25 TABLET, FILM COATED ORAL at 18:11

## 2025-03-17 RX ADMIN — PREDNISONE 40 MG: 20 TABLET ORAL at 08:47

## 2025-03-17 RX ADMIN — Medication 10 ML: at 20:41

## 2025-03-17 RX ADMIN — BUDESONIDE 500 MCG: 0.5 INHALANT RESPIRATORY (INHALATION) at 19:09

## 2025-03-17 RX ADMIN — Medication 10 MG: at 20:24

## 2025-03-17 RX ADMIN — INSULIN LISPRO 2 UNITS: 100 INJECTION, SOLUTION INTRAVENOUS; SUBCUTANEOUS at 11:42

## 2025-03-17 RX ADMIN — IPRATROPIUM BROMIDE AND ALBUTEROL SULFATE 1 DOSE: .5; 2.5 SOLUTION RESPIRATORY (INHALATION) at 19:09

## 2025-03-17 RX ADMIN — BUDESONIDE 500 MCG: 0.5 INHALANT RESPIRATORY (INHALATION) at 04:54

## 2025-03-17 RX ADMIN — IPRATROPIUM BROMIDE AND ALBUTEROL SULFATE 1 DOSE: .5; 2.5 SOLUTION RESPIRATORY (INHALATION) at 14:13

## 2025-03-17 RX ADMIN — QUETIAPINE FUMARATE 200 MG: 200 TABLET, EXTENDED RELEASE ORAL at 20:37

## 2025-03-17 RX ADMIN — ARFORMOTEROL TARTRATE 15 MCG: 15 SOLUTION RESPIRATORY (INHALATION) at 19:09

## 2025-03-17 RX ADMIN — ATORVASTATIN CALCIUM 40 MG: 40 TABLET, FILM COATED ORAL at 20:24

## 2025-03-17 RX ADMIN — INSULIN LISPRO 1 UNITS: 100 INJECTION, SOLUTION INTRAVENOUS; SUBCUTANEOUS at 20:34

## 2025-03-17 RX ADMIN — Medication 10 ML: at 08:53

## 2025-03-17 RX ADMIN — INSULIN GLARGINE 15 UNITS: 100 INJECTION, SOLUTION SUBCUTANEOUS at 20:34

## 2025-03-17 ASSESSMENT — PAIN DESCRIPTION - ORIENTATION
ORIENTATION: LEFT;ANTERIOR;POSTERIOR
ORIENTATION: LEFT

## 2025-03-17 ASSESSMENT — PAIN DESCRIPTION - LOCATION
LOCATION: CHEST
LOCATION: CHEST

## 2025-03-17 ASSESSMENT — PAIN SCALES - GENERAL
PAINLEVEL_OUTOF10: 7
PAINLEVEL_OUTOF10: 10

## 2025-03-17 ASSESSMENT — PAIN DESCRIPTION - DESCRIPTORS
DESCRIPTORS: ACHING;CRUSHING;CRAMPING
DESCRIPTORS: ACHING;CRUSHING;DISCOMFORT

## 2025-03-18 LAB
GLUCOSE BLD-MCNC: 166 MG/DL (ref 74–99)
GLUCOSE BLD-MCNC: 192 MG/DL (ref 74–99)
GLUCOSE BLD-MCNC: 236 MG/DL (ref 74–99)
GLUCOSE BLD-MCNC: 75 MG/DL (ref 74–99)

## 2025-03-18 PROCEDURE — 2700000000 HC OXYGEN THERAPY PER DAY

## 2025-03-18 PROCEDURE — 6360000002 HC RX W HCPCS: Performed by: FAMILY MEDICINE

## 2025-03-18 PROCEDURE — 1200000000 HC SEMI PRIVATE

## 2025-03-18 PROCEDURE — 6370000000 HC RX 637 (ALT 250 FOR IP): Performed by: FAMILY MEDICINE

## 2025-03-18 PROCEDURE — 2500000003 HC RX 250 WO HCPCS: Performed by: FAMILY MEDICINE

## 2025-03-18 PROCEDURE — 6360000002 HC RX W HCPCS: Performed by: INTERNAL MEDICINE

## 2025-03-18 PROCEDURE — 94640 AIRWAY INHALATION TREATMENT: CPT

## 2025-03-18 PROCEDURE — 82962 GLUCOSE BLOOD TEST: CPT

## 2025-03-18 PROCEDURE — 94660 CPAP INITIATION&MGMT: CPT

## 2025-03-18 PROCEDURE — 6370000000 HC RX 637 (ALT 250 FOR IP): Performed by: INTERNAL MEDICINE

## 2025-03-18 RX ADMIN — Medication 10 ML: at 09:15

## 2025-03-18 RX ADMIN — INSULIN LISPRO 1 UNITS: 100 INJECTION, SOLUTION INTRAVENOUS; SUBCUTANEOUS at 21:14

## 2025-03-18 RX ADMIN — CARVEDILOL 12.5 MG: 6.25 TABLET, FILM COATED ORAL at 09:13

## 2025-03-18 RX ADMIN — BUDESONIDE 500 MCG: 0.5 INHALANT RESPIRATORY (INHALATION) at 06:09

## 2025-03-18 RX ADMIN — PREDNISONE 40 MG: 20 TABLET ORAL at 09:13

## 2025-03-18 RX ADMIN — TRAZODONE HYDROCHLORIDE 50 MG: 50 TABLET ORAL at 21:12

## 2025-03-18 RX ADMIN — WATER 1000 MG: 1 INJECTION INTRAMUSCULAR; INTRAVENOUS; SUBCUTANEOUS at 21:12

## 2025-03-18 RX ADMIN — IPRATROPIUM BROMIDE AND ALBUTEROL SULFATE 1 DOSE: .5; 2.5 SOLUTION RESPIRATORY (INHALATION) at 06:08

## 2025-03-18 RX ADMIN — HYDROCODONE BITARTRATE AND ACETAMINOPHEN 1 TABLET: 7.5; 325 TABLET ORAL at 09:33

## 2025-03-18 RX ADMIN — Medication 10 MG: at 21:12

## 2025-03-18 RX ADMIN — INSULIN GLARGINE 15 UNITS: 100 INJECTION, SOLUTION SUBCUTANEOUS at 21:15

## 2025-03-18 RX ADMIN — ATORVASTATIN CALCIUM 40 MG: 40 TABLET, FILM COATED ORAL at 21:12

## 2025-03-18 RX ADMIN — Medication 10 ML: at 21:11

## 2025-03-18 RX ADMIN — IPRATROPIUM BROMIDE AND ALBUTEROL SULFATE 1 DOSE: .5; 2.5 SOLUTION RESPIRATORY (INHALATION) at 09:24

## 2025-03-18 RX ADMIN — IPRATROPIUM BROMIDE 0.5 MG: 0.5 SOLUTION RESPIRATORY (INHALATION) at 14:57

## 2025-03-18 RX ADMIN — CARVEDILOL 12.5 MG: 6.25 TABLET, FILM COATED ORAL at 16:47

## 2025-03-18 RX ADMIN — QUETIAPINE FUMARATE 200 MG: 200 TABLET, EXTENDED RELEASE ORAL at 21:11

## 2025-03-18 RX ADMIN — ENOXAPARIN SODIUM 40 MG: 100 INJECTION SUBCUTANEOUS at 09:13

## 2025-03-18 RX ADMIN — BUDESONIDE 500 MCG: 0.5 INHALANT RESPIRATORY (INHALATION) at 18:17

## 2025-03-18 RX ADMIN — ARFORMOTEROL TARTRATE 15 MCG: 15 SOLUTION RESPIRATORY (INHALATION) at 18:17

## 2025-03-18 RX ADMIN — ARFORMOTEROL TARTRATE 15 MCG: 15 SOLUTION RESPIRATORY (INHALATION) at 06:09

## 2025-03-18 RX ADMIN — IPRATROPIUM BROMIDE 0.5 MG: 0.5 SOLUTION RESPIRATORY (INHALATION) at 18:17

## 2025-03-18 ASSESSMENT — PAIN SCALES - GENERAL: PAINLEVEL_OUTOF10: 8

## 2025-03-18 ASSESSMENT — PAIN DESCRIPTION - DESCRIPTORS: DESCRIPTORS: ACHING;CRUSHING;DISCOMFORT;CRAMPING

## 2025-03-18 ASSESSMENT — PAIN DESCRIPTION - LOCATION: LOCATION: CHEST

## 2025-03-18 ASSESSMENT — PAIN DESCRIPTION - ORIENTATION: ORIENTATION: LEFT;ANTERIOR;POSTERIOR

## 2025-03-19 LAB
GLUCOSE BLD-MCNC: 116 MG/DL (ref 74–99)
GLUCOSE BLD-MCNC: 185 MG/DL (ref 74–99)
GLUCOSE BLD-MCNC: 197 MG/DL (ref 74–99)
GLUCOSE BLD-MCNC: 83 MG/DL (ref 74–99)

## 2025-03-19 PROCEDURE — 6360000002 HC RX W HCPCS: Performed by: INTERNAL MEDICINE

## 2025-03-19 PROCEDURE — 94660 CPAP INITIATION&MGMT: CPT

## 2025-03-19 PROCEDURE — 2500000003 HC RX 250 WO HCPCS: Performed by: FAMILY MEDICINE

## 2025-03-19 PROCEDURE — 6370000000 HC RX 637 (ALT 250 FOR IP): Performed by: FAMILY MEDICINE

## 2025-03-19 PROCEDURE — 82962 GLUCOSE BLOOD TEST: CPT

## 2025-03-19 PROCEDURE — 6370000000 HC RX 637 (ALT 250 FOR IP): Performed by: INTERNAL MEDICINE

## 2025-03-19 PROCEDURE — 97530 THERAPEUTIC ACTIVITIES: CPT | Performed by: PHYSICAL THERAPIST

## 2025-03-19 PROCEDURE — 97161 PT EVAL LOW COMPLEX 20 MIN: CPT | Performed by: PHYSICAL THERAPIST

## 2025-03-19 PROCEDURE — 1200000000 HC SEMI PRIVATE

## 2025-03-19 PROCEDURE — 2500000003 HC RX 250 WO HCPCS: Performed by: INTERNAL MEDICINE

## 2025-03-19 PROCEDURE — 97165 OT EVAL LOW COMPLEX 30 MIN: CPT

## 2025-03-19 PROCEDURE — 94640 AIRWAY INHALATION TREATMENT: CPT

## 2025-03-19 PROCEDURE — 6360000002 HC RX W HCPCS: Performed by: FAMILY MEDICINE

## 2025-03-19 PROCEDURE — 2700000000 HC OXYGEN THERAPY PER DAY

## 2025-03-19 RX ADMIN — INSULIN LISPRO 1 UNITS: 100 INJECTION, SOLUTION INTRAVENOUS; SUBCUTANEOUS at 21:32

## 2025-03-19 RX ADMIN — IPRATROPIUM BROMIDE AND ALBUTEROL SULFATE 1 DOSE: .5; 2.5 SOLUTION RESPIRATORY (INHALATION) at 09:00

## 2025-03-19 RX ADMIN — CARVEDILOL 12.5 MG: 6.25 TABLET, FILM COATED ORAL at 16:31

## 2025-03-19 RX ADMIN — ENOXAPARIN SODIUM 40 MG: 100 INJECTION SUBCUTANEOUS at 08:35

## 2025-03-19 RX ADMIN — ARFORMOTEROL TARTRATE 15 MCG: 15 SOLUTION RESPIRATORY (INHALATION) at 06:11

## 2025-03-19 RX ADMIN — ARFORMOTEROL TARTRATE 15 MCG: 15 SOLUTION RESPIRATORY (INHALATION) at 16:09

## 2025-03-19 RX ADMIN — BUDESONIDE 500 MCG: 0.5 INHALANT RESPIRATORY (INHALATION) at 06:11

## 2025-03-19 RX ADMIN — IPRATROPIUM BROMIDE AND ALBUTEROL SULFATE 1 DOSE: .5; 2.5 SOLUTION RESPIRATORY (INHALATION) at 06:11

## 2025-03-19 RX ADMIN — HYDROCODONE BITARTRATE AND ACETAMINOPHEN 1 TABLET: 7.5; 325 TABLET ORAL at 08:34

## 2025-03-19 RX ADMIN — PREDNISONE 40 MG: 20 TABLET ORAL at 08:34

## 2025-03-19 RX ADMIN — TRAZODONE HYDROCHLORIDE 50 MG: 50 TABLET ORAL at 21:35

## 2025-03-19 RX ADMIN — HYDROCODONE BITARTRATE AND ACETAMINOPHEN 1 TABLET: 7.5; 325 TABLET ORAL at 16:32

## 2025-03-19 RX ADMIN — WATER 1000 MG: 1 INJECTION INTRAMUSCULAR; INTRAVENOUS; SUBCUTANEOUS at 18:22

## 2025-03-19 RX ADMIN — ATORVASTATIN CALCIUM 40 MG: 40 TABLET, FILM COATED ORAL at 21:35

## 2025-03-19 RX ADMIN — IPRATROPIUM BROMIDE AND ALBUTEROL SULFATE 1 DOSE: .5; 2.5 SOLUTION RESPIRATORY (INHALATION) at 12:50

## 2025-03-19 RX ADMIN — CARVEDILOL 12.5 MG: 6.25 TABLET, FILM COATED ORAL at 08:34

## 2025-03-19 RX ADMIN — Medication 10 MG: at 21:36

## 2025-03-19 RX ADMIN — Medication 10 ML: at 21:39

## 2025-03-19 RX ADMIN — BUDESONIDE 500 MCG: 0.5 INHALANT RESPIRATORY (INHALATION) at 16:09

## 2025-03-19 RX ADMIN — IPRATROPIUM BROMIDE AND ALBUTEROL SULFATE 1 DOSE: .5; 2.5 SOLUTION RESPIRATORY (INHALATION) at 16:09

## 2025-03-19 RX ADMIN — QUETIAPINE FUMARATE 200 MG: 200 TABLET, EXTENDED RELEASE ORAL at 21:35

## 2025-03-19 RX ADMIN — INSULIN LISPRO 1 UNITS: 100 INJECTION, SOLUTION INTRAVENOUS; SUBCUTANEOUS at 16:32

## 2025-03-19 RX ADMIN — INSULIN GLARGINE 15 UNITS: 100 INJECTION, SOLUTION SUBCUTANEOUS at 21:33

## 2025-03-19 RX ADMIN — Medication 10 ML: at 08:35

## 2025-03-19 ASSESSMENT — PAIN DESCRIPTION - LOCATION
LOCATION: BACK
LOCATION: BACK

## 2025-03-19 ASSESSMENT — PAIN DESCRIPTION - PAIN TYPE: TYPE: CHRONIC PAIN

## 2025-03-19 ASSESSMENT — PAIN DESCRIPTION - ORIENTATION
ORIENTATION: MID;LOWER;POSTERIOR
ORIENTATION: MID;POSTERIOR

## 2025-03-19 ASSESSMENT — PAIN SCALES - GENERAL
PAINLEVEL_OUTOF10: 8
PAINLEVEL_OUTOF10: 8
PAINLEVEL_OUTOF10: 2
PAINLEVEL_OUTOF10: 8

## 2025-03-19 ASSESSMENT — PAIN DESCRIPTION - DESCRIPTORS
DESCRIPTORS: ACHING
DESCRIPTORS: ACHING;DISCOMFORT

## 2025-03-19 NOTE — PROGRESS NOTES
[facility] Hospitalist   Progress Note    Admitting Date and Time: 3/14/2025  6:35 PM  Admit Dx: Acute exacerbation of chronic obstructive pulmonary disease (COPD) (HCC) [J44.1]  Community acquired pneumonia, unspecified laterality [J18.9]    Subjective:    Pt feels ***  Per RN: ***    ROS: denies fever, chills, cp, sob, n/v, HA unless stated above.     atorvastatin  40 mg Oral Nightly    carvedilol  12.5 mg Oral BID WC    insulin glargine  15 Units SubCUTAneous Nightly    melatonin  10 mg Oral Nightly    QUEtiapine  200 mg Oral Nightly    traZODone  50 mg Oral Nightly    predniSONE  40 mg Oral Daily    insulin lispro  0-4 Units SubCUTAneous 4x Daily AC & HS    budesonide  0.5 mg Nebulization BID RT    And    arformoterol tartrate  15 mcg Nebulization BID RT    And    ipratropium  0.5 mg Nebulization 4x Daily RT    sodium chloride flush  5-40 mL IntraVENous 2 times per day    enoxaparin  40 mg SubCUTAneous Daily    cefTRIAXone (ROCEPHIN) IV  1,000 mg IntraVENous Q24H     glucose, 4 tablet, PRN  dextrose bolus, 125 mL, PRN   Or  dextrose bolus, 250 mL, PRN  glucagon (rDNA), 1 mg, PRN  dextrose, , Continuous PRN  sodium chloride flush, 5-40 mL, PRN  acetaminophen, 650 mg, Q6H PRN   Or  acetaminophen, 650 mg, Q6H PRN  ipratropium 0.5 mg-albuterol 2.5 mg, 1 Dose, Q4H PRN  guaiFENesin-dextromethorphan, 5 mL, Q4H PRN         Objective:    /69   Pulse 89   Temp 97.7 °F (36.5 °C) (Oral)   Resp 20   Ht 1.803 m (5' 10.98\")   Wt 91.6 kg (202 lb)   SpO2 96%   BMI 28.19 kg/m²   General Appearance: alert and oriented to person, place and time and in no acute distress  Skin: warm and dry  Head: normocephalic and atraumatic  Eyes: pupils equal, round, and reactive to light, extraocular eye movements intact, conjunctivae normal  Neck: neck supple and non tender without mass   Pulmonary/Chest: clear to auscultation bilaterally- no wheezes, rales or rhonchi, normal air movement, no respiratory 
       [facility] Hospitalist   Progress Note    Admitting Date and Time: 3/14/2025  6:35 PM  Admit Dx: Acute exacerbation of chronic obstructive pulmonary disease (COPD) (HCC) [J44.1]  Community acquired pneumonia, unspecified laterality [J18.9]    Subjective:    Pt feels ***  Per RN: ***    ROS: denies fever, chills, cp, sob, n/v, HA unless stated above.     atorvastatin  40 mg Oral Nightly    carvedilol  12.5 mg Oral BID WC    insulin glargine  15 Units SubCUTAneous Nightly    melatonin  10 mg Oral Nightly    QUEtiapine  200 mg Oral Nightly    traZODone  50 mg Oral Nightly    predniSONE  40 mg Oral Daily    insulin lispro  0-4 Units SubCUTAneous 4x Daily AC & HS    budesonide  0.5 mg Nebulization BID RT    And    arformoterol tartrate  15 mcg Nebulization BID RT    And    ipratropium  0.5 mg Nebulization 4x Daily RT    sodium chloride flush  5-40 mL IntraVENous 2 times per day    enoxaparin  40 mg SubCUTAneous Daily     HYDROcodone-acetaminophen, 1 tablet, Q4H PRN  glucose, 4 tablet, PRN  dextrose bolus, 125 mL, PRN   Or  dextrose bolus, 250 mL, PRN  glucagon (rDNA), 1 mg, PRN  dextrose, , Continuous PRN  sodium chloride flush, 5-40 mL, PRN  acetaminophen, 650 mg, Q6H PRN   Or  acetaminophen, 650 mg, Q6H PRN  ipratropium 0.5 mg-albuterol 2.5 mg, 1 Dose, Q4H PRN  guaiFENesin-dextromethorphan, 5 mL, Q4H PRN         Objective:    /73   Pulse 74   Temp 97.3 °F (36.3 °C) (Oral)   Resp 22   Ht 1.803 m (5' 10.98\")   Wt 91.6 kg (202 lb)   SpO2 98%   BMI 28.19 kg/m²   General Appearance: alert and oriented to person, place and time and in no acute distress  Skin: warm and dry  Head: normocephalic and atraumatic  Eyes: pupils equal, round, and reactive to light, extraocular eye movements intact, conjunctivae normal  Neck: neck supple and non tender without mass   Pulmonary/Chest: clear to auscultation bilaterally- no wheezes, rales or rhonchi, normal air movement, no respiratory 
       [facility] Hospitalist   Progress Note    Admitting Date and Time: 3/14/2025  6:35 PM  Admit Dx: Acute exacerbation of chronic obstructive pulmonary disease (COPD) (HCC) [J44.1]  Community acquired pneumonia, unspecified laterality [J18.9]    Subjective:    Pt feels ***  Per RN: ***    ROS: denies fever, chills, cp, sob, n/v, HA unless stated above.     atorvastatin  40 mg Oral Nightly    carvedilol  12.5 mg Oral BID WC    insulin glargine  15 Units SubCUTAneous Nightly    melatonin  10 mg Oral Nightly    QUEtiapine  200 mg Oral Nightly    traZODone  50 mg Oral Nightly    predniSONE  40 mg Oral Daily    insulin lispro  0-4 Units SubCUTAneous 4x Daily AC & HS    budesonide  0.5 mg Nebulization BID RT    And    arformoterol tartrate  15 mcg Nebulization BID RT    And    ipratropium  0.5 mg Nebulization 4x Daily RT    sodium chloride flush  5-40 mL IntraVENous 2 times per day    enoxaparin  40 mg SubCUTAneous Daily    cefTRIAXone (ROCEPHIN) IV  1,000 mg IntraVENous Q24H     HYDROcodone-acetaminophen, 1 tablet, Q4H PRN  glucose, 4 tablet, PRN  dextrose bolus, 125 mL, PRN   Or  dextrose bolus, 250 mL, PRN  glucagon (rDNA), 1 mg, PRN  dextrose, , Continuous PRN  sodium chloride flush, 5-40 mL, PRN  acetaminophen, 650 mg, Q6H PRN   Or  acetaminophen, 650 mg, Q6H PRN  ipratropium 0.5 mg-albuterol 2.5 mg, 1 Dose, Q4H PRN  guaiFENesin-dextromethorphan, 5 mL, Q4H PRN         Objective:    /73   Pulse 73   Temp 97.5 °F (36.4 °C) (Oral)   Resp 20   Ht 1.803 m (5' 10.98\")   Wt 91.6 kg (202 lb)   SpO2 96%   BMI 28.19 kg/m²   General Appearance: alert and oriented to person, place and time and in no acute distress  Skin: warm and dry  Head: normocephalic and atraumatic  Eyes: pupils equal, round, and reactive to light, extraocular eye movements intact, conjunctivae normal  Neck: neck supple and non tender without mass   Pulmonary/Chest: clear to auscultation bilaterally- no wheezes, rales or rhonchi, 
4 Eyes Skin Assessment     NAME:  Castro Daniels  YOB: 1955  MEDICAL RECORD NUMBER:  [id number]    The patient is being assessed for  Admission    I agree that at least one RN has performed a thorough Head to Toe Skin Assessment on the patient. ALL assessment sites listed below have been assessed.      Areas assessed by both nurses:    Head, Face, Ears, Shoulders, Back, Chest, Arms, Elbows, Hands, Sacrum. Buttock, Coccyx, Ischium, Legs. Feet and Heels, and Under Medical Devices         Does the Patient have a Wound? No noted wound(s)       Ras Prevention initiated by RN: No  Wound Care Orders initiated by RN: No    Pressure Injury (Stage 3,4, Unstageable, DTI, NWPT, and Complex wounds) if present, place Wound referral order by RN under : No    New Ostomies, if present place, Ostomy referral order under : No     Nurse 1 eSignature: Electronically signed by Jeannette Rodriguez RN on 3/15/25 at 6:02 PM EDT    **SHARE this note so that the co-signing nurse can place an eSignature**    Nurse 2 eSignature: {Esignature:[id number]}    
Admitting Date and Time: 3/14/2025  6:35 PM  Admit Dx: Acute exacerbation of chronic obstructive pulmonary disease (COPD) (HCC) [J44.1]  Community acquired pneumonia, unspecified laterality [J18.9]    Subjective:    Pt feels no change in shortness of breath no worsening of other symptoms  Per RN: No issues    ROS: denies fever, chills, cp, sob, n/v, HA unless stated above.     atorvastatin  40 mg Oral Nightly    carvedilol  12.5 mg Oral BID WC    insulin glargine  15 Units SubCUTAneous Nightly    melatonin  10 mg Oral Nightly    QUEtiapine  200 mg Oral Nightly    traZODone  50 mg Oral Nightly    predniSONE  40 mg Oral Daily    insulin lispro  0-4 Units SubCUTAneous 4x Daily AC & HS    budesonide  0.5 mg Nebulization BID RT    And    arformoterol tartrate  15 mcg Nebulization BID RT    And    ipratropium  0.5 mg Nebulization 4x Daily RT    sodium chloride flush  5-40 mL IntraVENous 2 times per day    enoxaparin  40 mg SubCUTAneous Daily    cefTRIAXone (ROCEPHIN) IV  1,000 mg IntraVENous Q24H    And    azithromycin  500 mg Oral Q24H     glucose, 4 tablet, PRN  dextrose bolus, 125 mL, PRN   Or  dextrose bolus, 250 mL, PRN  glucagon (rDNA), 1 mg, PRN  dextrose, , Continuous PRN  sodium chloride flush, 5-40 mL, PRN  acetaminophen, 650 mg, Q6H PRN   Or  acetaminophen, 650 mg, Q6H PRN  ipratropium 0.5 mg-albuterol 2.5 mg, 1 Dose, Q4H PRN  guaiFENesin-dextromethorphan, 5 mL, Q4H PRN         Objective:    BP (!) 141/85   Pulse 95   Temp 97.9 °F (36.6 °C) (Oral)   Resp 28   Ht 1.803 m (5' 11\")   Wt 91.6 kg (202 lb)   SpO2 97%   BMI 28.17 kg/m²   General Appearance: alert and oriented to person, place and time and in no acute distress  Skin: warm and dry  Head: normocephalic and atraumatic  Eyes: pupils equal, round, and reactive to light, extraocular eye movements intact, conjunctivae normal  Neck: neck supple and non tender without mass   Pulmonary/Chest: clear to auscultation bilaterally- no wheezes, rales or 
Comprehensive Nutrition Assessment    Type and Reason for Visit:  Initial, Positive nutrition screen    Nutrition Recommendations/Plan:   Continue current diet and encourage PO intake   Recommend ONS Glucerna Shake TID        Malnutrition Assessment:  Malnutrition Status:  At risk for malnutrition (03/17/25 1508)    Context:  Chronic Illness     Findings of the 6 clinical characteristics of malnutrition:  Energy Intake:  Mild decrease in energy intake  Weight Loss:  No weight loss     Body Fat Loss:  No body fat loss     Muscle Mass Loss:  No muscle mass loss    Fluid Accumulation:  No fluid accumulation     Strength:  Not Performed    Nutrition Assessment:    Pt admit w/ SOB d/t severe COPD w/ exac. 2/2 community acquire PNA. PMHx of T2DM, HTN/HLD, COPD, depression, asthma. Pt is on regular diet and tolerating, intake 50-75% per pt, will switch ONS to glucerna and encourage intake.    Nutrition Related Findings:    abd WDL, no edema noted, I/O's -1.69L since admit, A&Ox4 Wound Type: None       Current Nutrition Intake & Therapies:    Average Meal Intake: 51-75%  Average Supplements Intake: 51-75%  ADULT DIET; Regular  ADULT ORAL NUTRITION SUPPLEMENT; Breakfast, Lunch, Dinner; Diabetic Oral Supplement    Anthropometric Measures:  Height: 180.3 cm (5' 10.98\")  Ideal Body Weight (IBW): 172 lbs (78 kg)    Admission Body Weight: 91.6 kg (202 lb) (3/14 stated)  Current Body Weight: 91.6 kg (201 lb 15.1 oz) (3/14 stated, UTO bed scale d/t read inaccurate, wt ordered), 117.4 % IBW. Weight Source: Stated  Current BMI (kg/m2): 28.2  Usual Body Weight:  (FARZANA d/t lack of wt hx per EMR since 5/2023 207lb 13oz BS)        Weight Adjustment For: No Adjustment                 BMI Categories: Overweight (BMI 25.0-29.9)    Estimated Daily Nutrient Needs:  Energy Requirements Based On: Formula  Weight Used for Energy Requirements: Current  Energy (kcal/day): 2000-2200kcal/day  Weight Used for Protein Requirements: Ideal  Protein 
Physical Therapy Initial Evaluation/Plan of Care    Room #:  0436/0436-01  Patient Name: Castro Daniels  YOB: 1955  MRN: [id number]    Date of Service: 3/19/2025     Tentative placement recommendation: Home with Home Health Physical Therapy  Equipment recommendation: To be determined      Evaluating Physical Therapist: Palak Harding, PT #[id number]      Specific Provider Orders/Date/Referring Provider :  03/17/25 1400    PT eval and treat  Start:  03/17/25 1400,   End:  03/17/25 1400,   ONE TIME,   Standing Count:  1 Occurrences,   R       Petar Daniels MD Acknowledge New      Admitting Diagnosis:   Acute exacerbation of chronic obstructive pulmonary disease (COPD) ([facility]) [J44.1]  Community acquired pneumonia, unspecified laterality [J18.9]     respiratory distress  Surgery: none  Visit Diagnoses         Codes      Acute exacerbation of chronic obstructive pulmonary disease (COPD) ([facility])    -  Primary J44.1            Patient Active Problem List   Diagnosis    COPD exacerbation ([facility])    History of tobacco abuse    Primary hypertension    Depression    Acute respiratory failure with hypoxia ([facility])    Sinus tachycardia    Community acquired pneumonia, unspecified laterality        ASSESSMENT of Current Deficits Patient exhibits decreased strength, balance, and endurance impairing functional mobility, transfers, gait , gait distance, and tolerance to activity increaed shortness of breath with activity maintains O2 saturation. Unsteady gait, reaches for furniture/walls. Patient requires continued skilled physical therapy to address concerns listed above for increased safety and function at discharge.         PHYSICAL THERAPY  PLAN OF CARE       Physical therapy plan of care is established based on physician order,  patient diagnosis and clinical assessment    Current Treatment Recommendations:    -Standing Balance: Perform strengthening exercises in standing to promote motor control with or without upper 
Spiritual Health History and Assessment/Progress Note  [facility]    (P) Spiritual/Emotional Needs,  ,  ,      Name: Castro Daniels MRN: [id number]    Age: 70 y.o.     Sex: male   Language: English   Moravian: Rastafari   Community acquired pneumonia, unspecified laterality     Date: 3/19/2025                           Spiritual Assessment began in [facility] MED SURG TELE        Referral/Consult From: (P) Rounding   Encounter Overview/Reason: (P) Spiritual/Emotional Needs  Service Provided For: (P) Patient    Rowena, Belief, Meaning:   Patient has beliefs or practices that help with coping during difficult times  Family/Friends No family/friends present      Importance and Influence:  Patient has spiritual/personal beliefs that influence decisions regarding their health  Family/Friends No family/friends present    Community:  Patient feels well-supported. Support system includes: Spouse/Partner  Family/Friends No family/friends present    Assessment and Plan of Care:     Patient Interventions include: Facilitated expression of thoughts and feelings, Engaged in theological reflection, Facilitated life review and/ or legacy, and Other:  and patient discussed his girlfriend's health and his hopes for personal regained health.  Family/Friends Interventions include: No family/friends present    Patient Plan of Care: Spiritual Care available upon further referral  Family/Friends Plan of Care: Spiritual Care available upon further referral    Electronically signed by Chaplain Isa on 3/19/2025 at 3:09 PM    
291.7, pCO2 41 while still on BIPAP.  Flu/COVID negative.  D-dimer <200.    Given magnesium, solu-medrol, 1L bolus in ED.      Acute on chronic hypoxic respiratory failure due to multifactorial reasons including community-acquired pneumonia and acute exacerbation of severe COPD.  Empiric Rocephin and Zithromax. DuoNebs Brovana Pulmicort.  Strep pneumonia Legionella antigen, & Respiratory array & rapid influenza and COVID-negative. mycoplasma still pneumonia.    He explains that his outpatient tertiary care center pulmonologist will be putting him on transplant list shortly.  3/16 home O2 is 3 L but currently on 4 L he is still having his general sense of malaise and would anyway benefit from at least 1 more additional day stay  Diabetes: Diabetic diet.  No oral diabetic meds here.  Sliding scale coverage.  Hypoglycemic protocol.  Lantus ordered at home dose of 15  Hypertension: Reasonably controlled on home medications.  Adjust as needed.   Continue to monitor.  Hyperlipidemia statin  Full code  DVT prophylaxis Lovenox  Dispo likely will be able to get discharged in 1-2 days    NOTE: This report was transcribed using voice recognition software. Every effort was made to ensure accuracy; however, inadvertent computerized transcription errors may be present.     Electronically signed by YOLANDA BARBER MD on 3/16/2025 at 10:30 AM  
Eval High 87805      OT Re-Eval 03234            ADL/Self Care 77690     Therapeutic Activities 55609       Therapeutic Ex 86177       Orthotic Management 44617       Manual 83595     Neuro Re-Ed 34210       Non-Billable Time        Evaluation Time additionally includes thorough review of current medical information, gathering information on past medical history/social history and prior level of function, interpretation of standardized testing/informal observation of tasks, assessment of data and development of plan of care and goals.        Evaluating OT: Mary Godoy OTR/L [id number]

## 2025-03-20 VITALS
WEIGHT: 202 LBS | RESPIRATION RATE: 22 BRPM | BODY MASS INDEX: 28.28 KG/M2 | TEMPERATURE: 97.7 F | DIASTOLIC BLOOD PRESSURE: 69 MMHG | OXYGEN SATURATION: 95 % | HEART RATE: 68 BPM | HEIGHT: 71 IN | SYSTOLIC BLOOD PRESSURE: 126 MMHG

## 2025-03-20 LAB
ALBUMIN SERPL-MCNC: 3.5 G/DL (ref 3.5–5.2)
ALP SERPL-CCNC: 88 U/L (ref 40–129)
ALT SERPL-CCNC: 27 U/L (ref 0–40)
ANION GAP SERPL CALCULATED.3IONS-SCNC: 10 MMOL/L (ref 7–16)
AST SERPL-CCNC: 19 U/L (ref 0–39)
BASOPHILS # BLD: 0.03 K/UL (ref 0–0.2)
BASOPHILS NFR BLD: 0 % (ref 0–2)
BILIRUB SERPL-MCNC: 0.3 MG/DL (ref 0–1.2)
BUN SERPL-MCNC: 21 MG/DL (ref 6–23)
CALCIUM SERPL-MCNC: 8.5 MG/DL (ref 8.6–10.2)
CHLORIDE SERPL-SCNC: 103 MMOL/L (ref 98–107)
CO2 SERPL-SCNC: 25 MMOL/L (ref 22–29)
CREAT SERPL-MCNC: 1.2 MG/DL (ref 0.7–1.2)
EOSINOPHIL # BLD: 0.07 K/UL (ref 0.05–0.5)
EOSINOPHILS RELATIVE PERCENT: 1 % (ref 0–6)
ERYTHROCYTE [DISTWIDTH] IN BLOOD BY AUTOMATED COUNT: 12.8 % (ref 11.5–15)
GFR, ESTIMATED: 67 ML/MIN/1.73M2
GLUCOSE BLD-MCNC: 175 MG/DL (ref 74–99)
GLUCOSE BLD-MCNC: 79 MG/DL (ref 74–99)
GLUCOSE SERPL-MCNC: 72 MG/DL (ref 74–99)
HCT VFR BLD AUTO: 42.6 % (ref 37–54)
HGB BLD-MCNC: 14.5 G/DL (ref 12.5–16.5)
IMM GRANULOCYTES # BLD AUTO: 0.1 K/UL (ref 0–0.58)
IMM GRANULOCYTES NFR BLD: 1 % (ref 0–5)
LYMPHOCYTES NFR BLD: 3.81 K/UL (ref 1.5–4)
LYMPHOCYTES RELATIVE PERCENT: 32 % (ref 20–42)
MCH RBC QN AUTO: 31.3 PG (ref 26–35)
MCHC RBC AUTO-ENTMCNC: 34 G/DL (ref 32–34.5)
MCV RBC AUTO: 91.8 FL (ref 80–99.9)
MONOCYTES NFR BLD: 1.27 K/UL (ref 0.1–0.95)
MONOCYTES NFR BLD: 11 % (ref 2–12)
NEUTROPHILS NFR BLD: 56 % (ref 43–80)
NEUTS SEG NFR BLD: 6.72 K/UL (ref 1.8–7.3)
PLATELET # BLD AUTO: 217 K/UL (ref 130–450)
PMV BLD AUTO: 9.5 FL (ref 7–12)
POTASSIUM SERPL-SCNC: 3.8 MMOL/L (ref 3.5–5)
PROCALCITONIN SERPL-MCNC: 0.06 NG/ML (ref 0–0.08)
PROT SERPL-MCNC: 5.7 G/DL (ref 6.4–8.3)
RBC # BLD AUTO: 4.64 M/UL (ref 3.8–5.8)
SODIUM SERPL-SCNC: 138 MMOL/L (ref 132–146)
WBC OTHER # BLD: 12 K/UL (ref 4.5–11.5)

## 2025-03-20 PROCEDURE — 94660 CPAP INITIATION&MGMT: CPT

## 2025-03-20 PROCEDURE — 6360000002 HC RX W HCPCS: Performed by: FAMILY MEDICINE

## 2025-03-20 PROCEDURE — 2500000003 HC RX 250 WO HCPCS: Performed by: FAMILY MEDICINE

## 2025-03-20 PROCEDURE — 6370000000 HC RX 637 (ALT 250 FOR IP): Performed by: INTERNAL MEDICINE

## 2025-03-20 PROCEDURE — 94640 AIRWAY INHALATION TREATMENT: CPT

## 2025-03-20 PROCEDURE — 6360000002 HC RX W HCPCS: Performed by: INTERNAL MEDICINE

## 2025-03-20 PROCEDURE — 82962 GLUCOSE BLOOD TEST: CPT

## 2025-03-20 PROCEDURE — 84145 PROCALCITONIN (PCT): CPT

## 2025-03-20 PROCEDURE — 36415 COLL VENOUS BLD VENIPUNCTURE: CPT

## 2025-03-20 PROCEDURE — 80053 COMPREHEN METABOLIC PANEL: CPT

## 2025-03-20 PROCEDURE — 85025 COMPLETE CBC W/AUTO DIFF WBC: CPT

## 2025-03-20 PROCEDURE — 2500000003 HC RX 250 WO HCPCS: Performed by: INTERNAL MEDICINE

## 2025-03-20 PROCEDURE — 2700000000 HC OXYGEN THERAPY PER DAY

## 2025-03-20 PROCEDURE — 6370000000 HC RX 637 (ALT 250 FOR IP): Performed by: FAMILY MEDICINE

## 2025-03-20 RX ORDER — PREDNISONE 10 MG/1
TABLET ORAL
Qty: 12 TABLET | Refills: 0 | Status: SHIPPED | OUTPATIENT
Start: 2025-03-20 | End: 2025-03-26

## 2025-03-20 RX ORDER — HYDROCODONE BITARTRATE AND ACETAMINOPHEN 7.5; 325 MG/1; MG/1
1 TABLET ORAL EVERY 6 HOURS PRN
Qty: 12 TABLET | Refills: 0 | Status: SHIPPED | OUTPATIENT
Start: 2025-03-20 | End: 2025-03-23

## 2025-03-20 RX ORDER — SIMVASTATIN 80 MG
80 TABLET ORAL NIGHTLY
COMMUNITY

## 2025-03-20 RX ADMIN — HYDROCODONE BITARTRATE AND ACETAMINOPHEN 1 TABLET: 7.5; 325 TABLET ORAL at 03:27

## 2025-03-20 RX ADMIN — ARFORMOTEROL TARTRATE 15 MCG: 15 SOLUTION RESPIRATORY (INHALATION) at 06:51

## 2025-03-20 RX ADMIN — IPRATROPIUM BROMIDE 0.5 MG: 0.5 SOLUTION RESPIRATORY (INHALATION) at 06:51

## 2025-03-20 RX ADMIN — IPRATROPIUM BROMIDE 0.5 MG: 0.5 SOLUTION RESPIRATORY (INHALATION) at 10:38

## 2025-03-20 RX ADMIN — HYDROCODONE BITARTRATE AND ACETAMINOPHEN 1 TABLET: 7.5; 325 TABLET ORAL at 07:58

## 2025-03-20 RX ADMIN — BUDESONIDE 500 MCG: 0.5 INHALANT RESPIRATORY (INHALATION) at 06:51

## 2025-03-20 RX ADMIN — PREDNISONE 40 MG: 20 TABLET ORAL at 07:58

## 2025-03-20 RX ADMIN — CARVEDILOL 12.5 MG: 6.25 TABLET, FILM COATED ORAL at 07:58

## 2025-03-20 RX ADMIN — ENOXAPARIN SODIUM 40 MG: 100 INJECTION SUBCUTANEOUS at 09:04

## 2025-03-20 RX ADMIN — WATER 1000 MG: 1 INJECTION INTRAMUSCULAR; INTRAVENOUS; SUBCUTANEOUS at 11:12

## 2025-03-20 RX ADMIN — Medication 10 ML: at 07:58

## 2025-03-20 ASSESSMENT — PAIN DESCRIPTION - DESCRIPTORS
DESCRIPTORS: ACHING;DISCOMFORT;TIGHTNESS
DESCRIPTORS: ACHING;DISCOMFORT

## 2025-03-20 ASSESSMENT — PAIN SCALES - GENERAL: PAINLEVEL_OUTOF10: 7

## 2025-03-20 ASSESSMENT — PAIN DESCRIPTION - ORIENTATION
ORIENTATION: RIGHT;LEFT;LOWER;MID;POSTERIOR
ORIENTATION: LEFT

## 2025-03-20 ASSESSMENT — PAIN DESCRIPTION - LOCATION
LOCATION: BACK
LOCATION: FLANK

## 2025-03-20 ASSESSMENT — PAIN - FUNCTIONAL ASSESSMENT: PAIN_FUNCTIONAL_ASSESSMENT: ACTIVITIES ARE NOT PREVENTED

## 2025-03-20 NOTE — PLAN OF CARE
Problem: Safety - Adult  Goal: Free from fall injury  3/16/2025 1235 by Jeannette Rodriguez, RN  Outcome: Progressing  3/16/2025 0324 by SANJEEV Gill RN  Outcome: Progressing     Problem: ABCDS Injury Assessment  Goal: Absence of physical injury  Outcome: Progressing     
  Problem: Safety - Adult  Goal: Free from fall injury  3/17/2025 1106 by Becky Arauz, RN  Outcome: Progressing     Problem: ABCDS Injury Assessment  Goal: Absence of physical injury  3/17/2025 1106 by Becky Arauz, RN  Outcome: Progressing     
  Problem: Safety - Adult  Goal: Free from fall injury  3/18/2025 1217 by Becky Arauz, RN  Outcome: Progressing     Problem: ABCDS Injury Assessment  Goal: Absence of physical injury  3/18/2025 1217 by Becky Arauz, RN  Outcome: Progressing     Problem: Nutrition Deficit:  Goal: Optimize nutritional status  3/18/2025 1217 by Becky Arauz, RN  Outcome: Progressing     
  Problem: Safety - Adult  Goal: Free from fall injury  3/18/2025 2206 by Jennifer Godoy RN  Outcome: Progressing  3/18/2025 1217 by Becky Arauz RN  Outcome: Progressing     Problem: ABCDS Injury Assessment  Goal: Absence of physical injury  3/18/2025 2206 by Jennifer Godoy RN  Outcome: Progressing  3/18/2025 1217 by Becky Arauz RN  Outcome: Progressing     Problem: Nutrition Deficit:  Goal: Optimize nutritional status  3/18/2025 2206 by Jennifer Godoy RN  Outcome: Progressing  3/18/2025 1217 by Becky Arauz RN  Outcome: Progressing     Problem: Respiratory - Adult  Goal: Achieves optimal ventilation and oxygenation  Outcome: Progressing     Problem: Cardiovascular - Adult  Goal: Maintains optimal cardiac output and hemodynamic stability  Outcome: Progressing  Goal: Absence of cardiac dysrhythmias or at baseline  Outcome: Progressing     Problem: Gastrointestinal - Adult  Goal: Minimal or absence of nausea and vomiting  Outcome: Progressing  Goal: Maintains or returns to baseline bowel function  Outcome: Progressing  Goal: Maintains adequate nutritional intake  Outcome: Progressing  Goal: Establish and maintain optimal ostomy function  Outcome: Progressing     
  Problem: Safety - Adult  Goal: Free from fall injury  Outcome: Progressing     
  Problem: Safety - Adult  Goal: Free from fall injury  Outcome: Progressing     
  Problem: Safety - Adult  Goal: Free from fall injury  Outcome: Progressing     Problem: ABCDS Injury Assessment  Goal: Absence of physical injury  Outcome: Progressing     
  Problem: Safety - Adult  Goal: Free from fall injury  Outcome: Progressing     Problem: ABCDS Injury Assessment  Goal: Absence of physical injury  Outcome: Progressing     Problem: Nutrition Deficit:  Goal: Optimize nutritional status  3/18/2025 0321 by SANJEEV Gill RN  Outcome: Progressing  3/17/2025 1510 by Martina Thomas RD, LD  Outcome: Progressing     
  Problem: Safety - Adult  Goal: Free from fall injury  Outcome: Progressing     Problem: ABCDS Injury Assessment  Goal: Absence of physical injury  Outcome: Progressing     Problem: Nutrition Deficit:  Goal: Optimize nutritional status  Outcome: Progressing     Problem: Respiratory - Adult  Goal: Achieves optimal ventilation and oxygenation  Outcome: Progressing  Flowsheets (Taken 3/19/2025 7230)  Achieves optimal ventilation and oxygenation:   Assess for changes in respiratory status   Assess for changes in mentation and behavior   Position to facilitate oxygenation and minimize respiratory effort     Problem: Cardiovascular - Adult  Goal: Maintains optimal cardiac output and hemodynamic stability  Outcome: Progressing  Goal: Absence of cardiac dysrhythmias or at baseline  Outcome: Progressing     Problem: Gastrointestinal - Adult  Goal: Minimal or absence of nausea and vomiting  Outcome: Progressing  Goal: Maintains or returns to baseline bowel function  Outcome: Progressing  Goal: Maintains adequate nutritional intake  Outcome: Progressing  Goal: Establish and maintain optimal ostomy function  Outcome: Progressing     Problem: Pain  Goal: Verbalizes/displays adequate comfort level or baseline comfort level  Outcome: Progressing     
RN  Outcome: Progressing     Problem: Pain  Goal: Verbalizes/displays adequate comfort level or baseline comfort level  3/20/2025 1406 by Constanza Corrales RN  Outcome: Adequate for Discharge  3/20/2025 0232 by Stefanie Barker RN  Outcome: Progressing     
maintain optimal ostomy function  3/20/2025 0232 by Stefanie Barker, RN  Outcome: Progressing  3/19/2025 1859 by Constanza Corrales, RN  Outcome: Progressing     Problem: Pain  Goal: Verbalizes/displays adequate comfort level or baseline comfort level  3/20/2025 0232 by Stefanie Barker, RN  Outcome: Progressing  3/19/2025 1859 by Constanza Corrales, RN  Outcome: Progressing

## 2025-03-20 NOTE — DISCHARGE INSTR - COC
Continuity of Care Form    Patient Name: Castro Daniels   :  1955  MRN:  [id number]    Admit date:  3/14/2025  Discharge date:  ***    Code Status Order: Full Code   Advance Directives:     Admitting Physician:  Mariana Love DO  PCP: Ori Cardenas MD    Discharging Nurse: ***  Discharging Hospital Unit/Room#: 0436/0436-01  Discharging Unit Phone Number: ***    Emergency Contact:   Extended Emergency Contact Information  Primary Emergency Contact: GaudencioNancy rodriguez  [address]: [address] [place], [place] [address] [place] [address] of [facility]  Home Phone: [phone]  Mobile Phone: [phone]  Relation: Other    Past Surgical History:  Past Surgical History:   Procedure Laterality Date    COLONOSCOPY      HAND SURGERY Left 2019    LEFT HAND-PALN, DUPUYTREN'S CONTRACTURE, MAJOR RESECTION. POSSIBLE FULL THICKNESS SKIN GRAFT    INTRACAPSULAR CATARACT EXTRACTION Left 11/15/2022    CATARACT EXTRACTION WITH IOL,COMPLEX VISCOAT, VISION BLUE, POSSIBLE ECCE-LEFT performed by Petar EDGAR MD at [facility] OR    LARYNGOSCOPY      removal non malignant lesion    NECK SURGERY      cervical fusion x2    WRIST SURGERY Left 2019    LEFT HAND DUPUYTRENS CONTRACTURE MAJOR RESECTION,  Z-PLASTY performed by Jordon Sandoval MD at [facility] OR       Immunization History:   Immunization History   Administered Date(s) Administered    COVID-19, MODERNA BLUE border, Primary or Immunocompromised, (age 12y+), IM, 100 mcg/0.5mL 2021, 2021       Active Problems:  Patient Active Problem List   Diagnosis Code    COPD exacerbation ([facility]) J44.1    History of tobacco abuse Z87.891    Primary hypertension I10    Depression F32.A    Acute respiratory failure with hypoxia ([facility]) J96.01    Sinus tachycardia R00.0    Community acquired pneumonia, unspecified laterality J18.9       Isolation/Infection:   Isolation            No Isolation          Patient Infection Status    No active infections.   Resolved

## 2025-03-20 NOTE — CARE COORDINATION
3/18/2025 1404 CM note: Pt is [place], wears [employer] hearing aides and resides with his fiance Nancy Qureshi who is pt in this hospital also. Pt and Nancy reside in a 2 story home,3STE. Bed/bath are on both levels. He ambulates with a cane or ww.  Other DME includes nebulizer and o2@3L NC (portable/concentrator) from [employer]. Hx [place] [facility], no hx HHC. Pt plans to return home at d/c and relays his step dtr Zeny will provide transportation. Will await therapy patrick Fagan RN  
3/19/2025 1251 CM note:  Pt is [place], wears [employer] hearing aides and resides with his fiance Nancy Qureshi who is pt at [facility].  Pt and Nancy reside in a 2 story home,3STE. Bed/bath are on both levels. He ambulates with a cane or ww.  Other DME includes nebulizer and o2@3L NC (portable/concentrator) from [facility]. Pt plans to return home,declines [facility], and relays his step dtr Zeny will provide transportation. Rylan LUGO  
3/20/2025 1237 CM note:  Pt is [place], wears [employer] hearing aides and resides with his fiance Nancy Qureshi who is pt at [facility].  Pt and Nancy reside in a 2 story home,3STE.  DME includes cane,ww,  nebulizer and o2@3L NC from [employer]. Pt plans to return home,declines C, and relays his step dtr Zeny will provide transportation. Pt's portable o2 tank at bedside. Rylan LUGO  
expects to discharge to: House  Plan for transportation at discharge:      Financial    [facility] / Plan: [facility] MEDICARE / Product Type: *No Product type* /       Potential assistance Purchasing Medications:    Meds-to-Beds request: Yes      [id number]  [place] ([place]), [place] - [address] - P [phone] - F [phone]  [address] [facility] [place] [address]  Phone: [phone] Fax: [phone]      Notes:    Factors facilitating achievement of predicted outcomes: Caregiver support, Cooperative, and Pleasant      Additional Case Management Notes: 3/17/2025 1350 CM note: Met with patient for care coordination. Pt is [place], wears [employer]ra hearing aides and resides with his fiance Nancy Qureshi who is pt in this hospital also. Pt and Nancy reside in a 2 story home,[facility]. Bed/bath are on both levels. He ambulates with a cane or ww and relays he has been increasingly weak last 1-2 weeks. Prior he was fairly independent. Other DME includes nebulizer and o2@3L NC (portable/concentrator) from [employer]. PCP is Dr Cardenas. Hx [facility], no hx [place]. Pt plans to return home at d/c and relays his step dtr Zeny will provide transportation. Will await therapy patrick Fagan RN        The Plan for Transition of Care is related to the following treatment goals of Acute exacerbation of chronic obstructive pulmonary disease (COPD) ([facility]) [J44.1]  Community acquired pneumonia, unspecified laterality [J18.9]      The Patient and/or Patient Representative Agree with the Discharge Plan?  yes    Candelaria Burk RN  Case Management Department

## 2025-03-20 NOTE — DISCHARGE SUMMARY
250 MG tablet Take 1 tablet by mouth daily This is a permanent/chronic med per the [facility] pulmonary team -- do not discontinue      melatonin 5 MG TBDP disintegrating tablet Take 2 tablets by mouth nightly      OXYGEN Inhale 3 L into the lungs continuous      budesonide (PULMICORT) 0.5 MG/2ML nebulizer suspension Take 2 mLs by nebulization in the morning and 2 mLs in the evening.  Qty: 60 each, Refills: 3      QUEtiapine (SEROQUEL XR) 200 MG extended release tablet Take 1 tablet by mouth nightly      ipratropium-albuterol (DUONEB) 0.5-2.5 (3) MG/3ML SOLN nebulizer solution Inhale 3 mLs into the lungs every 6 hours as needed for Shortness of Breath or Wheezing      albuterol sulfate HFA (PROVENTIL;VENTOLIN;PROAIR) 108 (90 Base) MCG/ACT inhaler Inhale 2 puffs into the lungs every 6 hours as needed for Wheezing           STOP taking these medications       traZODone (DESYREL) 50 MG tablet Comments:   Reason for Stopping:         insulin glargine (LANTUS) 100 UNIT/ML injection vial Comments:   Reason for Stopping:         insulin lispro (HUMALOG) 100 UNIT/ML SOLN injection vial Comments:   Reason for Stopping:         insulin lispro (HUMALOG) 100 UNIT/ML SOLN injection vial Comments:   Reason for Stopping:         glimepiride (AMARYL) 1 MG tablet Comments:   Reason for Stopping:         atorvastatin (LIPITOR) 40 MG tablet Comments:   Reason for Stopping:                 Note  that  32  minutes were spent in preparing discharge papers, discussing discharge with patient, medication review, etc.    NOTE: This report was transcribed using voice recognition software. Every effort was made to ensure accuracy; however, inadvertent computerized transcription errors may be present.     Signed:  Electronically signed by Petar Daniels MD on 3/20/2025 at 1:46 PM

## (undated) DEVICE — PAD N ADH W3XL4IN POLY COT SFT PERF FLM EASILY CUT ABSRB

## (undated) DEVICE — PREP TRAY 10X5X2: Brand: MEDLINE INDUSTRIES, INC.

## (undated) DEVICE — BASIC PACK: Brand: CONVERTORS

## (undated) DEVICE — CUFF TOURNIQUET 18 SNG BLADDER DUAL PORT

## (undated) DEVICE — GOWN SURG XL SMS FAB NONREINFORCED RAGLAN SLV HK LOOP CLSR

## (undated) DEVICE — TIBURON EXTREMITY SHEET: Brand: CONVERTORS

## (undated) DEVICE — MARKER,SKIN,WI/RULER AND LABELS: Brand: MEDLINE

## (undated) DEVICE — GAUZE,SPONGE,4"X4",16PLY,XRAY,STRL,LF: Brand: MEDLINE

## (undated) DEVICE — SURGICAL PREP KIT DRY EYE TY STRL

## (undated) DEVICE — STERILE POLYISOPRENE POWDER-FREE SURGICAL GLOVES: Brand: PROTEXIS

## (undated) DEVICE — BANDAGE,GAUZE,BULKEE II,4.5"X4.1YD,STRL: Brand: MEDLINE

## (undated) DEVICE — BLADE OPHTH GRN ROUNDED TIP 1 SIDE SHRP GRINDLESS MINI-BLDE

## (undated) DEVICE — SOLUTION SCRB 32OZ 7.5% POVIDONE IOD BTL GENTLE EFFECTIVE

## (undated) DEVICE — SOLUTION IV IRRIG POUR BRL 0.9% SODIUM CHL 2F7124

## (undated) DEVICE — STOCKINETTE COMPR W4XL54IN 2 PLY COT

## (undated) DEVICE — BANDAGE COMPR W2INXL5YD WHT BGE POLY COT M E WRP WV HK AND

## (undated) DEVICE — 20 ML SYRINGE REGULAR TIP: Brand: MONOJECT

## (undated) DEVICE — SUTURE ETHLN 10-0 L12IN NONABSORBABLE BLK CS140-8 L6.5MM 9033G

## (undated) DEVICE — TOWEL OR BLUEE 16X26IN ST 8 PACK ORB08 16X26ORTWL

## (undated) DEVICE — Device

## (undated) DEVICE — SURGICAL PROCEDURE PACK CATRCT LT EYE BASIC CUST ST JOS LF

## (undated) DEVICE — PENCIL ES L3M BTTN SWCH HOLSTER W/ BLDE ELECTRD EDGE

## (undated) DEVICE — PEN: MARKING STD 100/CS: Brand: MEDICAL ACTION INDUSTRIES

## (undated) DEVICE — NEEDLE HYPO 25GA L1.5IN BLU POLYPR HUB S STL REG BVL STR

## (undated) DEVICE — ENCORE® LATEX MICRO SIZE 8.5, STERILE LATEX POWDER-FREE SURGICAL GLOVE: Brand: ENCORE

## (undated) DEVICE — 3 ML SYRINGE LUER-LOCK TIP: Brand: MONOJECT

## (undated) DEVICE — INTENDED FOR TISSUE SEPARATION, AND OTHER PROCEDURES THAT REQUIRE A SHARP SURGICAL BLADE TO PUNCTURE OR CUT.: Brand: BARD-PARKER ® STAINLESS STEEL BLADES

## (undated) DEVICE — SOLUTION IV IRRIG WATER 1000ML POUR BRL 2F7114

## (undated) DEVICE — Z DISCONTINUED NO SUB IDED DRAIN PENROSE L12IN 0.25IN USED TO PROMOTE DRNAGE IN OPN

## (undated) DEVICE — BANDAGE COMPR W4XL108IN WHT LAYERED NO CLSR SYN RUB ESMARCH

## (undated) DEVICE — GLOVE ORANGE PI 8 1/2   MSG9085

## (undated) DEVICE — 12 ML SYRINGE,LUER-LOCK TIP: Brand: MONOJECT

## (undated) DEVICE — SUTURE ETHLN SZ 4-0 L18IN NONABSORBABLE BLK L19MM PS-2 3/8 1667H

## (undated) DEVICE — CONTROL SYRINGE LUER-LOCK TIP: Brand: MONOJECT

## (undated) DEVICE — 1810 FOAM BLOCK NEEDLE COUNTER: Brand: DEVON

## (undated) DEVICE — BANDAGE COMPR W4INXL5YD WHT BGE POLY COT M E WRP WV HK AND

## (undated) DEVICE — CATHETER IV 20GA L1.16IN OD1.080MM ID0.800MM 60ML/MIN PNK

## (undated) DEVICE — HANDLE CVR PATENTED RETENTION DISC STRL LIGHT SHLD

## (undated) DEVICE — NEEDLE HYPO 18GA L1.5IN PNK POLYPR HUB S STL THN WALL FILL

## (undated) DEVICE — ELECTRODE PT RET AD L9FT HI MOIST COND ADH HYDRGEL CORDED

## (undated) DEVICE — GLOVE ORANGE PI 8   MSG9080